# Patient Record
Sex: FEMALE | Race: WHITE | NOT HISPANIC OR LATINO | Employment: OTHER | URBAN - METROPOLITAN AREA
[De-identification: names, ages, dates, MRNs, and addresses within clinical notes are randomized per-mention and may not be internally consistent; named-entity substitution may affect disease eponyms.]

---

## 2018-02-23 ENCOUNTER — OFFICE VISIT - HEALTHEAST (OUTPATIENT)
Dept: FAMILY MEDICINE | Facility: CLINIC | Age: 68
End: 2018-02-23

## 2018-02-23 DIAGNOSIS — Z71.84 TRAVEL ADVICE ENCOUNTER: ICD-10-CM

## 2018-02-23 DIAGNOSIS — Z00.00 ROUTINE GENERAL MEDICAL EXAMINATION AT A HEALTH CARE FACILITY: ICD-10-CM

## 2018-02-23 DIAGNOSIS — M81.8 OTHER OSTEOPOROSIS WITHOUT CURRENT PATHOLOGICAL FRACTURE: ICD-10-CM

## 2018-02-23 DIAGNOSIS — F32.A DEPRESSION, UNSPECIFIED DEPRESSION TYPE: ICD-10-CM

## 2018-02-23 DIAGNOSIS — B18.2 HEP C W/O COMA, CHRONIC (H): ICD-10-CM

## 2018-02-23 DIAGNOSIS — R74.8 ELEVATED ALKALINE PHOSPHATASE LEVEL: ICD-10-CM

## 2018-02-23 DIAGNOSIS — R53.83 FATIGUE: ICD-10-CM

## 2018-02-23 DIAGNOSIS — M81.0 OSTEOPOROSIS: ICD-10-CM

## 2018-02-23 LAB
ALBUMIN SERPL-MCNC: 3.3 G/DL (ref 3.5–5)
ALP SERPL-CCNC: 144 U/L (ref 45–120)
ALT SERPL W P-5'-P-CCNC: 89 U/L (ref 0–45)
ANION GAP SERPL CALCULATED.3IONS-SCNC: 6 MMOL/L (ref 5–18)
AST SERPL W P-5'-P-CCNC: 69 U/L (ref 0–40)
BASOPHILS # BLD AUTO: 0 THOU/UL (ref 0–0.2)
BASOPHILS NFR BLD AUTO: 0 % (ref 0–2)
BILIRUB DIRECT SERPL-MCNC: 0.2 MG/DL
BILIRUB SERPL-MCNC: 0.4 MG/DL (ref 0–1)
BUN SERPL-MCNC: 15 MG/DL (ref 8–22)
CALCIUM SERPL-MCNC: 9 MG/DL (ref 8.5–10.5)
CHLORIDE BLD-SCNC: 102 MMOL/L (ref 98–107)
CHOLEST SERPL-MCNC: 115 MG/DL
CO2 SERPL-SCNC: 28 MMOL/L (ref 22–31)
CREAT SERPL-MCNC: 0.73 MG/DL (ref 0.6–1.1)
EOSINOPHIL # BLD AUTO: 0.1 THOU/UL (ref 0–0.4)
EOSINOPHIL NFR BLD AUTO: 1 % (ref 0–6)
ERYTHROCYTE [DISTWIDTH] IN BLOOD BY AUTOMATED COUNT: 11.7 % (ref 11–14.5)
FASTING STATUS PATIENT QL REPORTED: NO
GFR SERPL CREATININE-BSD FRML MDRD: >60 ML/MIN/1.73M2
GGT SERPL-CCNC: 49 U/L (ref 0–50)
GLUCOSE BLD-MCNC: 100 MG/DL (ref 70–125)
HCT VFR BLD AUTO: 40.3 % (ref 35–47)
HDLC SERPL-MCNC: 38 MG/DL
HGB BLD-MCNC: 13.5 G/DL (ref 12–16)
LDLC SERPL CALC-MCNC: 55 MG/DL
LYMPHOCYTES # BLD AUTO: 1.4 THOU/UL (ref 0.8–4.4)
LYMPHOCYTES NFR BLD AUTO: 19 % (ref 20–40)
MCH RBC QN AUTO: 31.4 PG (ref 27–34)
MCHC RBC AUTO-ENTMCNC: 33.5 G/DL (ref 32–36)
MCV RBC AUTO: 94 FL (ref 80–100)
MONOCYTES # BLD AUTO: 0.5 THOU/UL (ref 0–0.9)
MONOCYTES NFR BLD AUTO: 7 % (ref 2–10)
NEUTROPHILS # BLD AUTO: 5.4 THOU/UL (ref 2–7.7)
NEUTROPHILS NFR BLD AUTO: 73 % (ref 50–70)
PLATELET # BLD AUTO: 127 THOU/UL (ref 140–440)
PMV BLD AUTO: 7.4 FL (ref 7–10)
POTASSIUM BLD-SCNC: 4.4 MMOL/L (ref 3.5–5)
PROT SERPL-MCNC: 6.6 G/DL (ref 6–8)
RBC # BLD AUTO: 4.29 MILL/UL (ref 3.8–5.4)
SODIUM SERPL-SCNC: 136 MMOL/L (ref 136–145)
TRIGL SERPL-MCNC: 109 MG/DL
TSH SERPL DL<=0.005 MIU/L-ACNC: 0.26 UIU/ML (ref 0.3–5)
VIT B12 SERPL-MCNC: 924 PG/ML (ref 213–816)
WBC: 7.4 THOU/UL (ref 4–11)

## 2018-02-25 LAB — 25(OH)D3 SERPL-MCNC: 28.4 NG/ML (ref 30–80)

## 2018-02-28 LAB
ALP BONE SERPL-CCNC: 62 U/L (ref 0–55)
ALP LIVER SERPL-CCNC: 67 U/L (ref 0–94)
ALP OTHER SERPL-CCNC: 0 U/L
ALP SERPL-CCNC: 129 U/L (ref 40–120)

## 2018-03-02 ENCOUNTER — COMMUNICATION - HEALTHEAST (OUTPATIENT)
Dept: FAMILY MEDICINE | Facility: CLINIC | Age: 68
End: 2018-03-02

## 2018-03-02 DIAGNOSIS — R79.89 DECREASED THYROID STIMULATING HORMONE (TSH) LEVEL: ICD-10-CM

## 2018-03-02 DIAGNOSIS — R74.8 ELEVATED LIVER ENZYMES: ICD-10-CM

## 2018-03-02 DIAGNOSIS — D69.6 PLATELETS DECREASED (H): ICD-10-CM

## 2018-03-06 ENCOUNTER — COMMUNICATION - HEALTHEAST (OUTPATIENT)
Dept: FAMILY MEDICINE | Facility: CLINIC | Age: 68
End: 2018-03-06

## 2018-03-19 ENCOUNTER — RECORDS - HEALTHEAST (OUTPATIENT)
Dept: ADMINISTRATIVE | Facility: OTHER | Age: 68
End: 2018-03-19

## 2018-04-16 ENCOUNTER — RECORDS - HEALTHEAST (OUTPATIENT)
Dept: ADMINISTRATIVE | Facility: OTHER | Age: 68
End: 2018-04-16

## 2018-06-29 ENCOUNTER — OFFICE VISIT - HEALTHEAST (OUTPATIENT)
Dept: FAMILY MEDICINE | Facility: CLINIC | Age: 68
End: 2018-06-29

## 2018-06-29 DIAGNOSIS — R35.1 NOCTURIA: ICD-10-CM

## 2018-06-29 DIAGNOSIS — B18.2 HEP C W/O COMA, CHRONIC (H): ICD-10-CM

## 2018-06-29 DIAGNOSIS — F32.A DEPRESSION, UNSPECIFIED DEPRESSION TYPE: ICD-10-CM

## 2018-06-29 DIAGNOSIS — R19.5 LOOSE STOOLS: ICD-10-CM

## 2018-06-29 DIAGNOSIS — R23.3 EASY BRUISING: ICD-10-CM

## 2018-06-29 DIAGNOSIS — K30 INDIGESTION: ICD-10-CM

## 2018-06-29 DIAGNOSIS — R53.83 TIRED: ICD-10-CM

## 2018-06-29 DIAGNOSIS — Z71.84 TRAVEL ADVICE ENCOUNTER: ICD-10-CM

## 2018-06-29 LAB
ALBUMIN SERPL-MCNC: 3.8 G/DL (ref 3.5–5)
ALBUMIN UR-MCNC: NEGATIVE MG/DL
ALP SERPL-CCNC: 130 U/L (ref 45–120)
ALT SERPL W P-5'-P-CCNC: 72 U/L (ref 0–45)
APPEARANCE UR: CLEAR
AST SERPL W P-5'-P-CCNC: 47 U/L (ref 0–40)
BASOPHILS # BLD AUTO: 0 THOU/UL (ref 0–0.2)
BASOPHILS NFR BLD AUTO: 0 % (ref 0–2)
BILIRUB DIRECT SERPL-MCNC: 0.2 MG/DL
BILIRUB SERPL-MCNC: 0.6 MG/DL (ref 0–1)
BILIRUB UR QL STRIP: NEGATIVE
C REACTIVE PROTEIN LHE: <0.1 MG/DL (ref 0–0.8)
COLOR UR AUTO: YELLOW
EOSINOPHIL # BLD AUTO: 0.1 THOU/UL (ref 0–0.4)
EOSINOPHIL NFR BLD AUTO: 2 % (ref 0–6)
ERYTHROCYTE [DISTWIDTH] IN BLOOD BY AUTOMATED COUNT: 11.1 % (ref 11–14.5)
FERRITIN SERPL-MCNC: 249 NG/ML (ref 10–130)
GGT SERPL-CCNC: 63 U/L (ref 0–50)
GLUCOSE UR STRIP-MCNC: NEGATIVE MG/DL
HBA1C MFR BLD: 5 % (ref 3.5–6)
HCT VFR BLD AUTO: 45.5 % (ref 35–47)
HGB BLD-MCNC: 15 G/DL (ref 12–16)
HGB UR QL STRIP: NEGATIVE
KETONES UR STRIP-MCNC: NEGATIVE MG/DL
LEUKOCYTE ESTERASE UR QL STRIP: NEGATIVE
LYMPHOCYTES # BLD AUTO: 1.5 THOU/UL (ref 0.8–4.4)
LYMPHOCYTES NFR BLD AUTO: 26 % (ref 20–40)
MCH RBC QN AUTO: 31.7 PG (ref 27–34)
MCHC RBC AUTO-ENTMCNC: 33 G/DL (ref 32–36)
MCV RBC AUTO: 96 FL (ref 80–100)
MONOCYTES # BLD AUTO: 0.5 THOU/UL (ref 0–0.9)
MONOCYTES NFR BLD AUTO: 9 % (ref 2–10)
NEUTROPHILS # BLD AUTO: 3.6 THOU/UL (ref 2–7.7)
NEUTROPHILS NFR BLD AUTO: 63 % (ref 50–70)
NITRATE UR QL: NEGATIVE
PH UR STRIP: 7 [PH] (ref 5–8)
PLATELET # BLD AUTO: 153 THOU/UL (ref 140–440)
PMV BLD AUTO: 7 FL (ref 7–10)
PROT SERPL-MCNC: 7.4 G/DL (ref 6–8)
RBC # BLD AUTO: 4.73 MILL/UL (ref 3.8–5.4)
SP GR UR STRIP: 1.02 (ref 1–1.03)
T3FREE SERPL-MCNC: 3.1 PG/ML (ref 1.9–3.9)
T4 FREE SERPL-MCNC: 0.8 NG/DL (ref 0.7–1.8)
TSH SERPL DL<=0.005 MIU/L-ACNC: 0.38 UIU/ML (ref 0.3–5)
UROBILINOGEN UR STRIP-ACNC: NORMAL
WBC: 5.7 THOU/UL (ref 4–11)

## 2018-06-29 ASSESSMENT — MIFFLIN-ST. JEOR: SCORE: 1034.76

## 2018-07-01 LAB
ALP BONE SERPL-CCNC: 44 U/L (ref 0–55)
ALP LIVER SERPL-CCNC: 90 U/L (ref 0–94)
ALP OTHER SERPL-CCNC: 0 U/L
ALP SERPL-CCNC: 134 U/L (ref 40–120)

## 2018-07-19 ENCOUNTER — COMMUNICATION - HEALTHEAST (OUTPATIENT)
Dept: FAMILY MEDICINE | Facility: CLINIC | Age: 68
End: 2018-07-19

## 2018-07-24 ENCOUNTER — COMMUNICATION - HEALTHEAST (OUTPATIENT)
Dept: FAMILY MEDICINE | Facility: CLINIC | Age: 68
End: 2018-07-24

## 2018-07-24 ENCOUNTER — RECORDS - HEALTHEAST (OUTPATIENT)
Dept: ADMINISTRATIVE | Facility: OTHER | Age: 68
End: 2018-07-24

## 2018-07-24 DIAGNOSIS — G31.84 MILD COGNITIVE IMPAIRMENT WITH MEMORY LOSS: ICD-10-CM

## 2018-08-05 ENCOUNTER — RECORDS - HEALTHEAST (OUTPATIENT)
Dept: LAB | Facility: CLINIC | Age: 68
End: 2018-08-05

## 2018-08-07 LAB — BACTERIA SPEC CULT: NORMAL

## 2018-09-07 ENCOUNTER — COMMUNICATION - HEALTHEAST (OUTPATIENT)
Dept: FAMILY MEDICINE | Facility: CLINIC | Age: 68
End: 2018-09-07

## 2018-09-07 LAB — ERYTHROCYTE [SEDIMENTATION RATE] IN BLOOD BY WESTERGREN METHOD: 22 MM/HR (ref 0–20)

## 2018-09-12 ENCOUNTER — COMMUNICATION - HEALTHEAST (OUTPATIENT)
Dept: FAMILY MEDICINE | Facility: CLINIC | Age: 68
End: 2018-09-12

## 2019-01-13 ENCOUNTER — COMMUNICATION - HEALTHEAST (OUTPATIENT)
Dept: FAMILY MEDICINE | Facility: CLINIC | Age: 69
End: 2019-01-13

## 2019-03-07 ENCOUNTER — RECORDS - HEALTHEAST (OUTPATIENT)
Dept: ADMINISTRATIVE | Facility: OTHER | Age: 69
End: 2019-03-07

## 2019-03-11 ENCOUNTER — RECORDS - HEALTHEAST (OUTPATIENT)
Dept: ADMINISTRATIVE | Facility: OTHER | Age: 69
End: 2019-03-11

## 2019-05-15 ENCOUNTER — OFFICE VISIT - HEALTHEAST (OUTPATIENT)
Dept: FAMILY MEDICINE | Facility: CLINIC | Age: 69
End: 2019-05-15

## 2019-05-15 ENCOUNTER — COMMUNICATION - HEALTHEAST (OUTPATIENT)
Dept: TELEHEALTH | Facility: CLINIC | Age: 69
End: 2019-05-15

## 2019-05-15 DIAGNOSIS — R41.3 MEMORY DIFFICULTY: ICD-10-CM

## 2019-05-15 DIAGNOSIS — G44.209 TENSION HEADACHE: ICD-10-CM

## 2019-05-15 DIAGNOSIS — Z23 NEED FOR PNEUMOCOCCAL VACCINATION: ICD-10-CM

## 2019-05-15 DIAGNOSIS — R74.8 ELEVATED LIVER ENZYMES: ICD-10-CM

## 2019-05-15 LAB
BASOPHILS # BLD AUTO: 0 THOU/UL (ref 0–0.2)
BASOPHILS NFR BLD AUTO: 0 % (ref 0–2)
EOSINOPHIL # BLD AUTO: 0.1 THOU/UL (ref 0–0.4)
EOSINOPHIL NFR BLD AUTO: 2 % (ref 0–6)
ERYTHROCYTE [DISTWIDTH] IN BLOOD BY AUTOMATED COUNT: 10.8 % (ref 11–14.5)
HCT VFR BLD AUTO: 39.3 % (ref 35–47)
HGB BLD-MCNC: 13.4 G/DL (ref 12–16)
LYMPHOCYTES # BLD AUTO: 1.6 THOU/UL (ref 0.8–4.4)
LYMPHOCYTES NFR BLD AUTO: 32 % (ref 20–40)
MCH RBC QN AUTO: 31.3 PG (ref 27–34)
MCHC RBC AUTO-ENTMCNC: 34 G/DL (ref 32–36)
MCV RBC AUTO: 92 FL (ref 80–100)
MONOCYTES # BLD AUTO: 0.4 THOU/UL (ref 0–0.9)
MONOCYTES NFR BLD AUTO: 9 % (ref 2–10)
NEUTROPHILS # BLD AUTO: 2.9 THOU/UL (ref 2–7.7)
NEUTROPHILS NFR BLD AUTO: 57 % (ref 50–70)
PLATELET # BLD AUTO: 167 THOU/UL (ref 140–440)
PMV BLD AUTO: 7 FL (ref 7–10)
RBC # BLD AUTO: 4.26 MILL/UL (ref 3.8–5.4)
WBC: 5 THOU/UL (ref 4–11)

## 2019-05-15 ASSESSMENT — MIFFLIN-ST. JEOR: SCORE: 1063.49

## 2019-05-16 LAB
ALBUMIN SERPL-MCNC: 3.7 G/DL (ref 3.5–5)
ALP SERPL-CCNC: 118 U/L (ref 45–120)
ALT SERPL W P-5'-P-CCNC: 21 U/L (ref 0–45)
ANION GAP SERPL CALCULATED.3IONS-SCNC: 11 MMOL/L (ref 5–18)
AST SERPL W P-5'-P-CCNC: 28 U/L (ref 0–40)
BILIRUB SERPL-MCNC: 0.3 MG/DL (ref 0–1)
BUN SERPL-MCNC: 17 MG/DL (ref 8–22)
C REACTIVE PROTEIN LHE: <0.1 MG/DL (ref 0–0.8)
CALCIUM SERPL-MCNC: 9.7 MG/DL (ref 8.5–10.5)
CHLORIDE BLD-SCNC: 103 MMOL/L (ref 98–107)
CO2 SERPL-SCNC: 26 MMOL/L (ref 22–31)
CREAT SERPL-MCNC: 0.83 MG/DL (ref 0.6–1.1)
ERYTHROCYTE [SEDIMENTATION RATE] IN BLOOD BY WESTERGREN METHOD: 11 MM/HR (ref 0–20)
GFR SERPL CREATININE-BSD FRML MDRD: >60 ML/MIN/1.73M2
GGT SERPL-CCNC: 21 U/L (ref 0–50)
GLUCOSE BLD-MCNC: 124 MG/DL (ref 70–125)
POTASSIUM BLD-SCNC: 4.5 MMOL/L (ref 3.5–5)
PROT SERPL-MCNC: 7.1 G/DL (ref 6–8)
SODIUM SERPL-SCNC: 140 MMOL/L (ref 136–145)
TSH SERPL DL<=0.005 MIU/L-ACNC: 1.32 UIU/ML (ref 0.3–5)
VIT B12 SERPL-MCNC: >2000 PG/ML (ref 213–816)

## 2019-05-17 ENCOUNTER — COMMUNICATION - HEALTHEAST (OUTPATIENT)
Dept: FAMILY MEDICINE | Facility: CLINIC | Age: 69
End: 2019-05-17

## 2019-05-30 ENCOUNTER — RECORDS - HEALTHEAST (OUTPATIENT)
Dept: ADMINISTRATIVE | Facility: OTHER | Age: 69
End: 2019-05-30

## 2019-05-30 ENCOUNTER — COMMUNICATION - HEALTHEAST (OUTPATIENT)
Dept: FAMILY MEDICINE | Facility: CLINIC | Age: 69
End: 2019-05-30

## 2019-06-25 ENCOUNTER — COMMUNICATION - HEALTHEAST (OUTPATIENT)
Dept: FAMILY MEDICINE | Facility: CLINIC | Age: 69
End: 2019-06-25

## 2019-07-30 ENCOUNTER — HOSPITAL ENCOUNTER (OUTPATIENT)
Dept: NEUROLOGY | Facility: CLINIC | Age: 69
Setting detail: THERAPIES SERIES
Discharge: STILL ADMITTED OR ADMINISTRATIVE | End: 2019-07-30
Attending: FAMILY MEDICINE

## 2019-07-30 DIAGNOSIS — F32.A DEPRESSIVE DISORDER: ICD-10-CM

## 2019-07-30 DIAGNOSIS — G31.84 MILD COGNITIVE IMPAIRMENT WITH MEMORY LOSS: ICD-10-CM

## 2019-08-12 ENCOUNTER — COMMUNICATION - HEALTHEAST (OUTPATIENT)
Dept: SCHEDULING | Facility: CLINIC | Age: 69
End: 2019-08-12

## 2019-08-13 ENCOUNTER — COMMUNICATION - HEALTHEAST (OUTPATIENT)
Dept: FAMILY MEDICINE | Facility: CLINIC | Age: 69
End: 2019-08-13

## 2019-08-13 ENCOUNTER — HOSPITAL ENCOUNTER (OUTPATIENT)
Dept: MRI IMAGING | Facility: CLINIC | Age: 69
Discharge: HOME | End: 2019-08-13
Attending: PSYCHIATRY & NEUROLOGY

## 2019-08-13 DIAGNOSIS — G31.84 MILD COGNITIVE IMPAIRMENT WITH MEMORY LOSS: ICD-10-CM

## 2019-09-04 ENCOUNTER — HOSPITAL ENCOUNTER (OUTPATIENT)
Dept: NEUROLOGY | Facility: CLINIC | Age: 69
Setting detail: THERAPIES SERIES
Discharge: STILL ADMITTED OR ADMINISTRATIVE | End: 2019-09-04
Attending: PSYCHIATRY & NEUROLOGY

## 2019-09-04 DIAGNOSIS — G31.84 MILD COGNITIVE IMPAIRMENT, SO STATED: ICD-10-CM

## 2019-09-11 ENCOUNTER — HOSPITAL ENCOUNTER (OUTPATIENT)
Dept: NEUROLOGY | Facility: CLINIC | Age: 69
Setting detail: THERAPIES SERIES
Discharge: STILL ADMITTED OR ADMINISTRATIVE | End: 2019-09-11
Attending: PSYCHIATRY & NEUROLOGY

## 2019-09-11 DIAGNOSIS — F33.0 MAJOR DEPRESSIVE DISORDER, RECURRENT EPISODE, MILD (H): ICD-10-CM

## 2019-09-11 DIAGNOSIS — G31.84 MILD COGNITIVE IMPAIRMENT, SO STATED: ICD-10-CM

## 2019-09-27 ENCOUNTER — HOSPITAL ENCOUNTER (OUTPATIENT)
Dept: NEUROLOGY | Facility: CLINIC | Age: 69
Setting detail: THERAPIES SERIES
Discharge: STILL ADMITTED OR ADMINISTRATIVE | End: 2019-09-27
Attending: PSYCHIATRY & NEUROLOGY

## 2019-09-27 DIAGNOSIS — G31.84 MCI (MILD COGNITIVE IMPAIRMENT): ICD-10-CM

## 2019-10-15 ENCOUNTER — OFFICE VISIT - HEALTHEAST (OUTPATIENT)
Dept: FAMILY MEDICINE | Facility: CLINIC | Age: 69
End: 2019-10-15

## 2019-10-15 DIAGNOSIS — R63.0 POOR APPETITE: ICD-10-CM

## 2019-10-15 DIAGNOSIS — S06.9X1S TRAUMATIC BRAIN INJURY, WITH LOSS OF CONSCIOUSNESS OF 30 MINUTES OR LESS, SEQUELA (H): ICD-10-CM

## 2019-10-15 DIAGNOSIS — M81.8 OTHER OSTEOPOROSIS WITHOUT CURRENT PATHOLOGICAL FRACTURE: ICD-10-CM

## 2019-10-15 DIAGNOSIS — G31.84 MILD COGNITIVE IMPAIRMENT WITH MEMORY LOSS: ICD-10-CM

## 2019-10-15 DIAGNOSIS — F32.A DEPRESSIVE DISORDER: ICD-10-CM

## 2019-10-15 LAB
ALBUMIN UR-MCNC: NEGATIVE MG/DL
ANION GAP SERPL CALCULATED.3IONS-SCNC: 9 MMOL/L (ref 5–18)
APPEARANCE UR: CLEAR
BACTERIA #/AREA URNS HPF: ABNORMAL HPF
BASOPHILS # BLD AUTO: 0 THOU/UL (ref 0–0.2)
BASOPHILS NFR BLD AUTO: 0 % (ref 0–2)
BILIRUB UR QL STRIP: NEGATIVE
BUN SERPL-MCNC: 12 MG/DL (ref 8–22)
CALCIUM SERPL-MCNC: 10.2 MG/DL (ref 8.5–10.5)
CHLORIDE BLD-SCNC: 99 MMOL/L (ref 98–107)
CO2 SERPL-SCNC: 28 MMOL/L (ref 22–31)
COLOR UR AUTO: YELLOW
CREAT SERPL-MCNC: 0.79 MG/DL (ref 0.6–1.1)
EOSINOPHIL # BLD AUTO: 0.1 THOU/UL (ref 0–0.4)
EOSINOPHIL NFR BLD AUTO: 1 % (ref 0–6)
ERYTHROCYTE [DISTWIDTH] IN BLOOD BY AUTOMATED COUNT: 11.6 % (ref 11–14.5)
GFR SERPL CREATININE-BSD FRML MDRD: >60 ML/MIN/1.73M2
GLUCOSE BLD-MCNC: 95 MG/DL (ref 70–125)
GLUCOSE UR STRIP-MCNC: NEGATIVE MG/DL
HCT VFR BLD AUTO: 42.9 % (ref 35–47)
HGB BLD-MCNC: 14.6 G/DL (ref 12–16)
HGB UR QL STRIP: ABNORMAL
HYALINE CASTS #/AREA URNS LPF: ABNORMAL LPF
KETONES UR STRIP-MCNC: NEGATIVE MG/DL
LEUKOCYTE ESTERASE UR QL STRIP: NEGATIVE
LYMPHOCYTES # BLD AUTO: 1 THOU/UL (ref 0.8–4.4)
LYMPHOCYTES NFR BLD AUTO: 13 % (ref 20–40)
MCH RBC QN AUTO: 32.4 PG (ref 27–34)
MCHC RBC AUTO-ENTMCNC: 34 G/DL (ref 32–36)
MCV RBC AUTO: 95 FL (ref 80–100)
MONOCYTES # BLD AUTO: 0.4 THOU/UL (ref 0–0.9)
MONOCYTES NFR BLD AUTO: 5 % (ref 2–10)
NEUTROPHILS # BLD AUTO: 6 THOU/UL (ref 2–7.7)
NEUTROPHILS NFR BLD AUTO: 80 % (ref 50–70)
NITRATE UR QL: NEGATIVE
PH UR STRIP: 8.5 [PH] (ref 5–8)
PLATELET # BLD AUTO: 204 THOU/UL (ref 140–440)
PMV BLD AUTO: 7 FL (ref 7–10)
POTASSIUM BLD-SCNC: 4.2 MMOL/L (ref 3.5–5)
RBC # BLD AUTO: 4.5 MILL/UL (ref 3.8–5.4)
RBC #/AREA URNS AUTO: ABNORMAL HPF
SODIUM SERPL-SCNC: 136 MMOL/L (ref 136–145)
SP GR UR STRIP: 1.02 (ref 1–1.03)
SQUAMOUS #/AREA URNS AUTO: ABNORMAL LPF
TSH SERPL DL<=0.005 MIU/L-ACNC: 0.86 UIU/ML (ref 0.3–5)
UROBILINOGEN UR STRIP-ACNC: ABNORMAL
VIT B12 SERPL-MCNC: 1893 PG/ML (ref 213–816)
WBC #/AREA URNS AUTO: ABNORMAL HPF
WBC: 7.5 THOU/UL (ref 4–11)

## 2019-10-15 ASSESSMENT — MIFFLIN-ST. JEOR: SCORE: 1043.65

## 2019-10-16 ENCOUNTER — COMMUNICATION - HEALTHEAST (OUTPATIENT)
Dept: FAMILY MEDICINE | Facility: CLINIC | Age: 69
End: 2019-10-16

## 2019-10-16 DIAGNOSIS — R63.0 POOR APPETITE: ICD-10-CM

## 2019-10-16 LAB — 25(OH)D3 SERPL-MCNC: 32 NG/ML (ref 30–80)

## 2019-10-30 ENCOUNTER — AMBULATORY - HEALTHEAST (OUTPATIENT)
Dept: BEHAVIORAL HEALTH | Facility: CLINIC | Age: 69
End: 2019-10-30

## 2019-11-04 ENCOUNTER — COMMUNICATION - HEALTHEAST (OUTPATIENT)
Dept: SCHEDULING | Facility: CLINIC | Age: 69
End: 2019-11-04

## 2019-11-05 ENCOUNTER — OFFICE VISIT - HEALTHEAST (OUTPATIENT)
Dept: FAMILY MEDICINE | Facility: CLINIC | Age: 69
End: 2019-11-05

## 2019-11-05 ENCOUNTER — COMMUNICATION - HEALTHEAST (OUTPATIENT)
Dept: FAMILY MEDICINE | Facility: CLINIC | Age: 69
End: 2019-11-05

## 2019-11-05 DIAGNOSIS — R55 NEAR SYNCOPE: ICD-10-CM

## 2019-11-05 LAB
ALBUMIN SERPL-MCNC: 4.2 G/DL (ref 3.5–5)
ALBUMIN UR-MCNC: NEGATIVE MG/DL
ALP SERPL-CCNC: 101 U/L (ref 45–120)
ALT SERPL W P-5'-P-CCNC: 16 U/L (ref 0–45)
ANION GAP SERPL CALCULATED.3IONS-SCNC: 11 MMOL/L (ref 5–18)
APPEARANCE UR: CLEAR
AST SERPL W P-5'-P-CCNC: 24 U/L (ref 0–40)
ATRIAL RATE - MUSE: 62 BPM
BACTERIA #/AREA URNS HPF: ABNORMAL HPF
BASOPHILS # BLD AUTO: 0 THOU/UL (ref 0–0.2)
BASOPHILS NFR BLD AUTO: 0 % (ref 0–2)
BILIRUB SERPL-MCNC: 0.4 MG/DL (ref 0–1)
BILIRUB UR QL STRIP: NEGATIVE
BUN SERPL-MCNC: 11 MG/DL (ref 8–22)
CALCIUM SERPL-MCNC: 10.3 MG/DL (ref 8.5–10.5)
CHLORIDE BLD-SCNC: 100 MMOL/L (ref 98–107)
CO2 SERPL-SCNC: 28 MMOL/L (ref 22–31)
COLOR UR AUTO: YELLOW
CREAT SERPL-MCNC: 0.81 MG/DL (ref 0.6–1.1)
DIASTOLIC BLOOD PRESSURE - MUSE: NORMAL
EOSINOPHIL # BLD AUTO: 0.1 THOU/UL (ref 0–0.4)
EOSINOPHIL NFR BLD AUTO: 1 % (ref 0–6)
ERYTHROCYTE [DISTWIDTH] IN BLOOD BY AUTOMATED COUNT: 11.5 % (ref 11–14.5)
FASTING STATUS PATIENT QL REPORTED: NO
GFR SERPL CREATININE-BSD FRML MDRD: >60 ML/MIN/1.73M2
GLUCOSE BLD-MCNC: 86 MG/DL (ref 74–125)
GLUCOSE BLD-MCNC: 89 MG/DL (ref 70–125)
GLUCOSE UR STRIP-MCNC: NEGATIVE MG/DL
HCT VFR BLD AUTO: 42.7 % (ref 35–47)
HGB BLD-MCNC: 14.4 G/DL (ref 12–16)
HGB UR QL STRIP: ABNORMAL
INTERPRETATION ECG - MUSE: NORMAL
KETONES UR STRIP-MCNC: NEGATIVE MG/DL
LEUKOCYTE ESTERASE UR QL STRIP: NEGATIVE
LYMPHOCYTES # BLD AUTO: 1.1 THOU/UL (ref 0.8–4.4)
LYMPHOCYTES NFR BLD AUTO: 19 % (ref 20–40)
MCH RBC QN AUTO: 32.2 PG (ref 27–34)
MCHC RBC AUTO-ENTMCNC: 33.8 G/DL (ref 32–36)
MCV RBC AUTO: 95 FL (ref 80–100)
MONOCYTES # BLD AUTO: 0.5 THOU/UL (ref 0–0.9)
MONOCYTES NFR BLD AUTO: 8 % (ref 2–10)
NEUTROPHILS # BLD AUTO: 4.1 THOU/UL (ref 2–7.7)
NEUTROPHILS NFR BLD AUTO: 71 % (ref 50–70)
NITRATE UR QL: NEGATIVE
P AXIS - MUSE: 57 DEGREES
PH UR STRIP: 8.5 [PH] (ref 5–8)
PLATELET # BLD AUTO: 188 THOU/UL (ref 140–440)
PMV BLD AUTO: 7.2 FL (ref 7–10)
POTASSIUM BLD-SCNC: 4.1 MMOL/L (ref 3.5–5)
PR INTERVAL - MUSE: 152 MS
PROT SERPL-MCNC: 7.8 G/DL (ref 6–8)
QRS DURATION - MUSE: 88 MS
QT - MUSE: 416 MS
QTC - MUSE: 422 MS
R AXIS - MUSE: 32 DEGREES
RBC # BLD AUTO: 4.48 MILL/UL (ref 3.8–5.4)
RBC #/AREA URNS AUTO: ABNORMAL HPF
SODIUM SERPL-SCNC: 139 MMOL/L (ref 136–145)
SP GR UR STRIP: 1.01 (ref 1–1.03)
SQUAMOUS #/AREA URNS AUTO: ABNORMAL LPF
SYSTOLIC BLOOD PRESSURE - MUSE: NORMAL
T AXIS - MUSE: 56 DEGREES
UROBILINOGEN UR STRIP-ACNC: ABNORMAL
VENTRICULAR RATE- MUSE: 62 BPM
WBC #/AREA URNS AUTO: ABNORMAL HPF
WBC: 5.8 THOU/UL (ref 4–11)

## 2019-11-05 ASSESSMENT — PATIENT HEALTH QUESTIONNAIRE - PHQ9: SUM OF ALL RESPONSES TO PHQ QUESTIONS 1-9: 8

## 2019-11-05 ASSESSMENT — MIFFLIN-ST. JEOR: SCORE: 1053.86

## 2019-11-06 ENCOUNTER — COMMUNICATION - HEALTHEAST (OUTPATIENT)
Dept: FAMILY MEDICINE | Facility: CLINIC | Age: 69
End: 2019-11-06

## 2019-11-07 ENCOUNTER — HOSPITAL ENCOUNTER (OUTPATIENT)
Dept: NEUROLOGY | Facility: HOSPITAL | Age: 69
Discharge: HOME | End: 2019-11-07
Attending: FAMILY MEDICINE

## 2019-11-07 ENCOUNTER — HOSPITAL ENCOUNTER (OUTPATIENT)
Dept: CARDIOLOGY | Facility: HOSPITAL | Age: 69
Discharge: HOME | End: 2019-11-07
Attending: FAMILY MEDICINE

## 2019-11-07 DIAGNOSIS — R55 NEAR SYNCOPE: ICD-10-CM

## 2019-11-11 ENCOUNTER — COMMUNICATION - HEALTHEAST (OUTPATIENT)
Dept: FAMILY MEDICINE | Facility: CLINIC | Age: 69
End: 2019-11-11

## 2019-11-11 DIAGNOSIS — R55 NEAR SYNCOPE: ICD-10-CM

## 2019-11-11 DIAGNOSIS — R53.83 TIRED: ICD-10-CM

## 2019-11-11 DIAGNOSIS — G31.84 MILD COGNITIVE IMPAIRMENT WITH MEMORY LOSS: ICD-10-CM

## 2019-11-12 ENCOUNTER — OFFICE VISIT - HEALTHEAST (OUTPATIENT)
Dept: CARDIOLOGY | Facility: CLINIC | Age: 69
End: 2019-11-12

## 2019-11-12 ENCOUNTER — COMMUNICATION - HEALTHEAST (OUTPATIENT)
Dept: CARDIOLOGY | Facility: CLINIC | Age: 69
End: 2019-11-12

## 2019-11-12 DIAGNOSIS — I49.3 PVC'S (PREMATURE VENTRICULAR CONTRACTIONS): ICD-10-CM

## 2019-11-12 DIAGNOSIS — R55 PRE-SYNCOPE: ICD-10-CM

## 2019-11-18 ENCOUNTER — COMMUNICATION - HEALTHEAST (OUTPATIENT)
Dept: FAMILY MEDICINE | Facility: CLINIC | Age: 69
End: 2019-11-18

## 2019-11-19 ENCOUNTER — COMMUNICATION - HEALTHEAST (OUTPATIENT)
Dept: NEUROLOGY | Facility: CLINIC | Age: 69
End: 2019-11-19

## 2019-11-19 ENCOUNTER — COMMUNICATION - HEALTHEAST (OUTPATIENT)
Dept: FAMILY MEDICINE | Facility: CLINIC | Age: 69
End: 2019-11-19

## 2019-11-21 ENCOUNTER — HOSPITAL ENCOUNTER (OUTPATIENT)
Dept: CARDIOLOGY | Facility: HOSPITAL | Age: 69
Discharge: HOME | End: 2019-11-21
Attending: INTERNAL MEDICINE

## 2019-11-21 DIAGNOSIS — R55 PRE-SYNCOPE: ICD-10-CM

## 2019-11-21 LAB
AORTIC ROOT: 3.9 CM
BSA FOR ECHO PROCEDURE: 1.57 M2
CV BLOOD PRESSURE: ABNORMAL MMHG
CV ECHO HEIGHT: 63 IN
CV ECHO WEIGHT: 122 LBS
DOP CALC LVOT AREA: 2.54 CM2
DOP CALC LVOT DIAMETER: 1.8 CM
DOP CALC LVOT STROKE VOLUME: 72.7 CM3
DOP CALCLVOT PEAK VEL VTI: 28.6 CM
EJECTION FRACTION: 67 % (ref 55–75)
FRACTIONAL SHORTENING: 35.9 % (ref 28–44)
INTERVENTRICULAR SEPTUM IN END DIASTOLE: 0.9 CM (ref 0.6–0.9)
IVS/PW RATIO: 0.9
LA AREA 1: 18.4 CM2
LA AREA 2: 20.1 CM2
LEFT ATRIUM LENGTH: 5.48 CM
LEFT ATRIUM SIZE: 3.6 CM
LEFT ATRIUM TO AORTIC ROOT RATIO: 0.92 NO UNITS
LEFT ATRIUM VOLUME INDEX: 36.5 ML/M2
LEFT ATRIUM VOLUME: 57.4 ML
LEFT VENTRICLE CARDIAC INDEX: 3.1 L/MIN/M2
LEFT VENTRICLE CARDIAC OUTPUT: 4.8 L/MIN
LEFT VENTRICLE DIASTOLIC VOLUME INDEX: 50.3 CM3/M2 (ref 29–61)
LEFT VENTRICLE DIASTOLIC VOLUME: 79 CM3 (ref 46–106)
LEFT VENTRICLE HEART RATE: 66 BPM
LEFT VENTRICLE MASS INDEX: 72.3 G/M2
LEFT VENTRICLE SYSTOLIC VOLUME INDEX: 16.6 CM3/M2 (ref 8–24)
LEFT VENTRICLE SYSTOLIC VOLUME: 26 CM3 (ref 14–42)
LEFT VENTRICULAR INTERNAL DIMENSION IN DIASTOLE: 3.9 CM (ref 3.8–5.2)
LEFT VENTRICULAR INTERNAL DIMENSION IN SYSTOLE: 2.5 CM (ref 2.2–3.5)
LEFT VENTRICULAR MASS: 113.6 G
LEFT VENTRICULAR OUTFLOW TRACT MEAN GRADIENT: 3 MMHG
LEFT VENTRICULAR OUTFLOW TRACT MEAN VELOCITY: 73.3 CM/S
LEFT VENTRICULAR POSTERIOR WALL IN END DIASTOLE: 1 CM (ref 0.6–0.9)
LV STROKE VOLUME INDEX: 46.3 ML/M2
MITRAL VALVE E/A RATIO: 1.4
MV AVERAGE E/E' RATIO: 10 CM/S
MV DECELERATION TIME: 278 MS
MV E'TISSUE VEL-LAT: 11.5 CM/S
MV E'TISSUE VEL-MED: 6.63 CM/S
MV LATERAL E/E' RATIO: 7.9
MV MEDIAL E/E' RATIO: 13.7
MV PEAK A VELOCITY: 66.2 CM/S
MV PEAK E VELOCITY: 90.5 CM/S
NUC REST DIASTOLIC VOLUME INDEX: 1952 LBS
NUC REST SYSTOLIC VOLUME INDEX: 63 IN
TRICUSPID VALVE ANULAR PLANE SYSTOLIC EXCURSION: 2.9 CM

## 2019-11-21 ASSESSMENT — MIFFLIN-ST. JEOR: SCORE: 1042.52

## 2019-11-25 ENCOUNTER — COMMUNICATION - HEALTHEAST (OUTPATIENT)
Dept: FAMILY MEDICINE | Facility: CLINIC | Age: 69
End: 2019-11-25

## 2019-11-30 ENCOUNTER — COMMUNICATION - HEALTHEAST (OUTPATIENT)
Dept: FAMILY MEDICINE | Facility: CLINIC | Age: 69
End: 2019-11-30

## 2019-11-30 DIAGNOSIS — F32.A DEPRESSIVE DISORDER: ICD-10-CM

## 2019-12-05 ENCOUNTER — COMMUNICATION - HEALTHEAST (OUTPATIENT)
Dept: SCHEDULING | Facility: CLINIC | Age: 69
End: 2019-12-05

## 2019-12-06 ENCOUNTER — OFFICE VISIT - HEALTHEAST (OUTPATIENT)
Dept: FAMILY MEDICINE | Facility: CLINIC | Age: 69
End: 2019-12-06

## 2019-12-06 ENCOUNTER — COMMUNICATION - HEALTHEAST (OUTPATIENT)
Dept: NURSING | Facility: CLINIC | Age: 69
End: 2019-12-06

## 2019-12-06 DIAGNOSIS — F32.A DEPRESSIVE DISORDER: ICD-10-CM

## 2019-12-06 DIAGNOSIS — G31.84 MILD COGNITIVE IMPAIRMENT WITH MEMORY LOSS: ICD-10-CM

## 2019-12-06 DIAGNOSIS — R55 NEAR SYNCOPE: ICD-10-CM

## 2019-12-06 DIAGNOSIS — F43.9 STRESS: ICD-10-CM

## 2019-12-06 DIAGNOSIS — Z59.9 FINANCIAL DIFFICULTIES: ICD-10-CM

## 2019-12-06 SDOH — ECONOMIC STABILITY - INCOME SECURITY: PROBLEM RELATED TO HOUSING AND ECONOMIC CIRCUMSTANCES, UNSPECIFIED: Z59.9

## 2019-12-06 ASSESSMENT — MIFFLIN-ST. JEOR: SCORE: 1047.05

## 2019-12-07 ENCOUNTER — COMMUNICATION - HEALTHEAST (OUTPATIENT)
Dept: FAMILY MEDICINE | Facility: CLINIC | Age: 69
End: 2019-12-07

## 2019-12-09 ENCOUNTER — HOSPITAL ENCOUNTER (OUTPATIENT)
Dept: CARDIOLOGY | Facility: HOSPITAL | Age: 69
Discharge: HOME | End: 2019-12-09
Attending: FAMILY MEDICINE

## 2019-12-09 ENCOUNTER — COMMUNICATION - HEALTHEAST (OUTPATIENT)
Dept: FAMILY MEDICINE | Facility: CLINIC | Age: 69
End: 2019-12-09

## 2019-12-09 DIAGNOSIS — R55 NEAR SYNCOPE: ICD-10-CM

## 2019-12-10 ENCOUNTER — COMMUNICATION - HEALTHEAST (OUTPATIENT)
Dept: FAMILY MEDICINE | Facility: CLINIC | Age: 69
End: 2019-12-10

## 2019-12-10 ENCOUNTER — OFFICE VISIT - HEALTHEAST (OUTPATIENT)
Dept: FAMILY MEDICINE | Facility: CLINIC | Age: 69
End: 2019-12-10

## 2019-12-10 ENCOUNTER — AMBULATORY - HEALTHEAST (OUTPATIENT)
Dept: LAB | Facility: CLINIC | Age: 69
End: 2019-12-10

## 2019-12-10 ENCOUNTER — AMBULATORY - HEALTHEAST (OUTPATIENT)
Dept: NURSING | Facility: CLINIC | Age: 69
End: 2019-12-10

## 2019-12-10 ENCOUNTER — COMMUNICATION - HEALTHEAST (OUTPATIENT)
Dept: CARE COORDINATION | Facility: CLINIC | Age: 69
End: 2019-12-10

## 2019-12-10 DIAGNOSIS — R55 NEAR SYNCOPE: ICD-10-CM

## 2019-12-10 DIAGNOSIS — R07.9 CHEST PAIN: ICD-10-CM

## 2019-12-10 DIAGNOSIS — R42 DIZZINESS: ICD-10-CM

## 2019-12-10 DIAGNOSIS — F43.22 ADJUSTMENT DISORDER WITH ANXIOUS MOOD: ICD-10-CM

## 2019-12-10 DIAGNOSIS — G31.84 MILD COGNITIVE IMPAIRMENT WITH MEMORY LOSS: ICD-10-CM

## 2019-12-10 DIAGNOSIS — F32.A DEPRESSIVE DISORDER: ICD-10-CM

## 2019-12-10 LAB
DEPRECATED S PYO AG THROAT QL EIA: NORMAL
FLUAV AG SPEC QL IA: NORMAL
FLUBV AG SPEC QL IA: NORMAL

## 2019-12-10 ASSESSMENT — MIFFLIN-ST. JEOR: SCORE: 1047.05

## 2019-12-11 ENCOUNTER — COMMUNICATION - HEALTHEAST (OUTPATIENT)
Dept: NURSING | Facility: CLINIC | Age: 69
End: 2019-12-11

## 2019-12-11 LAB — GROUP A STREP BY PCR: NORMAL

## 2019-12-11 ASSESSMENT — ACTIVITIES OF DAILY LIVING (ADL): DEPENDENT_IADLS:: INDEPENDENT

## 2019-12-12 ENCOUNTER — COMMUNICATION - HEALTHEAST (OUTPATIENT)
Dept: FAMILY MEDICINE | Facility: CLINIC | Age: 69
End: 2019-12-12

## 2019-12-12 DIAGNOSIS — F32.A DEPRESSIVE DISORDER: ICD-10-CM

## 2019-12-13 ENCOUNTER — COMMUNICATION - HEALTHEAST (OUTPATIENT)
Dept: FAMILY MEDICINE | Facility: CLINIC | Age: 69
End: 2019-12-13

## 2019-12-13 ENCOUNTER — AMBULATORY - HEALTHEAST (OUTPATIENT)
Dept: LAB | Facility: CLINIC | Age: 69
End: 2019-12-13

## 2019-12-13 DIAGNOSIS — R55 NEAR SYNCOPE: ICD-10-CM

## 2019-12-13 LAB
ANNOTATION COMMENT IMP: NORMAL
METANEPHS SERPL-SCNC: 0.34 NMOL/L (ref 0–0.49)
NORMETANEPHRINE SERPL-SCNC: 0.31 NMOL/L (ref 0–0.89)

## 2019-12-16 ENCOUNTER — COMMUNICATION - HEALTHEAST (OUTPATIENT)
Dept: FAMILY MEDICINE | Facility: CLINIC | Age: 69
End: 2019-12-16

## 2019-12-16 ENCOUNTER — COMMUNICATION - HEALTHEAST (OUTPATIENT)
Dept: NURSING | Facility: CLINIC | Age: 69
End: 2019-12-16

## 2019-12-16 DIAGNOSIS — R55 NEAR SYNCOPE: ICD-10-CM

## 2019-12-16 DIAGNOSIS — R42 DIZZY SPELLS: ICD-10-CM

## 2019-12-16 DIAGNOSIS — G31.84 MILD COGNITIVE IMPAIRMENT WITH MEMORY LOSS: ICD-10-CM

## 2019-12-16 LAB
CATECHOLS UR-IMP: NORMAL
COLLECT DURATION TIME SPEC: 24 HR
CREAT 24H UR-MRATE: 1045 MG/D (ref 500–1400)
CREAT UR-MCNC: 110 MG/DL
DOPAMINE 24H UR-MRATE: 203 UG/D (ref 71–485)
DOPAMINE UR-MCNC: 214 UG/L
DOPAMINE/CREAT UR: 195 UG/G CRT (ref 0–250)
EPINEPH 24H UR-MRATE: 6 UG/D (ref 1–14)
EPINEPH UR-MCNC: 6 UG/L
EPINEPH/CREAT UR: 5 UG/G CRT (ref 0–20)
NOREPINEPH 24H UR-MRATE: 23 UG/D (ref 14–120)
NOREPINEPH UR-MCNC: 24 UG/L
NOREPINEPH/CREAT UR: 22 UG/G CRT (ref 0–45)
SPECIMEN VOL ?TM UR: 950 ML

## 2019-12-17 ENCOUNTER — COMMUNICATION - HEALTHEAST (OUTPATIENT)
Dept: FAMILY MEDICINE | Facility: CLINIC | Age: 69
End: 2019-12-17

## 2019-12-20 ENCOUNTER — COMMUNICATION - HEALTHEAST (OUTPATIENT)
Dept: FAMILY MEDICINE | Facility: CLINIC | Age: 69
End: 2019-12-20

## 2019-12-23 ENCOUNTER — COMMUNICATION - HEALTHEAST (OUTPATIENT)
Dept: FAMILY MEDICINE | Facility: CLINIC | Age: 69
End: 2019-12-23

## 2019-12-23 ENCOUNTER — COMMUNICATION - HEALTHEAST (OUTPATIENT)
Dept: ADMINISTRATIVE | Facility: CLINIC | Age: 69
End: 2019-12-23

## 2019-12-27 ENCOUNTER — COMMUNICATION - HEALTHEAST (OUTPATIENT)
Dept: FAMILY MEDICINE | Facility: CLINIC | Age: 69
End: 2019-12-27

## 2019-12-28 ENCOUNTER — COMMUNICATION - HEALTHEAST (OUTPATIENT)
Dept: FAMILY MEDICINE | Facility: CLINIC | Age: 69
End: 2019-12-28

## 2019-12-29 ENCOUNTER — COMMUNICATION - HEALTHEAST (OUTPATIENT)
Dept: FAMILY MEDICINE | Facility: CLINIC | Age: 69
End: 2019-12-29

## 2019-12-30 ENCOUNTER — AMBULATORY - HEALTHEAST (OUTPATIENT)
Dept: FAMILY MEDICINE | Facility: CLINIC | Age: 69
End: 2019-12-30

## 2019-12-30 ENCOUNTER — COMMUNICATION - HEALTHEAST (OUTPATIENT)
Dept: FAMILY MEDICINE | Facility: CLINIC | Age: 69
End: 2019-12-30

## 2019-12-31 ENCOUNTER — COMMUNICATION - HEALTHEAST (OUTPATIENT)
Dept: FAMILY MEDICINE | Facility: CLINIC | Age: 69
End: 2019-12-31

## 2020-01-02 ENCOUNTER — COMMUNICATION - HEALTHEAST (OUTPATIENT)
Dept: FAMILY MEDICINE | Facility: CLINIC | Age: 70
End: 2020-01-02

## 2020-01-03 ENCOUNTER — COMMUNICATION - HEALTHEAST (OUTPATIENT)
Dept: ADMINISTRATIVE | Facility: CLINIC | Age: 70
End: 2020-01-03

## 2020-01-04 ENCOUNTER — COMMUNICATION - HEALTHEAST (OUTPATIENT)
Dept: FAMILY MEDICINE | Facility: CLINIC | Age: 70
End: 2020-01-04

## 2020-01-07 ENCOUNTER — AMBULATORY - HEALTHEAST (OUTPATIENT)
Dept: FAMILY MEDICINE | Facility: CLINIC | Age: 70
End: 2020-01-07

## 2020-01-07 DIAGNOSIS — R42 DIZZY SPELLS: ICD-10-CM

## 2020-01-07 DIAGNOSIS — R55 NEAR SYNCOPE: ICD-10-CM

## 2020-01-08 ENCOUNTER — COMMUNICATION - HEALTHEAST (OUTPATIENT)
Dept: FAMILY MEDICINE | Facility: CLINIC | Age: 70
End: 2020-01-08

## 2020-01-08 DIAGNOSIS — R55 NEAR SYNCOPE: ICD-10-CM

## 2020-01-08 DIAGNOSIS — R42 DIZZY SPELLS: ICD-10-CM

## 2020-01-09 ENCOUNTER — COMMUNICATION - HEALTHEAST (OUTPATIENT)
Dept: FAMILY MEDICINE | Facility: CLINIC | Age: 70
End: 2020-01-09

## 2020-01-09 ENCOUNTER — HOSPITAL ENCOUNTER (OUTPATIENT)
Dept: ULTRASOUND IMAGING | Facility: HOSPITAL | Age: 70
Discharge: HOME | End: 2020-01-09
Attending: FAMILY MEDICINE

## 2020-01-09 DIAGNOSIS — R55 NEAR SYNCOPE: ICD-10-CM

## 2020-01-09 DIAGNOSIS — R42 DIZZY SPELLS: ICD-10-CM

## 2020-01-10 ENCOUNTER — AMBULATORY - HEALTHEAST (OUTPATIENT)
Dept: LAB | Facility: CLINIC | Age: 70
End: 2020-01-10

## 2020-01-10 ENCOUNTER — COMMUNICATION - HEALTHEAST (OUTPATIENT)
Dept: FAMILY MEDICINE | Facility: CLINIC | Age: 70
End: 2020-01-10

## 2020-01-10 DIAGNOSIS — R42 DIZZY SPELLS: ICD-10-CM

## 2020-01-10 DIAGNOSIS — R31.29 MICROSCOPIC HEMATURIA: ICD-10-CM

## 2020-01-10 LAB
ALBUMIN UR-MCNC: ABNORMAL MG/DL
APPEARANCE UR: CLEAR
BACTERIA #/AREA URNS HPF: ABNORMAL HPF
BILIRUB UR QL STRIP: NEGATIVE
COLOR UR AUTO: YELLOW
GLUCOSE UR STRIP-MCNC: NEGATIVE MG/DL
HGB UR QL STRIP: ABNORMAL
KETONES UR STRIP-MCNC: NEGATIVE MG/DL
LEUKOCYTE ESTERASE UR QL STRIP: NEGATIVE
NITRATE UR QL: NEGATIVE
PH UR STRIP: 6 [PH] (ref 5–8)
RBC #/AREA URNS AUTO: ABNORMAL HPF
SP GR UR STRIP: >=1.03 (ref 1–1.03)
SQUAMOUS #/AREA URNS AUTO: ABNORMAL LPF
UROBILINOGEN UR STRIP-ACNC: ABNORMAL
WBC #/AREA URNS AUTO: ABNORMAL HPF

## 2020-01-11 ENCOUNTER — COMMUNICATION - HEALTHEAST (OUTPATIENT)
Dept: FAMILY MEDICINE | Facility: CLINIC | Age: 70
End: 2020-01-11

## 2020-01-12 ENCOUNTER — COMMUNICATION - HEALTHEAST (OUTPATIENT)
Dept: FAMILY MEDICINE | Facility: CLINIC | Age: 70
End: 2020-01-12

## 2020-01-13 ENCOUNTER — COMMUNICATION - HEALTHEAST (OUTPATIENT)
Dept: FAMILY MEDICINE | Facility: CLINIC | Age: 70
End: 2020-01-13

## 2020-01-14 ENCOUNTER — RECORDS - HEALTHEAST (OUTPATIENT)
Dept: MAMMOGRAPHY | Facility: CLINIC | Age: 70
End: 2020-01-14

## 2020-01-14 DIAGNOSIS — Z12.31 ENCOUNTER FOR SCREENING MAMMOGRAM FOR MALIGNANT NEOPLASM OF BREAST: ICD-10-CM

## 2020-01-15 ENCOUNTER — COMMUNICATION - HEALTHEAST (OUTPATIENT)
Dept: FAMILY MEDICINE | Facility: CLINIC | Age: 70
End: 2020-01-15

## 2020-01-16 ENCOUNTER — COMMUNICATION - HEALTHEAST (OUTPATIENT)
Dept: NURSING | Facility: CLINIC | Age: 70
End: 2020-01-16

## 2020-01-16 ENCOUNTER — COMMUNICATION - HEALTHEAST (OUTPATIENT)
Dept: FAMILY MEDICINE | Facility: CLINIC | Age: 70
End: 2020-01-16

## 2020-01-17 ENCOUNTER — COMMUNICATION - HEALTHEAST (OUTPATIENT)
Dept: FAMILY MEDICINE | Facility: CLINIC | Age: 70
End: 2020-01-17

## 2020-01-21 ENCOUNTER — COMMUNICATION - HEALTHEAST (OUTPATIENT)
Dept: FAMILY MEDICINE | Facility: CLINIC | Age: 70
End: 2020-01-21

## 2020-01-22 ENCOUNTER — AMBULATORY - HEALTHEAST (OUTPATIENT)
Dept: FAMILY MEDICINE | Facility: CLINIC | Age: 70
End: 2020-01-22

## 2020-01-22 ENCOUNTER — OFFICE VISIT - HEALTHEAST (OUTPATIENT)
Dept: BEHAVIORAL HEALTH | Facility: CLINIC | Age: 70
End: 2020-01-22

## 2020-01-22 DIAGNOSIS — F32.A DEPRESSIVE DISORDER: ICD-10-CM

## 2020-01-22 DIAGNOSIS — F41.0 PANIC ATTACK: ICD-10-CM

## 2020-01-22 DIAGNOSIS — F41.9 ANXIETY DISORDER, UNSPECIFIED TYPE: ICD-10-CM

## 2020-01-22 DIAGNOSIS — R41.3 MEMORY DIFFICULTY: ICD-10-CM

## 2020-01-22 ASSESSMENT — ANXIETY QUESTIONNAIRES
5. BEING SO RESTLESS THAT IT IS HARD TO SIT STILL: MORE THAN HALF THE DAYS
2. NOT BEING ABLE TO STOP OR CONTROL WORRYING: MORE THAN HALF THE DAYS
IF YOU CHECKED OFF ANY PROBLEMS ON THIS QUESTIONNAIRE, HOW DIFFICULT HAVE THESE PROBLEMS MADE IT FOR YOU TO DO YOUR WORK, TAKE CARE OF THINGS AT HOME, OR GET ALONG WITH OTHER PEOPLE: VERY DIFFICULT
1. FEELING NERVOUS, ANXIOUS, OR ON EDGE: NEARLY EVERY DAY
6. BECOMING EASILY ANNOYED OR IRRITABLE: NEARLY EVERY DAY
GAD7 TOTAL SCORE: 17
4. TROUBLE RELAXING: MORE THAN HALF THE DAYS
7. FEELING AFRAID AS IF SOMETHING AWFUL MIGHT HAPPEN: NEARLY EVERY DAY
3. WORRYING TOO MUCH ABOUT DIFFERENT THINGS: MORE THAN HALF THE DAYS

## 2020-01-22 ASSESSMENT — PATIENT HEALTH QUESTIONNAIRE - PHQ9: SUM OF ALL RESPONSES TO PHQ QUESTIONS 1-9: 14

## 2020-01-23 ENCOUNTER — RECORDS - HEALTHEAST (OUTPATIENT)
Dept: RADIOLOGY | Facility: CLINIC | Age: 70
End: 2020-01-23

## 2020-01-23 ENCOUNTER — COMMUNICATION - HEALTHEAST (OUTPATIENT)
Dept: FAMILY MEDICINE | Facility: CLINIC | Age: 70
End: 2020-01-23

## 2020-01-23 ENCOUNTER — RECORDS - HEALTHEAST (OUTPATIENT)
Dept: ADMINISTRATIVE | Facility: OTHER | Age: 70
End: 2020-01-23

## 2020-01-23 ENCOUNTER — HOSPITAL ENCOUNTER (OUTPATIENT)
Dept: MAMMOGRAPHY | Facility: CLINIC | Age: 70
Discharge: HOME | End: 2020-01-23
Attending: FAMILY MEDICINE

## 2020-01-23 DIAGNOSIS — N64.89 BREAST ASYMMETRY: ICD-10-CM

## 2020-01-24 ENCOUNTER — COMMUNICATION - HEALTHEAST (OUTPATIENT)
Dept: ADMINISTRATIVE | Facility: CLINIC | Age: 70
End: 2020-01-24

## 2020-01-24 ENCOUNTER — COMMUNICATION - HEALTHEAST (OUTPATIENT)
Dept: FAMILY MEDICINE | Facility: CLINIC | Age: 70
End: 2020-01-24

## 2020-01-27 ENCOUNTER — COMMUNICATION - HEALTHEAST (OUTPATIENT)
Dept: FAMILY MEDICINE | Facility: CLINIC | Age: 70
End: 2020-01-27

## 2020-01-29 ENCOUNTER — COMMUNICATION - HEALTHEAST (OUTPATIENT)
Dept: FAMILY MEDICINE | Facility: CLINIC | Age: 70
End: 2020-01-29

## 2020-01-31 ENCOUNTER — COMMUNICATION - HEALTHEAST (OUTPATIENT)
Dept: FAMILY MEDICINE | Facility: CLINIC | Age: 70
End: 2020-01-31

## 2020-01-31 DIAGNOSIS — R42 VERTIGO: ICD-10-CM

## 2020-01-31 DIAGNOSIS — F41.0 PANIC ATTACK: ICD-10-CM

## 2020-01-31 DIAGNOSIS — F43.9 STRESS: ICD-10-CM

## 2020-02-01 ENCOUNTER — COMMUNICATION - HEALTHEAST (OUTPATIENT)
Dept: FAMILY MEDICINE | Facility: CLINIC | Age: 70
End: 2020-02-01

## 2020-02-04 ENCOUNTER — COMMUNICATION - HEALTHEAST (OUTPATIENT)
Dept: FAMILY MEDICINE | Facility: CLINIC | Age: 70
End: 2020-02-04

## 2020-02-05 ENCOUNTER — COMMUNICATION - HEALTHEAST (OUTPATIENT)
Dept: FAMILY MEDICINE | Facility: CLINIC | Age: 70
End: 2020-02-05

## 2020-02-05 ENCOUNTER — OFFICE VISIT - HEALTHEAST (OUTPATIENT)
Dept: BEHAVIORAL HEALTH | Facility: CLINIC | Age: 70
End: 2020-02-05

## 2020-02-05 DIAGNOSIS — F43.10 PTSD (POST-TRAUMATIC STRESS DISORDER): ICD-10-CM

## 2020-02-06 ENCOUNTER — COMMUNICATION - HEALTHEAST (OUTPATIENT)
Dept: FAMILY MEDICINE | Facility: CLINIC | Age: 70
End: 2020-02-06

## 2020-02-07 ENCOUNTER — OFFICE VISIT - HEALTHEAST (OUTPATIENT)
Dept: FAMILY MEDICINE | Facility: CLINIC | Age: 70
End: 2020-02-07

## 2020-02-07 DIAGNOSIS — B18.2 HEP C W/O COMA, CHRONIC (H): ICD-10-CM

## 2020-02-07 DIAGNOSIS — Z12.11 SCREEN FOR COLON CANCER: ICD-10-CM

## 2020-02-07 DIAGNOSIS — S06.9X1S TRAUMATIC BRAIN INJURY, WITH LOSS OF CONSCIOUSNESS OF 30 MINUTES OR LESS, SEQUELA (H): ICD-10-CM

## 2020-02-07 DIAGNOSIS — F10.21 ALCOHOL DEPENDENCE IN REMISSION (H): ICD-10-CM

## 2020-02-07 DIAGNOSIS — Z80.0 FAMILY HX OF COLON CANCER: ICD-10-CM

## 2020-02-07 DIAGNOSIS — F32.0 MILD MAJOR DEPRESSION (H): ICD-10-CM

## 2020-02-07 DIAGNOSIS — Z00.00 ROUTINE GENERAL MEDICAL EXAMINATION AT A HEALTH CARE FACILITY: ICD-10-CM

## 2020-02-07 DIAGNOSIS — M81.8 OTHER OSTEOPOROSIS WITHOUT CURRENT PATHOLOGICAL FRACTURE: ICD-10-CM

## 2020-02-07 ASSESSMENT — MIFFLIN-ST. JEOR: SCORE: 1034.15

## 2020-02-11 ENCOUNTER — COMMUNICATION - HEALTHEAST (OUTPATIENT)
Dept: FAMILY MEDICINE | Facility: CLINIC | Age: 70
End: 2020-02-11

## 2020-02-11 DIAGNOSIS — T75.3XXA MOTION SICKNESS, INITIAL ENCOUNTER: ICD-10-CM

## 2020-02-12 ENCOUNTER — OFFICE VISIT - HEALTHEAST (OUTPATIENT)
Dept: BEHAVIORAL HEALTH | Facility: CLINIC | Age: 70
End: 2020-02-12

## 2020-02-12 ENCOUNTER — COMMUNICATION - HEALTHEAST (OUTPATIENT)
Dept: FAMILY MEDICINE | Facility: CLINIC | Age: 70
End: 2020-02-12

## 2020-02-12 DIAGNOSIS — F41.9 ANXIETY DISORDER, UNSPECIFIED TYPE: ICD-10-CM

## 2020-02-12 DIAGNOSIS — F43.10 PTSD (POST-TRAUMATIC STRESS DISORDER): ICD-10-CM

## 2020-02-23 ENCOUNTER — COMMUNICATION - HEALTHEAST (OUTPATIENT)
Dept: FAMILY MEDICINE | Facility: CLINIC | Age: 70
End: 2020-02-23

## 2020-02-25 ENCOUNTER — RECORDS - HEALTHEAST (OUTPATIENT)
Dept: ADMINISTRATIVE | Facility: OTHER | Age: 70
End: 2020-02-25

## 2020-02-26 ENCOUNTER — COMMUNICATION - HEALTHEAST (OUTPATIENT)
Dept: CARE COORDINATION | Facility: CLINIC | Age: 70
End: 2020-02-26

## 2020-02-26 ENCOUNTER — AMBULATORY - HEALTHEAST (OUTPATIENT)
Dept: BEHAVIORAL HEALTH | Facility: CLINIC | Age: 70
End: 2020-02-26

## 2020-02-26 ENCOUNTER — COMMUNICATION - HEALTHEAST (OUTPATIENT)
Dept: NURSING | Facility: CLINIC | Age: 70
End: 2020-02-26

## 2020-02-26 ENCOUNTER — OFFICE VISIT - HEALTHEAST (OUTPATIENT)
Dept: BEHAVIORAL HEALTH | Facility: CLINIC | Age: 70
End: 2020-02-26

## 2020-02-26 DIAGNOSIS — F41.1 GENERALIZED ANXIETY DISORDER: ICD-10-CM

## 2020-02-26 ASSESSMENT — ANXIETY QUESTIONNAIRES
2. NOT BEING ABLE TO STOP OR CONTROL WORRYING: NOT AT ALL
GAD7 TOTAL SCORE: 1
3. WORRYING TOO MUCH ABOUT DIFFERENT THINGS: NOT AT ALL
IF YOU CHECKED OFF ANY PROBLEMS ON THIS QUESTIONNAIRE, HOW DIFFICULT HAVE THESE PROBLEMS MADE IT FOR YOU TO DO YOUR WORK, TAKE CARE OF THINGS AT HOME, OR GET ALONG WITH OTHER PEOPLE: NOT DIFFICULT AT ALL
1. FEELING NERVOUS, ANXIOUS, OR ON EDGE: NOT AT ALL
5. BEING SO RESTLESS THAT IT IS HARD TO SIT STILL: NOT AT ALL
7. FEELING AFRAID AS IF SOMETHING AWFUL MIGHT HAPPEN: NOT AT ALL
6. BECOMING EASILY ANNOYED OR IRRITABLE: SEVERAL DAYS
4. TROUBLE RELAXING: NOT AT ALL

## 2020-02-29 ENCOUNTER — COMMUNICATION - HEALTHEAST (OUTPATIENT)
Dept: FAMILY MEDICINE | Facility: CLINIC | Age: 70
End: 2020-02-29

## 2020-03-02 ENCOUNTER — COMMUNICATION - HEALTHEAST (OUTPATIENT)
Dept: FAMILY MEDICINE | Facility: CLINIC | Age: 70
End: 2020-03-02

## 2020-03-04 ENCOUNTER — COMMUNICATION - HEALTHEAST (OUTPATIENT)
Dept: FAMILY MEDICINE | Facility: CLINIC | Age: 70
End: 2020-03-04

## 2020-03-04 ENCOUNTER — AMBULATORY - HEALTHEAST (OUTPATIENT)
Dept: FAMILY MEDICINE | Facility: CLINIC | Age: 70
End: 2020-03-04

## 2020-03-04 DIAGNOSIS — S06.9X1S TRAUMATIC BRAIN INJURY, WITH LOSS OF CONSCIOUSNESS OF 30 MINUTES OR LESS, SEQUELA (H): ICD-10-CM

## 2020-03-04 DIAGNOSIS — F41.0 PANIC ATTACK: ICD-10-CM

## 2020-03-08 ENCOUNTER — COMMUNICATION - HEALTHEAST (OUTPATIENT)
Dept: FAMILY MEDICINE | Facility: CLINIC | Age: 70
End: 2020-03-08

## 2020-03-11 ENCOUNTER — COMMUNICATION - HEALTHEAST (OUTPATIENT)
Dept: FAMILY MEDICINE | Facility: CLINIC | Age: 70
End: 2020-03-11

## 2020-03-12 ENCOUNTER — COMMUNICATION - HEALTHEAST (OUTPATIENT)
Dept: FAMILY MEDICINE | Facility: CLINIC | Age: 70
End: 2020-03-12

## 2020-03-18 ENCOUNTER — AMBULATORY - HEALTHEAST (OUTPATIENT)
Dept: FAMILY MEDICINE | Facility: CLINIC | Age: 70
End: 2020-03-18

## 2020-03-18 DIAGNOSIS — F32.0 MILD MAJOR DEPRESSION (H): ICD-10-CM

## 2020-03-18 DIAGNOSIS — F10.21 ALCOHOL DEPENDENCE IN REMISSION (H): ICD-10-CM

## 2020-03-18 DIAGNOSIS — S06.9X1S TRAUMATIC BRAIN INJURY, WITH LOSS OF CONSCIOUSNESS OF 30 MINUTES OR LESS, SEQUELA (H): ICD-10-CM

## 2020-03-18 DIAGNOSIS — F41.0 PANIC ATTACK: ICD-10-CM

## 2020-03-20 ENCOUNTER — OFFICE VISIT - HEALTHEAST (OUTPATIENT)
Dept: BEHAVIORAL HEALTH | Facility: CLINIC | Age: 70
End: 2020-03-20

## 2020-03-20 DIAGNOSIS — F43.10 PTSD (POST-TRAUMATIC STRESS DISORDER): ICD-10-CM

## 2020-03-20 DIAGNOSIS — F41.1 GENERALIZED ANXIETY DISORDER: ICD-10-CM

## 2020-03-24 ENCOUNTER — COMMUNICATION - HEALTHEAST (OUTPATIENT)
Dept: FAMILY MEDICINE | Facility: CLINIC | Age: 70
End: 2020-03-24

## 2020-04-03 ENCOUNTER — OFFICE VISIT - HEALTHEAST (OUTPATIENT)
Dept: BEHAVIORAL HEALTH | Facility: CLINIC | Age: 70
End: 2020-04-03

## 2020-04-03 DIAGNOSIS — F41.1 GENERALIZED ANXIETY DISORDER: ICD-10-CM

## 2020-04-03 DIAGNOSIS — F43.10 PTSD (POST-TRAUMATIC STRESS DISORDER): ICD-10-CM

## 2020-04-06 ENCOUNTER — COMMUNICATION - HEALTHEAST (OUTPATIENT)
Dept: FAMILY MEDICINE | Facility: CLINIC | Age: 70
End: 2020-04-06

## 2020-04-10 ENCOUNTER — COMMUNICATION - HEALTHEAST (OUTPATIENT)
Dept: FAMILY MEDICINE | Facility: CLINIC | Age: 70
End: 2020-04-10

## 2020-04-13 ENCOUNTER — COMMUNICATION - HEALTHEAST (OUTPATIENT)
Dept: FAMILY MEDICINE | Facility: CLINIC | Age: 70
End: 2020-04-13

## 2020-04-22 ENCOUNTER — COMMUNICATION - HEALTHEAST (OUTPATIENT)
Dept: FAMILY MEDICINE | Facility: CLINIC | Age: 70
End: 2020-04-22

## 2020-04-22 DIAGNOSIS — R42 VERTIGO: ICD-10-CM

## 2020-04-22 DIAGNOSIS — F41.0 PANIC ATTACK: ICD-10-CM

## 2020-04-22 DIAGNOSIS — F43.9 STRESS: ICD-10-CM

## 2020-04-22 DIAGNOSIS — S06.9X1S TRAUMATIC BRAIN INJURY, WITH LOSS OF CONSCIOUSNESS OF 30 MINUTES OR LESS, SEQUELA (H): ICD-10-CM

## 2020-04-22 DIAGNOSIS — F10.21 ALCOHOL DEPENDENCE IN REMISSION (H): ICD-10-CM

## 2020-04-22 DIAGNOSIS — F32.0 MILD MAJOR DEPRESSION (H): ICD-10-CM

## 2020-04-24 ENCOUNTER — COMMUNICATION - HEALTHEAST (OUTPATIENT)
Dept: NURSING | Facility: CLINIC | Age: 70
End: 2020-04-24

## 2020-04-26 ENCOUNTER — COMMUNICATION - HEALTHEAST (OUTPATIENT)
Dept: FAMILY MEDICINE | Facility: CLINIC | Age: 70
End: 2020-04-26

## 2020-04-27 ENCOUNTER — COMMUNICATION - HEALTHEAST (OUTPATIENT)
Dept: FAMILY MEDICINE | Facility: CLINIC | Age: 70
End: 2020-04-27

## 2020-05-01 ENCOUNTER — OFFICE VISIT - HEALTHEAST (OUTPATIENT)
Dept: BEHAVIORAL HEALTH | Facility: CLINIC | Age: 70
End: 2020-05-01

## 2020-05-01 ENCOUNTER — COMMUNICATION - HEALTHEAST (OUTPATIENT)
Dept: FAMILY MEDICINE | Facility: CLINIC | Age: 70
End: 2020-05-01

## 2020-05-01 DIAGNOSIS — F41.1 GENERALIZED ANXIETY DISORDER: ICD-10-CM

## 2020-05-01 DIAGNOSIS — F43.10 PTSD (POST-TRAUMATIC STRESS DISORDER): ICD-10-CM

## 2020-05-05 ENCOUNTER — COMMUNICATION - HEALTHEAST (OUTPATIENT)
Dept: FAMILY MEDICINE | Facility: CLINIC | Age: 70
End: 2020-05-05

## 2020-05-06 ENCOUNTER — OFFICE VISIT - HEALTHEAST (OUTPATIENT)
Dept: FAMILY MEDICINE | Facility: CLINIC | Age: 70
End: 2020-05-06

## 2020-05-06 ENCOUNTER — COMMUNICATION - HEALTHEAST (OUTPATIENT)
Dept: NURSING | Facility: CLINIC | Age: 70
End: 2020-05-06

## 2020-05-06 ENCOUNTER — COMMUNICATION - HEALTHEAST (OUTPATIENT)
Dept: FAMILY MEDICINE | Facility: CLINIC | Age: 70
End: 2020-05-06

## 2020-05-06 DIAGNOSIS — S06.9X1S TRAUMATIC BRAIN INJURY, WITH LOSS OF CONSCIOUSNESS OF 30 MINUTES OR LESS, SEQUELA (H): ICD-10-CM

## 2020-05-06 DIAGNOSIS — F41.0 PANIC ATTACK: ICD-10-CM

## 2020-05-06 DIAGNOSIS — G31.84 MILD COGNITIVE IMPAIRMENT WITH MEMORY LOSS: ICD-10-CM

## 2020-05-06 DIAGNOSIS — M81.0 SENILE OSTEOPOROSIS: ICD-10-CM

## 2020-05-06 DIAGNOSIS — F32.0 MILD MAJOR DEPRESSION (H): ICD-10-CM

## 2020-05-06 DIAGNOSIS — R42 VERTIGO: ICD-10-CM

## 2020-05-06 DIAGNOSIS — F43.9 STRESS: ICD-10-CM

## 2020-05-06 DIAGNOSIS — M81.8 OTHER OSTEOPOROSIS WITHOUT CURRENT PATHOLOGICAL FRACTURE: ICD-10-CM

## 2020-05-06 DIAGNOSIS — F10.21 ALCOHOL DEPENDENCE IN REMISSION (H): ICD-10-CM

## 2020-05-07 ENCOUNTER — COMMUNICATION - HEALTHEAST (OUTPATIENT)
Dept: NURSING | Facility: CLINIC | Age: 70
End: 2020-05-07

## 2020-05-07 ENCOUNTER — COMMUNICATION - HEALTHEAST (OUTPATIENT)
Dept: CARE COORDINATION | Facility: CLINIC | Age: 70
End: 2020-05-07

## 2020-05-15 ENCOUNTER — OFFICE VISIT - HEALTHEAST (OUTPATIENT)
Dept: BEHAVIORAL HEALTH | Facility: CLINIC | Age: 70
End: 2020-05-15

## 2020-05-15 DIAGNOSIS — F41.1 GENERALIZED ANXIETY DISORDER: ICD-10-CM

## 2020-05-17 ENCOUNTER — COMMUNICATION - HEALTHEAST (OUTPATIENT)
Dept: FAMILY MEDICINE | Facility: CLINIC | Age: 70
End: 2020-05-17

## 2020-05-17 DIAGNOSIS — F41.0 PANIC DISORDER WITHOUT AGORAPHOBIA: ICD-10-CM

## 2020-05-18 ENCOUNTER — COMMUNICATION - HEALTHEAST (OUTPATIENT)
Dept: FAMILY MEDICINE | Facility: CLINIC | Age: 70
End: 2020-05-18

## 2020-06-01 ENCOUNTER — COMMUNICATION - HEALTHEAST (OUTPATIENT)
Dept: FAMILY MEDICINE | Facility: CLINIC | Age: 70
End: 2020-06-01

## 2020-06-10 ENCOUNTER — COMMUNICATION - HEALTHEAST (OUTPATIENT)
Dept: FAMILY MEDICINE | Facility: CLINIC | Age: 70
End: 2020-06-10

## 2020-06-11 ENCOUNTER — AMBULATORY - HEALTHEAST (OUTPATIENT)
Dept: BEHAVIORAL HEALTH | Facility: CLINIC | Age: 70
End: 2020-06-11

## 2020-06-11 ENCOUNTER — OFFICE VISIT - HEALTHEAST (OUTPATIENT)
Dept: FAMILY MEDICINE | Facility: CLINIC | Age: 70
End: 2020-06-11

## 2020-06-11 ENCOUNTER — OFFICE VISIT - HEALTHEAST (OUTPATIENT)
Dept: BEHAVIORAL HEALTH | Facility: CLINIC | Age: 70
End: 2020-06-11

## 2020-06-11 DIAGNOSIS — S06.9X1S TRAUMATIC BRAIN INJURY, WITH LOSS OF CONSCIOUSNESS OF 30 MINUTES OR LESS, SEQUELA (H): ICD-10-CM

## 2020-06-11 DIAGNOSIS — F41.1 GENERALIZED ANXIETY DISORDER: ICD-10-CM

## 2020-06-11 DIAGNOSIS — F32.0 MILD MAJOR DEPRESSION (H): ICD-10-CM

## 2020-06-11 DIAGNOSIS — M81.8 OTHER OSTEOPOROSIS WITHOUT CURRENT PATHOLOGICAL FRACTURE: ICD-10-CM

## 2020-06-11 DIAGNOSIS — F41.0 PANIC DISORDER WITHOUT AGORAPHOBIA: ICD-10-CM

## 2020-06-11 DIAGNOSIS — F43.10 PTSD (POST-TRAUMATIC STRESS DISORDER): ICD-10-CM

## 2020-06-11 DIAGNOSIS — F10.21 ALCOHOL DEPENDENCE IN REMISSION (H): ICD-10-CM

## 2020-06-11 DIAGNOSIS — G31.84 MILD COGNITIVE IMPAIRMENT WITH MEMORY LOSS: ICD-10-CM

## 2020-06-15 ENCOUNTER — COMMUNICATION - HEALTHEAST (OUTPATIENT)
Dept: FAMILY MEDICINE | Facility: CLINIC | Age: 70
End: 2020-06-15

## 2020-06-21 ENCOUNTER — COMMUNICATION - HEALTHEAST (OUTPATIENT)
Dept: FAMILY MEDICINE | Facility: CLINIC | Age: 70
End: 2020-06-21

## 2020-06-23 ENCOUNTER — COMMUNICATION - HEALTHEAST (OUTPATIENT)
Dept: FAMILY MEDICINE | Facility: CLINIC | Age: 70
End: 2020-06-23

## 2020-06-23 DIAGNOSIS — R55 PRE-SYNCOPE: ICD-10-CM

## 2020-06-23 DIAGNOSIS — R42 VERTIGO: ICD-10-CM

## 2020-06-23 DIAGNOSIS — F41.0 PANIC ATTACK: ICD-10-CM

## 2020-06-23 DIAGNOSIS — I49.3 PVC'S (PREMATURE VENTRICULAR CONTRACTIONS): ICD-10-CM

## 2020-06-23 DIAGNOSIS — F43.9 STRESS: ICD-10-CM

## 2020-07-02 ENCOUNTER — COMMUNICATION - HEALTHEAST (OUTPATIENT)
Dept: BEHAVIORAL HEALTH | Facility: CLINIC | Age: 70
End: 2020-07-02

## 2020-07-06 ENCOUNTER — COMMUNICATION - HEALTHEAST (OUTPATIENT)
Dept: FAMILY MEDICINE | Facility: CLINIC | Age: 70
End: 2020-07-06

## 2020-07-08 ENCOUNTER — COMMUNICATION - HEALTHEAST (OUTPATIENT)
Dept: FAMILY MEDICINE | Facility: CLINIC | Age: 70
End: 2020-07-08

## 2020-07-08 ENCOUNTER — OFFICE VISIT - HEALTHEAST (OUTPATIENT)
Dept: FAMILY MEDICINE | Facility: CLINIC | Age: 70
End: 2020-07-08

## 2020-07-08 DIAGNOSIS — M81.8 OTHER OSTEOPOROSIS WITHOUT CURRENT PATHOLOGICAL FRACTURE: ICD-10-CM

## 2020-07-08 DIAGNOSIS — F32.0 MILD MAJOR DEPRESSION (H): ICD-10-CM

## 2020-07-08 DIAGNOSIS — F10.21 ALCOHOL DEPENDENCE IN REMISSION (H): ICD-10-CM

## 2020-07-08 DIAGNOSIS — Z63.79 STRESSFUL LIFE EVENT AFFECTING FAMILY: ICD-10-CM

## 2020-07-08 DIAGNOSIS — S06.9X1S TRAUMATIC BRAIN INJURY, WITH LOSS OF CONSCIOUSNESS OF 30 MINUTES OR LESS, SEQUELA (H): ICD-10-CM

## 2020-07-08 DIAGNOSIS — R55 PRE-SYNCOPE: ICD-10-CM

## 2020-07-08 DIAGNOSIS — I49.3 PVC'S (PREMATURE VENTRICULAR CONTRACTIONS): ICD-10-CM

## 2020-07-10 ENCOUNTER — COMMUNICATION - HEALTHEAST (OUTPATIENT)
Dept: FAMILY MEDICINE | Facility: CLINIC | Age: 70
End: 2020-07-10

## 2020-07-10 DIAGNOSIS — F10.21 ALCOHOL DEPENDENCE IN REMISSION (H): ICD-10-CM

## 2020-07-10 DIAGNOSIS — F32.0 MILD MAJOR DEPRESSION (H): ICD-10-CM

## 2020-07-10 DIAGNOSIS — S06.9X1S TRAUMATIC BRAIN INJURY, WITH LOSS OF CONSCIOUSNESS OF 30 MINUTES OR LESS, SEQUELA (H): ICD-10-CM

## 2020-07-10 DIAGNOSIS — F41.0 PANIC DISORDER WITHOUT AGORAPHOBIA: ICD-10-CM

## 2020-07-10 DIAGNOSIS — F41.0 PANIC ATTACK: ICD-10-CM

## 2020-07-15 ENCOUNTER — COMMUNICATION - HEALTHEAST (OUTPATIENT)
Dept: FAMILY MEDICINE | Facility: CLINIC | Age: 70
End: 2020-07-15

## 2020-07-22 ENCOUNTER — OFFICE VISIT - HEALTHEAST (OUTPATIENT)
Dept: FAMILY MEDICINE | Facility: CLINIC | Age: 70
End: 2020-07-22

## 2020-07-22 DIAGNOSIS — M81.8 OTHER OSTEOPOROSIS WITHOUT CURRENT PATHOLOGICAL FRACTURE: ICD-10-CM

## 2020-07-22 DIAGNOSIS — R42 VERTIGO: ICD-10-CM

## 2020-07-22 DIAGNOSIS — F41.0 PANIC DISORDER WITHOUT AGORAPHOBIA: ICD-10-CM

## 2020-07-22 DIAGNOSIS — F10.21 ALCOHOL DEPENDENCE IN REMISSION (H): ICD-10-CM

## 2020-07-22 DIAGNOSIS — M81.0 SENILE OSTEOPOROSIS: ICD-10-CM

## 2020-07-22 DIAGNOSIS — F32.0 MILD MAJOR DEPRESSION (H): ICD-10-CM

## 2020-07-22 DIAGNOSIS — G31.84 MILD COGNITIVE IMPAIRMENT WITH MEMORY LOSS: ICD-10-CM

## 2020-07-22 DIAGNOSIS — F43.9 STRESS: ICD-10-CM

## 2020-07-22 DIAGNOSIS — Z63.79 STRESSFUL LIFE EVENT AFFECTING FAMILY: ICD-10-CM

## 2020-07-22 DIAGNOSIS — F41.0 PANIC ATTACK: ICD-10-CM

## 2020-07-22 DIAGNOSIS — R55 PRE-SYNCOPE: ICD-10-CM

## 2020-07-22 DIAGNOSIS — I49.3 PVC'S (PREMATURE VENTRICULAR CONTRACTIONS): ICD-10-CM

## 2020-07-22 DIAGNOSIS — S06.9X1S TRAUMATIC BRAIN INJURY, WITH LOSS OF CONSCIOUSNESS OF 30 MINUTES OR LESS, SEQUELA (H): ICD-10-CM

## 2020-07-24 ENCOUNTER — COMMUNICATION - HEALTHEAST (OUTPATIENT)
Dept: FAMILY MEDICINE | Facility: CLINIC | Age: 70
End: 2020-07-24

## 2020-07-29 ENCOUNTER — OFFICE VISIT - HEALTHEAST (OUTPATIENT)
Dept: BEHAVIORAL HEALTH | Facility: CLINIC | Age: 70
End: 2020-07-29

## 2020-07-29 DIAGNOSIS — F41.1 GENERALIZED ANXIETY DISORDER: ICD-10-CM

## 2020-08-06 ENCOUNTER — COMMUNICATION - HEALTHEAST (OUTPATIENT)
Dept: FAMILY MEDICINE | Facility: CLINIC | Age: 70
End: 2020-08-06

## 2020-08-12 ENCOUNTER — OFFICE VISIT - HEALTHEAST (OUTPATIENT)
Dept: FAMILY MEDICINE | Facility: CLINIC | Age: 70
End: 2020-08-12

## 2020-08-12 DIAGNOSIS — G31.84 MILD COGNITIVE IMPAIRMENT WITH MEMORY LOSS: ICD-10-CM

## 2020-08-12 DIAGNOSIS — F10.21 ALCOHOL DEPENDENCE IN REMISSION (H): ICD-10-CM

## 2020-08-12 DIAGNOSIS — S06.9X1S TRAUMATIC BRAIN INJURY, WITH LOSS OF CONSCIOUSNESS OF 30 MINUTES OR LESS, SEQUELA (H): ICD-10-CM

## 2020-08-12 DIAGNOSIS — F41.0 PANIC DISORDER WITHOUT AGORAPHOBIA: ICD-10-CM

## 2020-08-12 DIAGNOSIS — F22 PARANOIA (H): ICD-10-CM

## 2020-08-12 DIAGNOSIS — F32.0 MILD MAJOR DEPRESSION (H): ICD-10-CM

## 2020-08-12 DIAGNOSIS — G47.00 INSOMNIA, UNSPECIFIED TYPE: ICD-10-CM

## 2020-08-19 ENCOUNTER — COMMUNICATION - HEALTHEAST (OUTPATIENT)
Dept: SCHEDULING | Facility: CLINIC | Age: 70
End: 2020-08-19

## 2020-08-19 ENCOUNTER — COMMUNICATION - HEALTHEAST (OUTPATIENT)
Dept: FAMILY MEDICINE | Facility: CLINIC | Age: 70
End: 2020-08-19

## 2020-08-21 ENCOUNTER — COMMUNICATION - HEALTHEAST (OUTPATIENT)
Dept: FAMILY MEDICINE | Facility: CLINIC | Age: 70
End: 2020-08-21

## 2020-08-21 DIAGNOSIS — F41.0 PANIC DISORDER WITHOUT AGORAPHOBIA: ICD-10-CM

## 2020-08-24 ENCOUNTER — COMMUNICATION - HEALTHEAST (OUTPATIENT)
Dept: FAMILY MEDICINE | Facility: CLINIC | Age: 70
End: 2020-08-24

## 2020-08-24 DIAGNOSIS — F41.0 PANIC DISORDER WITHOUT AGORAPHOBIA: ICD-10-CM

## 2020-08-25 ENCOUNTER — AMBULATORY - HEALTHEAST (OUTPATIENT)
Dept: NEUROLOGY | Facility: CLINIC | Age: 70
End: 2020-08-25

## 2020-08-25 ENCOUNTER — COMMUNICATION - HEALTHEAST (OUTPATIENT)
Dept: FAMILY MEDICINE | Facility: CLINIC | Age: 70
End: 2020-08-25

## 2020-08-26 ENCOUNTER — COMMUNICATION - HEALTHEAST (OUTPATIENT)
Dept: BEHAVIORAL HEALTH | Facility: CLINIC | Age: 70
End: 2020-08-26

## 2020-08-27 ENCOUNTER — COMMUNICATION - HEALTHEAST (OUTPATIENT)
Dept: FAMILY MEDICINE | Facility: CLINIC | Age: 70
End: 2020-08-27

## 2020-08-27 DIAGNOSIS — F32.0 MILD MAJOR DEPRESSION (H): ICD-10-CM

## 2020-08-27 DIAGNOSIS — Z63.79 STRESSFUL LIFE EVENT AFFECTING FAMILY: ICD-10-CM

## 2020-08-27 DIAGNOSIS — H81.13 BENIGN PAROXYSMAL POSITIONAL VERTIGO DUE TO BILATERAL VESTIBULAR DISORDER: ICD-10-CM

## 2020-08-27 DIAGNOSIS — M81.8 OTHER OSTEOPOROSIS WITHOUT CURRENT PATHOLOGICAL FRACTURE: ICD-10-CM

## 2020-08-27 DIAGNOSIS — I10 ESSENTIAL HYPERTENSION: ICD-10-CM

## 2020-08-27 DIAGNOSIS — F10.21 ALCOHOL DEPENDENCE IN REMISSION (H): ICD-10-CM

## 2020-08-27 DIAGNOSIS — S06.9X1S TRAUMATIC BRAIN INJURY, WITH LOSS OF CONSCIOUSNESS OF 30 MINUTES OR LESS, SEQUELA (H): ICD-10-CM

## 2020-08-27 DIAGNOSIS — G31.84 MILD COGNITIVE IMPAIRMENT WITH MEMORY LOSS: ICD-10-CM

## 2020-08-27 DIAGNOSIS — R68.89 SPELLS OF DECREASED ATTENTIVENESS: ICD-10-CM

## 2020-08-28 ENCOUNTER — COMMUNICATION - HEALTHEAST (OUTPATIENT)
Dept: FAMILY MEDICINE | Facility: CLINIC | Age: 70
End: 2020-08-28

## 2020-08-30 ENCOUNTER — COMMUNICATION - HEALTHEAST (OUTPATIENT)
Dept: FAMILY MEDICINE | Facility: CLINIC | Age: 70
End: 2020-08-30

## 2020-08-30 DIAGNOSIS — R68.89 SPELLS OF DECREASED ATTENTIVENESS: ICD-10-CM

## 2020-08-30 DIAGNOSIS — I10 ESSENTIAL HYPERTENSION: ICD-10-CM

## 2020-09-02 ENCOUNTER — OFFICE VISIT - HEALTHEAST (OUTPATIENT)
Dept: BEHAVIORAL HEALTH | Facility: CLINIC | Age: 70
End: 2020-09-02

## 2020-09-02 DIAGNOSIS — F41.1 GENERALIZED ANXIETY DISORDER: ICD-10-CM

## 2020-09-03 ENCOUNTER — COMMUNICATION - HEALTHEAST (OUTPATIENT)
Dept: FAMILY MEDICINE | Facility: CLINIC | Age: 70
End: 2020-09-03

## 2020-09-07 ENCOUNTER — COMMUNICATION - HEALTHEAST (OUTPATIENT)
Dept: FAMILY MEDICINE | Facility: CLINIC | Age: 70
End: 2020-09-07

## 2020-09-08 ENCOUNTER — OFFICE VISIT - HEALTHEAST (OUTPATIENT)
Dept: FAMILY MEDICINE | Facility: CLINIC | Age: 70
End: 2020-09-08

## 2020-09-08 ENCOUNTER — COMMUNICATION - HEALTHEAST (OUTPATIENT)
Dept: FAMILY MEDICINE | Facility: CLINIC | Age: 70
End: 2020-09-08

## 2020-09-08 DIAGNOSIS — F32.0 MILD MAJOR DEPRESSION (H): ICD-10-CM

## 2020-09-08 DIAGNOSIS — E51.9 THIAMINE DEFICIENCY: ICD-10-CM

## 2020-09-08 DIAGNOSIS — M81.0 SENILE OSTEOPOROSIS: ICD-10-CM

## 2020-09-08 DIAGNOSIS — Z63.79 STRESSFUL LIFE EVENT AFFECTING FAMILY: ICD-10-CM

## 2020-09-08 DIAGNOSIS — G31.84 MILD COGNITIVE IMPAIRMENT WITH MEMORY LOSS: ICD-10-CM

## 2020-09-08 DIAGNOSIS — Z63.79 STRESSFUL LIFE EVENTS AFFECTING FAMILY AND HOUSEHOLD: ICD-10-CM

## 2020-09-08 DIAGNOSIS — F10.21 ALCOHOL DEPENDENCE IN REMISSION (H): ICD-10-CM

## 2020-09-08 DIAGNOSIS — H81.13 BENIGN PAROXYSMAL POSITIONAL VERTIGO DUE TO BILATERAL VESTIBULAR DISORDER: ICD-10-CM

## 2020-09-08 RX ORDER — ALENDRONATE SODIUM 70 MG/1
70 TABLET ORAL
Qty: 12 TABLET | Refills: 4 | Status: SHIPPED | OUTPATIENT
Start: 2020-09-08 | End: 2022-04-30

## 2020-09-08 ASSESSMENT — PATIENT HEALTH QUESTIONNAIRE - PHQ9: SUM OF ALL RESPONSES TO PHQ QUESTIONS 1-9: 3

## 2020-09-08 ASSESSMENT — MIFFLIN-ST. JEOR: SCORE: 1083.79

## 2020-09-09 ENCOUNTER — COMMUNICATION - HEALTHEAST (OUTPATIENT)
Dept: NURSING | Facility: CLINIC | Age: 70
End: 2020-09-09

## 2020-09-11 ENCOUNTER — COMMUNICATION - HEALTHEAST (OUTPATIENT)
Dept: NURSING | Facility: CLINIC | Age: 70
End: 2020-09-11

## 2020-09-11 ASSESSMENT — ACTIVITIES OF DAILY LIVING (ADL): DEPENDENT_IADLS:: INDEPENDENT

## 2020-09-16 ENCOUNTER — COMMUNICATION - HEALTHEAST (OUTPATIENT)
Dept: FAMILY MEDICINE | Facility: CLINIC | Age: 70
End: 2020-09-16

## 2020-09-16 DIAGNOSIS — R42 VERTIGO: ICD-10-CM

## 2020-09-16 DIAGNOSIS — F43.9 STRESS: ICD-10-CM

## 2020-09-16 DIAGNOSIS — F41.0 PANIC ATTACK: ICD-10-CM

## 2020-09-21 ENCOUNTER — AMBULATORY - HEALTHEAST (OUTPATIENT)
Dept: FAMILY MEDICINE | Facility: CLINIC | Age: 70
End: 2020-09-21

## 2020-09-21 DIAGNOSIS — R68.89 SPELLS OF DECREASED ATTENTIVENESS: ICD-10-CM

## 2020-09-21 DIAGNOSIS — G31.84 MILD COGNITIVE IMPAIRMENT WITH MEMORY LOSS: ICD-10-CM

## 2020-09-21 DIAGNOSIS — F22 PARANOIA (H): ICD-10-CM

## 2020-09-21 DIAGNOSIS — S06.9X1S TRAUMATIC BRAIN INJURY, WITH LOSS OF CONSCIOUSNESS OF 30 MINUTES OR LESS, SEQUELA (H): ICD-10-CM

## 2020-09-22 ENCOUNTER — COMMUNICATION - HEALTHEAST (OUTPATIENT)
Dept: FAMILY MEDICINE | Facility: CLINIC | Age: 70
End: 2020-09-22

## 2020-09-22 DIAGNOSIS — B18.2 CHRONIC HEPATITIS C WITHOUT HEPATIC COMA (H): ICD-10-CM

## 2020-09-22 DIAGNOSIS — I10 ESSENTIAL HYPERTENSION: ICD-10-CM

## 2020-09-22 RX ORDER — LOSARTAN POTASSIUM 100 MG/1
100 TABLET ORAL DAILY
Qty: 90 TABLET | Refills: 4 | Status: SHIPPED | OUTPATIENT
Start: 2020-09-22 | End: 2021-10-25

## 2020-09-22 RX ORDER — FOLIC ACID 1 MG/1
1 TABLET ORAL DAILY
Qty: 90 TABLET | Refills: 4 | Status: SHIPPED | OUTPATIENT
Start: 2020-09-22 | End: 2021-10-25

## 2020-09-22 RX ORDER — LANOLIN ALCOHOL/MO/W.PET/CERES
100 CREAM (GRAM) TOPICAL DAILY
Qty: 90 TABLET | Refills: 4 | Status: SHIPPED | OUTPATIENT
Start: 2020-09-22 | End: 2021-09-22

## 2020-09-28 ENCOUNTER — COMMUNICATION - HEALTHEAST (OUTPATIENT)
Dept: FAMILY MEDICINE | Facility: CLINIC | Age: 70
End: 2020-09-28

## 2020-09-28 DIAGNOSIS — R68.89 SPELLS OF DECREASED ATTENTIVENESS: ICD-10-CM

## 2020-09-28 DIAGNOSIS — G31.84 MILD COGNITIVE IMPAIRMENT WITH MEMORY LOSS: ICD-10-CM

## 2020-09-28 DIAGNOSIS — S06.9X1S TRAUMATIC BRAIN INJURY, WITH LOSS OF CONSCIOUSNESS OF 30 MINUTES OR LESS, SEQUELA (H): ICD-10-CM

## 2020-09-28 DIAGNOSIS — F22 PARANOIA (H): ICD-10-CM

## 2020-09-30 ENCOUNTER — AMBULATORY - HEALTHEAST (OUTPATIENT)
Dept: FAMILY MEDICINE | Facility: CLINIC | Age: 70
End: 2020-09-30

## 2020-09-30 ENCOUNTER — AMBULATORY - HEALTHEAST (OUTPATIENT)
Dept: LAB | Facility: CLINIC | Age: 70
End: 2020-09-30

## 2020-09-30 ENCOUNTER — COMMUNICATION - HEALTHEAST (OUTPATIENT)
Dept: FAMILY MEDICINE | Facility: CLINIC | Age: 70
End: 2020-09-30

## 2020-09-30 ENCOUNTER — COMMUNICATION - HEALTHEAST (OUTPATIENT)
Dept: ADMINISTRATIVE | Facility: CLINIC | Age: 70
End: 2020-09-30

## 2020-09-30 DIAGNOSIS — R55 PRE-SYNCOPE: ICD-10-CM

## 2020-09-30 DIAGNOSIS — R73.09 ELEVATED GLUCOSE: ICD-10-CM

## 2020-09-30 DIAGNOSIS — B18.2 CHRONIC HEPATITIS C WITHOUT HEPATIC COMA (H): ICD-10-CM

## 2020-09-30 DIAGNOSIS — I49.3 PVC'S (PREMATURE VENTRICULAR CONTRACTIONS): ICD-10-CM

## 2020-09-30 DIAGNOSIS — I10 ESSENTIAL HYPERTENSION: ICD-10-CM

## 2020-09-30 LAB
ALBUMIN SERPL-MCNC: 4.1 G/DL (ref 3.5–5)
ALBUMIN UR-MCNC: NEGATIVE MG/DL
ALP SERPL-CCNC: 75 U/L (ref 45–120)
ALT SERPL W P-5'-P-CCNC: 17 U/L (ref 0–45)
ANION GAP SERPL CALCULATED.3IONS-SCNC: 10 MMOL/L (ref 5–18)
APPEARANCE UR: CLEAR
AST SERPL W P-5'-P-CCNC: 17 U/L (ref 0–40)
BACTERIA #/AREA URNS HPF: ABNORMAL HPF
BILIRUB SERPL-MCNC: 0.4 MG/DL (ref 0–1)
BILIRUB UR QL STRIP: NEGATIVE
BUN SERPL-MCNC: 15 MG/DL (ref 8–22)
CALCIUM SERPL-MCNC: 10.3 MG/DL (ref 8.5–10.5)
CHLORIDE BLD-SCNC: 103 MMOL/L (ref 98–107)
CO2 SERPL-SCNC: 27 MMOL/L (ref 22–31)
COLOR UR AUTO: YELLOW
CREAT SERPL-MCNC: 0.93 MG/DL (ref 0.6–1.1)
ERYTHROCYTE [DISTWIDTH] IN BLOOD BY AUTOMATED COUNT: 11.3 % (ref 11–14.5)
GFR SERPL CREATININE-BSD FRML MDRD: 60 ML/MIN/1.73M2
GLUCOSE BLD-MCNC: 173 MG/DL (ref 70–125)
GLUCOSE UR STRIP-MCNC: NEGATIVE MG/DL
HCT VFR BLD AUTO: 44.5 % (ref 35–47)
HGB BLD-MCNC: 15.1 G/DL (ref 12–16)
HGB UR QL STRIP: ABNORMAL
KETONES UR STRIP-MCNC: NEGATIVE MG/DL
LEUKOCYTE ESTERASE UR QL STRIP: NEGATIVE
MCH RBC QN AUTO: 31.9 PG (ref 27–34)
MCHC RBC AUTO-ENTMCNC: 33.8 G/DL (ref 32–36)
MCV RBC AUTO: 94 FL (ref 80–100)
NITRATE UR QL: NEGATIVE
PH UR STRIP: 5.5 [PH] (ref 5–8)
PLATELET # BLD AUTO: 218 THOU/UL (ref 140–440)
PMV BLD AUTO: 6.4 FL (ref 7–10)
POTASSIUM BLD-SCNC: 5 MMOL/L (ref 3.5–5)
PROT SERPL-MCNC: 7.8 G/DL (ref 6–8)
RBC # BLD AUTO: 4.73 MILL/UL (ref 3.8–5.4)
RBC #/AREA URNS AUTO: ABNORMAL HPF
SODIUM SERPL-SCNC: 140 MMOL/L (ref 136–145)
SP GR UR STRIP: 1.02 (ref 1–1.03)
SQUAMOUS #/AREA URNS AUTO: ABNORMAL LPF
UROBILINOGEN UR STRIP-ACNC: ABNORMAL
WBC #/AREA URNS AUTO: ABNORMAL HPF
WBC: 7.7 THOU/UL (ref 4–11)

## 2020-09-30 RX ORDER — METOPROLOL SUCCINATE 50 MG/1
50 TABLET, EXTENDED RELEASE ORAL AT BEDTIME
Qty: 90 TABLET | Refills: 4 | Status: SHIPPED | OUTPATIENT
Start: 2020-09-30 | End: 2021-10-09

## 2020-10-01 ENCOUNTER — COMMUNICATION - HEALTHEAST (OUTPATIENT)
Dept: FAMILY MEDICINE | Facility: CLINIC | Age: 70
End: 2020-10-01

## 2020-10-02 ENCOUNTER — COMMUNICATION - HEALTHEAST (OUTPATIENT)
Dept: FAMILY MEDICINE | Facility: CLINIC | Age: 70
End: 2020-10-02

## 2020-10-02 ENCOUNTER — AMBULATORY - HEALTHEAST (OUTPATIENT)
Dept: FAMILY MEDICINE | Facility: CLINIC | Age: 70
End: 2020-10-02

## 2020-10-02 DIAGNOSIS — F32.0 MILD MAJOR DEPRESSION (H): ICD-10-CM

## 2020-10-02 DIAGNOSIS — E51.9 THIAMINE DEFICIENCY: ICD-10-CM

## 2020-10-02 DIAGNOSIS — F22 PARANOIA (H): ICD-10-CM

## 2020-10-02 DIAGNOSIS — S06.9X1S TRAUMATIC BRAIN INJURY, WITH LOSS OF CONSCIOUSNESS OF 30 MINUTES OR LESS, SEQUELA (H): ICD-10-CM

## 2020-10-02 DIAGNOSIS — F10.21 ALCOHOL DEPENDENCE IN REMISSION (H): ICD-10-CM

## 2020-10-02 DIAGNOSIS — Z63.79 STRESSFUL LIFE EVENT AFFECTING FAMILY: ICD-10-CM

## 2020-10-02 DIAGNOSIS — I10 ESSENTIAL HYPERTENSION: ICD-10-CM

## 2020-10-02 DIAGNOSIS — G31.84 MILD COGNITIVE IMPAIRMENT WITH MEMORY LOSS: ICD-10-CM

## 2020-10-02 DIAGNOSIS — H81.13 BENIGN PAROXYSMAL POSITIONAL VERTIGO DUE TO BILATERAL VESTIBULAR DISORDER: ICD-10-CM

## 2020-10-02 DIAGNOSIS — B18.2 CHRONIC HEPATITIS C WITHOUT HEPATIC COMA (H): ICD-10-CM

## 2020-10-02 DIAGNOSIS — J30.1 NON-SEASONAL ALLERGIC RHINITIS DUE TO POLLEN: ICD-10-CM

## 2020-10-02 LAB — HBA1C MFR BLD: 5.5 %

## 2020-10-04 ENCOUNTER — COMMUNICATION - HEALTHEAST (OUTPATIENT)
Dept: FAMILY MEDICINE | Facility: CLINIC | Age: 70
End: 2020-10-04

## 2020-10-04 DIAGNOSIS — Z59.9 FINANCIAL DIFFICULTIES: ICD-10-CM

## 2020-10-04 SDOH — ECONOMIC STABILITY - INCOME SECURITY: PROBLEM RELATED TO HOUSING AND ECONOMIC CIRCUMSTANCES, UNSPECIFIED: Z59.9

## 2020-10-06 ENCOUNTER — AMBULATORY - HEALTHEAST (OUTPATIENT)
Dept: PHARMACY | Facility: CLINIC | Age: 70
End: 2020-10-06

## 2020-10-06 ENCOUNTER — COMMUNICATION - HEALTHEAST (OUTPATIENT)
Dept: PHARMACY | Facility: CLINIC | Age: 70
End: 2020-10-06

## 2020-10-06 DIAGNOSIS — R73.9 ELEVATED BLOOD SUGAR: ICD-10-CM

## 2020-10-06 DIAGNOSIS — I10 ESSENTIAL HYPERTENSION: ICD-10-CM

## 2020-10-06 DIAGNOSIS — M81.8 OTHER OSTEOPOROSIS WITHOUT CURRENT PATHOLOGICAL FRACTURE: ICD-10-CM

## 2020-10-06 DIAGNOSIS — F41.9 ANXIETY: ICD-10-CM

## 2020-10-06 RX ORDER — GLUCOSAMINE HCL/CHONDROITIN SU 500-400 MG
1 CAPSULE ORAL DAILY
Qty: 100 STRIP | Refills: 3 | Status: SHIPPED | OUTPATIENT
Start: 2020-10-06 | End: 2022-03-19

## 2020-10-07 ENCOUNTER — AMBULATORY - HEALTHEAST (OUTPATIENT)
Dept: BEHAVIORAL HEALTH | Facility: CLINIC | Age: 70
End: 2020-10-07

## 2020-10-07 ENCOUNTER — OFFICE VISIT - HEALTHEAST (OUTPATIENT)
Dept: BEHAVIORAL HEALTH | Facility: CLINIC | Age: 70
End: 2020-10-07

## 2020-10-07 DIAGNOSIS — F41.1 GENERALIZED ANXIETY DISORDER: ICD-10-CM

## 2020-10-07 ASSESSMENT — ANXIETY QUESTIONNAIRES
1. FEELING NERVOUS, ANXIOUS, OR ON EDGE: MORE THAN HALF THE DAYS
3. WORRYING TOO MUCH ABOUT DIFFERENT THINGS: MORE THAN HALF THE DAYS
2. NOT BEING ABLE TO STOP OR CONTROL WORRYING: MORE THAN HALF THE DAYS
GAD7 TOTAL SCORE: 13
7. FEELING AFRAID AS IF SOMETHING AWFUL MIGHT HAPPEN: SEVERAL DAYS
IF YOU CHECKED OFF ANY PROBLEMS ON THIS QUESTIONNAIRE, HOW DIFFICULT HAVE THESE PROBLEMS MADE IT FOR YOU TO DO YOUR WORK, TAKE CARE OF THINGS AT HOME, OR GET ALONG WITH OTHER PEOPLE: VERY DIFFICULT
5. BEING SO RESTLESS THAT IT IS HARD TO SIT STILL: MORE THAN HALF THE DAYS
6. BECOMING EASILY ANNOYED OR IRRITABLE: SEVERAL DAYS
4. TROUBLE RELAXING: NEARLY EVERY DAY

## 2020-10-08 ENCOUNTER — OFFICE VISIT - HEALTHEAST (OUTPATIENT)
Dept: PHARMACY | Facility: CLINIC | Age: 70
End: 2020-10-08

## 2020-10-08 DIAGNOSIS — R73.9 ELEVATED BLOOD SUGAR: ICD-10-CM

## 2020-10-09 ENCOUNTER — OFFICE VISIT - HEALTHEAST (OUTPATIENT)
Dept: FAMILY MEDICINE | Facility: CLINIC | Age: 70
End: 2020-10-09

## 2020-10-09 ENCOUNTER — COMMUNICATION - HEALTHEAST (OUTPATIENT)
Dept: NURSING | Facility: CLINIC | Age: 70
End: 2020-10-09

## 2020-10-09 DIAGNOSIS — I10 ESSENTIAL HYPERTENSION: ICD-10-CM

## 2020-10-09 DIAGNOSIS — R68.89 SPELLS OF DECREASED ATTENTIVENESS: ICD-10-CM

## 2020-10-09 DIAGNOSIS — S09.90XS TRAUMATIC INJURY OF HEAD, SEQUELA: ICD-10-CM

## 2020-10-09 DIAGNOSIS — F41.9 ANXIETY: ICD-10-CM

## 2020-10-09 DIAGNOSIS — E51.9 THIAMINE DEFICIENCY: ICD-10-CM

## 2020-10-09 DIAGNOSIS — Z63.79 STRESSFUL LIFE EVENT AFFECTING FAMILY: ICD-10-CM

## 2020-10-09 DIAGNOSIS — F32.0 MILD MAJOR DEPRESSION (H): ICD-10-CM

## 2020-10-20 ENCOUNTER — COMMUNICATION - HEALTHEAST (OUTPATIENT)
Dept: FAMILY MEDICINE | Facility: CLINIC | Age: 70
End: 2020-10-20

## 2020-10-20 DIAGNOSIS — F22 PARANOIA (H): ICD-10-CM

## 2020-10-20 DIAGNOSIS — R68.89 SPELLS OF DECREASED ATTENTIVENESS: ICD-10-CM

## 2020-10-20 DIAGNOSIS — G31.84 MILD COGNITIVE IMPAIRMENT WITH MEMORY LOSS: ICD-10-CM

## 2020-10-20 DIAGNOSIS — F32.0 MILD MAJOR DEPRESSION (H): ICD-10-CM

## 2020-10-20 DIAGNOSIS — F10.21 ALCOHOL DEPENDENCE IN REMISSION (H): ICD-10-CM

## 2020-10-20 DIAGNOSIS — E51.9 THIAMINE DEFICIENCY: ICD-10-CM

## 2020-10-20 DIAGNOSIS — S06.9X1S TRAUMATIC BRAIN INJURY, WITH LOSS OF CONSCIOUSNESS OF 30 MINUTES OR LESS, SEQUELA (H): ICD-10-CM

## 2020-10-22 ENCOUNTER — AMBULATORY - HEALTHEAST (OUTPATIENT)
Dept: NEUROLOGY | Facility: CLINIC | Age: 70
End: 2020-10-22

## 2020-10-29 ENCOUNTER — COMMUNICATION - HEALTHEAST (OUTPATIENT)
Dept: FAMILY MEDICINE | Facility: CLINIC | Age: 70
End: 2020-10-29

## 2020-10-31 ENCOUNTER — COMMUNICATION - HEALTHEAST (OUTPATIENT)
Dept: FAMILY MEDICINE | Facility: CLINIC | Age: 70
End: 2020-10-31

## 2020-10-31 DIAGNOSIS — S06.9X1S TRAUMATIC BRAIN INJURY, WITH LOSS OF CONSCIOUSNESS OF 30 MINUTES OR LESS, SEQUELA (H): ICD-10-CM

## 2020-10-31 DIAGNOSIS — G31.84 MILD COGNITIVE IMPAIRMENT WITH MEMORY LOSS: ICD-10-CM

## 2020-10-31 DIAGNOSIS — R68.89 SPELLS OF DECREASED ATTENTIVENESS: ICD-10-CM

## 2020-10-31 DIAGNOSIS — F22 PARANOIA (H): ICD-10-CM

## 2020-11-11 ENCOUNTER — OFFICE VISIT - HEALTHEAST (OUTPATIENT)
Dept: BEHAVIORAL HEALTH | Facility: CLINIC | Age: 70
End: 2020-11-11

## 2020-11-11 DIAGNOSIS — F33.2 SEVERE EPISODE OF RECURRENT MAJOR DEPRESSIVE DISORDER, WITHOUT PSYCHOTIC FEATURES (H): ICD-10-CM

## 2020-11-11 DIAGNOSIS — F41.1 GENERALIZED ANXIETY DISORDER: ICD-10-CM

## 2020-11-13 ENCOUNTER — OFFICE VISIT - HEALTHEAST (OUTPATIENT)
Dept: FAMILY MEDICINE | Facility: CLINIC | Age: 70
End: 2020-11-13

## 2020-11-13 ENCOUNTER — COMMUNICATION - HEALTHEAST (OUTPATIENT)
Dept: FAMILY MEDICINE | Facility: CLINIC | Age: 70
End: 2020-11-13

## 2020-11-13 DIAGNOSIS — R73.09 ELEVATED GLUCOSE: ICD-10-CM

## 2020-11-13 DIAGNOSIS — R68.89 SPELLS OF DECREASED ATTENTIVENESS: ICD-10-CM

## 2020-11-13 DIAGNOSIS — Z63.79 STRESSFUL LIFE EVENT AFFECTING FAMILY: ICD-10-CM

## 2020-11-13 DIAGNOSIS — F22 PARANOIA (H): ICD-10-CM

## 2020-11-13 DIAGNOSIS — F41.9 ANXIETY: ICD-10-CM

## 2020-11-13 DIAGNOSIS — G31.84 MILD COGNITIVE IMPAIRMENT WITH MEMORY LOSS: ICD-10-CM

## 2020-11-13 DIAGNOSIS — I10 ESSENTIAL HYPERTENSION: ICD-10-CM

## 2020-11-13 DIAGNOSIS — S09.90XS TRAUMATIC INJURY OF HEAD, SEQUELA: ICD-10-CM

## 2020-11-13 DIAGNOSIS — S06.9X1S TRAUMATIC BRAIN INJURY, WITH LOSS OF CONSCIOUSNESS OF 30 MINUTES OR LESS, SEQUELA (H): ICD-10-CM

## 2020-11-13 DIAGNOSIS — F33.2 SEVERE EPISODE OF RECURRENT MAJOR DEPRESSIVE DISORDER, WITHOUT PSYCHOTIC FEATURES (H): ICD-10-CM

## 2020-11-30 ENCOUNTER — OFFICE VISIT - HEALTHEAST (OUTPATIENT)
Dept: BEHAVIORAL HEALTH | Facility: CLINIC | Age: 70
End: 2020-11-30

## 2020-11-30 DIAGNOSIS — F33.2 SEVERE EPISODE OF RECURRENT MAJOR DEPRESSIVE DISORDER, WITHOUT PSYCHOTIC FEATURES (H): ICD-10-CM

## 2020-11-30 DIAGNOSIS — F41.1 GENERALIZED ANXIETY DISORDER: ICD-10-CM

## 2020-12-02 ENCOUNTER — AMBULATORY - HEALTHEAST (OUTPATIENT)
Dept: FAMILY MEDICINE | Facility: CLINIC | Age: 70
End: 2020-12-02

## 2020-12-02 DIAGNOSIS — F22 PARANOIA (H): ICD-10-CM

## 2020-12-02 DIAGNOSIS — S06.9X1S TRAUMATIC BRAIN INJURY, WITH LOSS OF CONSCIOUSNESS OF 30 MINUTES OR LESS, SEQUELA (H): ICD-10-CM

## 2020-12-02 DIAGNOSIS — F41.1 GAD (GENERALIZED ANXIETY DISORDER): ICD-10-CM

## 2020-12-15 ENCOUNTER — OFFICE VISIT - HEALTHEAST (OUTPATIENT)
Dept: FAMILY MEDICINE | Facility: CLINIC | Age: 70
End: 2020-12-15

## 2020-12-15 DIAGNOSIS — F33.2 SEVERE EPISODE OF RECURRENT MAJOR DEPRESSIVE DISORDER, WITHOUT PSYCHOTIC FEATURES (H): ICD-10-CM

## 2020-12-15 DIAGNOSIS — F10.931 ALCOHOL WITHDRAWAL SEIZURE WITH DELIRIUM (H): ICD-10-CM

## 2020-12-15 DIAGNOSIS — G40.909 SEIZURE DISORDER (H): ICD-10-CM

## 2020-12-15 DIAGNOSIS — R56.9 ALCOHOL WITHDRAWAL SEIZURE WITH DELIRIUM (H): ICD-10-CM

## 2020-12-15 DIAGNOSIS — F41.9 ANXIETY: ICD-10-CM

## 2020-12-15 DIAGNOSIS — F22 PARANOIA (H): ICD-10-CM

## 2020-12-15 DIAGNOSIS — G31.84 MILD COGNITIVE IMPAIRMENT WITH MEMORY LOSS: ICD-10-CM

## 2020-12-15 DIAGNOSIS — Z63.79 STRESSFUL LIFE EVENT AFFECTING FAMILY: ICD-10-CM

## 2020-12-15 DIAGNOSIS — S09.90XS TRAUMATIC INJURY OF HEAD, SEQUELA: ICD-10-CM

## 2021-01-11 ENCOUNTER — COMMUNICATION - HEALTHEAST (OUTPATIENT)
Dept: BEHAVIORAL HEALTH | Facility: CLINIC | Age: 71
End: 2021-01-11

## 2021-01-11 ENCOUNTER — COMMUNICATION - HEALTHEAST (OUTPATIENT)
Dept: FAMILY MEDICINE | Facility: CLINIC | Age: 71
End: 2021-01-11

## 2021-01-13 ENCOUNTER — OFFICE VISIT - HEALTHEAST (OUTPATIENT)
Dept: FAMILY MEDICINE | Facility: CLINIC | Age: 71
End: 2021-01-13

## 2021-01-13 ENCOUNTER — COMMUNICATION - HEALTHEAST (OUTPATIENT)
Dept: FAMILY MEDICINE | Facility: CLINIC | Age: 71
End: 2021-01-13

## 2021-01-13 DIAGNOSIS — F22 PARANOIA (H): ICD-10-CM

## 2021-01-13 DIAGNOSIS — F33.2 SEVERE EPISODE OF RECURRENT MAJOR DEPRESSIVE DISORDER, WITHOUT PSYCHOTIC FEATURES (H): ICD-10-CM

## 2021-01-13 DIAGNOSIS — G31.84 MILD COGNITIVE IMPAIRMENT WITH MEMORY LOSS: ICD-10-CM

## 2021-01-13 DIAGNOSIS — F41.9 ANXIETY: ICD-10-CM

## 2021-01-13 DIAGNOSIS — E51.9 THIAMINE DEFICIENCY: ICD-10-CM

## 2021-01-13 DIAGNOSIS — S06.9X1S TRAUMATIC BRAIN INJURY, WITH LOSS OF CONSCIOUSNESS OF 30 MINUTES OR LESS, SEQUELA (H): ICD-10-CM

## 2021-01-13 DIAGNOSIS — F10.21 ALCOHOL DEPENDENCE IN REMISSION (H): ICD-10-CM

## 2021-01-13 DIAGNOSIS — S09.90XS TRAUMATIC INJURY OF HEAD, SEQUELA: ICD-10-CM

## 2021-01-13 DIAGNOSIS — R68.89 SPELLS OF DECREASED ATTENTIVENESS: ICD-10-CM

## 2021-02-17 ENCOUNTER — AMBULATORY - HEALTHEAST (OUTPATIENT)
Dept: SCHEDULING | Facility: CLINIC | Age: 71
End: 2021-02-17

## 2021-02-17 ENCOUNTER — COMMUNICATION - HEALTHEAST (OUTPATIENT)
Dept: FAMILY MEDICINE | Facility: CLINIC | Age: 71
End: 2021-02-17

## 2021-02-17 ENCOUNTER — OFFICE VISIT - HEALTHEAST (OUTPATIENT)
Dept: FAMILY MEDICINE | Facility: CLINIC | Age: 71
End: 2021-02-17

## 2021-02-17 DIAGNOSIS — Z13.820 SCREENING FOR OSTEOPOROSIS: ICD-10-CM

## 2021-02-17 DIAGNOSIS — R68.89 SPELLS OF DECREASED ATTENTIVENESS: ICD-10-CM

## 2021-02-17 DIAGNOSIS — F22 PARANOIA (H): ICD-10-CM

## 2021-02-17 DIAGNOSIS — M81.0 AGE-RELATED OSTEOPOROSIS WITHOUT CURRENT PATHOLOGICAL FRACTURE: ICD-10-CM

## 2021-02-17 DIAGNOSIS — F17.200 CURRENT EVERY DAY SMOKER: ICD-10-CM

## 2021-02-17 DIAGNOSIS — F33.2 SEVERE EPISODE OF RECURRENT MAJOR DEPRESSIVE DISORDER, WITHOUT PSYCHOTIC FEATURES (H): ICD-10-CM

## 2021-02-17 DIAGNOSIS — Z12.31 ENCOUNTER FOR SCREENING MAMMOGRAM FOR BREAST CANCER: ICD-10-CM

## 2021-02-17 DIAGNOSIS — S06.9X1S TRAUMATIC BRAIN INJURY, WITH LOSS OF CONSCIOUSNESS OF 30 MINUTES OR LESS, SEQUELA (H): ICD-10-CM

## 2021-02-17 DIAGNOSIS — G31.84 MILD COGNITIVE IMPAIRMENT WITH MEMORY LOSS: ICD-10-CM

## 2021-02-17 RX ORDER — RISPERIDONE 1 MG/1
TABLET ORAL
Qty: 60 TABLET | Refills: 11 | Status: SHIPPED | OUTPATIENT
Start: 2021-02-17 | End: 2022-02-28

## 2021-02-19 ENCOUNTER — COMMUNICATION - HEALTHEAST (OUTPATIENT)
Dept: FAMILY MEDICINE | Facility: CLINIC | Age: 71
End: 2021-02-19

## 2021-03-01 ENCOUNTER — COMMUNICATION - HEALTHEAST (OUTPATIENT)
Dept: FAMILY MEDICINE | Facility: CLINIC | Age: 71
End: 2021-03-01

## 2021-03-01 DIAGNOSIS — S06.9X1S TRAUMATIC BRAIN INJURY, WITH LOSS OF CONSCIOUSNESS OF 30 MINUTES OR LESS, SEQUELA (H): ICD-10-CM

## 2021-03-01 DIAGNOSIS — F41.1 GAD (GENERALIZED ANXIETY DISORDER): ICD-10-CM

## 2021-03-01 DIAGNOSIS — F22 PARANOIA (H): ICD-10-CM

## 2021-03-01 RX ORDER — LORAZEPAM 1 MG/1
1 TABLET ORAL 2 TIMES DAILY PRN
Qty: 14 TABLET | Refills: 0 | Status: SHIPPED | OUTPATIENT
Start: 2021-03-01 | End: 2022-04-30

## 2021-03-17 ENCOUNTER — OFFICE VISIT - HEALTHEAST (OUTPATIENT)
Dept: FAMILY MEDICINE | Facility: CLINIC | Age: 71
End: 2021-03-17

## 2021-03-17 DIAGNOSIS — F10.21 ALCOHOL DEPENDENCE IN REMISSION (H): ICD-10-CM

## 2021-03-17 DIAGNOSIS — F41.0 PANIC ATTACK: ICD-10-CM

## 2021-03-17 DIAGNOSIS — F32.0 MILD MAJOR DEPRESSION (H): ICD-10-CM

## 2021-03-17 DIAGNOSIS — S06.9X1S TRAUMATIC BRAIN INJURY, WITH LOSS OF CONSCIOUSNESS OF 30 MINUTES OR LESS, SEQUELA (H): ICD-10-CM

## 2021-03-17 DIAGNOSIS — F33.2 SEVERE EPISODE OF RECURRENT MAJOR DEPRESSIVE DISORDER, WITHOUT PSYCHOTIC FEATURES (H): ICD-10-CM

## 2021-03-17 RX ORDER — ESCITALOPRAM OXALATE 20 MG/1
20 TABLET ORAL DAILY
Qty: 90 TABLET | Refills: 4 | Status: SHIPPED | OUTPATIENT
Start: 2021-03-17 | End: 2022-04-05

## 2021-03-17 ASSESSMENT — MIFFLIN-ST. JEOR: SCORE: 1109.42

## 2021-03-17 ASSESSMENT — PATIENT HEALTH QUESTIONNAIRE - PHQ9: SUM OF ALL RESPONSES TO PHQ QUESTIONS 1-9: 18

## 2021-03-19 ENCOUNTER — COMMUNICATION - HEALTHEAST (OUTPATIENT)
Dept: FAMILY MEDICINE | Facility: CLINIC | Age: 71
End: 2021-03-19

## 2021-03-19 DIAGNOSIS — F32.0 MILD MAJOR DEPRESSION (H): ICD-10-CM

## 2021-03-24 ENCOUNTER — COMMUNICATION - HEALTHEAST (OUTPATIENT)
Dept: FAMILY MEDICINE | Facility: CLINIC | Age: 71
End: 2021-03-24

## 2021-04-29 ENCOUNTER — COMMUNICATION - HEALTHEAST (OUTPATIENT)
Dept: FAMILY MEDICINE | Facility: CLINIC | Age: 71
End: 2021-04-29

## 2021-05-14 ENCOUNTER — COMMUNICATION - HEALTHEAST (OUTPATIENT)
Dept: FAMILY MEDICINE | Facility: CLINIC | Age: 71
End: 2021-05-14

## 2021-05-14 ENCOUNTER — OFFICE VISIT - HEALTHEAST (OUTPATIENT)
Dept: FAMILY MEDICINE | Facility: CLINIC | Age: 71
End: 2021-05-14

## 2021-05-14 DIAGNOSIS — F10.931 ALCOHOL WITHDRAWAL SEIZURE WITH DELIRIUM (H): ICD-10-CM

## 2021-05-14 DIAGNOSIS — I10 ESSENTIAL HYPERTENSION: ICD-10-CM

## 2021-05-14 DIAGNOSIS — F33.2 SEVERE EPISODE OF RECURRENT MAJOR DEPRESSIVE DISORDER, WITHOUT PSYCHOTIC FEATURES (H): ICD-10-CM

## 2021-05-14 DIAGNOSIS — F32.0 MILD MAJOR DEPRESSION (H): ICD-10-CM

## 2021-05-14 DIAGNOSIS — K70.30 ALCOHOLIC CIRRHOSIS OF LIVER WITHOUT ASCITES (H): ICD-10-CM

## 2021-05-14 DIAGNOSIS — G40.909 SEIZURE DISORDER (H): ICD-10-CM

## 2021-05-14 DIAGNOSIS — B18.2 CHRONIC HEPATITIS C WITHOUT HEPATIC COMA (H): ICD-10-CM

## 2021-05-14 DIAGNOSIS — R56.9 ALCOHOL WITHDRAWAL SEIZURE WITH DELIRIUM (H): ICD-10-CM

## 2021-05-14 DIAGNOSIS — F41.9 ANXIETY: ICD-10-CM

## 2021-05-14 DIAGNOSIS — Z11.59 NEED FOR HEPATITIS B SCREENING TEST: ICD-10-CM

## 2021-05-14 LAB
ALBUMIN SERPL-MCNC: 3.9 G/DL (ref 3.5–5)
ALBUMIN UR-MCNC: NEGATIVE G/DL
ALP SERPL-CCNC: 57 U/L (ref 45–120)
ALT SERPL W P-5'-P-CCNC: 11 U/L (ref 0–45)
ANION GAP SERPL CALCULATED.3IONS-SCNC: 15 MMOL/L (ref 5–18)
APPEARANCE UR: CLEAR
AST SERPL W P-5'-P-CCNC: 13 U/L (ref 0–40)
BACTERIA #/AREA URNS HPF: ABNORMAL /[HPF]
BASOPHILS # BLD AUTO: 0 THOU/UL (ref 0–0.2)
BASOPHILS NFR BLD AUTO: 0 % (ref 0–2)
BILIRUB SERPL-MCNC: 0.5 MG/DL (ref 0–1)
BILIRUB UR QL STRIP: NEGATIVE
BUN SERPL-MCNC: 17 MG/DL (ref 8–28)
CALCIUM SERPL-MCNC: 9.7 MG/DL (ref 8.5–10.5)
CHLORIDE BLD-SCNC: 100 MMOL/L (ref 98–107)
CHOLEST SERPL-MCNC: 201 MG/DL
CO2 SERPL-SCNC: 25 MMOL/L (ref 22–31)
COLOR UR AUTO: YELLOW
CREAT SERPL-MCNC: 0.88 MG/DL (ref 0.6–1.1)
EOSINOPHIL # BLD AUTO: 0.1 THOU/UL (ref 0–0.4)
EOSINOPHIL NFR BLD AUTO: 1 % (ref 0–6)
ERYTHROCYTE [DISTWIDTH] IN BLOOD BY AUTOMATED COUNT: 11.9 % (ref 11–14.5)
FASTING STATUS PATIENT QL REPORTED: ABNORMAL
GFR SERPL CREATININE-BSD FRML MDRD: >60 ML/MIN/1.73M2
GGT SERPL-CCNC: 24 U/L (ref 0–50)
GLUCOSE BLD-MCNC: 99 MG/DL (ref 70–125)
GLUCOSE UR STRIP-MCNC: NEGATIVE MG/DL
HCT VFR BLD AUTO: 46.4 % (ref 35–47)
HDLC SERPL-MCNC: 46 MG/DL
HGB BLD-MCNC: 15.2 G/DL (ref 12–16)
HGB UR QL STRIP: ABNORMAL
IMM GRANULOCYTES # BLD: 0 THOU/UL
IMM GRANULOCYTES NFR BLD: 0 %
KETONES UR STRIP-MCNC: NEGATIVE MG/DL
LDLC SERPL CALC-MCNC: 119 MG/DL
LEUKOCYTE ESTERASE UR QL STRIP: ABNORMAL
LYMPHOCYTES # BLD AUTO: 1.2 THOU/UL (ref 0.8–4.4)
LYMPHOCYTES NFR BLD AUTO: 12 % (ref 20–40)
MCH RBC QN AUTO: 30.5 PG (ref 27–34)
MCHC RBC AUTO-ENTMCNC: 32.8 G/DL (ref 32–36)
MCV RBC AUTO: 93 FL (ref 80–100)
MONOCYTES # BLD AUTO: 0.9 THOU/UL (ref 0–0.9)
MONOCYTES NFR BLD AUTO: 8 % (ref 2–10)
NEUTROPHILS # BLD AUTO: 8 THOU/UL (ref 2–7.7)
NEUTROPHILS NFR BLD AUTO: 78 % (ref 50–70)
NITRATE UR QL: NEGATIVE
PH UR STRIP: 5.5 [PH] (ref 5–8)
PLATELET # BLD AUTO: 205 THOU/UL (ref 140–440)
PMV BLD AUTO: 8.4 FL (ref 7–10)
POTASSIUM BLD-SCNC: 4.1 MMOL/L (ref 3.5–5)
PROT SERPL-MCNC: 7.6 G/DL (ref 6–8)
RBC # BLD AUTO: 4.99 MILL/UL (ref 3.8–5.4)
RBC #/AREA URNS AUTO: ABNORMAL HPF
SODIUM SERPL-SCNC: 140 MMOL/L (ref 136–145)
SP GR UR STRIP: 1.02 (ref 1–1.03)
SQUAMOUS #/AREA URNS AUTO: ABNORMAL LPF
TRIGL SERPL-MCNC: 182 MG/DL
TSH SERPL DL<=0.005 MIU/L-ACNC: 1.3 UIU/ML (ref 0.3–5)
UROBILINOGEN UR STRIP-ACNC: ABNORMAL
WBC #/AREA URNS AUTO: ABNORMAL HPF
WBC: 10.2 THOU/UL (ref 4–11)

## 2021-05-14 RX ORDER — VENLAFAXINE HYDROCHLORIDE 75 MG/1
75 CAPSULE, EXTENDED RELEASE ORAL DAILY
Qty: 90 CAPSULE | Refills: 4 | Status: SHIPPED | OUTPATIENT
Start: 2021-05-14 | End: 2021-10-26

## 2021-05-14 ASSESSMENT — MIFFLIN-ST. JEOR: SCORE: 1127.57

## 2021-05-15 LAB — BACTERIA SPEC CULT: NORMAL

## 2021-05-17 LAB
HBV SURFACE AB SERPL IA-ACNC: POSITIVE M[IU]/ML
HCV AB SERPL QL IA: POSITIVE

## 2021-05-21 LAB — HCV GENTYP SERPL NAA+PROBE: NORMAL

## 2021-05-24 ENCOUNTER — COMMUNICATION - HEALTHEAST (OUTPATIENT)
Dept: FAMILY MEDICINE | Facility: CLINIC | Age: 71
End: 2021-05-24

## 2021-05-24 DIAGNOSIS — R31.29 MICROSCOPIC HEMATURIA: ICD-10-CM

## 2021-05-24 DIAGNOSIS — F17.200 CURRENT SMOKER: ICD-10-CM

## 2021-05-26 ASSESSMENT — PATIENT HEALTH QUESTIONNAIRE - PHQ9: SUM OF ALL RESPONSES TO PHQ QUESTIONS 1-9: 8

## 2021-05-27 VITALS
WEIGHT: 140.75 LBS | TEMPERATURE: 99.3 F | BODY MASS INDEX: 24.94 KG/M2 | DIASTOLIC BLOOD PRESSURE: 66 MMHG | SYSTOLIC BLOOD PRESSURE: 124 MMHG | HEIGHT: 63 IN | RESPIRATION RATE: 17 BRPM | HEART RATE: 82 BPM | OXYGEN SATURATION: 98 %

## 2021-05-27 ASSESSMENT — PATIENT HEALTH QUESTIONNAIRE - PHQ9
SUM OF ALL RESPONSES TO PHQ QUESTIONS 1-9: 18
SUM OF ALL RESPONSES TO PHQ QUESTIONS 1-9: 3
SUM OF ALL RESPONSES TO PHQ QUESTIONS 1-9: 14

## 2021-05-28 ASSESSMENT — ANXIETY QUESTIONNAIRES
GAD7 TOTAL SCORE: 13
GAD7 TOTAL SCORE: 17
GAD7 TOTAL SCORE: 1

## 2021-05-28 NOTE — PROGRESS NOTES
OFFICE VISIT NOTE      Assessment & Plan   Eleanor Awan is a 68 y.o. female dealing with significant memory issues. She readily agrees to see a specialist in this area, hopefully before she moves to [place].  We will also recheck labs and try to be sure there is no obvious reason for her memory problems.  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 Getting this appointment in is TOP PRIORITY  Checking BP, possible CXR and possible EKG are due with a bone density test once she's retired.    Headache - sounds a little like temporal arteritis so will check ESR    Depression - seems fine - she admits the fluoxetine 40mg is helping    Adv directive - will not do a form now but says to ask her  if she cannot talk    Agrees to Pneumonia shot today; Hep C u/s due    Diagnoses and all orders for this visit:    Memory difficulty  -     Ambulatory referral to [facility]  -     HM1(CBC and Differential)  -     Vitamin B12  -     Thyroid Stimulating Hormone (TSH)  -     HM1 (CBC with Diff)    Tension headache  -     Erythrocyte Sedimentation Rate  -     HM1(CBC and Differential)  -     Comprehensive Metabolic Panel  -     C-Reactive Protein  -     Thyroid Stimulating Hormone (TSH)  -     HM1 (CBC with Diff)    Elevated liver enzymes  -     Comprehensive Metabolic Panel  -     GGT (Gamma GT)    Need for pneumococcal vaccination  -     Pneumococcal conjugate vaccine 13-valent 6wks-17yrs; >50yrs        Camille Love MD    The following are part of a depression follow up plan for the patient:  coping support assessment and coping support management          Subjective:   Chief Complaint:  Follow-up    68 y.o. female.     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 - and has notices posted up around home    Stutters sometimes  Now she's quiet in groups because she does not want to stutter - her  has noticed that she  "talks a lot less    Drops things on occasion, she's not sure if it is from weakness or something else    2) balance is off, nearly fell in the bathtub    3)  has been sick, appy surgery, not able to work, so he's frustrated    4) gets 3 meals per day; feels better having gained some weight    5) cough - occasionally productive of green sputum      Current Outpatient Medications   Medication Sig Note     FLUoxetine (PROZAC) 20 MG capsule Take 2 capsules (40 mg total) by mouth daily.      alendronate (FOSAMAX) 70 MG tablet Take 1 tablet (70 mg total) by mouth every 7 days. Take in the morning on an empty stomach with a full glass of water 30 minutes before food      HARVONI  mg Tab per tablet Take  mg by mouth daily. 6/29/2018: Received from: External Pharmacy       PSFHx: appropriate sections of PMH completed/filled in   Tobacco Status:  She  reports that she has never smoked. She has never used smokeless tobacco.    Review of Systems:  No fever.  Occasional sharp  Headache on left temporal side. All other systems negative except as noted above.    Objective:    /62   Pulse 63   Temp 99.4  F (37.4  C) (Oral)   Resp 16   Ht 5' 3.75\" (1.619 m)   Wt 124 lb (56.2 kg)   SpO2 (!) 9%   BMI 21.45 kg/m    GENERAL: No acute distress, has gained weight and looks better  HEENT: PERRL  NECK: supple, no LA  Ht: Reg s1s2  Lungs: clear with good aeration but breath sounds are decreased  Mood: good  Insight: good  Judgment: good  Affect: appropriate    Labs pending    Spent 40 min face to face with patient with more the 50% spent in counseling, reviewing chart, and coordination of care and discussing memory issues, eating, sleeping, work, immunizations.      "

## 2021-05-29 NOTE — TELEPHONE ENCOUNTER
Provider Communication  Who is calling:  Myesha / Sil[facility]  Facility in which provider is associated:  [facility]  Reason for call: Myesha / [facility] completed on the patient peripheral artrial disease screen. The patient had moderate findings on the left leg. Please call with questions. The report will be sent in 10 days  Urgency for return call:  as available  Okay to leave detailed message?:  Yes

## 2021-05-29 NOTE — TELEPHONE ENCOUNTER
Writer contacted the caller and asked Myesha to send to [facility]  ASAP as the provider cannot review report and follow-up with questions until she has the report to review.     Myesha agreed. Myesha agrees to send ASAP to clinic at [phone] (). When received, please route to physician in blue folder.

## 2021-05-30 NOTE — TELEPHONE ENCOUNTER
Who is calling:  Patient  Reason for Call:  Patient would like Dr Love know she is not moving to [facility] and plans to go to her Memory appointment on July 30 th.   Date of last appointment with primary care: NA  Okay to leave a detailed message: Yes, No need to call back just a FYI.

## 2021-05-30 NOTE — PROGRESS NOTES
"[facility] Outpatient Consultation    Assessment / Impression:   1.  Cognitive disorder not otherwise specified, MCI by history  2.  Depression NOS  3.  Hepatitis C, status post treatment  4.  Degenerative arthritis    Plan:   1.  Neuropsychometric testing  2.  MRI brain  3.  Follow-up on completion    A long conversation held with the patient who presents unaccompanied today.  This patient with above-noted past medical history has experienced progressive changes in cognition over the past 2 years initially manifesting his impairment and episodic memory but also lately including possible episodic spatial disorientation and difficulty with language as outlined below.  The patient has undergone screening blood tests in the additional tests noted above are felt to be warranted.  All questions answered follow-up on completion    Total time for evaluation one hour with majority of time spent in counseling.    Subjective:   HPI: Eleanor Awan is a 68 y.o. female with above-noted diagnoses who presents for cognitive evaluation.  The patient is unaccompanied today but she reports that her  of 21 years would agree with her report today.  Ekaterina notes that she first became aware of difficulty with cognition more than 2 years ago when she noted having difficulty remembering items on her to do list and elements of conversation.  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  In addition to difficulties with short-term recall she has noted problems with word finding during the course of conversation, occasional stuttering when speaking and difficulty phonetically constructing words.  \"Sometimes what comes out of my mouth is total gibberish.\"  She reports no significant difficulties with functionality but again she has recently discontinued her long-standing " "in-home business.    On further questioning patient denies loss of consciousness, frequent falls, tremors or active symptoms of depression.  She does report difficulty with ambulation on occasion with what she describes as a stumbling type gait.  She also reports losing her balance frequently.  No difficulty with sleep identified.  No evidence of visual or auditory hallucinations.  No significant orthostasis.  Remainder of her general medical review of systems is negative.    Past Medical History:   As above    Social History:   Born in [place], [place], the patient obtained her GED.  She was  on 3 occasions.  Her first marriage ended in divorce as did her second marriage.  Her third marriage is in his 22nd tier.  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      Past Psychiatric History:   Remarkable for depression treated with pharmacotherapy alone.  The patient has never been hospitalized for depression.  She has no history of substance use disorder but does report \"doing drugs\" as a young adult.    Family History:   Remarkable for late life dementia in the patient's father    Review of Systems:  Pertinent items are noted in HPI.    Objective:   Patient is a thin but generally healthy-appearing middle-aged female who otherwise demonstrates no evidence of acute physical distress    There were no vitals filed for this visit.  Physical Exam:    Damage.  Skin turgor is adequate.  No significant lower extremity edema noted.  HEENT exam is grossly unremarkable.  Head is normocephalic.  Eyes pupils and irises normal.  Conjunctiva clear.  Ears nose normal inspection.  Oropharynx reveals a full upper plate.  Mucous membranes moist.  Neck is supple without adenopathy.  Lungs with diminished breath sounds otherwise clear to auscultation.  Cardiac exam with a regular S1-S2.  Abdomen soft nontender.  Extremities again without " significant edema.  Peripheral joint changes consistent with degenerative joint disease no joint redness or    Neurological Exam:     Cranial nerve exam reveals eyes to demonstrate a forward conjugate gaze and normal pursuit and saccadic movements with normal saccadic velocity and amplitude.  Volitional EOMs are fully intact.  No evidence of ocular apraxia or ataxia no evidence of square wave jerks.  Face is symmetrical.  Hearing is intact.  Speech is well articulated and projects normally at this time.  Oropharynx reveals symmetrical palatal lift and midline tongue without fasciculation or atrophy.  Shoulder girdle strength appears to be intact.  Strength and tone are normal peripherally including axial tone.  Hand grasp strength is equal.  No pronator drift noted.  A slight increase in reflex noted on the left involving the brachioradialis.  No tremors.  Absent ankle reflexes noted.  Frontal release signs are absent.  The patient station demonstrates a normal base with steady maintenance of posture.  Romberg is borderline positive.  Gait demonstrates a good symmetric stride and good ground clearance with normal associated arm movements.  Turns are fluid and steady with no evidence of focal weakness or ataxia.  No evidence of freezing of gait or gait apraxia noted.  Coordination testing reveals normal finger-to-nose testing normal rapid alternating movements and heel-knee-to-shin.  No evidence of dysdiadochokinesis.    Cognitive Evaluation:     Patient was neatly groomed casually dressed and seated for evaluation.  She was alert pleasant and directable.  Insight and reasoning abilities appear to be reasonably well preserved.  General fund of knowledge would appear to be reasonably well preserved.  No gross evidence of difficulty with attentional ability at this time.  Speech was noted to be fluent with no evidence of paraphasias during this evaluation.  The patient demonstrated mildly impaired fluency with the  ability to generate 15 exemplars during phonetic fluency testing and 13 exemplars during categorical fluency testing.  The patient did not appear to have difficulty remembering information presented to her during the course of this evaluation but specific testing was not performed.  Affect was noted to be pleasant and mood congruent with no evidence of abnormal thought    Medications:  Scheduled Meds:  Continuous Infusions:  PRN Meds:.    Anupam Santos MD  7/30/2019

## 2021-05-31 NOTE — TELEPHONE ENCOUNTER
Patient calling. She is reporting that she has recently been evaluated for a 'seizure type disturbance' that has occurred 3 times over this past weekend.  From the notes in chart, it looks as if this problem has been coming on for about 2 years, with lapses in memory, and some gait problems , and short term memory issues. Trouble with language, and ability to speak what she is thinking in the moment.    Patient reports while talking with nurse   Adviser, that she was feeling a sensation that starts in her feet, a numbing, and tingling traveling up both legs . And she is feeling somewhat paralyzed , frozen.    She reports that this is very startling, and sometimes , she feels like it is a seizure type of event.    She is calling to ask about what she is to do, if she has another one of these .    Patient was advsied to see her provider, and PCP is out all week. Patient was them advised to go to ED, but would rather go to [facility].  Patient has not been started on any seizure type medication she states.    Mechelle Parson RN  [facility] Triage/refill nurse

## 2021-05-31 NOTE — TELEPHONE ENCOUNTER
PCP is unfortunately out of clinic until Tuesday.  CMT scheduled pt for Tuesday with PCP.  Pt had an MRI done today.  Should pt wait until Tuesday or go to ER or go to Neurology.  Pt apt with neurology 7/30 cx by provider.  How should we proceed?  Thanks.

## 2021-05-31 NOTE — TELEPHONE ENCOUNTER
Patient most recently saw neurology 7/30 who ordered MRI, Dr. Santos- patient should contact their office regarding next steps.     Loren Headley

## 2021-05-31 NOTE — TELEPHONE ENCOUNTER
New Appointment Needed  What is the reason for the visit:   Same day/ next day  Same Date/Next Day Appt Request  What is the reason for your visit?: On going seizure, 3 Friday, 4 sat. 1 yesterday and 1 today. Much more frequent. Please call and advise.     Provider Preference: PCP only  How soon do you need to be seen?: next week, asap   Waitlist offered?: No  Okay to leave a detailed message:  Yes

## 2021-06-01 ENCOUNTER — COMMUNICATION - HEALTHEAST (OUTPATIENT)
Dept: FAMILY MEDICINE | Facility: CLINIC | Age: 71
End: 2021-06-01

## 2021-06-01 VITALS — HEIGHT: 64 IN | BODY MASS INDEX: 20.19 KG/M2 | WEIGHT: 118.25 LBS

## 2021-06-01 DIAGNOSIS — K70.30 ALCOHOLIC CIRRHOSIS OF LIVER WITHOUT ASCITES (H): ICD-10-CM

## 2021-06-01 DIAGNOSIS — F17.200 CURRENT EVERY DAY SMOKER: ICD-10-CM

## 2021-06-01 DIAGNOSIS — F10.21 ALCOHOL DEPENDENCE IN REMISSION (H): ICD-10-CM

## 2021-06-01 DIAGNOSIS — G31.84 MILD COGNITIVE IMPAIRMENT WITH MEMORY LOSS: ICD-10-CM

## 2021-06-01 DIAGNOSIS — R68.89 SPELLS OF DECREASED ATTENTIVENESS: ICD-10-CM

## 2021-06-01 DIAGNOSIS — E51.9 THIAMINE DEFICIENCY: ICD-10-CM

## 2021-06-01 DIAGNOSIS — Z59.9 FINANCIAL DIFFICULTIES: ICD-10-CM

## 2021-06-01 DIAGNOSIS — I10 ESSENTIAL HYPERTENSION: ICD-10-CM

## 2021-06-01 DIAGNOSIS — B18.2 CHRONIC HEPATITIS C WITHOUT HEPATIC COMA (H): ICD-10-CM

## 2021-06-01 DIAGNOSIS — S06.9X1S TRAUMATIC BRAIN INJURY, WITH LOSS OF CONSCIOUSNESS OF 30 MINUTES OR LESS, SEQUELA (H): ICD-10-CM

## 2021-06-01 SDOH — ECONOMIC STABILITY - INCOME SECURITY: PROBLEM RELATED TO HOUSING AND ECONOMIC CIRCUMSTANCES, UNSPECIFIED: Z59.9
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
## 2021-06-01 NOTE — PROGRESS NOTES
Persons accompanying you (the patient) today: self    How have you been doing since we saw you last? Please note any concerns.  Patient is here to discuss test, also patient is having a hard time making decisions.    Please list any recent hospitalizations/surgeries/procedures you've had since we saw you last:  None     Have you had any falls since your last visit? No    Do you have any pain today? No

## 2021-06-01 NOTE — PROGRESS NOTES
NEUROPSYCHOLOGICAL PROGRESS NOTE    NAME: Eleanor Awan  YOB: 1950     DATE OF EVALUATION: 9/4/2019      Eleanor Awan is a 68 y.o., right-handed, female who was referred for a cognitive evaluation by Anupam Santos MD.  The patient arrived on time and unaccompanied. She was administered a portion of the neuropsychological battery, but partway through experienced an episode of numbness and tingling of her hands bilaterally, and the sensation ultimately traveled up through her upper body and to her head. This is a fairly typical episode that she has intermittently experienced over the past two years, although it usually begins in her toes. She felt nauseous, dizzy, and fatigued and required a break. She continued to feel mildly symptomatic afterward (fatigue, pressure in head, and ongoing numbness in her hands). A performance validity test was subsequently administered, and she performed below the cutoff score for adequate effort/engagement. As such, the remainder of testing was rescheduled for next week (9/11/2019). Patient was amenable to this plan. Full report to follow upon the completion of testing.    Please contact me with any questions regarding the content of this note.       Edna Swain PsyD, LP  Licensed [place] [address]  Phone: [phone]

## 2021-06-01 NOTE — PROGRESS NOTES
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 impaired/low average on a word list memory task). All other test scores are within normal limits, including processing speed, visuospatial/constructional ability, language processing, verbal and nonverbal learning, and nonverbal memory. In contrast to the patient's subjective experiences in daily life, there is no evidence of an expressive language issue and no consistent evidence of memory impairment in the current profile. There is also no cognitive correlate with the left anterior temporal chronic encephalomalacia noted on recent MRI.    Emotionally, Ms. Awan endorses severe anxiety and mild depressive symptoms on self-report measures. She acknowledges significant concern/preoccupation about having dementia. She often refers to her cognitive issues as \"my dementia\" despite never formally receiving that diagnosis, and spends time reading and watching videos about the condition. She is also going through several other stressors, including her recent assisted and associated loss of income, missing her  children, her mother-in-law's poor health, and several of her friends recently passing from or currently battling " cancer. She endorses passive suicidal ideation but denies current intent or plan.    Again, due to the patient's variable effort/engagement during testing, the etiology of her mild cognitive impairments is difficult to discern. However, her significant emotional distress may very well cause or exacerbate cognitive difficulties and result in variable test performances. An underlying neurodegenerative dementia cannot be fully ruled out, but her cognitive profile does not suggest dementia-level impairment at this time. The patient did experience one of her episodes of numbness/tingling during testing, which caused the patient to feel quite symptomatic afterward (with nausea, chills, dizziness, pressure in her head, and fatigue). These episodes could have a transient effect on cognitive functioning in daily life, and further workup is recommended.     DIAGNOSTIC IMPRESSIONS:  Unspecified Mild Neurocognitive Disorder  Major Depressive Disorder, Recurrent, Mild, with Anxious Distress  R/O Generalized Anxiety Disorder    RECOMMENDATIONS:  1) Given her significant symptoms of anxiety and depression, it is recommended that Ms. Awan consider engaging in psychotherapy services to address her mood symptoms. A cognitive-behavioral approach may be useful as such a technique has been shown to have fairly quick results in terms of symptom improvement (8-12 weeks). A psychiatric consultation may also be appropriate to determine if her antidepressant medication needs an adjustment or if alternative medications may help.    2) Further evaluation/treatment of her episodes of numbness/tingling is recommended. This will be deferred to her medical providers.    3) Neuropsychological re-evaluation can be performed as clinically indicated. The current test data can now serve as a baseline should a repeat assessment be warranted in the future.      Ms. Awan has requested to receive the results of this evaluation from her referring  provider at the time of an upcoming follow-up appointment; however, she was encouraged to schedule a formal feedback appointment with me after that appointment to discuss these results in further detail. Thank you for allowing me to participate in Ms. Awan's care. Please contact me with any questions regarding the content of this report.        Edna Swain PsyD, LP  Licensed Clinical Neuropsychologist  [facility]  Neuropsychology Section   Phone:  [phone]
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
## 2021-06-02 NOTE — TELEPHONE ENCOUNTER
RN cannot approve Refill Request    RN can NOT refill this medication 30 tablet w/ 2 refills    10/15/2019   . Last office visit: 10/15/2019 Camille Love MD Last Physical: 2/23/2018 Last MTM visit: Visit date not found Last visit same specialty: 10/15/2019 Camille Love MD.  Next visit within 3 mo: Visit date not found  Next physical within 3 mo: Visit date not found      Kwame Pinto, [facility] Triage/Med Refill 10/18/2019    Requested Prescriptions   Pending Prescriptions Disp Refills     mirtazapine (REMERON) 15 MG tablet [Pharmacy Med Name: MIRTAZAPINE 15MG TABLETS] 90 tablet 2     Sig: TAKE 1 TABLET(15 MG) BY MOUTH AT BEDTIME       Tricyclics/Misc Antidepressant/Antianxiety Meds Refill Protocol Passed - 10/16/2019  3:18 PM        Passed - PCP or prescribing provider visit in last year     Last office visit with prescriber/PCP: 10/15/2019 Camille Love MD OR same dept: 10/15/2019 Camille oLve MD OR same specialty: 10/15/2019 Camille Love MD  Last physical: 2/23/2018 Last MTM visit: Visit date not found   Next visit within 3 mo: Visit date not found  Next physical within 3 mo: Visit date not found  Prescriber OR PCP: Camille Love MD  Last diagnosis associated with med order: 1. Poor appetite  - mirtazapine (REMERON) 15 MG tablet [Pharmacy Med Name: MIRTAZAPINE 15MG TABLETS]; TAKE 1 TABLET(15 MG) BY MOUTH AT BEDTIME  Dispense: 90 tablet; Refill: 2    If protocol passes may refill for 12 months if within 3 months of last provider visit (or a total of 15 months).

## 2021-06-02 NOTE — TELEPHONE ENCOUNTER
"Please call Eleanor. Let her know her lab results came back normal! This is a good thing - but we found nothing that is causing her to feel \"off\".  I hope the meds given yesterday help her. We will also see what her neurologist says next month.  She should take care of herself with good sleep, efforts to eat well, and get regular exercise.  "

## 2021-06-03 VITALS — WEIGHT: 124 LBS | HEIGHT: 64 IN | BODY MASS INDEX: 21.17 KG/M2

## 2021-06-03 VITALS
BODY MASS INDEX: 22.06 KG/M2 | SYSTOLIC BLOOD PRESSURE: 140 MMHG | RESPIRATION RATE: 12 BRPM | WEIGHT: 124.5 LBS | DIASTOLIC BLOOD PRESSURE: 76 MMHG | HEART RATE: 76 BPM | HEIGHT: 63 IN | TEMPERATURE: 99.3 F

## 2021-06-03 VITALS
WEIGHT: 122 LBS | OXYGEN SATURATION: 95 % | HEART RATE: 88 BPM | BODY MASS INDEX: 21.61 KG/M2 | DIASTOLIC BLOOD PRESSURE: 62 MMHG | SYSTOLIC BLOOD PRESSURE: 124 MMHG | RESPIRATION RATE: 14 BRPM

## 2021-06-03 VITALS
DIASTOLIC BLOOD PRESSURE: 72 MMHG | RESPIRATION RATE: 16 BRPM | BODY MASS INDEX: 21.66 KG/M2 | HEIGHT: 63 IN | WEIGHT: 122.25 LBS | SYSTOLIC BLOOD PRESSURE: 126 MMHG | HEART RATE: 64 BPM | TEMPERATURE: 98.3 F

## 2021-06-03 NOTE — TELEPHONE ENCOUNTER
Spoke to pt, call soft xferred to [facility]. Appt sched for:    Nov 12, 2019 12:50 PM CST  (Arrive by 12:35 PM)  Urgent with Daniel Mijares MD  [facility]  ([facility] [address] [address]  [phone]

## 2021-06-03 NOTE — TELEPHONE ENCOUNTER
Called pt and relayed PCP message.  Please assist pt with referral for rapid access Cardiology.  Pt no longer works and is retired.  Thanks.

## 2021-06-03 NOTE — TELEPHONE ENCOUNTER
"Please call Eleanor. Let her know that her Holter monitor showed that she MIGHT have a problem with her heart which is causing her near-fainting spells.  Dr. Love put in a referral to cardiology's \"rapid access\" clinic, so it is hoped Eleanor will be seen this week. If they say her spells are not from her heart, we'll look more at her neurological system/brain.  "

## 2021-06-03 NOTE — PATIENT INSTRUCTIONS - HE
Thank you for allowing the [facility] clinic to be a part of your care. Please pay close attention to the following medications as they have been changed during this visit.  1.) Please start taking metoprolol succinate 12.5 mg (half of a 25 mg tablet) one time daily at bedtime.

## 2021-06-03 NOTE — PROGRESS NOTES
Bedside EEG was performed that included photic stimulation; hyperventilation was deferred. The patient was awake and asleep throughout the recording.    EEG System [id number] was used for this recording.

## 2021-06-03 NOTE — TELEPHONE ENCOUNTER
Seizures coming more often 3 x day.  Sometimes takes 20 minutes to return to normal.    Gets her flush and sweaty.    Currently symptoms free .  Patient would like to be seen.    Transferred to scheduling for an appointment.    Arabella Stein, RN, [facility] Connection Nurse Triage/Med Refills RN     Reason for Disposition    Epileptic seizures occur frequently (several per week)    Protocols used: ONXFJPZ-U-MW

## 2021-06-03 NOTE — TELEPHONE ENCOUNTER
Please call Eleanor. Let her know her EEG was normal, so it showed NO seizures. This is good news!  Dr. Love saw that the cardiologist put her on  Metoprolol. With being on that, are her episodes/spells any different?

## 2021-06-03 NOTE — PROGRESS NOTES
"OFFICE VISIT NOTE      Assessment & Plan   Eleanor Awan is a 68 y.o. female with continued \"near syncope\" - I don't yet think the episodes are seizures. Will evaluate more with a Holter monitor to see if she is having unusual arrhythmias and see if I can do an EEG (since it takes quite a while to see a neurologist and then have them do it). Meanwhile, she's to eat well, get sleep, remain hydrated and not drive.      Diagnoses and all orders for this visit:    Near syncope  -     Electrocardiogram Perform and Read  -     XR Chest 2 Views  -     Urinalysis-UC if Indicated  -     Comprehensive Metabolic Panel  -     HM1(CBC and Differential)  -     HM1 (CBC with Diff)  -     Glucose  -     Holter Monitor; Future; Expected date: 11/05/2019  -     EEG - Routine - Extended to 1 Hour (41-60mins); Future; Expected date: 11/05/2019        Camille Love MD              Subjective:   Chief Complaint:  Seizures (One this AM. )    68 y.o. female.     1) starts in her toes, goes up to her head, head turns red, eyes \"flicker\" per ; then she's tired for a while after. No fevers or other signs of a \"cold\" or such.    2) drinking lots of water  Eating regularly  Very little alcohol    Current Outpatient Medications   Medication Sig Note     alendronate (FOSAMAX) 70 MG tablet Take 1 tablet (70 mg total) by mouth every 7 days. Take in the morning on an empty stomach with a full glass of water 30 minutes before food      FLUoxetine (PROZAC) 20 MG capsule Take 2 capsules (40 mg total) by mouth daily.      HARVONI  mg Tab per tablet Take  mg by mouth daily. 6/29/2018: Received from: External Pharmacy     mirtazapine (REMERON) 15 MG tablet Take 1 tablet (15 mg total) by mouth at bedtime.        PSFHx: appropriate sections of PMH completed/filled in   Tobacco Status:  She  reports that she has never smoked. She has never used smokeless tobacco.    Review of Systems:  No fever.  No rash. All other systems negative " "except as noted above.    Objective:    /76   Pulse 76   Temp 99.3  F (37.4  C) (Oral)   Resp 12   Ht 5' 3\" (1.6 m)   Wt 124 lb 8 oz (56.5 kg)   BMI 22.05 kg/m    GENERAL: No acute distress.  HEENT: PERRL, EOMI, clear anterior chambers of eyes, TMs retracted and clear; throat without erythema, tongue protrudes in the middle and straightly  NECK: supple, no LA  Ht: reg s1s2 with extra beats  Lungs: clear  Gait: stable even around turns    Labs: pending  EK PVCs, otherwise normal  CXR: clear (read by me with patient, then confirmed with overread)    Spent 40 min face to face with patient with more the 50% spent in counseling, education and coordination of care and discussing episodes, possible causes, trying to decrease episodes, sleep, hydration, eating.    "

## 2021-06-03 NOTE — TELEPHONE ENCOUNTER
Called pt who was glad to know about the EEG.  Pt states the Metoprolol causes spells/seizures in the middle of the night.  Had one a t30 am and 4-430 pm and she is feeling drained and tired. Thanks.

## 2021-06-03 NOTE — TELEPHONE ENCOUNTER
Refill Approved    Rx renewed per Medication Renewal Policy. Medication was last renewed on 9/12/18.    Constance Hernandez, [facility] Triage/Med Refill 11/30/2019     Requested Prescriptions   Pending Prescriptions Disp Refills     FLUoxetine (PROZAC) 20 MG capsule [Pharmacy Med Name: FLUOXETINE 20MG CAPSULES] 90 capsule 0     Sig: TAKE ONE CAPSULE BY MOUTH EVERY DAY       SSRI Refill Protocol  Passed - 11/30/2019  3:17 AM        Passed - PCP or prescribing provider visit in last year     Last office visit with prescriber/PCP: 11/5/2019 Camille Love MD OR same dept: 11/5/2019 Camille Love MD OR same specialty: 11/5/2019 Camille Love MD  Last physical: 2/23/2018 Last MTM visit: Visit date not found   Next visit within 3 mo: Visit date not found  Next physical within 3 mo: Visit date not found  Prescriber OR PCP: Camille Love MD  Last diagnosis associated with med order: There are no diagnoses linked to this encounter.  If protocol passes may refill for 12 months if within 3 months of last provider visit (or a total of 15 months).

## 2021-06-04 VITALS
HEART RATE: 72 BPM | TEMPERATURE: 97.9 F | RESPIRATION RATE: 18 BRPM | BODY MASS INDEX: 21.79 KG/M2 | HEIGHT: 63 IN | WEIGHT: 123 LBS | DIASTOLIC BLOOD PRESSURE: 92 MMHG | OXYGEN SATURATION: 97 % | SYSTOLIC BLOOD PRESSURE: 162 MMHG

## 2021-06-04 VITALS
RESPIRATION RATE: 20 BRPM | BODY MASS INDEX: 22.5 KG/M2 | SYSTOLIC BLOOD PRESSURE: 98 MMHG | WEIGHT: 122.25 LBS | HEART RATE: 92 BPM | OXYGEN SATURATION: 95 % | TEMPERATURE: 99.1 F | HEIGHT: 62 IN | DIASTOLIC BLOOD PRESSURE: 62 MMHG

## 2021-06-04 VITALS
HEART RATE: 76 BPM | SYSTOLIC BLOOD PRESSURE: 130 MMHG | WEIGHT: 123 LBS | TEMPERATURE: 99.6 F | HEIGHT: 63 IN | BODY MASS INDEX: 21.79 KG/M2 | RESPIRATION RATE: 16 BRPM | DIASTOLIC BLOOD PRESSURE: 76 MMHG

## 2021-06-04 VITALS — HEIGHT: 63 IN | BODY MASS INDEX: 21.62 KG/M2 | WEIGHT: 122 LBS

## 2021-06-04 NOTE — PROGRESS NOTES
CHW spoke with Eleanor as she just enrolled in [facility].    Eleanor cancelled her SW appointment and states she does not need any specific resources as her main concern is out of pocket medical bills. CHW provided the FRW's direct phone number and encouraged Eleanor to call the FRW back to schedule an appointment to complete a Financial Aid Application for the [facility] [employer] system.    CHW did provide the [facility] phone number, [phone] to Eleanor as well as she reports she does get confused about Medicare and would like to have a good phone number to call when she has questions.      Next Outreach: 1/16/2020    Plan:     - Was FAA completed with the patient to help with out of pocket medical costs?   - How did MH appointment go on 1/3/2020, any follow up appointments scheduled?

## 2021-06-04 NOTE — TELEPHONE ENCOUNTER
Please call Eleanor  Let her know 1/2 of the 24 hr urine test is back. The part that is in is normal.  While waiting for the 2nd half of the 24 hr urine test to come back, Dr. Love is trying to figure out what else could be causing Eleanor's spells. At this point, referrals to specialists are needed. Dr. Love spoke with an endocrinologist who did not think Eleanor's spells were due to an endocrine problem. Therefore, referral to neurology is being put in. Also, Dr. Love is hoping to hear from the cardiologist who previously saw Eleanor to see if anything else should happen for her heart. I hope she can go to the earliest appointment offered (by neuro or cards).    At any point with this spells she has, she is welcome to go to the ER.

## 2021-06-04 NOTE — TELEPHONE ENCOUNTER
----- Message from Jhon John sent at 1/3/2020  8:43 AM CST -----  Regarding: Psychology Referral  Good Morning,     The patient sent a [employer] message yesterday 1.2.2020 stating that she is uncomfortable driving into the city and would like to see a female provider; we had previous scheduled her for a new patient intake for therapy with a male LICSW. We are on an intake hold with our female providers at [facility] and [facility]. Can you reach out to her to see if there is any recourses locally that she can use for support? Thank you much.     Jhon John

## 2021-06-04 NOTE — PROGRESS NOTES
Programs Applying For:  SNAP/Health Insurance  County:  Case #:  [facility] Worker:   [place] #:   PMI #:   Tracking:   Date Applied:     Outreach:   12/27/2019: FRW received staff message from CHW about [employer]. FRW called patient and will mail [employer] application for her to complete and send to billing. No further FRW needs, taking patient off panel.   12/19/2019: Patient called FRW back, patient's  makes $3,100 per month. Patient is over income for medical assistance/SNAP. Individuals on Medicare don't qualify for [employer]. Patient understands.FRW sending staff message to CHW. No further FRW needs, taking patient off panel.  12/19/2019: FRW called patient on referral. Patient is going to gather gross monthly income from her  and call FRW back. Patient is aware FRW will be out of the office from 12/20-12/30.  12/11/2019: Referral Call: Attempt 1 Financial Resource Worker called and left a message for the patient. If the patient is returning my call, please transfer the patient to Gloria Kearns  at ext. 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  She receives $468/mo SSI.   still works. [place]  Paying $800/month rent.  Having difficulty with paying co-pays to specialty clinics and appointments.  Will refer to FRW for SNAP and insurance.

## 2021-06-04 NOTE — TELEPHONE ENCOUNTER
Copy of message below sent to patient in [employer] per her request so that she can review the message with her spouse.     Patient will be at [facility] 12/10/19 for assessment with care management. The assessment with care management is 60 minutes so CMT schedule lab for urine and blood at 11:15 AM. The patient verbalizes understanding of provider/CSS instructions for follow-up and continued care per provider message.

## 2021-06-04 NOTE — PROGRESS NOTES
"HPI - 69 yo female who came for a lab appointment and reported being dizzy.     She was got flushed and dizzy and weak and chilled. Lab staff checked her BP which was 162/98 and a pulse of 32. She was placed on an exam table and tested for strep, influenza and EKG b/c she was reporting chest pressure.   She reported no shortness of breath or chest pain or N&V.      She reports she has been having these episodes off and on for over a year. Her PCP has been doing several tests to identify the etiology.     Per chart review:  EEG normal 11/13/19  Echo normal 11/12/19  CBC, CMP, nl 11/5/19  TSH wnl 10/15/19  SERENA hook up yesterday at cardiology to evaluated near syncope  24-hour urine ordered to r/o pheo        Current Outpatient Medications   Medication Sig Note     alendronate (FOSAMAX) 70 MG tablet Take 1 tablet (70 mg total) by mouth every 7 days. Take in the morning on an empty stomach with a full glass of water 30 minutes before food      FLUoxetine (PROZAC) 20 MG capsule Take 2 capsules (40 mg total) by mouth daily.      HARVONI  mg Tab per tablet Take  mg by mouth daily. 6/29/2018: Received from: External Pharmacy     metoprolol succinate (TOPROL-XL) 25 MG Take 0.5 tablets (12.5 mg total) by mouth at bedtime.      mirtazapine (REMERON) 15 MG tablet Take 1 tablet (15 mg total) by mouth at bedtime.      FLUoxetine (PROZAC) 20 MG capsule TAKE ONE CAPSULE BY MOUTH EVERY DAY      Vitals:    12/10/19 1132 12/10/19 1147 12/10/19 1148   BP: (!) 160/98 152/74 (!) 162/92   Pulse: (!) 38 72    Resp: 18     Temp: 97.9  F (36.6  C)     TempSrc: Oral     SpO2: 97%     Weight: 123 lb (55.8 kg)     Height: 5' 3\" (1.6 m)       OBJECTIVE:  Vitals listed above within normal limits  General appearance: well groomed, pleasant, well hydrated, nontoxic appearing  ENT: PERRL  Lungs: lungs clear to auscultation bilaterally, no wheezes or rhonchi  Heart: HR varied from regular to irregularly irregular, no murmurs, rubs or " gallops  Abdomen: soft, nontender  Neuro: no focal deficits, CN II-XII grossly intact, alert and oriented  Psych:  mood stable, appears to have good insight and judgment    Results for BUD KEATING (MRN [id number]) as of 12/10/2019 13:02   Ref. Range 12/10/2019 11:40 12/10/2019 11:54   Rapid Strep A Antigen Latest Ref Range: No Group A Strep detected, presumptive negative  No Group A Strep detected, presumptive negative    Influenza  A, Rapid Antigen Latest Ref Range: No Influenza A antigen detected  No Influenza A antigen detected    Influenza B, Rapid Antigen Latest Ref Range: No Influenza B antigen detected  No Influenza B antigen detected    SYSTOLIC BLOOD PRESSURE Unknown  Pend   DIASTOLIC BLOOD PRESSURE Unknown  Pend   VENTRICULAR RATE Latest Units: BPM  69   ATRIAL RATE Latest Units: BPM  69   P-R INTERVAL Latest Units: ms  152   QRS DURATION Latest Units: ms  88   Q-T INTERVAL Latest Units: ms  392   QTC CALCULATION (BEZET) Latest Units: ms  420   P Axis Latest Units: degrees  62   R AXIS Latest Units: degrees  52   T AXIS Latest Units: degrees  61   MUSE DIAGNOSIS Unknown  Normal sinus rhyt...       A/P  Dizziness spell with intermittently irregular heart rate  After a few minutes the symptoms passes and she was feeling better.   EKG showed NRS  Discussed her heart monitor and explained about capturing her symptoms with the phone she was given by the cardiologist yesterday. She had not been pushing the button when she had symptoms, including this episode. We reviewed the process.   She was stable, asx and pulse and BP improved and so was given ok to go home.   She was advised to complete the heart and urine test as ordered by her PCP and to f/u as scheduled.   Call or return to clinic if not improving or symptoms worsening, fever or unable to adequate oral intake.

## 2021-06-04 NOTE — TELEPHONE ENCOUNTER
[facility] RN spoke with Eleanor today for an Assessment.  Patient would like to participate in Mental Health counseling to talk about all the changes to her life, health, etc lately.    Writer can place the referral if PCP is okay with this.  Please advise

## 2021-06-04 NOTE — TELEPHONE ENCOUNTER
I left a VM for pt to call back or come back to clinic  to   for her sensor adapter that she had left in room 12.

## 2021-06-04 NOTE — TELEPHONE ENCOUNTER
"I called Eleanor's mobile and left a message for her to go to the ER.  I messaged her back on [employer].  Then I called her , Asaf, per demographics. I asked that she go to [facility], or if they really do not want to go downtown, to [facility]. She needs specialist's care and there are more specialists \"in house\" at [facility] and [facility]. Asaf said he agreed and would try to reach Eleanor.    I then called [facility] ER and gave them report to expect her.    *CMT - please call Eleanor or her  about 11:30 and check to be sure they are following through on this. If Asaf (Eleanor's ) cannot take her to [facility], then call 911.  "

## 2021-06-04 NOTE — TELEPHONE ENCOUNTER
Please call Eleanor. Ask her to come in and collect a kit for a 24 hour urine collection. That is needed for a test Dr. Love wants to do because of her spells. Also, there is a blood test we want, and she has to come in and lie down for 30min before they draw the labs. I hope she will do these very soon (remember the clinic is open until 8pm on Mondays and Wednesdays).  The name of what she might have is usam-ifgyxr-etxroc (a certain kind of tumor of the adrenal gland). These tumors are sometimes (not always) cancerous.    Also, I hope Eleanor will keep a record of her spells - when she has them, how long they are, and anything else (if they are more intense, or whatever).

## 2021-06-04 NOTE — PROGRESS NOTES
The Clinic Community Health Worker spoke with the patient today to discuss possible Clinic Care Coordination enrollment.  The service was described to the patient and immediate needs were discussed.  The patient agreed to enrollment and an assessment was scheduled.  The patient was provided with contact information for the clinic CHW.             Assessment date: 12/10/2019      Notes:   Scared to sleep due to medical concerns  Getting a Holter Monitor on Monday  Sees Cardiology, Neurology   Struggling financially -  works, she doesn't. She receives $468 a month. Retired in June. Didn't pay in taxes so this is affecting her.    Has a $10 copy with [employer].  Has been using Credit cards. Pays $107 for [employer]. They are living with a friend

## 2021-06-04 NOTE — TELEPHONE ENCOUNTER
FARNAZ called patient and left message on home phone to let her know that we are trying to follow up to make sure that we know that she went to ER.     FARNAZ checked chart and there is an active [facility] ED note. Patient went to ER.

## 2021-06-04 NOTE — PROGRESS NOTES
"OFFICE VISIT NOTE      Assessment & Plan   Eleanor Awan is a 68 y.o. female who keeps having \"spells\" where she sweats, has chest tightness, and headache. She's been put on metoprolol and since taking that, the spells are a bit less frequent.  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 We'll do a urine collection (24hr) on her and test blood, too.  She showed me a video of one of her spells. It was interesting to me how she made herself breathe more heavily during the spell. She did not specify anything during the video but told me on the side about how terrible the headache was. She denied palpitations but said her chest was tight and perhaps her heart was beating more quickly. She has stopped going out of her house much for fear of having a spell. She can fell the spells coming on, so she drives and stops quickly if a spell is starting --this despite my clear instructions to her not to drive.    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 The copay on these visits is very hard, and it's hard to come up with money for groceries. She agreed to have our financial  help her.    Diagnoses and all orders for this visit:    Near syncope  -     SERENA Monitor Hook-Up; Future; Expected date: 12/06/2019  -     Ambulatory referral to Care Management (Primary Care)  -     Catecholamine Fractionation, Free, 24 Hour Urine; Future  -     Metanephrines, Fractionated, Free, Plasma; Future; Expected date: 12/08/2019    Mild cognitive impairment with memory loss  -     Ambulatory referral to Care Management (Primary Care)    Depressive disorder  -     Ambulatory referral to Care Management (Primary Care)    Stress  -     Ambulatory referral to Care Management (Primary Care)    Financial difficulties  -     Ambulatory referral to Care Management (Primary Care)        Camille Love MD    The following are part of a depression follow up plan for the patient:  coping support " assessment and coping support management  I have counseled the patient for tobacco cessation and the follow up will occur  at the next visit.          Subjective:   Chief Complaint:  Seizures (-per triage pt has video to show PCP)    68 y.o. female.     1) still having episodes - EXCEPT this AM - no episodes    4am gets up to pee and then gets one, then poops  6am gets up and same thing  Typically none during the day    Since being on metoprolol (takes between 5-7pm), the spells start in her upper belly instead of in her feet.  She feels more paranoid.    With the spells - sweats, tight in the chest  Breathes deeply during the spell because she feels like she won't breathe at all.  Feels weak  Head feels heavy  Nausea  Body feels heavy    Going to a craft show all day today - hopes not to have a spell.    2) money is really tight - she can barely afford the copays.    Current Outpatient Medications   Medication Sig Note     metoprolol succinate (TOPROL-XL) 25 MG Take 0.5 tablets (12.5 mg total) by mouth at bedtime.      alendronate (FOSAMAX) 70 MG tablet Take 1 tablet (70 mg total) by mouth every 7 days. Take in the morning on an empty stomach with a full glass of water 30 minutes before food      FLUoxetine (PROZAC) 20 MG capsule Take 2 capsules (40 mg total) by mouth daily.      FLUoxetine (PROZAC) 20 MG capsule TAKE ONE CAPSULE BY MOUTH EVERY DAY      HARVONI  mg Tab per tablet Take  mg by mouth daily. 6/29/2018: Received from: External Pharmacy     mirtazapine (REMERON) 15 MG tablet Take 1 tablet (15 mg total) by mouth at bedtime.        PSFHx: appropriate sections of PMH completed/filled in   Tobacco Status:  She  reports that she quit smoking about 24 years ago. She started smoking about 49 years ago. She smoked 0.00 packs per day. She has never used smokeless tobacco.    Review of Systems:  No fever.  No rash. All other systems negative except as noted above.    Objective:    /76 (Patient  "Site: Left Arm, Patient Position: Sitting, Cuff Size: Adult Regular)   Pulse 76   Temp 99.6  F (37.6  C) (Oral)   Resp 16   Ht 5' 3\" (1.6 m)   Wt 123 lb (55.8 kg)   BMI 21.79 kg/m    GENERAL: No acute distress.    Video - she feels the spell start and keeps talking, breathing deeply with her eyes closed. Through the middle of the spell, she swore several times, presumably due to pain. She never cried out. I could not tell if her face was red. As the spell let up, she resumed breathing normally.    Echo is OK    Cardiology note - not obvious what these spells are    Spent 25 min face to face with patient with more the 50% spent in counseling, education and coordination of care and discussing spells, heart condition, depression, anxiety, finances.    "

## 2021-06-04 NOTE — TELEPHONE ENCOUNTER
Spoke to pt. Appt scheduled for:    Jan 22, 2020 11:00 AM CST  (Arrive by 10:45 AM)  Psychotherapy Evaluation with CLIFF Wright  [facility] ([facility]) [address] [place] [address]  [phone]     Intake + reminder mailed to pt.

## 2021-06-04 NOTE — TELEPHONE ENCOUNTER
"Patient calling.  She is reporting that she has been seeing Cardiology, Neurology and PCP, because of ongoing little seizure episodes.    Been on new medication , Metoprolol.  For 2 weeks.  And still getting these episodes;seizure like\"   Patient reports she video taped herself  Having \"episode\"., and she would like to bring it into her PCP for her to see .  Patient states she originally got these   Episodes \"seizure like\" , that started in her feet, and moved up her body until reaching her brain.  Since starting the Metoprolol, they seem to start in the gut, and move up from there.  She states she is frightened , and feels vulnerable because of them, and has stopped going out of her home,   She reports, she is afraid to go out, because of these seizure like episodes.    Patient wanting to make an appointment   With her PCP, and one was made for tomorrow at 0900.  Mechelle Parson RN  [facility] Triage/refill nurse        "

## 2021-06-05 VITALS
HEART RATE: 56 BPM | SYSTOLIC BLOOD PRESSURE: 122 MMHG | HEIGHT: 63 IN | OXYGEN SATURATION: 97 % | BODY MASS INDEX: 22.99 KG/M2 | WEIGHT: 129.75 LBS | DIASTOLIC BLOOD PRESSURE: 54 MMHG | TEMPERATURE: 99.3 F

## 2021-06-05 VITALS
WEIGHT: 136.75 LBS | TEMPERATURE: 99 F | HEART RATE: 81 BPM | RESPIRATION RATE: 17 BRPM | SYSTOLIC BLOOD PRESSURE: 124 MMHG | HEIGHT: 63 IN | BODY MASS INDEX: 24.23 KG/M2 | OXYGEN SATURATION: 98 % | DIASTOLIC BLOOD PRESSURE: 72 MMHG
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
## 2021-06-05 NOTE — TELEPHONE ENCOUNTER
I do not see pt is med mentioned.  Only the metoprolol is a  Half tab.  Please clarify for pt. Thanks.
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
## 2021-06-05 NOTE — PROGRESS NOTES
Eleanor completed the [employer] application and states she mailed it back last week. She understands it can take 6 weeks to hear back.    She reports remembering her appointments coming up tomorrow and on 1/22/20 with therapist Lyndsay at [facility].      Next Outreach: 2/27/20    Plan:     - Have you heard from the billing department about your [employer] financial aid application? Approved or denied?

## 2021-06-05 NOTE — PROGRESS NOTES
OFFICE VISIT NOTE      Assessment & Plan   Eleanor Awan is a 69 y.o. 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 She's on top of those now through medication and counseling. Soon, she and her family will go on a cruise together - which will be another great way for her to relax and improve her self-care.  We need to get her caught up on health maintenance - with colonoscopy, bone density scan; discussed shingles vaccine and she does not want it.    Diagnoses and all orders for this visit:    Routine general medical examination at a health care facility    Screen for colon cancer  -     Cologuard    Mild major depression (H)    Alcohol dependence in remission (H)    Hep C w/o coma, chronic (H)    Traumatic brain injury, with loss of consciousness of 30 minutes or less, sequela (H)    Family hx of colon cancer    Other osteoporosis without current pathological fracture    Other orders  -     Cancel: Ambulatory referral for Colonoscopy        Camille Love MD    I have had an Advance Directives discussion with the patient.  The following are part of a depression follow up plan for the patient:  under care of mental health counselor, coping support assessment and coping support management          Subjective:   Chief Complaint:  Annual Exam    69 y.o. female.     1) 2/14 leave for the cruise  Cat is having hip problems  Snappy the past month    2) paranoia - better; bad with noises - cat jumps or door slam  Appetite - eating better, mainly meat;   Spells - had a tiny one, went to bed and slept till noon (2 wakes at 5am and 9am)  Exercise- walking, 3 days in a row  Sorting, cleaning  Socializing some - has not resumed bingo  Prayer - never before; raised Hindu    3) adv directive      No life support  Daughter; Kia Staley      Current Outpatient Medications   Medication Sig     hydrOXYzine  "HCl (ATARAX) 25 MG tablet Take 0.5-1 tablets (12.5-25 mg total) by mouth every 8 (eight) hours as needed for itching.     alendronate (FOSAMAX) 70 MG tablet Take 1 tablet (70 mg total) by mouth every 7 days. Take in the morning on an empty stomach with a full glass of water 30 minutes before food     escitalopram oxalate (LEXAPRO) 10 MG tablet Take 10 mg by mouth daily.     hydrOXYzine pamoate (VISTARIL) 25 MG capsule Take 25 mg by mouth daily.     metoprolol succinate (TOPROL-XL) 25 MG Take 0.5 tablets (12.5 mg total) by mouth at bedtime.     mirtazapine (REMERON) 15 MG tablet Take 1 tablet (15 mg total) by mouth at bedtime.       PSFHx: appropriate sections of PMH completed/filled in   Tobacco Status:  She  reports that she quit smoking about 24 years ago. She started smoking about 49 years ago. She smoked 0.00 packs per day. She has never used smokeless tobacco.    Review of Systems:  No fever.  No rash. All other systems negative except as noted above.    Objective:    BP 98/62   Pulse 92   Temp 99.1  F (37.3  C) (Oral)   Resp 20   Ht 5' 2.4\" (1.585 m)   Wt 122 lb 4 oz (55.5 kg)   SpO2 95%   BMI 22.07 kg/m    General Appearance: Alert, cooperative, no distress, appears older than stated age  Head: Normocephalic, without obvious abnormality, atraumatic  Eyes: PERRL, conjunctiva/corneas clear, EOM's intact  Ears: TM's clear and retracted,external ear canals with mild amount of cerumen, both ears  Nose: Nares clear, septum midline,mucosa pink and moist, no drainage  Throat: Lips, mucosa, and tongue moist without lesions; teeth is mild disrepair  Neck: Supple, symmetrical, trachea midline, no adenopathy;  thyroid: not enlarged, symmetric, no tenderness/mass/nodules  Back: Symmetric, no curvature, ROM normal, no CVA tenderness  Lungs: Clear to auscultation bilaterally, respirations unlabored  Breasts: No breast masses, tenderness, asymmetry, or nipple discharge  Heart: Regular rate and rhythm, S1 and S2 " normal, no murmur  Abdomen: Soft, non-tender, bowel sounds active,  no masses, no organomegaly  Extremities: Extremities have symmetric bulk and tone, atraumatic, no cyanosis or edema  Skin: no rashes or lesions, warm  Neurologic: smooth coordination during exam, +2/4 DTRs in patellar tendons    Labs and other tests reveiwed

## 2021-06-06 NOTE — TELEPHONE ENCOUNTER
Please see My Chart message from today.     Please begin PA on pt Lexapro ASAP.  PCP dc'd Prozac on 12/19/19 but insurance refuses to do a override despite being in the same medication family. As pharmacy called.  Thanks.

## 2021-06-06 NOTE — PROGRESS NOTES
Outpatient Mental Health Treatment Plan    Name:  Eleanor Awan  :  1950  MRN:  [id number]    Treatment Plan:  Initial Treatment Plan  Intake/initial treatment plan date:    Intake: 2020   Benefit and risks and alternatives have been discussed: Yes  Is this treatment appropriate with minimal intrusion/restrictions: Yes  Estimated duration of treatment:  Patient attend 2 total follow-up visits post DA completion and met goals  Anticipated frequency of services: Will notify PCP if needing to return due to goals of managing anxiety being met  Necessity for frequency: This frequency is needed to establish therapeutic goals and for continuity of care in order to monitor progress.  Necessity for treatment: To address cognitive, behavioral, and/or emotional barriers in order to work toward goals and to improve quality of life.    Session Type: Patient is presenting for an Individual session.    Plan:           ?   ? Anxiety    Goal:  Maintain average anxiety level around 1   Strategies: ? [] Practice relaxation techniques and other coping strategies (e.g., thought stopping, reframing, meditation)     ? [] Increase involvement in meaningful activities and pursue part-time employment     ? [] Maintain adequate sleep hygiene     ? [] Explore thoughts and expectations about self and others     ? [] Identify and monitor triggers for panic/anxiety symptoms     ? [] Implement physical activity routine     ? [] Maintain compliance with prescribed medications  ?Degree to which this is a problem: 1  Degree to which goal is met: 4  Date of Review: Long-term goals reviewed today; goals reached; patient will return for therapy as needed         Functional Impairment:  1=Not at all/Rarely  2=Some days  3=Most Days  4=Every Day    Personal : 0  Family : 1  Social : 0   Work/school : 1    Diagnosis:  (EXAMPLE of DSM V: Major depressive disorder, recurrent, moderate; Generalized Anxiety disorder; borderline personality per  patient PHI; fibromyalgia, History of breast cancer in remission; Problem with primary relationship.)   Generalized Anxiety Disorder      Clinical assessments and measures completed at intake:  On the Patient Health Questionnaire-9, a self report measure of depressive symptomatology, she obtained a score of 14, placing her in the range of moderate depression.      On the Generalized Anxiety Disorder-7, a self-report measure of anxiety, she obtained a score of 17,  placing her in the range of severe anxiety.       On the PTSD Checklist for DSM-5 (PCL-5), a 20-item self-report measure of PTSD symptoms, she obtained a total symptom severity score of 63. Number of symptoms endorsed in each criterion group are as follows:  Cluster B: 4 (1) Cluster C: 2 (1)   Cluster D: 6 (2) Cluster E: 3 (2)     WHODAS 2.0 12-item version: 17    Scores presented in qualifiers to represent level of disability.  MILD Problem (slight, low, ...) 5-24%  H1= 20  H2= 20  H3= 10    Clinical assessments and measures completed today:  LESIA-7 = 1     Strengths:  The patient is humorous and open-minded. She is cooperative and agreeable to treatment recommendations.   Limitations:  She has a history of substance abuse and interpersonal conflicts in addition to untreated mental illness.  Cultural Considerations: The patient is a 69 year old  female. She was born and raised in [place], [place]. She identifies as having a good upbringing but running away from home at age 17. The patient recalls getting into trouble as a teenager. She does not identify any specific aspects of culture as being relevant to current symptoms.     Persons responsible for this plan: Patient and Provider            Psychotherapist Signature           Patient Signature:              Guardian Signature             Provider: Performed and documented by CLIFF Wright   Date:  2/26/2020

## 2021-06-06 NOTE — TELEPHONE ENCOUNTER
PCP dc'd Prozac on 12/12/19 and began Lexapro.  Pharmacy called insurance for override since meds are in the same family.  Also relayed to insurance that Prozac was dc'd but insurance refused override and states needs a PA.  How does PCP wish to proceed?  Thanks.

## 2021-06-06 NOTE — PROGRESS NOTES
Clinic Care Coordination Contact     Situation: Pt chart reviewed by SW care coordinator.     Background:   - Most recent RN assessment completed on 12/10/19; goals identified were for SW and FRW to follow up on.  - Pt was scheduled to follow up with SW but cancelled her appointment stating her main concern was out of pocket medical bills.   - FRW has assisted with [employer] application; pt confirmed that it was completed and mailed at W outreach on 1/16/20.  - Pt following with CLIFF Wright (RLN therapist) for psychotherapy. Intake completed on 1/22/20.  - Last outreach by W on 2/26/20. During this conversation, pt did not identify any new concerns. She is waiting to hear back regarding her [employer] application which can take up to 6 weeks to process.     Assessment: All previous goals completed.     Plan/Recommendations:   1.) Okay for maintenance.

## 2021-06-06 NOTE — PROGRESS NOTES
Mental Health Visit Note    2/12/2020    Start time: 10:00am    Stop Time: 10:50am   Session # 1    Session Type: Patient is presenting for an Individual session.    Persons Present: Patient and Therapist    Eleanor Awan is a 69 y.o. female is being seen today for    Chief Complaint   Patient presents with      Follow Up     Psychotherapy follow-up visit for anxiety   .     New symptoms or complaints: Excitement about upcoming cruise; no recent panic attacks    Functional Impairment:   Personal: 2  Family: 1  Work: 4  Social:1    Clinical assessment of mental status:   Grooming: Well groomed  Attire: Appropriate  Age: Appears Stated  Behavior Towards Examiner: Cooperative  Motor Activity: Excessive   Eye Contact: Appropriate  Mood: Anxious  Affect: Jovial  Speech/Language: Dramatic, Rapid and Loud  Attention: Hypervigilant  Concentration: Brief  Thought Process: Flight of ideas  Thought Content: Hallucinations: none reported  Delusions: none reported  Orientation: X 3  Memory: No Evidence of Impairment  Judgement: No Evidence of Impairment  Estimated Intelligence: Average  Demonstrated Insight: Adequate  Fund of Knowledge: adequate    Suicidal/Homicidal Ideation present: None Reported This Session    Patient's impression of their current status:   The patient reports that her overall status is greatly improved since her initial intake visit on 1/22/20. She reports feeling less anxious and denies any recent episodes of panic. She is very excited about her upcoming cruise. She leaves tomorrow. She feels that she is getting back to her old self. She reported a desire to be 100% back to normal. At this time, she reports feeling 90% back to normal. She reported that her goal is to work part-time and to get back into her chores. She believes she will know if she is back to 100% when she is able to work, do her chores and feel more relaxed.   She utilized much of today's session reflecting upon her life story and  how she got to where she is today.     Therapist impression of patients current state:   Therapist's impression is that patient's anxiety is now better managed with the assistance of medication and therapy. The patient presents with rapid speech patterns, excessive motor activity and flight of ideas thought process. This appears to be congruent to her baseline and does not necessarily cause impairments to daily functioning. Episodes of panic have almost disappeared, as patient reports some signs and symptoms that last only moments. The patient indicates feeling more in control of her symptoms. Therapist suggests that patient continue to participate in therapy in order to monitor progress towards goals to manage anxiety independently.     Type of psychotherapeutic technique provided: Insight oriented and CBT    Progress toward short term goals:Progress as expected, as patient returned on time for a follow-up therapy visit. She has met short-term goals to eliminate problematic panic attack symptoms and better manage underlying symptoms of anxiety.    Review of long term goals: Not done at today's visit and long-term treatment goals will be created during her next follow-up visit    Diagnosis:   1. PTSD (post-traumatic stress disorder)    2. Anxiety disorder, unspecified type        Plan and Follow up: The patient is scheduled to return for a follow-up visit on 2/26/20.  She has an anxiety action plan to help her manage symptoms on her cruise.      Discharge Criteria/Planning: Patient will continue with follow-up until therapy can be discontinued without return of signs and symptoms.     Performed and documented by CLIFF Wright 2/12/2020

## 2021-06-06 NOTE — PROGRESS NOTES
Clinic Care Coordination Contact    Follow Up Progress Note      Assessment: Eleanor reports she has been gone for a week and reports she is doing well with Lyndsay for therapy services. Her primary concern of the cost of health care was resolved as she has connected with the billing department to start the process of applying for [employer]. She reports no concerns or barriers to this.      Goals addressed this encounter:     Goals Addressed                 This Visit's Progress       Patient Stated      COMPLETED: Financial Wellbeing (pt-stated)        Goal:  I would like to speak with the Financial Resource Worker to obtain information regarding my eligibility for Medicaid insurance, applying for SNAP and completing an [facility] Financial Aid Application in the next 30-60 days.    Personal Plan  1. I do not qualify for Medicaid or SNAP but I will accept the [employer] Financial Aid application the FRW mailed to me.  2. I have been working with the billing department regarding my [employer] application.  2. I will complete this financial aid application every 6 months as needed.    Date goal set:  12/10/2019    Discussed/Updated: 12/16/19; 1/8/20; 2/26/2020        COMPLETED: Mental Health Management (pt-stated)        Goal:  I would like to obtain a mental health counselor in the next 30-90 days.    Personal Plan  1.  I will continue to attend my therapy appointments with Lyndsay at [facility].    Date goal set:  12/10/2019    Updated: 12/16/19; 1/8/20; 2/27/2020                   Intervention/Education provided during outreach: None          Plan:      - CHW routing to [facility] SW to review for Maintenance.   - CHW will wait for approval/denial of maintenance and will addend this note to reflect next outreach date.

## 2021-06-06 NOTE — PROGRESS NOTES
"Mental Health Visit Note    2/26/2020    Start time: 10:00am    Stop Time: 10:35am   Session # 2    Session Type: Patient is presenting for an Individual session.    Persons Present: Patient and Therapist    Elaenor Awan is a 69 y.o. female is being seen today for    Chief Complaint   Patient presents with      Follow Up     Psychotherapy follow-up visit for anxiety   .     New symptoms or complaints: None    Functional Impairment:   Personal: 1  Family: 0  Work: 1  Social:0    Clinical assessment of mental status:   Grooming: Well groomed  Attire: Appropriate  Age: Appears Stated  Behavior Towards Examiner: Cooperative  Motor Activity: Excessive   Eye Contact: Appropriate  Mood: Euthymic  Affect: Jovial  Speech/Language: Dramatic, Rapid and Loud  Attention: Within normal and Hypervigilant  Concentration: Brief  Thought Process: Flight of ideas  Thought Content: Hallucinations: none reported  Delusions: none reported  Orientation: X 3  Memory: No Evidence of Impairment  Judgement: No Evidence of Impairment  Estimated Intelligence: Average  Demonstrated Insight: Adequate  Fund of Knowledge: adequate    Suicidal/Homicidal Ideation present: None Reported This Session    Patient's impression of their current status:   The patient reports that her overall status has dramatically improved since intake. She recently went on a cruise and denied any episodes of panic or problematic anxiety. She was very proud of this and happy to be feeling better. She stated, \"I feel like I'm back to my old self again.\" She noted having more energy to complete tasks, having more fun when social and feeling less irritable. She would like to pursue part-time work and start participating in craft shows again. She plans to continue exercising and spending time with friends and family. She plans to continue taking medications as prescribed and following up with her PCP. She was very appreciative of her time with this provider and " experience in therapy.     Therapist impression of patients current state:   Therapist's impression is that patient's anxiety is now better managed with the assistance of medication, engagement in meaningful activities, social interactions and healthy daily choices. Her LESIA-7 score today is 1; it was 17 at intake. The patient has reportedly returned to her baseline in regards to functioning with no reported impairments to functioning at this time. Episodes of panic have almost disappeared, as patient reported only minimal signs that are virtually non-existent. The patient indicates feeling more in control of her symptoms and does not identify any new treatment goals. Therefore, patient and therapist agreed that further participation in individual therapy is not clinically necessary at this time. She was encouraged to return for therapy in the future as needed.      Type of psychotherapeutic technique provided: Insight oriented and CBT    Progress toward short term goals:Progress as expected, as patient returned on time for a follow-up therapy visit. She has met short-term goals to eliminate problematic panic attack symptoms and better manage underlying symptoms of anxiety. Her LESIA-7 score has reduced significantly since intake.    Review of long term goals: Treatment Plan updated   Long term goals will be reviewed again if patient returns to therapy.   PCP will notify therapist if necessary.    Diagnosis:   1. Generalized anxiety disorder        Plan and Follow up: The patient will plan to coordinate care with her PCP and return to therapy as needed.  She plans to maintain compliance with prescribed medications. She plans to exercise and find a part-time job. She was encouraged to continue practicing mindfulness skills for relaxation and stress relief.       Discharge Criteria/Planning: Patient will continue with follow-up until therapy can be discontinued without return of signs and symptoms.     Performed and  documented by Lyndsay Mir, [facility]        Lyndsay Mir 2/26/2020

## 2021-06-06 NOTE — TELEPHONE ENCOUNTER
Central PA team  [phone]  Pool: HE PA MED ([id number])          PA has been initiated.       PA form completed and faxed insurance via Cover My Meds     Key:  [id number]     Medication:  Escitalopram    Insurance:  [employer]        Response will be received via fax and may take up to 5-10 business days depending on plan

## 2021-06-07 NOTE — PROGRESS NOTES
"Mental Health tele Visit Note    Patient: Eleanor Awan    : 1950 MRN: [id number]    Date: 2020   Start time: 10:30am   Stop Time: 10:55am   Session # 5    The patient has been notified of the following:   \"We have found that certain health care needs can be provided without the need for a face to face visit.  This service lets us provide the care you need with a phone conversation.  I will have full access to your [place] medical record during this entire phone call.   I will be taking notes for your medical record. Since this is like an office visit, we will bill your insurance company for this service.  There are potential benefits and risks of telephone visits (e.g. limits to patient confidentiality) that differ from in-person visits.?  Confidentiality still applies for telephone services, and nobody will record the visit.  It is important to be in a quiet, private space that is free of distractions (including cell phone or other devices) during the visit.?? If during the course of the call I believe a telephone visit is not appropriate, you will not be charged for this service.\"  Consent has been obtained for this service by care team member: Yes, per verbal agreement     Session Type: Patient is participating in a telephone visit.  Patient unable to participate in video visits due to limited knowledge of technology and lack of appropriate equipment.       Chief Complaint   Patient presents with      Follow Up     Virtual visit for mental health symptoms associated with anxiety       New symptoms or complaints: None    Functional Impairment:   Personal: 2  Family: 1  Work: 2  Social:2          ASSESSMENT: Current Emotional / Mental Status (status of significant symptoms):              Risk status (Self / Other harm or suicidal ideation)              Patient denies risks to personal safety              Patient denies current or recent suicidal ideation or behaviors.              Patient denies " "current or recent homicidal ideation or behaviors.              Patient denies current or recent self injurious behavior or ideation.              Patient denies other safety concerns.              Patient denies changes to risk factors              Patient denies changes to protective factors              Recommended that patient call 911 or go to the local ED should there be a change in any of these risk factors.                Attitude:                                   Cooperative               Orientation:                             x3              Speech                          Rate / Production:       Normal                           Volume:                       Normal               Mood:                                      Anxious  Normal              Thought Content:                    Clear               Thought Form:                        Coherent  Logical               Insight:                                     Good     Patient's impression of their current status:   Patient reports increased anxiety over past few weeks due to stress at home with roommate. Patient reports going for a walk to reduce stress. Patient reports that she is off balance when walking. \"When then things are not the way I expected them to be, it sets me off.\" This irritation leads to chest tightness. Patient reports waking up sweaty but sleeping well.   Patient reports interest in obtaining part-time work.   Patient reports \"I can't get myself back on the phone talking to friends\" like I used to.   Patient has not had any recent panic attacks in the last 2 weeks.     Therapist impression of patients current state:   Therapist prompted patient to express thoughts and feelings. Therapist prompted patient to identify triggers, signs and symptoms of anxiety. Patient is easily distracted and very energetic.   Therapist reviewed patient's defense mechanisms such as humor and avoidance that help her typically cope with this stress. " These defense mechanisms likely result in poor overall emotion regulation.   Therapist encouraged patient to practice belly breathing.     Type of psychotherapeutic technique provided: Client centered and CBT    Progress toward short term goals: Progress as expected, with patient discussing concerns and coping strategies during tele-sessions. Patient working on homework assignments and skills learned in therapy between sessions.    Review of long term goals: Not done at today's visit  Treatment plan updated on 2/26/20    Diagnosis:  1. Generalized anxiety disorder    2. PTSD (post-traumatic stress disorder)        Plan and Follow up:   To continue to work on personal goals:   *continue belly breathing  *continue acknowledging and expressing emotions  *practice assertive communication techniques      Discharge Criteria/Planning: Patient will continue with follow-up until therapy can be discontinued without return of signs and symptoms.         I have reviewed the note as documented above.  This accurately captures the substance of my conversation with the patient.  Performed and documented by CLIFF Wright LICSW

## 2021-06-07 NOTE — PROGRESS NOTES
Clinic Care Coordination Contact  [facility]/Voicemail       Clinical Data: Care Coordinator Outreach    Outreach attempted x 1.  Left message on patient's voicemail with call back information and requested return call.    Plan: Care Coordinator will make another attempt to reach the patient via phone.    Care Coordinator outreach on 5/7/2020

## 2021-06-07 NOTE — PROGRESS NOTES
"Mental Health tele Visit Note    Patient: Eleanor Awan    : 1950 MRN: [id number]    Date: 4/3/2020   Start time: 10:30am   Stop Time: 10:55am   Session # 4    The patient has been notified of the following:   \"We have found that certain health care needs can be provided without the need for a face to face visit.  This service lets us provide the care you need with a phone conversation.  I will have full access to your [place] medical record during this entire phone call.   I will be taking notes for your medical record. Since this is like an office visit, we will bill your insurance company for this service.  There are potential benefits and risks of telephone visits (e.g. limits to patient confidentiality) that differ from in-person visits.?  Confidentiality still applies for telephone services, and nobody will record the visit.  It is important to be in a quiet, private space that is free of distractions (including cell phone or other devices) during the visit.?? If during the course of the call I believe a telephone visit is not appropriate, you will not be charged for this service\"  Consent has been obtained for this service by care team member: Yes, per verbal agreement     Session Type: Patient is participating in a telephone visit     Chief Complaint   Patient presents with     Follow-up     Telephone visit for mental health       New symptoms or complaints: None    Functional Impairment:   Personal: 2  Family: 1  Work: 2  Social:2          ASSESSMENT: Current Emotional / Mental Status (status of significant symptoms):              Risk status (Self / Other harm or suicidal ideation)              Patient denies risks to personal safety              Patient denies current or recent suicidal ideation or behaviors.              Patient denies current or recent homicidal ideation or behaviors.              Patient denies current or recent self injurious behavior or ideation.              Patient " "denies other safety concerns.              Patient denies changes to risk factors              Patient denies changes to protective factors              Recommended that patient call 911 or go to the local ED should there be a change in any of these risk factors.                Attitude:                                   Cooperative               Orientation:                             x3              Speech                          Rate / Production:       Normal                           Volume:                       Normal               Mood:                                      Anxious  Normal              Thought Content:                    Clear               Thought Form:                        Coherent  Logical               Insight:                                     Good       Patient's impression of their current status:   Patient reported some reduced stress at home with roommate. Patient reported that her roommate will jump out and scare her which triggers panic symptoms. She has requested her roommate to stop. Patient identified other triggers for anxiety. Patient reported feeling slightly paranoid while out walking or when at home and hearing noises. Patient reported more frequent but less severe episodes. Patient denied \"panicking\" about the COVID19.  Patient reported that she continues to walk everyday - she reports that the walks provide relief. Patient reports being outside brings her christy. Patient expressed some concerns about being able to work and wondering if the timing is even appropriate.     Therapist impression of patients current state:   Therapist prompted patient to express thoughts and feelings. Therapist prompted patient to identify triggers, signs and symptoms of anxiety. Therapist affirmed patient capacity to work towards acceptance. Therapist normalized patient responses and encouraged patient to continue implementing positive self-talk. Therapist encouraged patient to continue " practicing mindfulness techniques.     Type of psychotherapeutic technique provided: Client centered and CBT    Progress toward short term goals: Progress as expected, with patient discussing concerns and coping strategies during tele-sessions. Patient working on homework assignments and skills learned in therapy between sessions.    Review of long term goals: Not done at today's visit  Treatment plan updated on 2/26/20    Diagnosis:  1. Generalized anxiety disorder    2. PTSD (post-traumatic stress disorder)        Plan and Follow up: To continue to work on personal goals. To continue participating in tele-therapy until face-to-face meetings are allowed. To maintain mental health and physical health through this period of heightened health concerns related to corona virus. To remain medication compliant. To notify this psychotherapist if additional psychotherapy sessions are needed due to environmental stressors.     Discharge Criteria/Planning: Patient will continue with follow-up until therapy can be discontinued without return of signs and symptoms.         I have reviewed the note as documented above.  This accurately captures the substance of my conversation with the patient.  CLIFF Wright

## 2021-06-07 NOTE — PROGRESS NOTES
"Eleanor Awan is a 69 y.o. female who is being evaluated via a billable telephone visit.      The patient has been notified of following:     \"This telephone visit will be conducted via a call between you and your physician/provider. We have found that certain health care needs can be provided without the need for a physical exam.  This service lets us provide the care you need with a short phone conversation.  If a prescription is necessary we can send it directly to your pharmacy.  If lab work is needed we can place an order for that and you can then stop by our lab to have the test done at a later time.    Telephone visits are billed at different rates depending on your insurance coverage. During this emergency period, for some insurers they may be billed the same as an in-person visit.  Please reach out to your insurance provider with any questions.    If during the course of the call the physician/provider feels a telephone visit is not appropriate, you will not be charged for this service.\"    Patient has given verbal consent to a Telephone visit? Yes    What phone number would you like to be contacted at? home    Patient would like to receive their AVS by AVS Preference: Kay.    Additional provider notes: depression     Got stimulus check and caught up with some debt.  Stuttering and walking - wants to be seen with her . 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  Sweats at night.  Gets a bit anxious and walks. This happens every morning about 9:30am. It sometimes comes back.  Is not smoking cigarettes x 27 years.  CBD oil helped for a bit but she no longer can afford it. She got some essential oil samples and that helped. Adaptive was the name.  Smokes pot 5 puffs per day.    The roommate situation continues. She is currently staying with her boyfriend somewhere else. Eleanor sticks up for herself, she says, so it's not too bad. This roommate is the owner and Eleanor and her  rent from her. There is " no option to move.          Assessment/Plan:  1. Mild major depression (H)  Stable, on esitalopram, walking    2. Traumatic brain injury, with loss of consciousness of 30 minutes or less, sequela (H)  Stable - but will eval her walking    3. Alcohol dependence in remission (H)  remission    4. Mild cognitive impairment with memory loss  stable    5. Vertigo  Not a current problem  - hydrOXYzine HCL (ATARAX) 25 MG tablet; Take 0.5-1 tablets (12.5-25 mg total) by mouth every 8 (eight) hours as needed for itching.  Dispense: 90 tablet; Refill: 4    6. Panic attack  Treats these with walks or medication, usually 2 hydroxyzine per day  - hydrOXYzine HCL (ATARAX) 25 MG tablet; Take 0.5-1 tablets (12.5-25 mg total) by mouth every 8 (eight) hours as needed for itching.  Dispense: 90 tablet; Refill: 4    7. Stress  Better now as roommate does not live with her right now  - hydrOXYzine HCL (ATARAX) 25 MG tablet; Take 0.5-1 tablets (12.5-25 mg total) by mouth every 8 (eight) hours as needed for itching.  Dispense: 90 tablet; Refill: 4    8. Senile osteoporosis  Re-start fosamax  - alendronate (FOSAMAX) 70 MG tablet; Take 1 tablet (70 mg total) by mouth every 7 days. Take in the morning on an empty stomach with a full glass of water 30 minutes before food  Dispense: 12 tablet; Refill: 4    9. Other osteoporosis without current pathological fracture  As above        Phone call duration:  29 minutes    8:47 - 9:16  Camille Love MD

## 2021-06-07 NOTE — PROGRESS NOTES
"Telephone Visit Note    Patient Name: Eleanor Awan                   Date:   3/20/2020                                          Service Type:            Telephone Visit                The patient has been notified of following:     \"This telephone visit will be conducted via a call between you and your provider. We have found that certain health care needs can be provided without the need for an office visit.  This service lets us provide the care you need with a short phone conversation.    If during the course of the call the provider feels a telephone visit is not appropriate, you will not be charged for this service.\"                 Session Start Time:  10:35am                    Session End Time:   11:05am                Session Length:        30 minutes    Session #:      1 via telephone encounter    Attendees:     Therapist and Patient                DATA                            Progress Since Last Session (Related to Symptoms / Goals / Homework):              Symptoms: increased anxiety and signs of panic.   Increased anxiety likely due to roommate conflicts and living situation.   Still searching for work.   Walking every morning.   Trying to distract self with TV.                            Episode of Care Goals:    Identifying signs, triggers and symptoms of anxiety.   Review coping strategies for managing stress.                Current / Ongoing Stressors and Concerns:              Living situation stress with roommate                Intervention:              Active listening   Unconditional positive regard   CBT                              ASSESSMENT: Current Emotional / Mental Status (status of significant symptoms):              Risk status (Self / Other harm or suicidal ideation)              Patient denies any risks to personal safety              Patient denies current or recent suicidal ideation or behaviors.              Patient denies current or recent homicidal ideation or " behaviors.              Patient denies current or recent self injurious behavior or ideation.              Patient denies other safety concerns.              Patient denies any risk factors              Patient denies any changes to protective factors              Recommended that patient call 911 or go to the local ED should there be a change in any of these risk factors.                Attitude:                                   Cooperative               Orientation:                             x3              Speech                          Rate / Production:       Normal                           Volume:                       Normal               Mood:                                      Anxious               Thought Content:                    Clear  Perservative               Thought Form:                        Coherent  Logical               Insight:                                     Good                 Medication Compliance:              Working with PCP on increasing medications   Some issues with remembering to take medications.                 Changes in Health Issues:              No major changes to health issues                Chemical Use Review:              Substance Use: Marijuana              Tobacco Use: yes    Diagnosis:  1. Generalized anxiety disorder    2. PTSD (post-traumatic stress disorder)          PLAN: (Patient Tasks / Therapist Tasks / Other)  Therapist and patient will plan to reconnect via telephone on 4/3/20.  The patient will plan to coordinate care with her PCP.  She plans to maintain compliance with prescribed medications.   She plans to exercise and attempt to find a part-time job.   She was encouraged to continue practicing mindfulness skills for relaxation and stress relief.  Encouraged to continue using positive self-talk.      I have reviewed the note as documented above.  This accurately captures the substance of my conversation with the patient.  Lyndsay Mir,  LICSW

## 2021-06-08 NOTE — PROGRESS NOTES
"Mental Health tele Visit Note    Patient: Eleanor Awan    : 1950 MRN: [id number]    Date: 5/15/2020   Start time: 10:31am   Stop Time: 10:55am   Session # 6    The patient has been notified of the following:   \"We have found that certain health care needs can be provided without the need for a face to face visit.  This service lets us provide the care you need with a phone conversation.  I will have full access to your [facility] medical record during this entire phone call.   I will be taking notes for your medical record. Since this is like an office visit, we will bill your insurance company for this service.  There are potential benefits and risks of telephone visits (e.g. limits to patient confidentiality) that differ from in-person visits.?  Confidentiality still applies for telephone services, and nobody will record the visit.  It is important to be in a quiet, private space that is free of distractions (including cell phone or other devices) during the visit.?? If during the course of the call I believe a telephone visit is not appropriate, you will not be charged for this service.\"  Consent has been obtained for this service by care team member: Yes, per verbal agreement     Session Type: Patient is participating in a telephone visit.  Patient unable to participate in video visits due to limited knowledge of technology and lack of appropriate equipment.       Chief Complaint   Patient presents with      Follow Up     Virtual visit to address symptoms of anxiety       New symptoms or complaints: None    Functional Impairment:   Personal: 2  Family: 1  Work: 2  Social: 2        ASSESSMENT: Current Emotional / Mental Status (status of significant symptoms):              Risk status (Self / Other harm or suicidal ideation)              Patient denies risks to personal safety              Patient denies current or recent suicidal ideation or behaviors.              Patient denies current or recent " "homicidal ideation or behaviors.              Patient denies current or recent self injurious behavior or ideation.              Patient denies other safety concerns.              Patient denies changes to risk factors              Patient denies changes to protective factors              Recommended that patient call 911 or go to the local ED should there be a change in any of these risk factors.                Attitude:                                   Cooperative               Orientation:                             x3              Speech                          Rate / Production:       Normal                           Volume:                       Normal               Mood:                                      Anxious  Normal              Thought Content:                    Clear               Thought Form:                        Coherent  Logical               Insight:                                     Good     Patient's impression of their current status:   Patient reports feeling \"paranoid\" and \"afraid like someone is watching me.\" Patient describes experiencing tightness in her chest. She tries to do deep breathing and reports that this does reduce the stress. Patient reports \"I hate this feeling.\" Patient believes that the majority of the anxiety stems from her relationship with her roommate. Patient does notice a reduction in panic symptoms. Patient reports walking a lot in order to reduce her anxiety. Patient has been writing in her journal. Patient reports \"thinking about things all day long.\"   Patient describes \"just having to go\" when starting to feel anxious.      Therapist impression of patients current state:   Therapist prompted patient to express thoughts and feelings. Therapist attempting to help patient understand her emotions.  Therapist prompted patient to identify triggers, signs and symptoms of anxiety. Therapist helped patient examine racing thought patterns. Therapist normalized " signs and symptoms of anxiety.   Therapist encouraged patient to practice belly breathing and practice grounding techniques.     Type of psychotherapeutic technique provided: Client centered and CBT    Progress toward short term goals: Progress as expected, with patient discussing concerns and coping strategies during tele-sessions. Patient working on homework assignments and skills learned in therapy between sessions.    Review of long term goals: Not done at today's visit  Treatment plan updated on 2/26/20    Diagnosis:  1. Generalized anxiety disorder    R/O Bipolar Disorder    Plan and Follow up:   To continue to work on personal goals:   *continue belly breathing  *continue acknowledging and expressing emotions  *practice assertive communication techniques      Discharge Criteria/Planning: Patient will continue with follow-up until therapy can be discontinued without return of signs and symptoms.         I have reviewed the note as documented above.  This accurately captures the substance of my conversation with the patient.  Performed and documented by CLIFF Wright LICSW

## 2021-06-08 NOTE — PROGRESS NOTES
Clinic Care Coordination Contact     Situation: Pt chart reviewed by CHARLENE care coordinator.     Background:   - Most recent RN assessment completed on 12/10/19.  - Moved to maintenance on 2/26/20. See CHARLENE note from this date for further detail regarding course of [facility] enrollment.  - Last outreach by CHW on 5/6/20. Pt stated that she was able to pay off her medical bills with her stimulus check. No new concerns or goals identified.     Assessment: All previous goals completed. Episode resolved. Enrollment status adjusted. CCC team members removed from Care Team.     Plan/Recommendations:   1.) Pt will be graduated from [facility] at this time.

## 2021-06-08 NOTE — TELEPHONE ENCOUNTER
This pt writes to update PCP on almost a daily basis.  It is FYI for PCP.  Will route to both PCP and covering provider.  Thanks.

## 2021-06-08 NOTE — PROGRESS NOTES
"Eleanor Awan is a 69 y.o. female who is being evaluated via a billable telephone visit.      The patient has been notified of following:     \"This telephone visit will be conducted via a call between you and your physician/provider. We have found that certain health care needs can be provided without the need for a physical exam.  This service lets us provide the care you need with a short phone conversation.  If a prescription is necessary we can send it directly to your pharmacy.  If lab work is needed we can place an order for that and you can then stop by our lab to have the test done at a later time.    Telephone visits are billed at different rates depending on your insurance coverage. During this emergency period, for some insurers they may be billed the same as an in-person visit.  Please reach out to your insurance provider with any questions.    If during the course of the call the physician/provider feels a telephone visit is not appropriate, you will not be charged for this service.\"    Patient has given verbal consent to a Telephone visit? Yes    What phone number would you like to be contacted at? Home#    Patient would like to receive their AVS by AVS Preference: Mail a copy.    Additional provider notes: anxiety med f/u     Doing meditative music + lavendar + crystals  Feeling really good.  She learned how to let it go. She can tolerate hearing about her landlord's boyfriend  Today they're doing a garage sale and she is loving seeing the kids.  Sleep - good, just gets up to use the toilet. 11pm - 7 - 10am.  Eating more.  Plans to keep up what she's doing since she's feeling so good.    Assessment/Plan:  1. Panic disorder without agoraphobia  Continue same  - QUEtiapine (SEROQUEL) 50 MG tablet; Take 1 tablet (50 mg total) by mouth at bedtime.  Dispense: 30 tablet; Refill: 4    2. Mild major depression (H)  Therapy, meds, skill exercises    3. Alcohol dependence in remission (H)  Stable/same    4. " Traumatic brain injury, with loss of consciousness of 30 minutes or less, sequela (H)  Stable/same    5. Mild cognitive impairment with memory loss  Improved with better mood    6. Other osteoporosis without current pathological fracture  She struggles to remember her fosamax but will keep trying        Phone call duration:  14 minutes  3:44 - 3:58    Camille Love MD

## 2021-06-08 NOTE — PROGRESS NOTES
Clinic Care Coordination Contact     Patient has completed all goals with Clinic Care Coordination.     Please review the chart and confirm if Graduation is approved.    CHW spoke with Eleanor via [employer] message and she chose to pay off her medical bills with her stimulus check. Eleanor does know she can contact the billing department in the future regarding any ongoing bills she has concerns about.

## 2021-06-08 NOTE — PROGRESS NOTES
Outpatient Mental Health Treatment Plan    Name:  Eleanor Awan  :  1950  MRN:  [id number]    Treatment Plan:  Updated Treatment Plan  Intake/initial treatment plan date:    Intake: 2020   Benefit and risks and alternatives have been discussed: Yes  Is this treatment appropriate with minimal intrusion/restrictions: Yes  Estimated duration of treatment:  Initial trial of 3-4 sessions, to be reviewed.  Anticipated frequency of services: Will notify PCP if needing to return due to goals of managing anxiety being met  Necessity for frequency: This frequency is needed to establish therapeutic goals and for continuity of care in order to monitor progress.  Necessity for treatment: To address cognitive, behavioral, and/or emotional barriers in order to work toward goals and to improve quality of life.    Session Type: Patient is presenting for an Individual session.via telephone encounter due to virtual visit    Plan:           ?   ? Anxiety    Goal:  Maintain average anxiety level around 1   Strategies: ? [x] Practice relaxation techniques and other coping strategies (e.g., thought stopping, reframing, meditation)     ? [x] Increase involvement in meaningful activities and pursue part-time employment     ? [x] Maintain adequate sleep hygiene     ? [x] Explore thoughts and expectations about self and others     ? [x] Identify and monitor triggers for panic/anxiety symptoms     ? [x] Implement physical activity routine     ? [x] Maintain compliance with prescribed medications  ?Degree to which this is a problem: 1  Degree to which goal is met: 3  Date of Review: 2020         Functional Impairment:  1=Not at all/Rarely  2=Some days  3=Most Days  4=Every Day    Personal : 0  Family : 1  Social : 0   Work/school : 1    Diagnosis:  (EXAMPLE of DSM V: Major depressive disorder, recurrent, moderate; Generalized Anxiety disorder; borderline personality per patient PHI; fibromyalgia, History of breast  cancer in remission; Problem with primary relationship.)   Generalized Anxiety Disorder      Clinical assessments and measures completed at intake:  On the Patient Health Questionnaire-9, a self report measure of depressive symptomatology, she obtained a score of 14, placing her in the range of moderate depression.      On the Generalized Anxiety Disorder-7, a self-report measure of anxiety, she obtained a score of 17,  placing her in the range of severe anxiety.       On the PTSD Checklist for DSM-5 (PCL-5), a 20-item self-report measure of PTSD symptoms, she obtained a total symptom severity score of 63. Number of symptoms endorsed in each criterion group are as follows:  Cluster B: 4 (1) Cluster C: 2 (1)   Cluster D: 6 (2) Cluster E: 3 (2)     WHODAS 2.0 12-item version: 17    Scores presented in qualifiers to represent level of disability.  MILD Problem (slight, low, ...) 5-24%  H1= 20  H2= 20  H3= 10       Strengths:  The patient is humorous and open-minded. She is cooperative and agreeable to treatment recommendations.   Limitations:  She has a history of substance abuse and interpersonal conflicts in addition to untreated mental illness.  Cultural Considerations: The patient is a 69 year old  female. She was born and raised in [place], [place]. She identifies as having a good upbringing but running away from home at age 17. The patient recalls getting into trouble as a teenager. She does not identify any specific aspects of culture as being relevant to current symptoms.     Persons responsible for this plan: Patient and Provider   Pt unable to sign due to virtual visit            Psychotherapist Signature           Patient Signature:              Guardian Signature             Provider: Performed and documented by CLIFF Wright   Date:  6/11/2020

## 2021-06-08 NOTE — PROGRESS NOTES
"Mental Health tele Visit Note    Patient: Eleanor Awan    : 1950 MRN: [id number]    Date: 2020   Start time: 11:00am   Stop Time: 11:20am   Session # 7    The patient has been notified of the following:   \"We have found that certain health care needs can be provided without the need for a face to face visit.  This service lets us provide the care you need with a phone conversation.  I will have full access to your [facility] medical record during this entire phone call.   I will be taking notes for your medical record. Since this is like an office visit, we will bill your insurance company for this service.  There are potential benefits and risks of telephone visits (e.g. limits to patient confidentiality) that differ from in-person visits.?  Confidentiality still applies for telephone services, and nobody will record the visit.  It is important to be in a quiet, private space that is free of distractions (including cell phone or other devices) during the visit.?? If during the course of the call I believe a telephone visit is not appropriate, you will not be charged for this service.\"  Consent has been obtained for this service by care team member: Yes, per verbal agreement     Session Type: Patient is participating in a telephone visit.  Patient unable to participate in video visits due to limited knowledge of technology and lack of appropriate equipment.     Chief Complaint   Patient presents with     MH Follow Up     Psychotherapy follow-up visit for anxiety       New symptoms or complaints: None    Functional Impairment:   Personal: 2  Family: 1  Work: 2  Social: 2        ASSESSMENT: Current Emotional / Mental Status (status of significant symptoms):              Risk status (Self / Other harm or suicidal ideation)              Patient denies risks to personal safety              Patient denies current or recent suicidal ideation or behaviors.              Patient denies current or recent " "homicidal ideation or behaviors.              Patient denies current or recent self injurious behavior or ideation.              Patient denies other safety concerns.              Patient denies changes to risk factors              Patient denies changes to protective factors              Recommended that patient call 911 or go to the local ED should there be a change in any of these risk factors.                Attitude:                                   Cooperative               Orientation:                             x3              Speech                          Rate / Production:       Normal                           Volume:                       Normal               Mood:                                      Anxious  Normal              Thought Content:                    Clear               Thought Form:                        Coherent  Logical               Insight:                                     Good     Patient's impression of their current status:   Patient reports that she is holding a garage sale currently but agrees to talk right now. Patient reports that a neighbor friend has provided her with some tools to help manage her anxiety (meditation, crystals, lavender). Patient reports that \"things have been really good\" minus a few minor symptoms.     Therapist impression of patients current state:   Therapist's impression is that patient was distracted today during the call despite her assurance that this was a good time.  Therapist prompted patient to express thoughts and feelings. Therapist attempting to help patient understand her emotions.  Therapist prompted patient to identify triggers, signs and symptoms of anxiety. Therapist helped patient examine racing thought patterns. Therapist normalized signs and symptoms of anxiety.   Therapist encouraged patient to practice belly breathing and practice grounding techniques.     Type of psychotherapeutic technique provided: Client centered and " CBT    Progress toward short term goals: Progress as expected, with patient discussing concerns and coping strategies during tele-sessions. Patient working on homework assignments and skills learned in therapy between sessions.    Review of long term goals:   Treatment plan updated today     Diagnosis:  1. Generalized anxiety disorder    2. PTSD (post-traumatic stress disorder)    R/O Bipolar Disorder    Plan and Follow up:   To continue to work on personal goals:   *continue belly breathing  *continue acknowledging and expressing emotions  *practice assertive communication techniques    *continue meditation practice     Discharge Criteria/Planning: Patient will continue with follow-up until therapy can be discontinued without return of signs and symptoms.         I have reviewed the note as documented above.  This accurately captures the substance of my conversation with the patient.  Performed and documented by CLIFF Wright LICSW

## 2021-06-09 NOTE — TELEPHONE ENCOUNTER
Patient did not answer phone at scheduled visit time today. Therapist left voice message for patient to call back in order to r/s.

## 2021-06-09 NOTE — PROGRESS NOTES
"Eleanor Awan is a 69 y.o. female who is being evaluated via a billable telephone visit.      The patient has been notified of following:     \"This telephone visit will be conducted via a call between you and your physician/provider. We have found that certain health care needs can be provided without the need for a physical exam.  This service lets us provide the care you need with a short phone conversation.  If a prescription is necessary we can send it directly to your pharmacy.  If lab work is needed we can place an order for that and you can then stop by our lab to have the test done at a later time.    Telephone visits are billed at different rates depending on your insurance coverage. During this emergency period, for some insurers they may be billed the same as an in-person visit.  Please reach out to your insurance provider with any questions.    If during the course of the call the physician/provider feels a telephone visit is not appropriate, you will not be charged for this service.\"    Patient has given verbal consent to a Telephone visit? Yes    What phone number would you like to be contacted at? [phone]    Patient would like to receive their AVS by AVS Preference: Kay.    Additional provider notes: housing problems with anxiety     Current housing is too stressful. Also, thinking about that friend puts her into panic. All this has become so much worse. She's been hesitant to go for walks because she can't stand up at times when she thinks about her. This is difficult, because this person used to be a good friend.    Eleanor wakes up tense. She has moments when she feels OK, but her whole life is manipulated by this.     Could I apply for disability?    Sleep is good if she takes her meds. She forgot about taking the hydroxyzine.    Assessment/Plan:  1. Mild major depression (H)  Worse due to stress of her friend/landlord; she's actively looking for different housing. Hopes to move within " the month.    2. Traumatic brain injury, with loss of consciousness of 30 minutes or less, sequela (H)  stable    3. Alcohol dependence in remission (H)  stable    4. Other osteoporosis without current pathological fracture  Getting treatment    5. Stressful life event affecting family  Housing is very stressful for her and her . Eleanor needs daily time away from their living space and agrees to seek that. Hopefully they can move soon.        Phone call duration:  26 minutes  9:44 - 10:10  MD Camille Jones MD

## 2021-06-09 NOTE — PROGRESS NOTES
"Eleanor Awan is a 69 y.o. female who is being evaluated via a billable telephone visit.      The patient has been notified of following:     \"This telephone visit will be conducted via a call between you and your physician/provider. We have found that certain health care needs can be provided without the need for a physical exam.  This service lets us provide the care you need with a short phone conversation.  If a prescription is necessary we can send it directly to your pharmacy.  If lab work is needed we can place an order for that and you can then stop by our lab to have the test done at a later time.    Telephone visits are billed at different rates depending on your insurance coverage. During this emergency period, for some insurers they may be billed the same as an in-person visit.  Please reach out to your insurance provider with any questions.    If during the course of the call the physician/provider feels a telephone visit is not appropriate, you will not be charged for this service.\"    Patient has given verbal consent to a Telephone visit? Yes    What phone number would you like to be contacted at? [phone]    Patient would like to receive their AVS by AVS Preference: Kay.    Additional provider notes: today is good     Conflict with her landlord continues.  Eleanor is writing problems down, she is seeing other friends.    Housing - they continue to looking. Have not found anything - they are looking for something to move into soon.  Still getting walks  Sleep is good - she notes she's sleeping more these days.8-10 hours per night!  Appetite - varies a lot, almost always 3 meals per day.    She loves watching the neighborhood kids play.    Planning to do a vacation when the Covid stuff is over --something to look forward to.    Assessment/Plan:  1. Panic disorder without agoraphobia  This really helps her - continue same dose  - QUEtiapine (SEROQUEL) 50 MG tablet; Take 1 tablet (50 mg total) " by mouth at bedtime. To help SLEEP  Dispense: 30 tablet; Refill: 4    2. Pre-syncope  Stable, continue same  - metoprolol succinate (TOPROL-XL) 25 MG; Take 0.5 tablets (12.5 mg total) by mouth at bedtime. To help heart racing  Dispense: 45 tablet; Refill: 3    3. PVC's (premature ventricular contractions)  She notes this gets bad when she panics.  - metoprolol succinate (TOPROL-XL) 25 MG; Take 0.5 tablets (12.5 mg total) by mouth at bedtime. To help heart racing  Dispense: 45 tablet; Refill: 3    4. Vertigo  Not bad now. Continue med for panic.  - hydrOXYzine HCL (ATARAX) 25 MG tablet; Take 0.5-1 tablets (12.5-25 mg total) by mouth every 8 (eight) hours as needed (for anxiety).  Dispense: 90 tablet; Refill: 4    5. Panic attack  Fewer attacks recently. Walking helps and she resumed this.  - hydrOXYzine HCL (ATARAX) 25 MG tablet; Take 0.5-1 tablets (12.5-25 mg total) by mouth every 8 (eight) hours as needed (for anxiety).  Dispense: 90 tablet; Refill: 4  - escitalopram oxalate (LEXAPRO) 20 MG tablet; Take 1 tablet (20 mg total) by mouth daily. To help general mood (depression/anxiety/anger)  Dispense: 90 tablet; Refill: 4    6. Stress  Very high- they are looking for a new low cost housing place  - hydrOXYzine HCL (ATARAX) 25 MG tablet; Take 0.5-1 tablets (12.5-25 mg total) by mouth every 8 (eight) hours as needed (for anxiety).  Dispense: 90 tablet; Refill: 4    7. Mild major depression (H)  Better on medication  - escitalopram oxalate (LEXAPRO) 20 MG tablet; Take 1 tablet (20 mg total) by mouth daily. To help general mood (depression/anxiety/anger)  Dispense: 90 tablet; Refill: 4    8. Alcohol dependence in remission (H)  Continues in remission  - escitalopram oxalate (LEXAPRO) 20 MG tablet; Take 1 tablet (20 mg total) by mouth daily. To help general mood (depression/anxiety/anger)  Dispense: 90 tablet; Refill: 4    9. Traumatic brain injury, with loss of consciousness of 30 minutes or less, sequela (H)  Historical  but affects her  - escitalopram oxalate (LEXAPRO) 20 MG tablet; Take 1 tablet (20 mg total) by mouth daily. To help general mood (depression/anxiety/anger)  Dispense: 90 tablet; Refill: 4    10. Senile osteoporosis  On medication  - alendronate (FOSAMAX) 70 MG tablet; Take 1 tablet (70 mg total) by mouth every 7 days. Take in the morning on an empty stomach with a full glass of water 30 minutes before food; FOR YOUR BONES  Dispense: 12 tablet; Refill: 4    11. Other osteoporosis without current pathological fracture  As above    12. Mild cognitive impairment with memory loss  Seemed ok to me - needs close monitoring    13. Stressful life event affecting family  Living situation is stressful - they are hoping to change it soon + COVID19 is stressful for everyone.    Phone call duration:  23 minutes  10:07 - 10:30  MD iTta Jones MA

## 2021-06-10 NOTE — TELEPHONE ENCOUNTER
Therapist called patient at 11am on 8/26/20 for a follow-up psychotherapy visit. Patient did not answer so therapist left detailed message including option to talk next Wednesday, 9/2, at 10am. Patient was instructed to call the behavioral access scheduling line if next week does not work for her.

## 2021-06-10 NOTE — PROGRESS NOTES
"Eleanor Awan is a 69 y.o. female who is being evaluated via a billable telephone visit.      The patient has been notified of following:     \"This telephone visit will be conducted via a call between you and your physician/provider. We have found that certain health care needs can be provided without the need for a physical exam.  This service lets us provide the care you need with a short phone conversation.  If a prescription is necessary we can send it directly to your pharmacy.  If lab work is needed we can place an order for that and you can then stop by our lab to have the test done at a later time.    Telephone visits are billed at different rates depending on your insurance coverage. During this emergency period, for some insurers they may be billed the same as an in-person visit.  Please reach out to your insurance provider with any questions.    If during the course of the call the physician/provider feels a telephone visit is not appropriate, you will not be charged for this service.\"    Patient has given verbal consent to a Telephone visit? Yes    What phone number would you like to be contacted at? [phone]    Patient would like to receive their AVS by AVS Preference: Kay.    Additional provider notes: follow up     Feels OK now - chest tightness.    After putting her cat down, had 5 anxiety attacks.  Her friend/landlord was gone    Paranoia - gets worse - watches who is behind her, etc. She can't walk as far anymore. She can't shower when she's alone in the house anymore either.    She feels like she wants to do something. She has to wait until \"a door opens up in her chest\" and then she can go do it. Sometimes with prayer, she can help the door open, but she otherwise has no control over it.    She knows she's had a huge year    She takes quetiapine 50mg 8pm. Goes to sleep 10-10:30pm. Wakes 7-9am.    Assessment/Plan:  1. Mild cognitive impairment with memory loss  I wonder how much of " what is going on is from her traumatic brain injury    2. Mild major depression (H)  On lexapro, helps    3. Alcohol dependence in remission (H)  No problem    4. Traumatic brain injury, with loss of consciousness of 30 minutes or less, sequela (H)  Might be causing her paranoia --will try increasing her seroquel from 50 to 100mg x 14 days.    5. Panic disorder without agoraphobia  On meds, still has attacks. Might have to give a stronger sleep med to work with the Lexapro to help more.  - QUEtiapine (SEROQUEL) 50 MG tablet; Take 1-2 tablets ( mg total) by mouth at bedtime. To help SLEEP  Dispense: 60 tablet; Refill: 4    6. Paranoia (H)  New - building - not sure if this is part of anxiety or her TBI or something else.    7. Insomnia, unspecified type  seroquel works, increasing dose        Phone call duration:  20 minutes  8:58 - 9:18  MD Camille Jones MD

## 2021-06-10 NOTE — TELEPHONE ENCOUNTER
FYI - Status Update  Who is Calling: Patient  Update: Wanted her Dr to know that she was in the hospital at [facility], they are sayng that she has a thiamine deficiency.  Okay to leave a detailed message?:  No return call needed

## 2021-06-10 NOTE — TELEPHONE ENCOUNTER
"This is helpful to know. Thanks for this message.    Please note the patient was hospitalized and found to have a thiamine deficiency. She has not \"sprung back\" from this -- a week later is still very tired.   "

## 2021-06-10 NOTE — PROGRESS NOTES
NEUROPSYCHOLOGY NOTE     Ms. Awan was referred for an updated neuropsychological evaluation by her former provider, Anupam Santos MD and was scheduled for an appointment with me on 9/9/2020. However, this appointment was scheduled prior to COVID-19. Given the ongoing pandemic, we are currently only able to see patients via telehealth. Given that she was previously evaluated in person, I recommend that Ms. Awan wait to be seen for testing when we can safely accommodate face-to-face visits again (hopefully within the next 1-2 months) in order to make more accurate comparisons to her initial test performances. We have been unable to get a hold of her via telephone, but left her another voicemail stating that we will cancel her 9/9/2020 appointment and will put her name on our wait-list and will contact her as soon as we are able to schedule an in-person assessment.      Ms. Awan is welcome to contact our service at any time ([phone]) if she has any questions about her pending appointment.        Edna Swain PsyD, LP  Licensed Clinical Neuropsychologist  [facility]  [address], [address]  [place] [address]  Phone: [phone]

## 2021-06-10 NOTE — PROGRESS NOTES
"Mental Health tele Visit Note    Patient: Eleanor Awan    : 1950 MRN: [id number]    Date: 2020   Start time: 2:05pm   Stop Time: 2:45pm   Session # 8    The patient has been notified of the following:   \"We have found that certain health care needs can be provided without the need for a face to face visit.  This service lets us provide the care you need with a phone conversation.  I will have full access to your [facility] medical record during this entire phone call.   I will be taking notes for your medical record. Since this is like an office visit, we will bill your insurance company for this service.  There are potential benefits and risks of telephone visits (e.g. limits to patient confidentiality) that differ from in-person visits.?  Confidentiality still applies for telephone services, and nobody will record the visit.  It is important to be in a quiet, private space that is free of distractions (including cell phone or other devices) during the visit.?? If during the course of the call I believe a telephone visit is not appropriate, you will not be charged for this service.\"  Consent has been obtained for this service by care team member: Yes, per verbal agreement     Session Type: Patient is participating in a telephone visit.  Patient unable to participate in video visits due to limited knowledge of technology and lack of appropriate equipment.     Chief Complaint   Patient presents with     MH Follow Up     Psychotherapy follow-up visit for anxiety       New symptoms or complaints: None    Functional Impairment:   Personal: 2  Family: 1  Work: 2  Social: 2        ASSESSMENT: Current Emotional / Mental Status (status of significant symptoms):              Risk status (Self / Other harm or suicidal ideation)              Patient denies risks to personal safety              Patient denies current or recent suicidal ideation or behaviors.              Patient denies current or recent " "homicidal ideation or behaviors.              Patient denies current or recent self injurious behavior or ideation.              Patient denies other safety concerns.              Patient denies changes to risk factors              Patient denies changes to protective factors              Recommended that patient call 911 or go to the local ED should there be a change in any of these risk factors.                Attitude:                                   Cooperative               Orientation:                             x3              Speech                          Rate / Production:       Normal                           Volume:                       Normal               Mood:                                      Anxious  Normal              Thought Content:                    Clear               Thought Form:                        Coherent  Logical               Insight:                                     Good     Patient's impression of their current status:   Patient reflects upon some recent events and stressors. She reports \"doing much better now\" compared to before. Patient is looking to move but is having trouble finding a suitable place. Patient reports that she is still going for walks. Patient reports having a panic attack several weeks ago during a walk which scared her. Patient describes some problems with appetite and low energy but doesn't believe she feels depressed.     Therapist impression of patients current state:   Therapist prompted patient to express thoughts and feelings.   Therapist prompted patient to identify triggers, signs and symptoms of anxiety. Therapist normalized signs and symptoms of anxiety.   Therapist encouraged patient to practice belly breathing and practice grounding techniques.     Type of psychotherapeutic technique provided: Client centered and CBT    Progress toward short term goals: Progress as expected, with patient discussing concerns and coping strategies during " tele-sessions. Patient working on homework assignments and skills learned in therapy between sessions.    Review of long term goals:   Not done at today's visit  Treatment plan updated 6/11/2020    Diagnosis:  1. Generalized anxiety disorder    R/O PTSD  R/O Bipolar Disorder    Plan and Follow up:   To continue to work on personal goals:   *continue belly breathing  *continue acknowledging and expressing emotions  *practice assertive communication techniques    *continue meditation practice     Discharge Criteria/Planning: Patient will continue with follow-up until therapy can be discontinued without return of signs and symptoms.         I have reviewed the note as documented above.  This accurately captures the substance of my conversation with the patient.  Performed and documented by CLIFF Wright LICSW

## 2021-06-11 NOTE — PROGRESS NOTES
Clinic Care Coordination Contact  Community Health Worker Initial Outreach    CHW Initial Information Gathering:  Referral Source: PCP  Preferred Hospital: [facility]  [phone]  Preferred Urgent Care: [facility], [phone]  Current living arrangement:: I live in a private home with family  Type of residence:: Apartment  Community Resources: None  Supplies used at home:: None  Equipment Currently Used at Home: none  Informal Support system:: Spouse  No PCP office visit in Past Year: No  Transportation means:: Accessible car  CHW Additional Questions  MyChart active?: Yes  Patient sent Social Determinants of Health questionnaire?: No    Patient accepts CC: Yes. Patient scheduled for assessment with CCC SW on Friday 9/11 at 2pm. Patient noted desire to discuss information on rental websites where she can go to research available rental openings.      - Financial concerns, but she is of age to apply for RSDI which she will have to do independently via calling [facility] or online.

## 2021-06-11 NOTE — PROGRESS NOTES
"Mental Health tele Visit Note    Patient: Eleanor Awan    : 1950 MRN: [id number]    Date: 2020   Start time: 10:00am   Stop Time: 10:40am   Session # 9    The patient has been notified of the following:   \"We have found that certain health care needs can be provided without the need for a face to face visit.  This service lets us provide the care you need with a phone conversation.  I will have full access to your [facility] medical record during this entire phone call.   I will be taking notes for your medical record. Since this is like an office visit, we will bill your insurance company for this service.  There are potential benefits and risks of telephone visits (e.g. limits to patient confidentiality) that differ from in-person visits.?  Confidentiality still applies for telephone services, and nobody will record the visit.  It is important to be in a quiet, private space that is free of distractions (including cell phone or other devices) during the visit.?? If during the course of the call I believe a telephone visit is not appropriate, you will not be charged for this service.\"  Consent has been obtained for this service by care team member: Yes, per verbal agreement     Session Type: Patient is participating in a telephone visit.  Patient unable to participate in video visits due to limited knowledge of technology and lack of appropriate equipment.     Chief Complaint   Patient presents with     MH Follow Up     Psychotherapy follow-up visit for anxiety       New symptoms or complaints: recent health problems    Functional Impairment:   Personal: 2  Family: 1  Work: 2  Social: 2        ASSESSMENT: Current Emotional / Mental Status (status of significant symptoms):              Risk status (Self / Other harm or suicidal ideation)              Patient denies risks to personal safety              Patient denies current or recent suicidal ideation or behaviors.              Patient denies current " "or recent homicidal ideation or behaviors.              Patient denies current or recent self injurious behavior or ideation.              Patient denies other safety concerns.              Patient denies changes to risk factors              Patient denies changes to protective factors              Recommended that patient call 911 or go to the local ED should there be a change in any of these risk factors.                Attitude:                                   Cooperative               Orientation:                             x3              Speech                          Rate / Production:       Normal                           Volume:                       Normal               Mood:                                      Anxious  Normal              Thought Content:                    Clear               Thought Form:                        Coherent  Logical               Insight:                                     Good     Patient's impression of their current status:   Patient reflects upon recent events since her last visit including a seizure and hospitalization. She shares \"I can't remember any of it.\" She discusses her substance use history. She is adamant that she does not have a substance use problem after quitting heroin 45 years ago. She shares that she finally \"started to feel human\" the past few days. She is finally able to eat normally but has lost \"so much weight.\" She is visiting her PCP next week. She reports that she has decided to stop drinking alcohol due to being so scared from the recent medical incident.   She shares that she has not experienced an anxiety attack recently. She reports that the living situation has improved slightly. She started walking again a few days ago but is not up to an hour yet.      Therapist impression of patients current state:   Therapist prompted patient to express thoughts and feelings following a CBT framework.   Therapist provided unconditional positive " regard and support.   Therapist prompted patient to identify triggers, signs and symptoms of anxiety.   Therapist encouraged patient to practice belly breathing and practice grounding techniques.     Type of psychotherapeutic technique provided: Client centered and CBT    Progress toward short term goals: Progress as expected, with patient discussing concerns and coping strategies during tele-sessions. Patient working on homework assignments and skills learned in therapy between sessions. She continues to follow-up with her PCP regarding health issues.     Review of long term goals:   Not done at today's visit  Treatment plan updated 6/11/2020    Diagnosis:  1. Generalized anxiety disorder    History of chemical dependency   R/O PTSD  R/O Bipolar Disorder    Plan and Follow up:   To continue to work on personal goals:   *continue belly breathing  *continue acknowledging and expressing emotions  *practice assertive communication techniques    *continue meditation practice   *follow-up with therapist as needed    Discharge Criteria/Planning: Patient will continue with follow-up until therapy can be discontinued without return of signs and symptoms.         I have reviewed the note as documented above.  This accurately captures the substance of my conversation with the patient.  Performed and documented by CLIFF Wright LICSW

## 2021-06-11 NOTE — TELEPHONE ENCOUNTER
FYI - Status Update  Who is Calling: Home Care  Update: [facility] received a referral to do medication management for this patient.  Patient has declined Home Care, stating her  will help her.  Home care referral being cancelled.  Okay to leave a detailed message?:  No return call needed

## 2021-06-11 NOTE — PROGRESS NOTES
"OFFICE VISIT NOTE      Assessment & Plan   Eleanor Awan is a 69 y.o. female who was hospitalized about a month ago after having some \"altered consciousness\" and found to have thiamine deficiency. She had been drinking alcohol the day before. She took a week after hospitalization to feel better, but now is feeling better - more appetite, sleeping well, etc. She's taking her meds - feels like she figured out how to keep them all straight.   Mood is good - she's seeing a counselor (luis) and taking a med.  Paranoia markedly better today.    Diagnoses and all orders for this visit:    Benign paroxysmal positional vertigo due to bilateral vestibular disorder  -     Ambulatory referral to Care Management (Primary Care)    Senile osteoporosis  -     alendronate (FOSAMAX) 70 MG tablet; Take 1 tablet (70 mg total) by mouth every 7 days. Take in the morning on an empty stomach with a full glass of water 30 minutes before food; FOR YOUR BONES  Dispense: 12 tablet; Refill: 4  -     Ambulatory referral to Care Management (Primary Care)    Mild cognitive impairment with memory loss  -     Ambulatory referral to Care Management (Primary Care)    Mild major depression (H)  -     Ambulatory referral to Care Management (Primary Care)    Stressful life event affecting family  -     Ambulatory referral to Care Management (Primary Care)    Stressful life events affecting family and household  -     Ambulatory referral to Care Management (Primary Care)    Thiamine deficiency    Alcohol dependence in remission (H)    Other orders  -     Cancel: PHQ9 Depression Screen        Camille Love MD    The following are part of a depression follow up plan for the patient:  under care of mental health counselor          Subjective:   Chief Complaint:  Medication review    69 y.o. female.     1) appetite is returning, she is starting to feel better    2) walks again and loves being outside    3) has very brief episodes, still, much " lighter and very short compared to what sent her to the ER a few weeks ago  4) has set up her meds;  does not help; she puts morning meds in one place and evening meds in another.    5) is looking for alternate housing but has very limited financial resources. Current roommate she lives with has been especially quiet recently, which is nice. But they never know if/when she'll explode and yell and be mean again. Eleanor wonders about getting disability or something?    Current Outpatient Medications   Medication Sig     alendronate (FOSAMAX) 70 MG tablet Take 1 tablet (70 mg total) by mouth every 7 days. Take in the morning on an empty stomach with a full glass of water 30 minutes before food; FOR YOUR BONES     escitalopram oxalate (LEXAPRO) 20 MG tablet Take 1 tablet (20 mg total) by mouth daily. To help general mood (depression/anxiety/anger)     folic acid (FOLVITE) 1 MG tablet Take 1 tablet (1 mg total) by mouth daily.     hydrOXYzine HCL (ATARAX) 25 MG tablet Take 0.5-1 tablets (12.5-25 mg total) by mouth every 8 (eight) hours as needed (for anxiety).     losartan (COZAAR) 100 MG tablet Take 1 tablet (100 mg total) by mouth daily.     metoprolol succinate (TOPROL-XL) 50 MG 24 hr tablet Take 1 tablet (50 mg total) by mouth at bedtime. To help heart racing     QUEtiapine (SEROQUEL) 50 MG tablet Take 1-2 tablets ( mg total) by mouth at bedtime. To help SLEEP     thiamine 100 MG tablet Take 1 tablet (100 mg total) by mouth 3 (three) times a day for 12 days, THEN 1 tablet (100 mg total) daily.     multivitamin with minerals (THERA-M) 9 mg iron-400 mcg Tab tablet Take 1 tablet by mouth daily.       PSFHx: appropriate sections of PMH completed/filled in   Tobacco Status:  She  reports that she quit smoking about 24 years ago. She started smoking about 49 years ago. She smoked 0.00 packs per day. She has never used smokeless tobacco.    Review of Systems:  No fever.  No rash. All other systems negative  "except as noted above.    Objective:    /54 (Patient Site: Left Arm, Patient Position: Sitting, Cuff Size: Adult Regular)   Pulse (!) 56   Temp 99.3  F (37.4  C) (Oral)   Ht 5' 3.39\" (1.61 m)   Wt 129 lb 12 oz (58.9 kg)   SpO2 97%   BMI 22.71 kg/m    GENERAL: No acute distress.  Mood: good  Affect - appropriate  Gait: steady and even    Ht: reg s1s2  Lungs: clear with good aeration  Legs: no edema  Skin: dry, no rashes    Went over meds and what each was for  Spent 40 min face to face with patient with more the 50% spent in counseling, education and coordination of care and discussing sleep, appetite/diet/nutrition, exercise, balance, medications.    "

## 2021-06-11 NOTE — TELEPHONE ENCOUNTER
Pt came in today and dropped off empty medication bottles. She wasn't sure if Dr Love wanted her to continue to take since they were prescribed by another provider.

## 2021-06-12 NOTE — PROGRESS NOTES
MTM Initial Encounter  Assessment & Plan                                                     Polypharmacy: Reviewed all medications with patient today.  Also, updated medication fridge list and routed to patient via evolso today.    1. Anxiety/depression  Discrepancy with current medication, quetiapine.  Patient appropriately taking escitalopram, risperidone, and hydroxyzine.  Patient also notes taking quetiapine, this medication was to be discontinued about a month ago.  Recommended patient discontinue quetiapine per PCP and continue current medications as prescribed, patient agreeable.  If patient continues to experience anxiety, could consider trial with buspirone.   In regards to blood sugars and CGM, recommended holding CGM testing for now due to lack of diabetes diagnosis or any medications known to cause hypoglycemia. Although, will send meter for BG to be monitored prn. Recommended maintaining regular eating schedule as PCP previously discussed with patient.   Plan  - Patient to stop taking quetiapine per previous PCP recommendation  - Sent BG testing supplies with PCP approval; will provide teaching on Thursday    Future consideration: Could consider trial of buspirone for anxiety    2. Essential hypertension  Patient appropriately taking losartan 100 mg daily and metoprolol succinate as prescribed.  Of note, patient's most recent pulse was low, recommend dose decrease of metoprolol if heart rate remains low at follow-up visit.    3. Other osteoporosis without current pathological fracture  Patient appropriately taking alendronate as prescribed, recommend continuation.    Follow Up  Return in about 3 days (around 10/9/2020) for Primary Care.     Subjective & Objective                                                     Eleanor Awan is a 69 y.o. female called for an initial visit for Medication Therapy Management. She was referred to me from Camille Love MD    Patient consented to a telehealth  visit: Yes  Chief Complaint: Initial MTM - adminsitration  Medication Adherence/Access: Self managment with use of a pillbox two times a day. Never missed doses.   Wants to know what they are for and what time of day to take them.     Anxiety/Depression: escitalopram 20 mg daily AM, risperidone 0.5 mg two times a day, hydroxyzine 25 mg Q8H prn and quetiapine 50 mg 1-2 by mouth HS. Thinks that these medications have been working for her mood but states that she has anxiety attacks that come out of nowhere.   Patient talks with Lyndsay who helps her out.   PHQ-9 Total Score: 3 (2020  4:41 PM)  Blood Sugars: PCP recommended placement of continuous glucose monitor to check for hypoglycemia. States that sometimes food doesn't taste good. Has been eating cereal for breakfast, sandwich for lunch, and a regular dinner. Has not been skipping any meals. Soemetimes noting shaking at times, feels like a panic attack, about once daily, never at the same time of day.   Lab Results   Component Value Date    HGBA1C 5.5 10/02/2020    HGBA1C 5.0 2018     HTN: losasrtan 100 mg AM and metoprolol succinate 50 mg daily. Denies any dizziness, only rarely when standing up too fast.  BP Readings from Last 3 Encounters:   20 122/54   20 171/77   20 98/62     Pulse Readings from Last 3 Encounters:   20 (!) 56   20 85   20 92     Bone health: alendronate 70 mg weekly on . Patient has been taking this every week as prescribed with water and without food or other medications.    PMH: reviewed in EPIC   Allergies/ADRs: reviewed in EPIC   Alcohol: None per patient, had panic attack last month   Tobacco:   Social History     Tobacco Use   Smoking Status Former Smoker     Packs/day: 0.00     Start date: 1970     Quit date: 1995     Years since quittin.9   Smokeless Tobacco Never Used   Tobacco Comment    currently smokes marijuana daily   ----------------    The patient declined an  after visit summary    I spent 37 minutes with this patient today.   All changes were made via collaborative practice agreement with Camille Love MD. A copy of the visit note was provided to the patient's provider.     Danuta Duong), PharmD  Medication Therapy Management Pharmacist  [facility]    Telemedicine Visit Details    Type of service:  Telephone     Start Time: 2:00 PM  End Time: 2:37 PM    Originating Location (pt. Location): Home    Distant Location (provider location):  [facility] [facility]    Mode of Communication: Telephone    Current Outpatient Medications   Medication Sig Dispense Refill     alendronate (FOSAMAX) 70 MG tablet Take 1 tablet (70 mg total) by mouth every 7 days. Take in the morning on an empty stomach with a full glass of water 30 minutes before food; FOR YOUR BONES 12 tablet 4     escitalopram oxalate (LEXAPRO) 20 MG tablet Take 1 tablet (20 mg total) by mouth daily. To help general mood (depression/anxiety/anger) 90 tablet 4     folic acid (FOLVITE) 1 MG tablet Take 1 tablet (1 mg total) by mouth daily. 90 tablet 4     hydrOXYzine HCL (ATARAX) 25 MG tablet Take 0.5-1 tablets (12.5-25 mg total) by mouth every 8 (eight) hours as needed (for anxiety). 90 tablet 4     losartan (COZAAR) 100 MG tablet Take 1 tablet (100 mg total) by mouth daily. 90 tablet 4     metoprolol succinate (TOPROL-XL) 50 MG 24 hr tablet Take 1 tablet (50 mg total) by mouth at bedtime. To help heart racing 90 tablet 4     multivitamin with minerals (THERA-M) 9 mg iron-400 mcg Tab tablet Take 1 tablet by mouth daily.  0     risperiDONE (RISPERDAL) 1 MG tablet Take 0.5 tablets (0.5 mg total) by mouth 2 (two) times a day. 60 tablet 11     thiamine 100 MG tablet Take 1 tablet (100 mg total) by mouth daily. 90 tablet 4     No current facility-administered medications for this visit.

## 2021-06-12 NOTE — PROGRESS NOTES
"Eleanor Awan is a 69 y.o. female who is being evaluated via a billable telephone visit.      The patient has been notified of following:     \"This telephone visit will be conducted via a call between you and your physician/provider. We have found that certain health care needs can be provided without the need for a physical exam.  This service lets us provide the care you need with a short phone conversation.  If a prescription is necessary we can send it directly to your pharmacy.  If lab work is needed we can place an order for that and you can then stop by our lab to have the test done at a later time.    Telephone visits are billed at different rates depending on your insurance coverage. During this emergency period, for some insurers they may be billed the same as an in-person visit.  Please reach out to your insurance provider with any questions.    If during the course of the call the physician/provider feels a telephone visit is not appropriate, you will not be charged for this service.\"    Patient has given verbal consent to a Telephone visit? Yes    What phone number would you like to be contacted at? [phone]    Patient would like to receive their AVS by AVS Preference: Kay.    Additional provider notes: check on spells     Just saw the clinical pharmacist and felt it was helpful.    Felt funny while driving yesterday, so she pulled over.    Eating is almost always fine. She had a few days that were \"off\" in terms of how things tasted (jannette hamburger and pork chops). Sleep - to bed at 10:30pm. Awake at 4:30am with her  and then back to sleep.    States she actually has a stomach now. She knows she's gaining weight.    Assessment/Plan:  1. Anxiety  Mostly improved    2. Thiamine deficiency  On supplementation    3. Spells of decreased attentiveness  Continue; evaluating for etiology; she has a home glucometer and will check her blood sugar during and after the spells and let us know what " she finds.    4. Traumatic injury of head, sequela  historical    5. Stressful life event affecting family  They need to move soon    6. Mild major depression (H)  Continues; on meds    7. Essential hypertension  controlled        Phone call duration:  10:40 - 10:52 minutes    Camille Love MD

## 2021-06-12 NOTE — TELEPHONE ENCOUNTER
Please call Eleanor and help her schedule to see Lorenzo Landrum next Thurs, 10/8 PM, for a continuous glucose monitor.  thanks

## 2021-06-12 NOTE — PROGRESS NOTES
MTM Consult Encounter    Eleanor Awan is a 69 y.o. female referred for a clinical pharmacist consult from Dr. Love for glucometer teaching.    Discussion: Patient presents today without her own glucometer. Checked with patients pharmacy today to ensure meter was covered by insurance, meter and testing supplies were covered and filled with Accu-chek Guide meter. [facility] pharmacist provides education with demo meter and testing supplies today. Patient is able to describe appropriate monitoring and will  new meter today. Instructed to use meter to check BG when feeling 'off', including dizzy, shaking, sweating, or heart racing.     Plan:  1. [facility] provided education on glucometer use today    Follow up:   With PCP as directed    Danuta Kay (Myles), PharmD  Medication Therapy Management Pharmacist  [facility]    Current Outpatient Medications   Medication Sig Dispense Refill     calcium-vitamin D 500 mg(1,250mg) -200 unit per tablet Take 1 tablet by mouth 2 (two) times a day with meals.       alendronate (FOSAMAX) 70 MG tablet Take 1 tablet (70 mg total) by mouth every 7 days. Take in the morning on an empty stomach with a full glass of water 30 minutes before food; FOR YOUR BONES 12 tablet 4     blood glucose meter (GLUCOMETER) Use 1 each As Directed as needed. Dispense glucometer brand per patient's insurance at pharmacy discretion. 1 each 0     blood glucose test strips Use 1 each As Directed daily. Dispense brand per patient's insurance at pharmacy discretion. 100 strip 3     escitalopram oxalate (LEXAPRO) 20 MG tablet Take 1 tablet (20 mg total) by mouth daily. To help general mood (depression/anxiety/anger) 90 tablet 4     folic acid (FOLVITE) 1 MG tablet Take 1 tablet (1 mg total) by mouth daily. 90 tablet 4     generic lancets Use 1 each As Directed daily. Dispense brand per patient's insurance at pharmacy discretion. 100 each 3     hydrOXYzine HCL (ATARAX) 25 MG tablet  Take 0.5-1 tablets (12.5-25 mg total) by mouth every 8 (eight) hours as needed (for anxiety). 90 tablet 4     losartan (COZAAR) 100 MG tablet Take 1 tablet (100 mg total) by mouth daily. 90 tablet 4     metoprolol succinate (TOPROL-XL) 50 MG 24 hr tablet Take 1 tablet (50 mg total) by mouth at bedtime. To help heart racing 90 tablet 4     risperiDONE (RISPERDAL) 1 MG tablet Take 0.5 tablets (0.5 mg total) by mouth 2 (two) times a day. 60 tablet 11     thiamine 100 MG tablet Take 1 tablet (100 mg total) by mouth daily. 90 tablet 4     No current facility-administered medications for this visit.

## 2021-06-12 NOTE — PROGRESS NOTES
NEUROPSYCHOLOGY NOTE    Ms. Awan was referred again for an updated neuropsychological evaluation by her PCP, Camille Love MD, on 10/21/2020. Given that she was previously underwent a neuropsychological evaluation with me in person on 9/11/2019, I recommend that Ms. Awan wait to be seen for testing when we can safely accommodate face-to-face visits again (hopefully within the next few months) in order to make more accurate comparisons to her initial test performances. We still have her name on our wait-list and will contact her as soon as we are able to schedule an in-person assessment. We will reach out to the patient again to communicate this plan with her.        Edna Swain PsyD, LP  Licensed Clinical Neuropsychologist  [facility] [address], [address]  [place] [address]  Phone: [phone]

## 2021-06-12 NOTE — TELEPHONE ENCOUNTER
RN cannot approve Refill Request    RN can NOT refill this medication med is not covered by policy/route to provider. Last office visit: 9/8/2020 Camille Love MD Last Physical: 2/7/2020 Last MTM visit: Visit date not found Last visit same specialty: 9/8/2020 Camille Love MD.  Next visit within 3 mo: Visit date not found  Next physical within 3 mo: Visit date not found      Page Hoffman, [facility] Triage/Med Refill 11/1/2020    Requested Prescriptions   Pending Prescriptions Disp Refills     risperiDONE (RISPERDAL) 1 MG tablet [Pharmacy Med Name: RISPERIDONE 1MG TABLETS] 30 tablet 0     Sig: TAKE 1/2 TABLET BY MOUTH TWICE DAILY       There is no refill protocol information for this order

## 2021-06-12 NOTE — PROGRESS NOTES
Outpatient Mental Health Treatment Plan    Name:  Eleanor Awan  :  1950  MRN:  [id number]    Treatment Plan:  Updated Treatment Plan  Intake/initial treatment plan date:    Intake: 2020   Benefit and risks and alternatives have been discussed: Yes  Is this treatment appropriate with minimal intrusion/restrictions: Yes  Estimated duration of treatment:  Approximately 5 sessions.  Anticipated frequency of services: Will continue to monitor and check-in regarding patient status  Necessity for frequency: This frequency is needed to establish therapeutic goals and for continuity of care in order to monitor progress.  Necessity for treatment: To address cognitive, behavioral, and/or emotional barriers in order to work toward goals and to improve quality of life.    Session Type: Patient is presenting for an Individual session.via telephone encounter due to virtual visit    Plan:           ?   ? Anxiety    Goal:  Decrease average anxiety level from 3 to 2   Strategies: ? [x] Practice relaxation techniques and other coping strategies (e.g., thought stopping, reframing, meditation)     ? [x] Increase involvement in meaningful activities and pursue part-time employment     ? [x] Maintain adequate sleep hygiene     ? [x] Explore thoughts and expectations about self and others     ? [x] Identify and monitor triggers for panic/anxiety symptoms     ? [x] Implement physical activity routine     ? [x] Maintain compliance with prescribed medications  ?Degree to which this is a problem: 3  Degree to which goal is met: 2  Date of Review: 2021         Functional Impairment:  1=Not at all/Rarely  2=Some days  3=Most Days  4=Every Day    Personal : 0  Family : 1  Social : 0   Work/school : 1    Diagnosis:  (EXAMPLE of DSM V: Major depressive disorder, recurrent, moderate; Generalized Anxiety disorder; borderline personality per patient PHI; fibromyalgia, History of breast cancer in remission; Problem with  primary relationship.)   Generalized Anxiety Disorder      Clinical assessments and measures completed at intake:  On the Patient Health Questionnaire-9, a self report measure of depressive symptomatology, she obtained a score of 14, placing her in the range of moderate depression.      On the Generalized Anxiety Disorder-7, a self-report measure of anxiety, she obtained a score of 17,  placing her in the range of severe anxiety.       On the PTSD Checklist for DSM-5 (PCL-5), a 20-item self-report measure of PTSD symptoms, she obtained a total symptom severity score of 63. Number of symptoms endorsed in each criterion group are as follows:  Cluster B: 4 (1) Cluster C: 2 (1)   Cluster D: 6 (2) Cluster E: 3 (2)     WHODAS 2.0 12-item version: 17    Scores presented in qualifiers to represent level of disability.  MILD Problem (slight, low, ...) 5-24%  H1= 20  H2= 20  H3= 10       Strengths:  The patient is humorous and open-minded. She is cooperative and agreeable to treatment recommendations.   Limitations:  She has a history of substance abuse and interpersonal conflicts in addition to untreated mental illness.  Cultural Considerations: The patient is a 69 year old  female. She was born and raised in [place], [place]. She identifies as having a good upbringing but running away from home at age 17. The patient recalls getting into trouble as a teenager. She does not identify any specific aspects of culture as being relevant to current symptoms.     Persons responsible for this plan: Patient and Provider   Pt unable to sign due to virtual visit            Psychotherapist Signature           Patient Signature:              Guardian Signature             Provider: Performed and documented by CLIFF Wright   Date:  10/7/2020

## 2021-06-12 NOTE — PROGRESS NOTES
"Mental Health tele Visit Note    Patient: Eleanor Awan    : 1950 MRN: [id number]    Date: 10/7/2020   Start time: 10:00am   Stop Time: 10:40am   Session # 10    The patient has been notified of the following:   \"We have found that certain health care needs can be provided without the need for a face to face visit.  This service lets us provide the care you need with a phone conversation.  I will have full access to your [facility] medical record during this entire phone call.   I will be taking notes for your medical record. Since this is like an office visit, we will bill your insurance company for this service.  There are potential benefits and risks of telephone visits (e.g. limits to patient confidentiality) that differ from in-person visits.?  Confidentiality still applies for telephone services, and nobody will record the visit.  It is important to be in a quiet, private space that is free of distractions (including cell phone or other devices) during the visit.?? If during the course of the call I believe a telephone visit is not appropriate, you will not be charged for this service.\"  Consent has been obtained for this service by care team member: Yes, per verbal agreement     Session Type: Patient is participating in a telephone visit.  Patient unable to participate in video visits due to limited knowledge of technology and lack of appropriate equipment.     Chief Complaint   Patient presents with      Follow Up     Psychotherapy follow-up visit for anxiety       New symptoms or complaints: anxiety attacks    Functional Impairment:   Personal: 2  Family: 1  Work: 2  Social: 2        ASSESSMENT: Current Emotional / Mental Status (status of significant symptoms):              Risk status (Self / Other harm or suicidal ideation)              Patient denies risks to personal safety              Patient denies current or recent suicidal ideation or behaviors.              Patient denies current or " "recent homicidal ideation or behaviors.              Patient denies current or recent self injurious behavior or ideation.              Patient denies other safety concerns.              Patient denies changes to risk factors              Patient denies changes to protective factors              Recommended that patient call 911 or go to the local ED should there be a change in any of these risk factors.                Attitude:                                   Cooperative               Orientation:                             x3              Speech                          Rate / Production:       Normal                           Volume:                       Normal               Mood:                                      Anxious  Normal              Thought Content:                    Clear               Thought Form:                        Coherent  Logical               Insight:                                     Good     Patient's impression of their current status:   Patient reports that she continues to have random panic attacks. She reports that her mind is \"constantly going.\" She is always thinking and worrying about things. She reports not wanting to go out very much or be social due to fears of feeling anxious. She describes some physical signs of stress.       Therapist impression of patients current state:   Therapist administered the LESIA-7 to evaluate underlying anxiety symptoms.   Therapist prompted patient to express thoughts and feelings following a CBT framework.   Therapist provided unconditional positive regard and support and encouraged patient to utilize positive self-talk.    Therapist encouraged patient to practice belly breathing and grounding techniques.     Type of psychotherapeutic technique provided: Client centered and CBT    Progress toward short term goals: Progress as expected, with patient discussing concerns and coping strategies during tele-sessions. Patient working on homework " assignments and skills learned in therapy between sessions. She continues to follow-up with her PCP regarding health issues. She continues to follow through with recommendations.     Review of long term goals:   Treatment plan updated today    Diagnosis:  1. Generalized anxiety disorder    History of chemical dependency   R/O PTSD  R/O Bipolar Disorder    Plan and Follow up:   To continue to work on personal goals:   *continue belly breathing  *continue acknowledging and expressing emotions  *practice assertive communication techniques    *continue meditation practice   *follow-up with therapist as needed    Discharge Criteria/Planning: Patient will continue with follow-up until therapy can be discontinued without return of signs and symptoms.         I have reviewed the note as documented above.  This accurately captures the substance of my conversation with the patient.  Performed and documented by CLIFF Wright LICSW

## 2021-06-13 NOTE — PROGRESS NOTES
"Mental Health tele Visit Note    Patient: Eleanor Awan    : 1950 MRN: [id number]    Date: 2020   Start time: 10:02am   Stop Time: 10:50am   Session # 11    The patient has been notified of the following:   \"We have found that certain health care needs can be provided without the need for a face to face visit.  This service lets us provide the care you need with a phone conversation.  I will have full access to your [place] medical record during this entire phone call.   I will be taking notes for your medical record. Since this is like an office visit, we will bill your insurance company for this service.  There are potential benefits and risks of telephone visits (e.g. limits to patient confidentiality) that differ from in-person visits.?  Confidentiality still applies for telephone services, and nobody will record the visit.  It is important to be in a quiet, private space that is free of distractions (including cell phone or other devices) during the visit.?? If during the course of the call I believe a telephone visit is not appropriate, you will not be charged for this service.\"  Consent has been obtained for this service by care team member: Yes, per verbal agreement     Session Type: Patient is participating in a telephone visit.  Patient unable to participate in video visits due to limited knowledge of technology and lack of appropriate equipment.     Chief Complaint   Patient presents with     MH Follow Up     Psychotherapy follow-up visit for anxiety        New symptoms or complaints: no new symptoms    Functional Impairment:   Personal: 2  Family: 1  Work: 2  Social: 2        ASSESSMENT: Current Emotional / Mental Status (status of significant symptoms):              Risk status (Self / Other harm or suicidal ideation)              Patient denies risks to personal safety              Patient denies current or recent suicidal ideation or behaviors.              Patient denies current or " "recent homicidal ideation or behaviors.              Patient denies current or recent self injurious behavior or ideation.              Patient denies other safety concerns.              Patient denies changes to risk factors              Patient denies changes to protective factors              Recommended that patient call 911 or go to the local ED should there be a change in any of these risk factors.                Attitude:                                   Cooperative               Orientation:                             x3              Speech                          Rate / Production:       Normal                           Volume:                       Normal               Mood:                                      Anxious  Normal              Thought Content:                    Clear               Thought Form:                        Coherent  Logical               Insight:                                     Good     Patient's impression of their current status:   Patient believes that the anxiety \"is ruining everything because I can't do anything.\" She reports that the anxiety attacks are becoming more frequent. She reports that her appetite fluctuates - she is not feeling hungry.  She is feeling afraid to do things on her own due to fear of getting another anxiety attack. She is feeling more depressed and discouraged.  She doesn't believe she has anything to do or anywhere to go.      Therapist impression of patients current state:   Patient adequately engaged in dialogue about presenting symptoms.   Therapist believes that patient may be suppressing her emotions without adequate emotional engagement or expression which may result in more physical sensations and/or somatic complaints. Therapist also believes that patient is not adequately taking care of herself. She is not following a daily routine or structure and is more isolated. She is engaging in a lot of avoidant behavior patterns and may be " taking her medications inconsistently. Therapist recommends that patient consider partial hospitalization or day treatment due to the severity of symptoms.     Type of psychotherapeutic technique provided: Client centered and CBT    Progress toward short term goals: Progress as expected, with patient discussing concerns and coping strategies during tele-sessions. However, poor progress in regards to symptom management with anxiety symptoms becoming more intolerable.      Review of long term goals:   Not done at today's visit  Treatment plan updated     Diagnosis:  1. Generalized anxiety disorder    2. Severe episode of recurrent major depressive disorder, without psychotic features (H)    worsening  History of chemical dependency   R/O PTSD  R/O Bipolar Disorder    Plan and Follow up:   Therapist strongly recommends that patient's level of care is elevated due to severity of symptoms.     Discharge Criteria/Planning: Patient will continue with follow-up until therapy can be discontinued without return of signs and symptoms.         I have reviewed the note as documented above.  This accurately captures the substance of my conversation with the patient.  Performed and documented by CLIFF Wright LICSW

## 2021-06-13 NOTE — PROGRESS NOTES
"Mental Health tele Visit Note    Patient: Eleanor Awan    : 1950 MRN: [id number]    Date: 2020   Start time: 10:00am   Stop Time: 10:40am   Session # 12    The patient has been notified of the following:   \"We have found that certain health care needs can be provided without the need for a face to face visit.  This service lets us provide the care you need with a phone conversation.  I will have full access to your [facility] medical record during this entire phone call.   I will be taking notes for your medical record. Since this is like an office visit, we will bill your insurance company for this service.  There are potential benefits and risks of telephone visits (e.g. limits to patient confidentiality) that differ from in-person visits.?  Confidentiality still applies for telephone services, and nobody will record the visit.  It is important to be in a quiet, private space that is free of distractions (including cell phone or other devices) during the visit.?? If during the course of the call I believe a telephone visit is not appropriate, you will not be charged for this service.\"  Consent has been obtained for this service by care team member: Yes, per verbal agreement     Session Type: Patient is participating in a telephone visit.  Patient unable to participate in video visits due to limited knowledge of technology and lack of appropriate equipment.     Chief Complaint   Patient presents with     MH Follow Up     Psychotherapy follow-up visit for anxiety and depression       New symptoms or complaints: no new symptoms    Functional Impairment:   Personal: 4  Family: 2  Work: 4  Social: 4        ASSESSMENT: Current Emotional / Mental Status (status of significant symptoms):              Risk status (Self / Other harm or suicidal ideation)              Patient denies risks to personal safety              Patient denies current or recent suicidal ideation or behaviors.              Patient " denies current or recent homicidal ideation or behaviors.              Patient denies current or recent self injurious behavior or ideation.              Patient denies other safety concerns.              Patient denies changes to risk factors              Patient denies changes to protective factors              Recommended that patient call 911 or go to the local ED should there be a change in any of these risk factors.                Attitude:                                   Cooperative               Orientation:                             x3              Speech                          Rate / Production:       Normal                           Volume:                       Normal               Mood:                                      Anxious  Normal              Thought Content:                    Clear               Thought Form:                        Coherent  Logical               Insight:                                     Good     Patient's impression of their current status:   Patient's impression is that her status has not improved. She reports not being able to do much of anything anymore. She reports feeling down and depressed.  She has stopped doing things that she used to enjoy. She has no structure to her day. She does not recall what she discussed with her doctor in regards to treatment options for anxiety and depression.     Therapist impression of patients current state:   Patient engaged in dialogue about presenting symptoms with minimal prompting. Her symptoms appear debilitating especially in regards to impairments to functioning. Therapist still recommends that patient consider partial hospitalization or day treatment due to the severity of symptoms.     Type of psychotherapeutic technique provided: Client centered and CBT    Progress toward short term goals: Poor progress in regards to symptom management, with anxiety and depression symptoms causing significant impairments to  functioning.  She has not yet followed through with short-term goals to improve self-care practices related to nutrition, social support, stress reduction, sleep and exercise.      Review of long term goals:   Not done at today's visit  Treatment plan updated on 10/7/20    Diagnosis:  1. Generalized anxiety disorder    2. Severe episode of recurrent major depressive disorder, without psychotic features (H)    worsening  History of chemical dependency   R/O PTSD  R/O Bipolar Disorder    Plan and Follow up:   Therapist strongly recommends that patient's level of care is elevated due to severity of symptoms.     Discharge Criteria/Planning: Patient will continue with follow-up until therapy can be discontinued without return of signs and symptoms.         I have reviewed the note as documented above.  This accurately captures the substance of my conversation with the patient.  Performed and documented by CLIFF Wright LICSW

## 2021-06-13 NOTE — PROGRESS NOTES
"Eleanor Awan is a 69 y.o. female who is being evaluated via a billable telephone visit.      The patient has been notified of following:     \"This telephone visit will be conducted via a call between you and your physician/provider. We have found that certain health care needs can be provided without the need for a physical exam.  This service lets us provide the care you need with a short phone conversation.  If a prescription is necessary we can send it directly to your pharmacy.  If lab work is needed we can place an order for that and you can then stop by our lab to have the test done at a later time.    Telephone visits are billed at different rates depending on your insurance coverage. During this emergency period, for some insurers they may be billed the same as an in-person visit.  Please reach out to your insurance provider with any questions.    If during the course of the call the physician/provider feels a telephone visit is not appropriate, you will not be charged for this service.\"    Patient has given verbal consent to a Telephone visit? Yes    What phone number would you like to be contacted at? [phone]    Patient would like to receive their AVS by AVS Preference: Kay.    Additional provider notes: really bad yesterday     Had a bad interaction with her roommate/landlord. It was really bad and Eleanor was shaking and \"flipping out\" she cried a lot and let it out. Her chest feels closed - she's hoping with a restful day today it'll open up. She is back to not taking showers unless Jr. Is home.    Despite this, she says she has not used marijuana.    Sleep last night - she was exhausted.  She is no longer walking - it is dark by the time her boyfriend is home.    Not eating well again. Snacks on sunflower seeds.    Not praying as much.  Says she setting up her meds well.      Assessment/Plan:  1. Elevated glucose  Check next time.  - Glycosylated Hemoglobin A1c    2. Mild cognitive " impairment with memory loss  same  - risperiDONE (RISPERDAL) 1 MG tablet; Take 0.5mg PO TID  Dispense: 45 tablet; Refill: 11    3. Traumatic brain injury, with loss of consciousness of 30 minutes or less, sequela (H)    - risperiDONE (RISPERDAL) 1 MG tablet; Take 0.5mg PO TID  Dispense: 45 tablet; Refill: 11    4. Paranoia (H)  Worse- will increase risperidone dosing to help  - risperiDONE (RISPERDAL) 1 MG tablet; Take 0.5mg PO TID  Dispense: 45 tablet; Refill: 11    5. Spells of decreased attentiveness  Following, a bit better now  - risperiDONE (RISPERDAL) 1 MG tablet; Take 0.5mg PO TID  Dispense: 45 tablet; Refill: 11    6. Severe episode of recurrent major depressive disorder, without psychotic features (H)  better    7. Anxiety  better    8. Traumatic injury of head, sequela  This is making it hard for her to stay well    9. Stressful life event affecting family  continues    10. Essential hypertension  Managed well, continue same        Phone call duration:  30 minutes  10:17 - 10:50    Camille Love MD

## 2021-06-13 NOTE — PROGRESS NOTES
"Eleanor Awan is a 69 y.o. female who is being evaluated via a billable telephone visit.      The patient has been notified of following:     \"This telephone visit will be conducted via a call between you and your physician/provider. We have found that certain health care needs can be provided without the need for a physical exam.  This service lets us provide the care you need with a short phone conversation.  If a prescription is necessary we can send it directly to your pharmacy.  If lab work is needed we can place an order for that and you can then stop by our lab to have the test done at a later time.    Telephone visits are billed at different rates depending on your insurance coverage. During this emergency period, for some insurers they may be billed the same as an in-person visit.  Please reach out to your insurance provider with any questions.    If during the course of the call the physician/provider feels a telephone visit is not appropriate, you will not be charged for this service.\"    Patient has given verbal consent to a Telephone visit? Yes    What phone number would you like to be contacted at? [phone]    Patient would like to receive their AVS by AVS Preference: Kay.    Additional provider notes: follow up on anxiety attacks     Yesterday was \"off\" but then in the evening she realized she had not taken her medications in the AM. She had a small spell yesterday AM while laying in bed. Otherwise, no spells lately    Has not yet tried the lorazepam - wants to know how to use/take it. The pharmacist was upset that Eleanor asked how to take it.    Sleep - OK. Rian wakes up early and Eleanor cannot always get back to sleep after he goes to work.    She is tired of being home. She's no longer taking walks.    Eating is OK. She's missing melons.    Eleanor wants to tell her counselor, Lyndsay, that Eleanor made a phone call and took a walk after their last virtual visit.    No more sweats - she's " "glad. She's talking to herself, encouraging herself.    Assessment/Plan:  1. Severe episode of recurrent major depressive disorder, without psychotic features (H)  Worse, being treated with lexapro and counseling    2. Anxiety  Worse - will try an occasional lorazepam prn panic    3. Traumatic injury of head, sequela  This is likely the root cause of some of her current problem    4. Seizure disorder (H)  Not currently on treatment    5. Alcohol withdrawal seizure with delirium (H)  Past abuse. I do not believe she is currently drinking    6. Paranoia (H)  Better recently; continue to monitor; seroquel likely helps this    7. Stressful life event affecting family  Problems with her \"landlord\" which cause a lot of stress; however, Eleanor is unable to find other housing at a cost she can afford    8. Mild cognitive impairment with memory loss  Makes remembering to do therapeutic routines difficult. I remind her and Lyndsay, her counselor, does, too.        Phone call duration:  25 minutes  MD Tita Jones MA  "

## 2021-06-14 NOTE — PROGRESS NOTES
Eleanor Awan is a 70 y.o. female who is being evaluated via a billable telephone visit.      What phone number would you like to be contacted at? [phone]  How would you like to obtain your AVS? AVS Preference: Kay.  Assessment & Plan     Mild cognitive impairment with memory loss  Making it harder for her now    Alcohol dependence in remission (H)  same    Traumatic brain injury, with loss of consciousness of 30 minutes or less, sequela (H)  worse    Paranoia (H)  stable    Traumatic injury of head, sequela  Continues to affect her daily life    Spells of decreased attentiveness  same    Thiamine deficiency  On supplementation    Anxiety  Same - makes it hard for her    Severe episode of recurrent major depressive disorder, without psychotic features (H)  Worse - needs to reconnect with her counselor            Diagnosis or treatment significantly limited by social determinants of health - poverty    30 minutes spent on the date of the encounter doing chart review, interpretation of tests, patient visit and documentation        Return in about 4 weeks (around 2/10/2021).    Camille Love MD  [facility]     Eleanor Awan is 70 y.o. and presents to clinic today for the following health issues   HPI   Feels bad  Depressed - sitting in bed all day and all night  Has not been out of a walk.  Chest has been tight for a few days.   Thinks she's overwhelmed by being inside.  Last week she drove home from the grocery store but otherwise she's not been driving. She literally cannot get herself to get out and in the car.    She missed her appointment with Lyndsay and is so sad about that.  She's taking her meds.    Not eating well. Today she's got a pot roast going. Her  was understanding    lorazepam    Depression and Anxiety Follow-Up    How are you doing with your depression since your last visit?: Worse    How are you doing with your anxiety since your last  visit?: Worse    Are you having other symptoms that might be associated with depression or anxiety?: Yes:  inability to do things to help    Have you had a significant life event?: No     Do you have any concerns with your use of alcohol or other drugs?: no    Social History     Tobacco Use     Smoking status: Former Smoker     Packs/day: 0.00     Start date: 1970     Quit date: 1995     Years since quittin.1     Smokeless tobacco: Never Used     Tobacco comment: currently smokes marijuana daily   Substance Use Topics     Alcohol use: Yes     Alcohol/week: 5.0 standard drinks     Types: 5 Standard drinks or equivalent per week     Frequency: 2-3 times a week     Drinks per session: 1 or 2     Binge frequency: Never     Drug use: Yes     Frequency: 7.0 times per week     Types: Marijuana     Comment: currently smokes marijuana daily         In the past two weeks have you had thoughts of suicide or self-harm?  No.    Do you have concerns about your personal safety or the safety of others?   No    Suicide Assessment Five-step Evaluation and Treatment (SAFE-T)    How many servings of fruits and vegetables do you eat daily?  0-1    On average, how many sweetened beverages do you drink each day (Examples: soda, juice, sweet tea, etc.  Do NOT count diet or artificially sweetened beverages)?   2    How many days per week do you exercise enough to make your heart beat faster? 3 or less    How many minutes a day do you exercise enough to make your heart beat faster? 9 or less    How many days per week do you miss taking your medication? 0      Review of Systems  Not sleeping as well      Objective       Vitals:  No vitals were obtained today due to virtual visit.    Physical Exam  None- vitual visit  Mood: low/anxious but workable    ----- Ambulatory Services Attestations for Billing on Time -----        Phone call duration: 25 minutes

## 2021-06-14 NOTE — TELEPHONE ENCOUNTER
Therapist attempted outreach - no answer. Left VM for patient to call behavioral access to reschedule.

## 2021-06-15 NOTE — TELEPHONE ENCOUNTER
----- Message from Camille Kaur MD sent at 2/17/2021 11:48 AM CST -----  Regarding: mammo and bone density  Hi -  This patient is not able to make her own appointments. She's recently had calls to help her schedule a mammo and a bone density test, but with her depression she just said no. I convinced her to do them today at her appointment, but she needs help to set them up --and because of transportation needs she needs them set up on the same day.  Can you help her? If not, do you know who can? Madison Love

## 2021-06-15 NOTE — PROGRESS NOTES
Eleanor Awan is a 70 y.o. female who is being evaluated via a billable telephone visit.      What phone number would you like to be contacted at? [phone]  How would you like to obtain your AVS? AVS Preference: Kay.    Assessment & Plan   Depression - she has medication plus she's had therapy. She's now working to make use of what she learned.    Mild cognitive impairment with memory loss  This definitely impedes her improvement. She has some skills that help her. Spring coming with more sunlight will likely help, too  - risperiDONE (RISPERDAL) 1 MG tablet  Dispense: 60 tablet; Refill: 11    Traumatic brain injury, with loss of consciousness of 30 minutes or less, sequela (H)  Likely part of what causes her depression and paranoia. Increase risperidone  - risperiDONE (RISPERDAL) 1 MG tablet  Dispense: 60 tablet; Refill: 11    Paranoia (H)  As above- this is a current problem. Increase risperidone to 2mg before bed + 1/2 prn. She currently declines getting a new therapist but she'll be thinking about it.  - risperiDONE (RISPERDAL) 1 MG tablet  Dispense: 60 tablet; Refill: 11    Spells of decreased attentiveness  I conidered an ADHD med for this but I fear it might exacerbate her paranoia. For now, will continue same meds and push her to get back to her crafts which should improve her attention.  - risperiDONE (RISPERDAL) 1 MG tablet  Dispense: 60 tablet; Refill: 11    She requests help to make her mammogram and dexa bone density tests.    Review of prior external note(s) from - Outside records from [facility]  60+ minutes spent on the date of the encounter doing chart review, history and exam, documentation and further activities as noted above     Return in about 4 weeks (around 3/17/2021) for Recheck.    Camille Love MD  [facility]   Eleanor Awan is 70 y.o. and presents today for the following health issues   HPI   Depression worse she thinks. She  "feels stuck inside.  Not driving, not getting out    Still looking for an apartment. Found one in [place]. They hope to move in April. She knows that will feel good and will be nice to set up. It is a good location for Rian's job.    Gets emotions/feelings that make her chest tight. She just had this before I called. She takes her med and takes deep breaths to try to help. Sometimes her thoughts are very low, and she worries someone is going to \"get her\" and be mean. She is unable to stop this when it occurs.  Walks the stairs - down 4, up 4. Once per day. Wipes her out.    Appetite - not good. She does not think she's gained weight.    Rian worries about her and his mom in a nursing home up [place].    Can't get into doing her crafts.    Sleeps - it is interrupted but she thinks she gets enough. She takes 1 risperidone and 1 hydroxyzine at bedtime.    TBI  depression with paranoia    Review of Systems  Poor appetite      Objective       Vitals:  No vitals were obtained today due to virtual visit.    Physical Exam  No exam, phone visit    Mood: fair  Voice - had energy, responses were quick and appropriate    ----- Ambulatory Services Attestations for Billing on Time -----        Phone call duration: 45 minutes    "

## 2021-06-15 NOTE — TELEPHONE ENCOUNTER
"Last office visit: 02/17/2021  Last ordered: 12/02/2020  Last lab check:   Next appointment: 03/17/2021    Chart reviewed. Please review findings below.     Assessment/Plan:  1. Severe episode of recurrent major depressive disorder, without psychotic features (H)  Worse, being treated with lexapro and counseling     2. Anxiety  Worse - will try an occasional lorazepam prn panic     3. Traumatic injury of head, sequela  This is likely the root cause of some of her current problem     4. Seizure disorder (H)  Not currently on treatment     5. Alcohol withdrawal seizure with delirium (H)  Past abuse. I do not believe she is currently drinking     6. Paranoia (H)  Better recently; continue to monitor; seroquel likely helps this     7. Stressful life event affecting family  Problems with her \"landlord\" which cause a lot of stress; however, Eleanor is unable to find other housing at a cost she can afford     8. Mild cognitive impairment with memory loss  Makes remembering to do therapeutic routines difficult. I remind her and Lyndsay, her counselor, does, too.     "

## 2021-06-15 NOTE — TELEPHONE ENCOUNTER
Dr. Love,    I called patient and we connected with [facility] but it was a very long hold time.  I left them a message requesting they contact patient to schedule.  Patient is aware and will await their call.      Transportation: [facility], this patient has [employer], do you happen to know if you can set up ride through [employer]?    Dr. Love,    As far as referrals to send our way, basically if its urgent or a patient with the inability to schedule his or herself due to incontinence or a MH reason we can certainly assist..  I think she falls into that category.

## 2021-06-15 NOTE — TELEPHONE ENCOUNTER
Refill Request  Did you contact pharmacy: Yes  Medication name:   Requested Prescriptions     Pending Prescriptions Disp Refills     LORazepam (ATIVAN) 1 MG tablet 14 tablet 0     Sig: Take 1 tablet (1 mg total) by mouth 2 (two) times a day as needed for anxiety.     Who prescribed the medication: Dr. Love   [facility]ed [facility]  Is patient out of medication: UNKNOWN  Patient notified refills processed in 3 business days:  no  Okay to leave a detailed message: no

## 2021-06-16 PROBLEM — G31.84 MILD COGNITIVE IMPAIRMENT WITH MEMORY LOSS: Status: ACTIVE | Noted: 2017-12-31

## 2021-06-16 PROBLEM — M81.0 OSTEOPOROSIS: Status: ACTIVE | Noted: 2017-07-21

## 2021-06-16 PROBLEM — F10.21 ALCOHOL DEPENDENCE IN REMISSION (H): Status: ACTIVE | Noted: 2020-02-08

## 2021-06-16 PROBLEM — J30.1 ALLERGIC RHINITIS DUE TO POLLEN: Status: ACTIVE | Noted: 2019-06-25

## 2021-06-16 PROBLEM — Z63.79 STRESSFUL LIFE EVENT AFFECTING FAMILY: Status: ACTIVE | Noted: 2020-07-08

## 2021-06-16 PROBLEM — F10.931 ALCOHOL WITHDRAWAL SEIZURE WITH DELIRIUM (H): Status: ACTIVE | Noted: 2020-08-17

## 2021-06-16 PROBLEM — E51.9 THIAMINE DEFICIENCY: Status: ACTIVE | Noted: 2020-09-09

## 2021-06-16 PROBLEM — F22 PARANOIA (H): Status: ACTIVE | Noted: 2020-08-12

## 2021-06-16 PROBLEM — F33.2 SEVERE EPISODE OF RECURRENT MAJOR DEPRESSIVE DISORDER, WITHOUT PSYCHOTIC FEATURES (H): Status: ACTIVE | Noted: 2020-11-11

## 2021-06-16 PROBLEM — I10 ESSENTIAL HYPERTENSION: Status: ACTIVE | Noted: 2020-08-23

## 2021-06-16 PROBLEM — K70.30 ALCOHOLIC CIRRHOSIS OF LIVER WITHOUT ASCITES (H): Status: ACTIVE | Noted: 2021-05-15

## 2021-06-16 PROBLEM — R56.9 ALCOHOL WITHDRAWAL SEIZURE WITH DELIRIUM (H): Status: ACTIVE | Noted: 2020-08-17

## 2021-06-16 PROBLEM — F41.9 ANXIETY: Status: ACTIVE | Noted: 2020-10-06

## 2021-06-16 NOTE — TELEPHONE ENCOUNTER
Telephone Encounter by Toni Suazo CMA at 12/30/2019 12:22 PM     Author: Toni Suazo CMA Service: -- Author Type: Certified Medical Assistant    Filed: 12/30/2019 12:22 PM Encounter Date: 12/27/2019 Status: Signed    : Toni Suazo CMA (Certified Medical Assistant)       Eleanor Awan Jeanette Di, MD 7 minutes ago (12:14 PM)         had one spell at 1030 am my chest feels lots better after not taking that one pill.

## 2021-06-16 NOTE — TELEPHONE ENCOUNTER
Telephone Encounter by Emily Thomas at 3/2/2020  3:13 PM     Author: Emily Thomas Service: -- Author Type: --    Filed: 3/2/2020  3:13 PM Encounter Date: 3/2/2020 Status: Signed    : Emily Thomas APPROVED:    Approval start date: 3/2/2020  Approval end date:  12/3/20    Pharmacy has been notified of approval and will contact patient when medication is ready for pickup.

## 2021-06-16 NOTE — PROGRESS NOTES
"OFFICE VISIT NOTE      Assessment & Plan   Eleanor Awan is a 70 y.o. female with recurrent major depression with paranoia and anxiety. She's on risperdone at bedtime and that has really helped the paranoia. However, her mood at this point is very low. The limitations imposed by Covid19 have been very difficult. She's had times when she does not get along with her roommate/landlord, but that seems to be OK at this point. Additionally, Eleanor and her  move out next month.  In order to try to support her mood more, I will add venlafaxine 75mg to her lexapro 20mg. Hopefully between the extra medication and some warmer weather alloweing her to get outside more, she will soon feel improvement in her mood.    Got a \"woosh\" feeling a tiredness - was a little shakey. I gave her a bottle of Ensure to drink and she perked up. I wonder if some impaired glucose tolerance is affecting her.    Diagnoses and all orders for this visit:    Severe episode of recurrent major depressive disorder, without psychotic features (H)  -     venlafaxine (EFFEXOR XR) 75 MG 24 hr capsule; Take 1 capsule (75 mg total) by mouth daily.  Dispense: 30 capsule; Refill: 2    Mild major depression (H)  -     escitalopram oxalate (LEXAPRO) 20 MG tablet; Take 1 tablet (20 mg total) by mouth daily. To help general mood (depression/anxiety/anger)  Dispense: 90 tablet; Refill: 4    Alcohol dependence in remission (H)  -     escitalopram oxalate (LEXAPRO) 20 MG tablet; Take 1 tablet (20 mg total) by mouth daily. To help general mood (depression/anxiety/anger)  Dispense: 90 tablet; Refill: 4    Traumatic brain injury, with loss of consciousness of 30 minutes or less, sequela (H)  -     escitalopram oxalate (LEXAPRO) 20 MG tablet; Take 1 tablet (20 mg total) by mouth daily. To help general mood (depression/anxiety/anger)  Dispense: 90 tablet; Refill: 4    Panic attack  -     escitalopram oxalate (LEXAPRO) 20 MG tablet; Take 1 tablet (20 mg total) by " mouth daily. To help general mood (depression/anxiety/anger)  Dispense: 90 tablet; Refill: 4        Camille Love MD    45 minutes spent on the date of the encounter doing chart review, history and exam, documentation and further activities as noted above        Subjective:   Chief Complaint:  Follow-up (Wants to discuss depression and possibility of new meds. Needs more energy. )    70 y.o. female.     1) depressed - worse for months  Her  says so, too  Quiet - her  says she's not herself  Taking all meds  Girlfriends invite her to do things    2) appetite is low  Sleeping OK 10pm - wakes 8:30 - 10:30am (wakes at 5am with Jr)    3) not drinking - does not care too    4) gets out some - to shop  Used to get on the computer    5) worries about someone breaking in - always thinking of the negative    6) moving 4/15! To an apt nearby here  May - they have concert tix!    7) 3/22 second shot      Current Outpatient Medications   Medication Sig     alendronate (FOSAMAX) 70 MG tablet Take 1 tablet (70 mg total) by mouth every 7 days. Take in the morning on an empty stomach with a full glass of water 30 minutes before food; FOR YOUR BONES     blood glucose meter (GLUCOMETER) Use 1 each As Directed as needed. Dispense glucometer brand per patient's insurance at pharmacy discretion.     blood glucose test strips Use 1 each As Directed daily. Dispense brand per patient's insurance at pharmacy discretion.     calcium-vitamin D 500 mg(1,250mg) -200 unit per tablet Take 1 tablet by mouth 2 (two) times a day with meals.     escitalopram oxalate (LEXAPRO) 20 MG tablet Take 1 tablet (20 mg total) by mouth daily. To help general mood (depression/anxiety/anger)     folic acid (FOLVITE) 1 MG tablet Take 1 tablet (1 mg total) by mouth daily.     generic lancets Use 1 each As Directed daily. Dispense brand per patient's insurance at pharmacy discretion.     LORazepam (ATIVAN) 1 MG tablet Take 1 tablet (1 mg total) by  "mouth 2 (two) times a day as needed for anxiety.     losartan (COZAAR) 100 MG tablet Take 1 tablet (100 mg total) by mouth daily.     metoprolol succinate (TOPROL-XL) 50 MG 24 hr tablet Take 1 tablet (50 mg total) by mouth at bedtime. To help heart racing     risperiDONE (RISPERDAL) 1 MG tablet Take 2mg PO qhs     thiamine 100 MG tablet Take 1 tablet (100 mg total) by mouth daily.     venlafaxine (EFFEXOR XR) 75 MG 24 hr capsule Take 1 capsule (75 mg total) by mouth daily.       PSFHx: appropriate sections of PMH completed/filled in   Tobacco Status:  She  reports that she quit smoking about 25 years ago. She started smoking about 50 years ago. She smoked 0.00 packs per day. She has never used smokeless tobacco.    Review of Systems:  No fever.  No diarrhea. All other systems negative except as noted above.    Objective:    /72   Pulse 81   Temp 99  F (37.2  C) (Oral)   Resp 17   Ht 5' 3\" (1.6 m)   Wt 136 lb 12 oz (62 kg)   SpO2 98%   BMI 24.22 kg/m    GENERAL: No acute distress.  Mood: low  Affect: flat  Judgment: fair    Gait: steady    No labs done      "

## 2021-06-16 NOTE — TELEPHONE ENCOUNTER
On current med list - it lists Fluxoetine but when pt saw PCP on 3/17/21 it is not listed.  Pt is on Lexapro.  Did the Lexapro replace the Fluxoetine?  Thanks.

## 2021-06-16 NOTE — TELEPHONE ENCOUNTER
Telephone Encounter by Toni Suazo CMA at 12/30/2019 12:21 PM     Author: Toni Suazo CMA Service: -- Author Type: Certified Medical Assistant    Filed: 12/30/2019 12:22 PM Encounter Date: 12/27/2019 Status: Signed    : Toni Suazo CMA (Certified Medical Assistant)       Eleanor Awan Jeanette Di, MD 15 hours ago (8:27 PM)         dec 29th  had 2 at 4 am & 630 am then slept. both times i got up to pee then one comes on then i poop. its almost the same every time I GET UP

## 2021-06-16 NOTE — TELEPHONE ENCOUNTER
Prior Authorization Request    Who s requesting:  Pharmacy     Pharmacy Name and Location: [address]    Medication Name: Escitalopram 20 mg tabs take one by mouth daily to help general mood    Member ID Number:  [id number]    Pt insurance does not cover med.  Either need PA or alternate med.  Thanks.     Call taken 3/19/21 at 1115 am by Noy Tipton CMA, CMT

## 2021-06-16 NOTE — PROGRESS NOTES
Assessment:      Healthy female exam.    Was with [facility] previously, now wants to establish with [facility]  Travel to [facility] later this year is planned  Osteoporosis - is on Flosamax + needs to resume vit D  Depression/anxiety - wants to continue on Fluoxetine  Hep C - she was not aware of the opportunity for cure - will refer to [facility]       Plan:       Discussed healthy lifestyle modifications.  Educational material distributed.  discussed Hep C treatment options - she'll see a liver specialist about this                Subjective:      Eleanor Awan is a 67 y.o. female who presents for an annual exam. The patient is not sexually active. The patient participates in regular exercise: no. The patient reports that there is not domestic violence in her life. She plans a trip to [facility] in [facility].    Healthy Habits:   Regular Exercise: Yes  Sunscreen Use: No  Healthy Diet: No  Dental Visits Regularly: sometimes  Seat Belt: Yes  Sexually active: not currently  Self Breast Exam Monthly:No mammo   Hemoccults: Yes and doesn't remember date  Flex Sig: No  Colonoscopy: Yes 17 OK  Lipid Profile: Yes  Glucose Screen: Yes  Prevention of Osteoporosis: Yes and No  Last Dexa: Yes  Guns at Home:  No      Immunization History   Administered Date(s) Administered     Hep A, Adult IM (19yr & older) 2018     IPV 2018     Immunization status: stated as current, but no records available.    No exam data present    Gynecologic History  No LMP recorded. Patient is postmenopausal.  Contraception: none  Last Pap: . Results were: normal  Last mammogram: refuses them      OB History    Para Term  AB Living   2 2    2   SAB TAB Ectopic Multiple Live Births             # Outcome Date GA Lbr Rony/2nd Weight Sex Delivery Anes PTL Lv   2 Para            1 Para                   Current Outpatient Prescriptions   Medication Sig Dispense Refill     FLUoxetine (PROZAC) 20 MG capsule Take 1 capsule  (20 mg total) by mouth daily. 90 capsule 4     alendronate (FOSAMAX) 70 MG tablet Take 1 tablet (70 mg total) by mouth every 7 days. Take in the morning on an empty stomach with a full glass of water 30 minutes before food 4 tablet 11     No current facility-administered medications for this visit.      Past Medical History:   Diagnosis Date     Alcoholism      Alcoholism in member of household      Hep C w/o coma, chronic      Mild cognitive impairment 2017    episodes of memory loss     Smoker     15years of small amounts     Past Surgical History:   Procedure Laterality Date     DENTAL SURGERY  2010     TUBAL LIGATION  1970     Review of patient's allergies indicates no known allergies.  Family History   Problem Relation Age of Onset     Heart attack Mother 57     Thyroid disease Mother      hyperthyroid     Peripheral vascular disease Father      No Medical Problems Brother      No Medical Problems Paternal Grandmother      Stroke Paternal Grandfather      No Medical Problems Daughter      No Medical Problems Son      Social History     Social History     Marital status:      Spouse name: Asaf     Number of children: 2     Years of education: 12     Occupational History     Not on file.     Social History Main Topics     Smoking status: Never Smoker     Smokeless tobacco: Never Used     Alcohol use Yes      Comment: sometimes     Drug use: Yes     Special: Marijuana      Comment: for back pain     Sexual activity: Not Currently     Partners: Male     Other Topics Concern     Not on file     Social History Narrative    2/2018        Employed -  - 26 years                Happy with life - daughter and grandson - in [place]    Does not communicate with son - he lives in [place]       Review of Systems  General:  Denies problem  Eyes: Denies problem  Ears/Nose/Throat: Denies problem  Cardiovascular: Denies problem  Respiratory:  Denies problem  Gastrointestinal:  Denies problem, Genitourinary:  occasional leaking  Musculoskeletal:  Denies problem  Skin: lots of age spots  Neurologic: Denies problem  Psychiatric: Denies problem  Endocrine: Denies problem  Heme/Lymphatic: Denies problem   Allergic/Immunologic: Denies problem        Objective:         Vitals:    02/23/18 0940   BP: 110/62   Pulse: 68   Resp: 18   Temp: 98.4  F (36.9  C)   TempSrc: Oral   SpO2: 98%     There is no height or weight on file to calculate BMI.    Physical Exam:  General Appearance: Alert, cooperative, no distress, appears stated age    Deferred full exam due to time limits  Skin: Skin color, texture, turgor normal, no rashes or lesions, no jaundice  Neurologic: Normal       Spent 40 min on history and an additional 15min on travel and Hep C

## 2021-06-16 NOTE — TELEPHONE ENCOUNTER
Telephone Encounter by Toni Suazo CMA at 12/30/2019 12:21 PM     Author: Toni Suazo CMA Service: -- Author Type: Certified Medical Assistant    Filed: 12/30/2019 12:22 PM Encounter Date: 12/27/2019 Status: Signed    : Toni Suazo CMA (Certified Medical Assistant)       Eleanor Awan Jeanette Di, MD 15 hours ago (8:38 PM)         Am I suppose to take Mirtazapine and fluoxetine at the same time  there both for the same thing. Im not taking either until I know thats not to much.

## 2021-06-17 NOTE — TELEPHONE ENCOUNTER
TC does not do outreach for referrals/orders. This was entered to [facility] portal yesterday by V[facility], they should be calling pts for appointments. However, pt should be calling specialty for the appt. I did send her a mychart msg with clinic info. Please call to relay provider msg if care team has not already done so. Thanks.

## 2021-06-17 NOTE — TELEPHONE ENCOUNTER
Telephone Encounter by Emily Thomas at 5/19/2021 11:01 AM     Author: Emily Thomas Service: -- Author Type: --    Filed: 5/19/2021 11:03 AM Encounter Date: 5/14/2021 Status: Addendum    : Emily Thomas    Related Notes: Original Note by Emily Thomas filed at 5/19/2021 11:03 AM       No PA needed, medication is covered under plan.  Called pharmacy and confirmed they have paid claim and patient picked up on 5/16. Closing encounter

## 2021-06-17 NOTE — PROGRESS NOTES
"OFFICE VISIT NOTE      Assessment & Plan   Eleanor Awan is a 70 y.o. female here for a depression and anxiety follow up. She and her  have moved and she feels the relief of not having a \"roommate\" who bugs her all the time. I can really tell her stress level has changed for the better. However, her mood is only neutral, not good - it seems something is bothering her. Continue venlafaxine, I encouraged her to re-establish care with a counselor, but she's not up for it now. Will check some labs since she's gained some weight to see if labs point to some of what is going on.    Persistent moderate blood in urine - this really needs to get evaluated.    Diagnoses and all orders for this visit:    Mild major depression (H)    Severe episode of recurrent major depressive disorder, without psychotic features (H)  -     venlafaxine (EFFEXOR XR) 75 MG 24 hr capsule; Take 1 capsule (75 mg total) by mouth daily.  Dispense: 90 capsule; Refill: 4    Anxiety  -     Thyroid Stimulating Hormone (TSH)    Essential hypertension  -     Comprehensive Metabolic Panel  -     HM1(CBC and Differential)  -     Lipid Cascade RANDOM  -     Urinalysis-UC if Indicated  -     Culture, Urine    Chronic hepatitis C without hepatic coma (H)  -     Comprehensive Metabolic Panel  -     Hepatitis C Antibody (Anti-HCV)  -     GGT (Gamma GT)  -     Hepatitis C Virus High-Resolution Genotype by Sequencing  -     Hepatitis B Surface Antibody (Anti-HBs) Vaccine Check    Need for hepatitis B screening test  -     Hepatitis B Surface Antibody (Anti-HBs) Vaccine Check    Alcoholic cirrhosis of liver without ascites (H)    Alcohol withdrawal seizure with delirium (H)    Seizure disorder (H)        Camille Love MD    40 minutes spent on the date of the encounter doing chart review, history and exam, documentation and further activities per the note        Subjective:   Chief Complaint:  Follow-up and Finger Injury (swollen knuckle )    70 y.o. " "female.     1) unpacked, moved into their new apartment    2) yesterday, walked only 15min and then \"I thought I was going to die.\"  Learning the area.  Looking for certain popcorn and snickerdoodles she likes from [place]  Rian is willing to walk with her now    3) sleeps well     4) follows soap operas    5) meds to [facility] pharmacy since there is no [facility] nearby and they use [facility] for food    6) might go to [place] for [place] - something to look forward to  Concert the end of the month    7) anxiety - thinking of Jeremy (former roommate Eleanor did not get along with) or thinking of anything - feels it in her chest    Current Outpatient Medications   Medication Sig     escitalopram oxalate (LEXAPRO) 20 MG tablet Take 1 tablet (20 mg total) by mouth daily. To help general mood (depression/anxiety/anger)     folic acid (FOLVITE) 1 MG tablet Take 1 tablet (1 mg total) by mouth daily.     losartan (COZAAR) 100 MG tablet Take 1 tablet (100 mg total) by mouth daily.     metoprolol succinate (TOPROL-XL) 50 MG 24 hr tablet Take 1 tablet (50 mg total) by mouth at bedtime. To help heart racing     risperiDONE (RISPERDAL) 1 MG tablet Take 2mg PO qhs     venlafaxine (EFFEXOR XR) 75 MG 24 hr capsule Take 1 capsule (75 mg total) by mouth daily.     VITAMIN B-1, MONONITRATE, 100 mg tablet Take 100 mg by mouth daily.     alendronate (FOSAMAX) 70 MG tablet Take 1 tablet (70 mg total) by mouth every 7 days. Take in the morning on an empty stomach with a full glass of water 30 minutes before food; FOR YOUR BONES     blood glucose meter (GLUCOMETER) Use 1 each As Directed as needed. Dispense glucometer brand per patient's insurance at pharmacy discretion.     blood glucose test strips Use 1 each As Directed daily. Dispense brand per patient's insurance at pharmacy discretion.     calcium-vitamin D 500 mg(1,250mg) -200 unit per tablet Take 1 tablet by mouth 2 (two) times a day with meals.     generic lancets Use 1 each As Directed daily. " "Dispense brand per patient's insurance at pharmacy discretion.     LORazepam (ATIVAN) 1 MG tablet Take 1 tablet (1 mg total) by mouth 2 (two) times a day as needed for anxiety.     thiamine 100 MG tablet Take 1 tablet (100 mg total) by mouth daily.       PSFHx: appropriate sections of PMH completed/filled in   Tobacco Status:  She  reports that she quit smoking about 25 years ago. She started smoking about 50 years ago. She smoked 0.00 packs per day. She has never used smokeless tobacco.    Review of Systems:  No fever.  No rash but dry skin. All other systems negative except as noted above.    Objective:    /66   Pulse 82   Temp 99.3  F (37.4  C) (Oral)   Resp 17   Ht 5' 3\" (1.6 m)   Wt 140 lb 12 oz (63.8 kg)   SpO2 98%   BMI 24.93 kg/m    GENERAL: No acute distress.  Mood: fair  Affect: mostly neutral, a few smiles.    Weight, BP reviewed  Labs pending  Immunizations - check hep B immunity  "

## 2021-06-17 NOTE — TELEPHONE ENCOUNTER
Please call Eleanor. She does not have an appointment scheduled with me. I hope she will schedule something soonish and something in a month or so.

## 2021-06-17 NOTE — TELEPHONE ENCOUNTER
Called pt and scheduled for 1240 pm on May 19th with PCP.  New address noted and added to pt chart. Thanks.

## 2021-06-17 NOTE — TELEPHONE ENCOUNTER
Prior Authorization Request  Who s requesting:  Pharmacy  Pharmacy Name and Location: [id number] [[place]], [address]  Medication Name: venlafaxine (EFFEXOR XR) 75 MG 24 hr capsule  Insurance Plan:   HUMANA HUMANA MEDICARE ADVANTAGE PLAN [id number] BUD KEATING [id number]   MEDICARE MEDICARE A AND B  BUD KEATING [id number]   CoverMyMeds Key: N/A  Informed patient that prior authorizations can take up to 10 business days for response:   Yes  Okay to leave a detailed message: Yes

## 2021-06-17 NOTE — TELEPHONE ENCOUNTER
"Please call Eleanor. Let her know I'm putting in a referral to urology because her urine has shown \"moderate blood\" for some time. This puts her at risk for cancer, so it should be checked out. She should call [facility] for an appointment.  "

## 2021-06-19 NOTE — LETTER
Letter by Camille Love MD at      Author: Camille Love MD Service: -- Author Type: --    Filed:  Encounter Date: 11/6/2019 Status: Signed         Eleanorchucho Awan  [address]  [facility] [address]         November 6, 2019         Dear Ms. Gilmiguesarah,    Below are the results from your recent visit:    Resulted Orders   Basic Metabolic Panel   Result Value Ref Range    Sodium 136 136 - 145 mmol/L    Potassium 4.2 3.5 - 5.0 mmol/L    Chloride 99 98 - 107 mmol/L    CO2 28 22 - 31 mmol/L    Anion Gap, Calculation 9 5 - 18 mmol/L    Glucose 95 70 - 125 mg/dL    Calcium 10.2 8.5 - 10.5 mg/dL    BUN 12 8 - 22 mg/dL    Creatinine 0.79 0.60 - 1.10 mg/dL    GFR MDRD Af Amer >60 >60 mL/min/1.73m2    GFR MDRD Non Af Amer >60 >60 mL/min/1.73m2    Narrative    Fasting Glucose reference range is 70-99 mg/dL per  American Diabetes Association (ADA) guidelines.   Thyroid Stimulating Hormone (TSH)   Result Value Ref Range    TSH 0.86 0.30 - 5.00 uIU/mL   Vitamin B12   Result Value Ref Range    Vitamin B-12 1,893 (H) 213 - 816 pg/mL   Vitamin D, Total (25-Hydroxy)   Result Value Ref Range    Vitamin D, Total (25-Hydroxy) 32.0 30.0 - 80.0 ng/mL    Narrative    Deficiency <10.0 ng/mL  Insufficiency 10.0-29.9 ng/mL  Sufficiency 30.0-80.0 ng/mL  Toxicity (possible) >100.0 ng/mL   Urinalysis-UC if Indicated   Result Value Ref Range    Color, UA Yellow Colorless, Yellow, Straw, Light Yellow    Clarity, UA Clear Clear    Glucose, UA Negative Negative    Bilirubin, UA Negative Negative    Ketones, UA Negative Negative    Specific Gravity, UA 1.020 1.005 - 1.030    Blood, UA Trace (!) Negative    pH, UA 8.5 (H) 5.0 - 8.0    Protein, UA Negative Negative mg/dL    Urobilinogen, UA 0.2 E.U./dL 0.2 E.U./dL, 1.0 E.U./dL    Nitrite, UA Negative Negative    Leukocytes, UA Negative Negative    Bacteria, UA None Seen None Seen hpf    RBC, UA 3-5 (!) None Seen, 0-2 hpf    WBC, UA 0-5 None Seen, 0-5 hpf    Squam Epithel, UA 0-5  None Seen, 0-5 lpf    Hyaline Casts, UA 0-5 0-5, None Seen lpf    Narrative    UC not indicated   HM1 (CBC with Diff)   Result Value Ref Range    WBC 7.5 4.0 - 11.0 thou/uL    RBC 4.50 3.80 - 5.40 mill/uL    Hemoglobin 14.6 12.0 - 16.0 g/dL    Hematocrit 42.9 35.0 - 47.0 %    MCV 95 80 - 100 fL    MCH 32.4 27.0 - 34.0 pg    MCHC 34.0 32.0 - 36.0 g/dL    RDW 11.6 11.0 - 14.5 %    Platelets 204 140 - 440 thou/uL    MPV 7.0 7.0 - 10.0 fL    Neutrophils % 80 (H) 50 - 70 %    Lymphocytes % 13 (L) 20 - 40 %    Monocytes % 5 2 - 10 %    Eosinophils % 1 0 - 6 %    Basophils % 0 0 - 2 %    Neutrophils Absolute 6.0 2.0 - 7.7 thou/uL    Lymphocytes Absolute 1.0 0.8 - 4.4 thou/uL    Monocytes Absolute 0.4 0.0 - 0.9 thou/uL    Eosinophils Absolute 0.1 0.0 - 0.4 thou/uL    Basophils Absolute 0.0 0.0 - 0.2 thou/uL       This is so you have copies of your lab results. While some are a little off, none help us know why you are having the spells. We'll keep trying to figure this out! Please do not drive until we have stopped the spells 100%.    Please call with questions or contact us using [employer].    Sincerely,      Electronically signed by Camille Love MD

## 2021-06-19 NOTE — LETTER
Letter by Camille Love MD at      Author: Camille Love MD Service: -- Author Type: --    Filed:  Encounter Date: 11/5/2019 Status: Signed         Eleanor Awan  [address]  [facility] [address]             November 5, 2019         Dear Ms. Lv,    Below are the results from your recent visit:    Resulted Orders   Urinalysis-UC if Indicated   Result Value Ref Range    Color, UA Yellow Colorless, Yellow, Straw, Light Yellow    Clarity, UA Clear Clear    Glucose, UA Negative Negative    Bilirubin, UA Negative Negative    Ketones, UA Negative Negative    Specific Gravity, UA 1.015 1.005 - 1.030    Blood, UA Trace (!) Negative    pH, UA 8.5 (H) 5.0 - 8.0    Protein, UA Negative Negative mg/dL    Urobilinogen, UA 0.2 E.U./dL 0.2 E.U./dL, 1.0 E.U./dL    Nitrite, UA Negative Negative    Leukocytes, UA Negative Negative    Bacteria, UA None Seen None Seen hpf    RBC, UA 0-2 None Seen, 0-2 hpf    WBC, UA None Seen None Seen, 0-5 hpf    Squam Epithel, UA 0-5 None Seen, 0-5 lpf    Narrative    UC not indicated   HM1 (CBC with Diff)   Result Value Ref Range    WBC 5.8 4.0 - 11.0 thou/uL    RBC 4.48 3.80 - 5.40 mill/uL    Hemoglobin 14.4 12.0 - 16.0 g/dL    Hematocrit 42.7 35.0 - 47.0 %    MCV 95 80 - 100 fL    MCH 32.2 27.0 - 34.0 pg    MCHC 33.8 32.0 - 36.0 g/dL    RDW 11.5 11.0 - 14.5 %    Platelets 188 140 - 440 thou/uL    MPV 7.2 7.0 - 10.0 fL    Neutrophils % 71 (H) 50 - 70 %    Lymphocytes % 19 (L) 20 - 40 %    Monocytes % 8 2 - 10 %    Eosinophils % 1 0 - 6 %    Basophils % 0 0 - 2 %    Neutrophils Absolute 4.1 2.0 - 7.7 thou/uL    Lymphocytes Absolute 1.1 0.8 - 4.4 thou/uL    Monocytes Absolute 0.5 0.0 - 0.9 thou/uL    Eosinophils Absolute 0.1 0.0 - 0.4 thou/uL    Basophils Absolute 0.0 0.0 - 0.2 thou/uL   Glucose   Result Value Ref Range    Glucose 86 74 - 125 mg/dL    Patient Fasting > 8hrs? No     Narrative    Fasting Glucose reference range is 70-99 mg/dL per  American Diabetes  Association (ADA) guidelines.       Your chest xray was read as clear. Your EKG showed PVCs (premature ventricular complexes) --which is why we are doing the Holter monitor (to check your heart rhythm over the course of a day). Be sure you are eating healthy food and drinking water regularly!    Please call with questions or contact us using [employer].    Sincerely,        Electronically signed by Camille Love MD

## 2021-06-19 NOTE — PROGRESS NOTES
OFFICE VISIT NOTE      Assessment & Plan   Eleanor Awan is a 67 y.o. female planning to go to [facility] in August.       Diagnoses and all orders for this visit:    Tired  This is a new symptom. Not sure what it is coming from, but there are many possible etiologies. Start with checking labs.  -     HM1(CBC and Differential)  -     Thyroid Stimulating Hormone (TSH)  -     Alkaline Phosphatase, Total & Isoenzymes, Serum or Plasma  -     Ferritin  -     Glycosylated Hemoglobin A1c  -     Urinalysis-UC if Indicated  -     HM1 (CBC with Diff)  -     GGT (Gamma GT)  -     Hepatic Profile  -     T4, Free  -     T3 (Triiodothyronine), Free    Depression, unspecified depression type  -     HM1(CBC and Differential)  -     Thyroid Stimulating Hormone (TSH)  -     Alkaline Phosphatase, Total & Isoenzymes, Serum or Plasma  -     Ferritin  -     Glycosylated Hemoglobin A1c  -     Urinalysis-UC if Indicated  -     HM1 (CBC with Diff)    Hep C w/o coma, chronic (H)  -     HM1(CBC and Differential)  -     Thyroid Stimulating Hormone (TSH)  -     Alkaline Phosphatase, Total & Isoenzymes, Serum or Plasma  -     Ferritin  -     Glycosylated Hemoglobin A1c  -     Urinalysis-UC if Indicated  -     HM1 (CBC with Diff)    Nocturia  -     HM1(CBC and Differential)  -     Thyroid Stimulating Hormone (TSH)  -     Alkaline Phosphatase, Total & Isoenzymes, Serum or Plasma  -     Ferritin  -     Glycosylated Hemoglobin A1c  -     Urinalysis-UC if Indicated  -     HM1 (CBC with Diff)    Travel advice encounter  -     Typhoid, Inactive, Inj  She agrees to the typhoid shot and will take anti-malarials. She'll carry medication with her for traveler's diarrhea.  She will also take an aspirin daily on her trip.    Easy bruising  Did labs, platelets and Hgb normal    Indigestion  -     GGT (Gamma GT)  -     Hepatic Profile    Other orders  -     atovaquone-proguanil (MALARONE) 250-100 mg Tab; Take 1 tablet by mouth daily with breakfast.   "Dispense: 14 tablet; Refill: 0      Camille Love MD    The following are part of a depression follow up plan for the patient:  coping support assessment and coping support management  I have counseled the patient for tobacco cessation and the follow up will occur  at the next visit.          Subjective:   Chief Complaint:  Immunizations    67 y.o. female.     1) sleepy most of the time; this is different for her -she's usually the energetic and active one. She's not sure why. She is eating and sleeping like usual.    2) change in thinking and remembering, too.    3) pooping lots, several times per day    4) likes veggies but does not eat them now; she used to enjoy cooking but she just eats out now. She has mostly breads and meat.    Current Outpatient Prescriptions   Medication Sig Note     alendronate (FOSAMAX) 70 MG tablet Take 1 tablet (70 mg total) by mouth every 7 days. Take in the morning on an empty stomach with a full glass of water 30 minutes before food      FLUoxetine (PROZAC) 20 MG capsule Take 1 capsule (20 mg total) by mouth daily.      HARVONI  mg Tab per tablet Take  mg by mouth daily. 6/29/2018: Received from: External Pharmacy     atovaquone-proguanil (MALARONE) 250-100 mg Tab Take 1 tablet by mouth daily with breakfast.        PSFHx: appropriate sections of PMH completed/filled in   Tobacco Status:  She  reports that she has never smoked. She has never used smokeless tobacco.    Review of Systems:  No fever.  Bruising easily.  Pooping a lot.    Objective:    /78 (Patient Site: Left Arm, Patient Position: Sitting, Cuff Size: Adult Regular)  Pulse 80  Temp 98.4  F (36.9  C) (Oral)   Resp 18  Ht 5' 3.58\" (1.615 m)  Wt 118 lb 4 oz (53.6 kg)  SpO2 98% Comment: RA  Breastfeeding? No  BMI 20.56 kg/m2  GENERAL: No acute distress, appears thin, alert  Ht: reg s1s2  Lungs: aeration fair, no wheezes or rales but not exactly clear  Skin: no bruising or rash    Reviewed [place] " health recommendations  Labs pending    Spent 25 min face to face with patient with more the 50% spent in counseling, reviewing chart, and coordination of care and discussing travel risks and recommendations for meds and vaccines. After this visit, she is ready to travel. The etiology of her sleepiness is uncertain. Might follow up on that once labs are all in.

## 2021-06-19 NOTE — LETTER
Letter by Camille Love MD at      Author: Camille Love MD Service: -- Author Type: --    Filed:  Encounter Date: 5/17/2019 Status: (Other)         Eleanor Awan  [address] [place] [address]             May 17, 2019         Dear Ms. Awan,    Below are the results from your recent visit:    Resulted Orders   Erythrocyte Sedimentation Rate   Result Value Ref Range    Sed Rate 11 0 - 20 mm/hr   Comprehensive Metabolic Panel   Result Value Ref Range    Sodium 140 136 - 145 mmol/L    Potassium 4.5 3.5 - 5.0 mmol/L    Chloride 103 98 - 107 mmol/L    CO2 26 22 - 31 mmol/L    Anion Gap, Calculation 11 5 - 18 mmol/L    Glucose 124 70 - 125 mg/dL    BUN 17 8 - 22 mg/dL    Creatinine 0.83 0.60 - 1.10 mg/dL    GFR MDRD Af Amer >60 >60 mL/min/1.73m2    GFR MDRD Non Af Amer >60 >60 mL/min/1.73m2    Bilirubin, Total 0.3 0.0 - 1.0 mg/dL    Calcium 9.7 8.5 - 10.5 mg/dL    Protein, Total 7.1 6.0 - 8.0 g/dL    Albumin 3.7 3.5 - 5.0 g/dL    Alkaline Phosphatase 118 45 - 120 U/L    AST 28 0 - 40 U/L    ALT 21 0 - 45 U/L    Narrative    Fasting Glucose reference range is 70-99 mg/dL per  American Diabetes Association (ADA) guidelines.   Vitamin B12   Result Value Ref Range    Vitamin B-12 >2,000 (H) 213 - 816 pg/mL   C-Reactive Protein   Result Value Ref Range    CRP <0.1 0.0 - 0.8 mg/dL   GGT (Gamma GT)   Result Value Ref Range    GGT (Gamma GT) 21 0 - 50 U/L   Thyroid Stimulating Hormone (TSH)   Result Value Ref Range    TSH 1.32 0.30 - 5.00 uIU/mL   HM1 (CBC with Diff)   Result Value Ref Range    WBC 5.0 4.0 - 11.0 thou/uL    RBC 4.26 3.80 - 5.40 mill/uL    Hemoglobin 13.4 12.0 - 16.0 g/dL    Hematocrit 39.3 35.0 - 47.0 %    MCV 92 80 - 100 fL    MCH 31.3 27.0 - 34.0 pg    MCHC 34.0 32.0 - 36.0 g/dL    RDW 10.8 (L) 11.0 - 14.5 %    Platelets 167 140 - 440 thou/uL    MPV 7.0 7.0 - 10.0 fL    Neutrophils % 57 50 - 70 %    Lymphocytes % 32 20 - 40 %    Monocytes % 9 2 - 10 %    Eosinophils % 2 0 - 6 %     Basophils % 0 0 - 2 %    Neutrophils Absolute 2.9 2.0 - 7.7 thou/uL    Lymphocytes Absolute 1.6 0.8 - 4.4 thou/uL    Monocytes Absolute 0.4 0.0 - 0.9 thou/uL    Eosinophils Absolute 0.1 0.0 - 0.4 thou/uL    Basophils Absolute 0.0 0.0 - 0.2 thou/uL       All of these labs look good. I am not worried that the vit B12 level is high, although you could take less of the supplement since it is high. I hope you see the neurologist soon!    Please call with questions or contact us using [employer]    Sincerely,        Electronically signed by Camille Love MD

## 2021-06-19 NOTE — LETTER
Letter by Camille Love MD at      Author: Camille Love MD Service: -- Author Type: --    Filed:  Encounter Date: 10/15/2019 Status: Signed             10/15/2019        This letter is to state that Eleanor is not well enough medically to care for a 3 month old baby. We are working to help improve her status, but it is not clear how soon things will improve.        Camille Love MD

## 2021-06-20 NOTE — LETTER
Letter by Heather Carson BSW at      Author: Heather Carson BSW Service: -- Author Type: --    Filed:  Encounter Date: 5/7/2020 Status: (Other)       CARE COORDINATION  [facility]- [address], [address]  [place] [address]      May 7, 2020    Eleanor Awan  [address] [address]      Dear Eleanor,  Your Care Team congratulates you on your journey to maintain wellness. This document will help guide you on your journey to maintain a healthy lifestyle.  You can use this to help you overcome any barriers you may encounter.  If you should have any questions or concerns, you can contact the members of your Care Team or contact your Primary Care Clinic for assistance.    Health Maintenance  Health Maintenance Reviewed:      My Access Plan  Medical Emergency 911   Primary Clinic Line Camille Love MD - [phone]   24 Hour Appointment Line [phone] or  [phone] ([phone]) (toll-free)   24 Hour Nurse Line [phone]   Preferred Urgent Care     Preferred Hospital     Preferred Pharmacy [id number] - [address] AT [facility] OF [address] [place] [address]     Behavioral Health Crisis Line The National Suicide Prevention Lifeline at 1-770.614.5577 or 911     My Care Team Members  Patient Care Team       Relationship Specialty Notifications Start End    Camille Love MD PCP - General Family Medicine  2/23/18     Phone: [phone] Fax: [phone]         [address] [place] [address]    Camille Love MD Assigned PCP   7/28/19     Phone: [phone] Fax: [phone]         [address] [place] [address]              Goals        Patient Stated    ? COMPLETED: Financial Wellbeing (pt-stated)      Goal:  I would like to speak with the Financial Resource Worker to obtain information regarding my eligibility for Medicaid insurance, applying for SNAP and completing an [facility] Financial Aid  Application in the next 30-60 days.    Personal Plan  1. I do not qualify for Medicaid or SNAP but I will accept the [facility] Financial Aid application the FRW mailed to me.  2. I have been working with the billing department regarding my [facility] Financial Aid application.  2. I will complete this financial aid application every 6 months as needed.    Date goal set:  12/10/2019    Discussed/Updated: 12/16/19; 1/8/20; 2/26/2020      ? COMPLETED: Mental Health Management (pt-stated)      Goal:  I would like to obtain a mental health counselor in the next 30-90 days.    Personal Plan  1.  I will continue to attend my therapy appointments with Lyndsay at [facility].    Date goal set:  12/10/2019    Updated: 12/16/19; 1/8/20; 2/27/2020          ? COMPLETED: Psychosocial (pt-stated)      Goal:  I would like to meet with the Clinic Care Coordination  to obtain information about community resources in the next 30 days.    Action steps to achieve this goal  1. I no longer feel I need to work with the [facility] as my primary concern is the cost of medical care and I will call the FRW to address this.    Date goal set:  12/10/2019             Advance Care Plans/Directives Type:      We notice that you do not have an Advance Directive on file. Upon completion of your Health Care Directive, please bring a copy with you to your next office visit.    It has been your Clinic Care Team's pleasure to work with you on your goals.    Regards,  Your Clinic Care Team

## 2021-06-20 NOTE — LETTER
Letter by Camille Love MD at      Author: Camille Love MD Service: -- Author Type: --    Filed:  Encounter Date: 2/7/2020 Status: (Other)                    My Depression Action Plan  Name: Eleanor Awan   Date of Birth 1950  Date: 2/7/2020    My Doctor: Camille Love MD   My Clinic: [facility]   [address] [place] [id number]  [phone]          GREEN    ZONE   Good Control    What it looks like:     Things are going generally well. You have normal ups and downs. You may even feel depressed from time to time, but bad moods usually last less than a day.   What you need to do:  1. Continue to care for yourself (see self care plan)  2. Check your depression survival kit and update it as needed  3. Follow your physicians recommendations including any medication.  4. Do not stop taking medication unless you consult with your physician first.           YELLOW         ZONE Getting Worse    What it looks like:     Depression is starting to interfere with your life.     It may be hard to get out of bed; you may be starting to isolate yourself from others.    Symptoms of depression are starting to last most all day and this has happened for several days.     You may have suicidal thoughts but they are not constant.   What you need to do:     1. Call your care team. Your response to treatment will improve if you keep your care team informed of your progress. Yellow periods are signs an adjustment may need to be made.     2. Continue your self-care.  Just get dressed and ready for the day.  Don't give yourself time to talk yourself out of it.    3. Talk to someone in your support network.    4. Open up your depression Depression Self-Care Plan / Wellness kit.           RED    ZONE Medical Alert - Get Help    What it looks like:     Depression is seriously interfering with your life.     You may experience these or other symptoms: You cant get out of bed most  days, cant work or engage in other necessary activities, you have trouble taking care of basic hygiene, or basic responsibilities, thoughts of suicide or death that will not go away, self-injurious behavior.     What you need to do:  1. Call your care team and request a same-day appointment. If they are not available (weekends or after hours) call your local crisis line, emergency room or 911.            Self-Care Plan / Wellness Kit    Self-Care for Depression  Heres the deal. Your body and mind are really not as separate as most people think.  What you do and think affects how you feel and how you feel influences what you do and think. This means if you do things that people who feel good do, it will help you feel better.  Sometimes this is all it takes.  There is also a place for medication and therapy depending on how severe your depression is, so be sure to consult with your medical provider and/ or Behavioral Health Consultant if your symptoms are worsening or not improving.     In order to better manage my stress, I will:    Exercise  Get some form of exercise, every day. This will help reduce pain and release endorphins, the feel good chemicals in your brain. This is almost as good as taking antidepressants!  This is not the same as joining a gym and then never going! (they count on that by the way?) It can be as simple as just going for a walk or doing some gardening, anything that will get you moving.      Hygiene   Maintain good hygiene (get out of bed in the morning, make your bed, brush your teeth, take a shower, and get dressed like you were going to work, even if you are unemployed).  If your clothes don't fit try to get ones that do.    Diet  Strive to eat foods that are good for me, drink plenty of water, and avoid excessive sugar, caffeine, alcohol, and other mood-altering substances.  Some foods that are helpful in depression are: complex carbohydrates, B vitamins, flaxseed, fish or fish oil,  fresh fruits and vegetables.    Psychotherapy  Agree to participate in Individual Therapy (if recommended).    Medication  If prescribed medications, I agree to take them.  Missing doses can result in serious side effects.  I understand that drinking alcohol, or other illicit drug use, may cause potential side effects.  I will not stop my medication abruptly without first discussing it with my provider.    Staying Connected With Others  Stay in touch with my friends, family members, and my primary care provider/team.    Use your imagination  Be creative.  We all have a creative side; it doesnt matter if its oil painting, sand castles, or mud pies! This will also kick up the endorphins.    Witness Beauty  (AKA stop and smell the roses) Take a look outside, even in mid-winter. Notice colors, textures. Watch the squirrels and birds.     Service to others  Be of service to others.  There is always someone else in need.  By helping others we can get out of ourselves and remember the really important things.  This also provides opportunities for practicing all the other parts of the program.    Humor  Laugh and be silly!  Adjust your TV habits for less news and crime-drama and more comedy.    Control your stress  Try breathing deep, massage therapy, biofeedback, and meditation. Find time to relax each day.     Crisis Text Line  http://www.crisistextline.org    The Crisis Text Line serves anyone, in any type of crisis, providing access to free, 24/7 support and information via the medium people already use and trust:    Here's how it works:  1.  Text 595-618 from anywhere in the [place], anytime, about any type of crisis.  2.  A live, trained Crisis Counselor receives the text and responds quickly.  3.  The volunteer Crisis Counselor will help you move from a 'hot moment to a cool moment'.  My support system    Clinic Contact:  Phone number:    Contact 1:  Phone number:    Contact 2:  Phone number:    Roman Catholic/spiritual  advisor:  Phone number:    Therapist:  Phone number:    [facility] center:    Phone number:    Other community support:  Phone number:

## 2021-06-20 NOTE — LETTER
Letter by Camille Love MD at      Author: Camille Love MD Service: -- Author Type: --    Filed:  Encounter Date: 9/8/2020 Status: (Other)                 9/8/2020      To Whom It May Concern:      I am the primary care giver for Eleanor Awan, and I can state that she medically needs her cat. Please allow this in her apartment.      Sincerely,    Camille Love MD

## 2021-06-20 NOTE — LETTER
Letter by Camille Love MD at      Author: Camille Love MD Service: -- Author Type: --    Filed:  Encounter Date: 2020 Status: (Other)         2020        To Whom It May Concern:      This letter is to state that Eleanor Awan ( 50) has a medical need for her cat.

## 2021-06-20 NOTE — LETTER
Letter by Camille Love MD at      Author: Camille Love MD Service: -- Author Type: --    Filed:  Encounter Date: 1/24/2020 Status: (Other)        [facility] PATIENT ACCESS  [address] [place] [address] [address] [place] [id number] [phone]         Eleanor Awan  [address] [address]        01/24/20    Dear Eleanor Awan,     At [facility] we care about your health and well-being. Your primary care provider is committed to ensuring you receive high quality care and has chosen a network of specialists to assist in providing that care. Recently Dr. Love referred you to Urology for specialty care.      Please call [facility] at [phone] at your earliest convenience for assistance in scheduling an appointment.  If you have already scheduled this appointment, please disregard this notice.  Thank you for choosing [facility] for your healthcare needs.       Sincerely,       [facility] Specialty Scheduling

## 2021-06-23 NOTE — TELEPHONE ENCOUNTER
"See comments in \"Reason for Call\" for information on the call purpose. Patient states that she cancelled her last two appointments as she did not have a ride. The patient will line up a ride and call back.   "

## 2021-06-25 NOTE — TELEPHONE ENCOUNTER
Please call Eleanor and ask her to schedule an appointment. If she needs help with transportation, we can help her with that.  I really need 40min with her, which I know will book out a ways.

## 2021-06-28 NOTE — PROGRESS NOTES
Progress Notes by Becca Tapia MD at 11/12/2019 12:50 PM     Author: Becca Tapia MD Service: -- Author Type: Physician    Filed: 11/12/2019  2:01 PM Encounter Date: 11/12/2019 Status: Signed    : Becca Tapia MD (Physician)             [facility] Heart Care Note    Thank you, Dr. Love, for asking the [facility] Heart Care team to see Eleanor Awan given frequent PVCs on holter report.    Assessment:    Eleanor Awan is a 68 y.o. old female with a PMHx significant for chronic hepatitis C treating with AIYANA Sy who presents to cardiology clinic today for follow-up s/p abnormal Holter report which was significant for very frequent outflow tract ventricular premature beats. 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    Of note, Mrs. Awan has been experiencing episodes of near syncope for the last 15 years which she reports have progressed in frequency and duration over the last few weeks. She reports that the events begin with flushing and some nausea with associated lightheadedness and numbness and tingling of the extremities. She reports no LOC of consciousness during the events, but states that she will fall if she does not sit when the episodes start. She reports that the frequency has increased to at least 3 times per day. She states that the episodes are now ~ 20 minutes in duration and usually leave extremely exhausted requiring a nap right after. The states that the events are always followed by a bowel movement. She denies any alleviating or exacerbating factors. She denies orthopnea, PND or fluid retention/edema    ECG: Personally reviewed. normal sinus rhythm, occasional PVC noted, unifocal.    ECHO: ordered    Plan:  - Echocardiogram to evaluate for PVC-induced cardiomyopathy in light of Holter results  - Will start low dose metoprolol succinate    ______________________________________________________________________    Subjective:  CC: Mrs. Awan denies any current complaints or symptoms. Reports that the events are unpredictable and that causes anxiety. Concerned that she was previously diagnosed with seizures when the symptoms started, but now it's unclear.   ______________________________________________________________________      Review of Systems:   A complete 10 systems ROS was reviewed  And is negative except what is listed in the HPI.     Problem List:  Patient Active Problem List   Diagnosis   ? Osteoporosis   ? Depressive disorder   ? Chronic hepatitis C virus infection (H)   ? Mild cognitive impairment with memory loss   ? BPPV (benign paroxysmal positional vertigo)   ? Family hx of colon cancer   ? Vertigo   ? Allergic rhinitis due to pollen     Medical History:  Past Medical History:   Diagnosis Date   ? Alcoholism (H)    ? Alcoholism in member of household    ? Cirrhosis (H) 04/2018    found by u/s in 4/2018; should be followed q6 mon   ? Carlotta gilmore 2019    right   ? Hep C w/o coma, chronic (H) 03/2019    CURED - gone   ? Mild cognitive impairment 2017    episodes of memory loss   ? Primary osteoarthritis of hands, bilateral    ? PVC (premature ventricular contraction) 11/2019    likely symptomatic - evaluating more with a holter/event monitor   ? Smoker     15years of small amounts     Surgical History:  Past Surgical History:   Procedure Laterality Date   ? DENTAL SURGERY  2010   ? TUBAL LIGATION  1970     Social History:  Social History     Socioeconomic History   ? Marital status:      Spouse name: Asaf   ? Number of children: 2   ? Years of education: 12   ? Highest education level: Not on file   Occupational History   ? Not on file   Social Needs   ? Financial resource strain: Not on file   ? Food insecurity:     Worry: Not on file     Inability: Not on file   ? Transportation needs:     Medical: Not on file      Non-medical: Not on file   Tobacco Use   ? Smoking status: Former Smoker     Packs/day: 0.00     Start date: 1970     Last attempt to quit: 1995     Years since quittin.0   ? Smokeless tobacco: Never Used   Substance and Sexual Activity   ? Alcohol use: Yes     Comment: sometimes   ? Drug use: Yes     Types: Marijuana     Comment: for back pain   ? Sexual activity: Not Currently     Partners: Male   Lifestyle   ? Physical activity:     Days per week: Not on file     Minutes per session: Not on file   ? Stress: Not on file   Relationships   ? Social connections:     Talks on phone: Not on file     Gets together: Not on file     Attends Jain service: Not on file     Active member of club or organization: Not on file     Attends meetings of clubs or organizations: Not on file     Relationship status: Not on file   ? Intimate partner violence:     Fear of current or ex partner: Not on file     Emotionally abused: Not on file     Physically abused: Not on file     Forced sexual activity: Not on file   Other Topics Concern   ? Not on file   Social History Narrative    2018        Employed -  - 26 years;     retiring 19 and then moving to [place], [place]                  Happy with life - daughter and grandson - in [place]    Does not communicate with son - he lives in [place]           Family History:  Family History   Problem Relation Age of Onset   ? Heart attack Mother 57   ? Thyroid disease Mother         hyperthyroid   ? Peripheral vascular disease Father    ? No Medical Problems Brother    ? No Medical Problems Paternal Grandmother    ? Stroke Paternal Grandfather    ? No Medical Problems Daughter    ? No Medical Problems Son          Allergies:  No Known Allergies    Medications:  Current Outpatient Medications   Medication Sig Dispense Refill   ? FLUoxetine (PROZAC) 20 MG capsule Take 2 capsules (40 mg total) by mouth daily. 180 capsule 4   ? alendronate (FOSAMAX)  70 MG tablet Take 1 tablet (70 mg total) by mouth every 7 days. Take in the morning on an empty stomach with a full glass of water 30 minutes before food 4 tablet 11   ? HARVONI  mg Tab per tablet Take  mg by mouth daily.     ? mirtazapine (REMERON) 15 MG tablet Take 1 tablet (15 mg total) by mouth at bedtime. 90 tablet 0     No current facility-administered medications for this visit.        Objective:   Vital signs:  /62 (Patient Site: Right Arm, Patient Position: Sitting, Cuff Size: Adult Regular)   Pulse 88   Resp 14   Wt 122 lb (55.3 kg)   SpO2 95%   BMI 21.61 kg/m        Physical Exam:    GENERAL APPEARANCE: Alert, cooperative and in no acute distress.   HEENT: No scleral icterus. Oral mucuos membranes pink and moist.   NECK:  No JVP. No Hepatojugular reflux. Thyroid not visualized. No lymphadenopathy   CHEST: clear to auscultation   CARDIOVASCULAR: S1, S2 without murmur ,clicks or rubs. Brachial, radial and posterior tibial pulses are intact and symetric. No carotid bruits noted. No edema  ABDOMEN: Nontender. BS+. No bruits.   SKIN: No Xanthelasma   Musculoskeletal: No cyanosis, clubbing or swelling.      Lab Results:  LIPIDS:  Lab Results   Component Value Date    CHOL 115 02/23/2018     Lab Results   Component Value Date    HDL 38 (L) 02/23/2018     Lab Results   Component Value Date    LDLCALC 55 02/23/2018     Lab Results   Component Value Date    TRIG 109 02/23/2018     No components found for: CHOLHDL    BMP:  Lab Results   Component Value Date    CREATININE 0.81 11/05/2019    BUN 11 11/05/2019     11/05/2019    K 4.1 11/05/2019     11/05/2019    CO2 28 11/05/2019         Judith Tapia MD., [facility]

## 2021-06-28 NOTE — PROGRESS NOTES
Progress Notes by Hedy Parker RN at 12/10/2019 10:00 AM     Author: Hedy Parker RN Service: -- Author Type: Registered Nurse    Filed: 12/11/2019  9:28 AM Encounter Date: 12/10/2019 Status: Signed    : Hedy Parker RN (Registered Nurse)       Clinic Care Coordination Contact    Clinic Care Coordination Contact  OUTREACH    Referral Information:  Referral Source: PCP    Primary Diagnosis: (syncope)    Chief Complaint   Patient presents with   ? Clinic Care Coordination - Initial        Clinic Utilization  Difficulty keeping appointments:: No  Compliance Concerns: No  No-Show Concerns: No  No PCP office visit in Past Year: No  Utilization    Last refreshed: 12/11/2019  8:59 AM:  Hospital Admissions 0           Last refreshed: 12/11/2019  8:59 AM:  ED Visits 0           Last refreshed: 12/11/2019  8:59 AM:  No Show Count (past year) 4              Current as of: 12/11/2019  8:59 AM              Clinical Concerns:  Current Medical Concerns:  Syncope, memory difficulty, osteoporosis, hep c, positional vertigo, currently being tested for pheochromocytoma.  Current Behavioral Concerns: depressive disorder  Education Provided to patient: yes  Pain  Pain (GOAL):: No  Health Maintenance Reviewed: Up to date  Clinical Pathway: None     Medication Management:  Currently sets up medications in a MSU box weekly     Functional Status:  Dependent ADLs:: Independent  Dependent IADLs:: Independent  Bed or wheelchair confined:: No  Mobility Status: Independent  Any fall with injury in the past year?: No    Living Situation:  Current living arrangement:: I live in a private home  Type of residence:: Town home    Diet/Exercise/Sleep:  Diet:: Regular  Inadequate nutrition (GOAL):: No  Food Insecurity: No  Tube Feeding: No  Exercise:: Unable to exercise  Inadequate activity/exercise (GOAL):: No  Significant changes in sleep pattern (GOAL): No    Transportation:  Transportation concerns (GOAL):: No  Transportation  "means:: Regular car     Psychosocial:  Druze or spiritual beliefs that impact treatment:: No  Mental health DX:: No  Mental health management concern (GOAL):: Yes  Informal Support system:: Spouse     Financial/Insurance:   Financial/Insurance concerns (GOAL):: Yes  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  She receives $468/mo SSI.   still works. Patient and her  are staying in a townhome with her friend.  Paying $800/month rent.  Having difficulty with paying co-pays to specialty clinics and appointments.  Will refer to W for SNAP and insurance.       Resources and Interventions:  Current Resources:    ;   Community Resources: None  Supplies used at home:: None  Equipment Currently Used at Home: none    Advance Care Plan/Directive  Advanced Care Plans/Directives on file:: No  Advanced Care Plan/Directive Status: Considering Options    Referrals Placed: None     68 yr F PMH: Syncope, memory difficulty, osteoporosis, hep c, positional vertigo, currently being tested for pheochromocytoma, depressive disorder.  Currently lives in a townhouse with her friend and spouse.  They are no longer able to afford their home.  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  She drives and is able to manage her groceries, rx pick-up and all Dr's appointments.  She manages her medications via MSU box.  She c/o headaches to the frontal area intermittently but does not take anything for this.  She denies falls but has 'episodes' and \"sits down\".  Per PCP note Eleanor \" keeps having \"spells\" where she sweats, has chest tightness, and headache. She's been put on metoprolol and since taking that, the spells are a bit less frequent.\"  Patient agreeable to counseling to discuss all the changes to her health, lifestyle, etc.  Note sent to PCP for order.  Patient enrolled for Goals as written below.       Eleanor arrives today wearing a " [employer] heart monitor.  She had 4 patches and 3 wires.  Writer viewed the application of the device.  Eleanor admitted to 'finding an extra wire' that was not attached to her body.  She 'didn't know where it went so she added an extra patch'.  Writer applied wire to existing patch that was already on patients body.  (where the wire had come detached from).  Writer offered to call cardiology or [employer] to confirm that they were indeed receiving cardiac event readings.  Eleanor declined and stated she would call when she returned home.       Goals:   Goals        General    Financial Wellbeing (pt-stated)     Notes - Note created  12/11/2019  8:58 AM by Hedy Parker, RN    Goal:  I would like to speak with the Financial Resource Worker to obtain information regarding my medical insurance and applying for SNAP in the next 30-60 days.    Action steps to achieve this goal  1. I will speak with the [facility] FRW at outreach telephone calls.  2.  I will speak with the [facility] CHW at outreach telephone calls.  3.  I will return any necessary paperwork and/or documentation in a timely manner.    Date goal set:  12/10/2019        Mental Health Management (pt-stated)     Notes - Note created  12/11/2019  9:01 AM by Hedy Parker, RN    Goal:  I would like to obtain a mental health counselor in the next 30-90 days.    Action steps to achieve this goal  1.  I will speak with the specialty  to make an appointment for counseling.  2.  If I have not heard from the specialty  in the next 1-2 weeks; I will let my Clinic Care Coordination Community Health Care Worker know at my next outreach telephone call.  (She will assist me in connecting with the specialty )    Date goal set:  12/10/2019      Psychosocial (pt-stated)     Notes - Note edited  12/11/2019  9:06 AM by Hedy Parker, RN    Goal:  I would like to meet with the Clinic Care Coordination  to obtain information about community resources in  the next 30 days.    Action steps to achieve this goal  1. I will meet with the [facility]  on 12/16/19 @ 10:00.    Date goal set:  12/10/2019              Patient/Caregiver understanding: Patient stated an understanding       Future Appointments              In 5 days [facility] Clinical Support, [facility]    In 1 month Camille Love MD [facility] , [facility]    In 9 months Edna Swain, [facility] Outpatient Services,           Plan: DELEGATION: NONE -RN ALREADY SENT FRW REMIND ME.

## 2021-06-29 NOTE — PROGRESS NOTES
"Progress Notes by Heather Carson BSW at 9/11/2020  2:00 PM     Author: Heather Carson BSW Service: -- Author Type:     Filed: 9/11/2020  3:59 PM Encounter Date: 9/11/2020 Status: Signed    : Heather Carson BSW ()       Clinic Care Coordination Contact    Clinic Care Coordination Contact  OUTREACH    Visit Note:    Present for today's phone assessment is pt and CCC CHARLENE.    Eleanor Awan is a 68yo female who is known to me from previous enrollment. She was graduated from [employer] in in May 2020 and has since been re-referred by her PCP due to questions about unemployment and housing concerns.    [facility]  and a friend of theirs. They contribute $800/month in rent. Today, she tells me that the friend/roommate is \"rarely\" home and stays elsewhere (with a boyfriend) much of the time. She and her  would like to get their own place but she is unsure what they can afford. Furthermore, she states that they are not willing to move into an apartment. They are looking at places but intend on \"staying put\" through the winter. SW did suggest [employer] and [employer] to browse (online) for potential housing options. These links were emailed to her at the conclusion of today's visit. She understands that she and her  must contact properties directly to inquire about eligibility, availability, etc.    She initially states that she would like to apply for social security disability (SSI/SSDI), however, goes on to tell CHARLENE that she is already receiving $500/month in SSI benefits. Her monthly payment decreased approximately one year ago as it was determined that she was overpaid by the SSA for several years. She now owes $6,000 in \"back pay\" which is why her monthly payments have been reduced. SW explained that she is already receiving the benefits available to her through social security. CHARLENE also explained " that she would not be eligible for any kind of unemployment benefits as she has not been working in any capacity for well over 1 year.    She is still seeing CLIFF Wright for psychotherapy 1x monthly. She feels that this is helpful in managing her mental health/wellbeing. She also notes that she really enjoys working with Lyndsay. She has monthly follow up visits scheduled with Dr. Love throughout the next 3 months.    No CCC goals identified from SW standpoint at this time.    Plan:  1.) Remove pt from work queue.    Referral Information:  Referral Source: PCP  Primary Diagnosis: Psychosocial    Chief Complaint   Patient presents with   ? Clinic Care Coordination - Initial     Clinic Utilization  Difficulty keeping appointments:: No  Compliance Concerns: No  No-Show Concerns: No  No PCP office visit in Past Year: No     Utilization    Last refreshed: 9/11/2020  2:13 PM:  Hospital Admissions 1           Last refreshed: 9/11/2020  2:13 PM:  ED Visits 3           Last refreshed: 9/11/2020  2:13 PM:  No Show Count (past year) 3              Current as of: 9/11/2020  2:13 PM            Clinical Concerns:  Current Medical Concerns: See Problem List    Current Behavioral Concerns: See Problem List    Pain  Pain (GOAL):: No  Health Maintenance Reviewed: Not assessed  Clinical Pathway: None     Medication Management: Not assessed     Functional Status:  Dependent ADLs:: Independent  Dependent IADLs:: Independent  Bed or wheelchair confined:: No  Mobility Status: Independent  Fallen 2 or more times in the past year?: No  Any fall with injury in the past year?: No    Living Situation:  Current living arrangement:: I live in a private home with spouse  Type of residence:: Town home    Lifestyle & Psychosocial Needs:  Lifestyle   ? Physical activity     Days per week: 3 days     Minutes per session: 30 min   ? Stress: Only a little     Social Needs   ? Financial resource strain: Somewhat hard   ? Food insecurity      Worry: Never true     Inability: Never true   ? Transportation needs     Medical: No     Non-medical: No     Diet:: Regular  Inadequate nutrition (GOAL):: No  Tube Feeding: No  Inadequate activity/exercise (GOAL):: No  Significant changes in sleep pattern (GOAL): No  Transportation means:: Family, Regular car(Self)     Druze or spiritual beliefs that impact treatment:: No  Mental health DX:: No  Mental health management concern (GOAL):: Yes  Informal Support system:: Spouse, Friends, Family, Children     Socioeconomic History   ? Marital status:      Spouse name: Asaf   ? Number of children: 2   ? Years of education: 12   ? Highest education level: Not on file   Relationships   ? Social connections     Talks on phone: More than three times a week     Gets together: More than three times a week     Attends Mandaeism service: Never     Active member of club or organization: No     Attends meetings of clubs or organizations: Never     Relationship status:    ? Intimate partner violence     Fear of current or ex partner: No     Emotionally abused: No     Physically abused: No     Forced sexual activity: No     Tobacco Use   ? Smoking status: Former Smoker     Packs/day: 0.00     Start date: 1970     Last attempt to quit: 1995     Years since quittin.8   ? Smokeless tobacco: Never Used   ? Tobacco comment: currently smokes marijuana daily   Substance and Sexual Activity   ? Alcohol use: Yes     Alcohol/week: 5.0 standard drinks     Types: 5 Standard drinks or equivalent per week     Frequency: 2-3 times a week     Drinks per session: 1 or 2     Binge frequency: Never   ? Drug use: Yes     Frequency: 7.0 times per week     Types: Marijuana     Comment: currently smokes marijuana daily   ? Sexual activity: Not Currently     Partners: Male     Birth control/protection: Post-menopausal     Community Resources: OP Mental Health  Supplies used at home:: None  Equipment Currently Used at Home:  none    Advance Care Plan/Directive  Advanced Care Plans/Directives on file:: No  Advanced Care Plan/Directive Status: Considering Options    Referrals: None     Goals: None     Future Appointments              In 4 weeks Camille Love MD [facility]    In 2 months Camille Love MD [facility]    In 3 months Camille Love MD [facility]

## 2021-07-03 NOTE — ADDENDUM NOTE
Addendum Note by Nata Garcia CMA at 7/30/2019 11:59 PM     Author: Nata Garcia CMA Service: -- Author Type: Certified Medical Assistant    Filed: 9/11/2019  8:25 AM Date of Service: 7/30/2019 11:59 PM Status: Signed    : Nata Garcia CMA (Certified Medical Assistant)    Encounter addended by: Nata Garcia CMA on: 9/11/2019  8:25 AM      Actions taken: Charge Capture section accepted

## 2021-07-03 NOTE — ADDENDUM NOTE
Addendum Note by Arturo Love MD at 9/8/2020  4:20 PM     Author: Arturo Love MD Service: -- Author Type: Physician    Filed: 9/9/2020  8:24 AM Encounter Date: 9/8/2020 Status: Signed    : Arturo Love MD (Physician)    Addended by: ARTURO LOVE on: 9/9/2020 08:24 AM        Modules accepted: Orders

## 2021-07-03 NOTE — ADDENDUM NOTE
Addendum Note by Arturo Love MD at 7/8/2020  9:40 AM     Author: Arturo Love MD Service: -- Author Type: Physician    Filed: 7/8/2020 10:55 AM Encounter Date: 7/8/2020 Status: Signed    : Arturo Love MD (Physician)    Addended by: ARTURO LOVE on: 7/8/2020 10:55 AM        Modules accepted: Orders

## 2021-07-04 ENCOUNTER — HEALTH MAINTENANCE LETTER (OUTPATIENT)
Age: 71
End: 2021-07-04

## 2021-07-08 ENCOUNTER — AMBULATORY - HEALTHEAST (OUTPATIENT)
Dept: FAMILY MEDICINE | Facility: CLINIC | Age: 71
End: 2021-07-08

## 2021-07-08 DIAGNOSIS — S06.9X1S TRAUMATIC BRAIN INJURY, WITH LOSS OF CONSCIOUSNESS OF 30 MINUTES OR LESS, SEQUELA (H): ICD-10-CM

## 2021-07-08 DIAGNOSIS — G31.84 MILD COGNITIVE IMPAIRMENT WITH MEMORY LOSS: ICD-10-CM

## 2021-07-08 DIAGNOSIS — I10 ESSENTIAL HYPERTENSION: ICD-10-CM

## 2021-07-08 DIAGNOSIS — R42 DIZZINESS: ICD-10-CM

## 2021-07-14 PROBLEM — F32.0 MILD MAJOR DEPRESSION (H): Status: RESOLVED | Noted: 2020-02-08 | Resolved: 2020-12-15

## 2021-08-17 ENCOUNTER — DOCUMENTATION ONLY (OUTPATIENT)
Dept: NEUROLOGY | Facility: CLINIC | Age: 71
End: 2021-08-17

## 2021-08-17 NOTE — PROGRESS NOTES
Neuropsychology Note    Ms. Awan was originally scheduled for repeat neuropsychological evaluation in September 2020. Due to the fact that she had completed previous testing, we felt it would be most appropriate for her to be seen again in person for repeat evaluation. She was contacted at the time and asked to wait to be seen in person. (Due to the ongoing COVID-19 pandemic we had only been conducting Tele-Health evaluations at that time). As we are now conducting in person evaluations, we have been steadily making our way through the wait list of such deferred patients. Our service reached out to Ms. Awan about scheduling options. Unfortunately, she has [employer] insurance and we are not able to see her as this carrier is no longer accepted by [facility]. She was provided with alternative providers of neuropsychological services in the area.       Ms. Awan is welcome to contact our service at any time ([phone]) should she have any questions.     Narda Harman, PhD, LP, ABPP  Clinical Neuropsychologist  [facility]

## 2021-09-01 ENCOUNTER — OFFICE VISIT (OUTPATIENT)
Dept: FAMILY MEDICINE | Facility: CLINIC | Age: 71
End: 2021-09-01
Payer: COMMERCIAL

## 2021-09-01 VITALS
BODY MASS INDEX: 24.05 KG/M2 | DIASTOLIC BLOOD PRESSURE: 64 MMHG | SYSTOLIC BLOOD PRESSURE: 116 MMHG | RESPIRATION RATE: 16 BRPM | OXYGEN SATURATION: 95 % | TEMPERATURE: 98.6 F | WEIGHT: 135.75 LBS | HEART RATE: 75 BPM

## 2021-09-01 DIAGNOSIS — R55 PRE-SYNCOPE: Primary | ICD-10-CM

## 2021-09-01 DIAGNOSIS — S06.9X1S TRAUMATIC BRAIN INJURY, WITH LOSS OF CONSCIOUSNESS OF 30 MINUTES OR LESS, SEQUELA (H): ICD-10-CM

## 2021-09-01 DIAGNOSIS — G31.84 MILD COGNITIVE IMPAIRMENT WITH MEMORY LOSS: ICD-10-CM

## 2021-09-01 DIAGNOSIS — I10 ESSENTIAL HYPERTENSION: ICD-10-CM

## 2021-09-01 DIAGNOSIS — F41.9 ANXIETY: ICD-10-CM

## 2021-09-01 DIAGNOSIS — F33.2 SEVERE EPISODE OF RECURRENT MAJOR DEPRESSIVE DISORDER, WITHOUT PSYCHOTIC FEATURES (H): ICD-10-CM

## 2021-09-01 DIAGNOSIS — R68.89 SPELLS OF DECREASED ATTENTIVENESS: ICD-10-CM

## 2021-09-01 DIAGNOSIS — E51.9 THIAMINE DEFICIENCY: ICD-10-CM

## 2021-09-01 PROCEDURE — 99214 OFFICE O/P EST MOD 30 MIN: CPT | Performed by: FAMILY MEDICINE

## 2021-09-01 NOTE — PROGRESS NOTES
"    Assessment & Plan     Essential hypertension  Controlled with meds, continue same  - Cardiac Event Monitor Adult Pediatric    Traumatic brain injury, with loss of consciousness of 30 minutes or less, sequela (H)  I really wonder if this is the cause of some of her dizziness or other struggles. I all other evals are normal, will consider a specialist in head trauma  - Cardiac Event Monitor Adult Pediatric    Spells of decreased attentiveness  Etiology remains uncertain  - Cardiac Event Monitor Adult Pediatric    Severe episode of recurrent major depressive disorder, without psychotic features (H)  Mood is currently OK/stable  - Cardiac Event Monitor Adult Pediatric    Anxiety  \"on the edge\" meaning she easily gets anxious  - Cardiac Event Monitor Adult Pediatric    Thiamine deficiency  Supplemented but does not seem to be improving  - Cardiac Event Monitor Adult Pediatric    Pre-syncope  cont'd problem - will get a cardiac event monitor to try to capture what is going on during her episodes  - Cardiac Event Monitor Adult Pediatric        30 minutes spent on the date of the encounter doing chart review, history and exam, documentation and further activities per the note       Regular exercise    Return in about 2 months (around 11/1/2021) for Follow up.    Camille Love MD  [facility] LEILA Webster is a 70 year old who presents for the following health issues  accompanied by her spouse (\"Rian\")    HPI   Not feeling well  Supper 5:30, go to bed about 10:30; often has a snack before bed.    Review of Systems   Constitutional, HEENT, cardiovascular, pulmonary, gi and gu systems are negative, except as otherwise noted.      Objective    /64   Pulse 75   Temp 98.6  F (37  C) (Oral)   Resp 16   Wt 61.6 kg (135 lb 12 oz)   SpO2 95%   BMI 24.05 kg/m    Body mass index is 24.05 kg/m .  Physical Exam   GENERAL: healthy, alert and no distress  NECK: no adenopathy, no " asymmetry, masses, or scars and thyroid normal to palpation  RESP: lungs clear to auscultation - no rales, rhonchi or wheezes  CV: regular rate and rhythm, normal S1 S2, no S3 or S4, no murmur, click or rub, no peripheral edema and peripheral pulses strong  ABDOMEN: soft, nontender, no hepatosplenomegaly, no masses and bowel sounds normal  MS: no gross musculoskeletal defects noted, no edema    Office Visit - [facility] on 05/14/2021   Component Date Value Ref Range Status     Sodium 05/14/2021 140  136 - 145 mmol/L Final     Potassium 05/14/2021 4.1  3.5 - 5.0 mmol/L Final     Chloride 05/14/2021 100  98 - 107 mmol/L Final     Carbon Dioxide (CO2) 05/14/2021 25  22 - 31 mmol/L Final     Anion Gap 05/14/2021 15  5 - 18 mmol/L Final     Glucose 05/14/2021 99  70 - 125 mg/dL Final     Urea Nitrogen 05/14/2021 17  8 - 28 mg/dL Final     Creatinine 05/14/2021 0.88  0.60 - 1.10 mg/dL Final     GFR Estimate If Black 05/14/2021 >60  >60 mL/min/1.73m2 Final     GFR Estimate 05/14/2021 >60  >60 mL/min/1.73m2 Final     Bilirubin Total 05/14/2021 0.5  0.0 - 1.0 mg/dL Final     Calcium 05/14/2021 9.7  8.5 - 10.5 mg/dL Final     Protein Total 05/14/2021 7.6  6.0 - 8.0 g/dL Final     Albumin 05/14/2021 3.9  3.5 - 5.0 g/dL Final     Alkaline Phosphatase 05/14/2021 57  45 - 120 U/L Final     AST 05/14/2021 13  0 - 40 U/L Final     ALT 05/14/2021 11  0 - 45 U/L Final     GGT 05/14/2021 24  0 - 50 U/L Final     TSH 05/14/2021 1.30  0.30 - 5.00 uIU/mL Final     WBC 05/14/2021 10.2  4.0 - 11.0 thou/uL Final     RBC Count 05/14/2021 4.99  3.80 - 5.40 mill/uL Final     Hemoglobin 05/14/2021 15.2  12.0 - 16.0 g/dL Final     Hematocrit 05/14/2021 46.4  35.0 - 47.0 % Final     MCV 05/14/2021 93  80 - 100 fL Final     MCH 05/14/2021 30.5  27.0 - 34.0 pg Final     MCHC 05/14/2021 32.8  32.0 - 36.0 g/dL Final     RDW 05/14/2021 11.9  11.0 - 14.5 % Final     Platelet Count 05/14/2021 205  140 - 440 thou/uL Final     Mean Platelet Volume  05/14/2021 8.4  7.0 - 10.0 fL Final     % Neutrophils 05/14/2021 78* 50 - 70 % Final     % Lymphocytes 05/14/2021 12* 20 - 40 % Final     % Monocytes 05/14/2021 8  2 - 10 % Final     % Eosinophils 05/14/2021 1  0 - 6 % Final     % Basophils 05/14/2021 0  0 - 2 % Final     % Immature Granulocytes 05/14/2021 0  <=0 % Final     Absolute Neutrophils 05/14/2021 8.0* 2.0 - 7.7 thou/uL Final     Absolute Lymphocytes 05/14/2021 1.2  0.8 - 4.4 thou/uL Final     Absolute Monocytes 05/14/2021 0.9  0.0 - 0.9 thou/uL Final     Eosinophils Absolute 05/14/2021 0.1  0.0 - 0.4 thou/uL Final     Absolute Basophils 05/14/2021 0.0  0.0 - 0.2 thou/uL Final     Absolute Immature Granulocytes 05/14/2021 0.0  <=0.0 thou/uL Final     Color Urine 05/14/2021 Yellow  Colorless, Yellow, Straw, Light Yellow Final     Appearance Urine 05/14/2021 Clear  Clear Final     Glucose Urine 05/14/2021 Negative  Negative Final     Protein Albumin Urine 05/14/2021 Negative  Negative Final     Bilirubin Urine 05/14/2021 Negative  Negative Final     Urobilinogen Urine 05/14/2021 0.2 E.U./dL  0.2 E.U./dL, 1.0 E.U./dL Final     pH Urine 05/14/2021 5.5  5.0 - 8.0 Final     Blood Urine 05/14/2021 Moderate* Negative Final     Ketones Urine 05/14/2021 Negative  Negative Final     Nitrite Urine 05/14/2021 Negative  Negative Final     Leukocyte Esterase Urine 05/14/2021 Trace* Negative Final     Specific Gravity Urine 05/14/2021 1.020  1.005 - 1.030 Final     RBC Urine 05/14/2021 0-2  None Seen, 0-2 hpf Final     WBC Urine 05/14/2021 0-5  None Seen, 0-5 hpf Final     Bacteria Urine 05/14/2021 Few* None Seen Final     Squamous Epithelials Urine 05/14/2021 10-25* None Seen, 0-5 lpf Final     Culture 05/14/2021 Mixture of urogenital organisms   Final     Cholesterol 05/14/2021 201* <=199 mg/dL Final     Triglycerides 05/14/2021 182* <=149 mg/dL Final     Direct Measure HDL 05/14/2021 46* >=50 mg/dL Final     LDL Cholesterol Calculated 05/14/2021 119  <=129 mg/dL Final      Patient Fasting > 8hrs? 05/14/2021 Unknown   Final     Hepatitis B Surface Antibody 05/14/2021 Positive  Positive Final     Hepatitis C Antibody 05/14/2021 Positive* Negative Final     Hepatitis C High Resolution 05/14/2021 Indeterminate   Final    Comment: Hepatitis C genotyping is indeterminate. This test may be  unsuccessful if the HCV RNA viral load is less than  log 5.0 or 100,000 IU per mL. In addition to low  viral load, other conditions, such as PCR inhibitors, viral  genetic variation, etc., may cause RT-PCR failure resulting  in an indeterminate result.  INTERPRETIVE INFORMATION: Hepatitis C High Resolution Genotype   Hepatitis C viral RNA is assayed using reverse transcription   polymerase chain reaction (RT-PCR) to amplify specific portions of   both the Core and NS5B regions of the viral genome. The amplified   nucleic acid is sequenced bi-directionally using dye-terminator   chemistry ([employer]). Sequencing data is compared to a database of   characterized sequences.     Isolates of hepatitis C virus are grouped into six major   genotypes(1-6). These genotypes are subtyped according to sequence   characteristics. Sequencing both the Core and NS5B regions allows   for subtyping of all confirmed and most provisional genotypes,   in                           cluding differentiation of 1a from 1b and typing of genotype 6.  This test was developed and its performance characteristics   determined by [facility]. It has not been cleared or   approved by the US Food and Drug Administration. This test was   performed in a CLIA certified laboratory and is intended for   clinical purposes.  Performed By: [facility]  [address] [place] [id number]  : Sara Fernandez MD         No results found for this or any previous visit (from the past 24 hour(s)).

## 2021-09-27 ENCOUNTER — HOSPITAL ENCOUNTER (OUTPATIENT)
Dept: CARDIOLOGY | Facility: HOSPITAL | Age: 71
Discharge: HOME | End: 2021-09-27
Attending: FAMILY MEDICINE | Admitting: FAMILY MEDICINE
Payer: COMMERCIAL

## 2021-09-27 DIAGNOSIS — E51.9 THIAMINE DEFICIENCY: ICD-10-CM

## 2021-09-27 DIAGNOSIS — F33.2 SEVERE EPISODE OF RECURRENT MAJOR DEPRESSIVE DISORDER, WITHOUT PSYCHOTIC FEATURES (H): ICD-10-CM

## 2021-09-27 DIAGNOSIS — S06.9X1S TRAUMATIC BRAIN INJURY, WITH LOSS OF CONSCIOUSNESS OF 30 MINUTES OR LESS, SEQUELA (H): ICD-10-CM

## 2021-09-27 DIAGNOSIS — R68.89 SPELLS OF DECREASED ATTENTIVENESS: ICD-10-CM

## 2021-09-27 DIAGNOSIS — F41.9 ANXIETY: ICD-10-CM

## 2021-09-27 DIAGNOSIS — I10 ESSENTIAL HYPERTENSION: ICD-10-CM

## 2021-09-27 DIAGNOSIS — R55 PRE-SYNCOPE: ICD-10-CM

## 2021-09-27 PROCEDURE — 93270 REMOTE 30 DAY ECG REV/REPORT: CPT

## 2021-09-27 PROCEDURE — 93272 ECG/REVIEW INTERPRET ONLY: CPT | Performed by: INTERNAL MEDICINE

## 2021-10-08 DIAGNOSIS — R55 PRE-SYNCOPE: ICD-10-CM

## 2021-10-08 DIAGNOSIS — Z76.0 ENCOUNTER FOR MEDICATION REFILL: Primary | ICD-10-CM

## 2021-10-08 DIAGNOSIS — I49.3 PVC'S (PREMATURE VENTRICULAR CONTRACTIONS): ICD-10-CM

## 2021-10-09 RX ORDER — METOPROLOL SUCCINATE 50 MG/1
TABLET, EXTENDED RELEASE ORAL
Qty: 90 TABLET | Refills: 4 | Status: ON HOLD | OUTPATIENT
Start: 2021-10-09 | End: 2022-10-04

## 2021-10-19 PROBLEM — F32.9 MAJOR DEPRESSION: Status: RESOLVED | Noted: 2020-02-08 | Resolved: 2020-12-15

## 2021-10-25 DIAGNOSIS — I10 ESSENTIAL HYPERTENSION: ICD-10-CM

## 2021-10-25 DIAGNOSIS — R68.89 SPELLS OF DECREASED ATTENTIVENESS: ICD-10-CM

## 2021-10-25 DIAGNOSIS — Z76.0 ENCOUNTER FOR MEDICATION REFILL: Primary | ICD-10-CM

## 2021-10-25 RX ORDER — LOSARTAN POTASSIUM 100 MG/1
TABLET ORAL
Qty: 90 TABLET | Refills: 4 | Status: SHIPPED | OUTPATIENT
Start: 2021-10-25 | End: 2021-12-14

## 2021-10-25 RX ORDER — LOSARTAN POTASSIUM 100 MG/1
TABLET ORAL
Qty: 90 TABLET | Refills: 4 | Status: SHIPPED | OUTPATIENT
Start: 2021-10-25 | End: 2022-04-30 | Stop reason: ALTCHOICE

## 2021-10-25 RX ORDER — FOLIC ACID 1 MG/1
TABLET ORAL
Qty: 90 TABLET | Refills: 4 | Status: SHIPPED | OUTPATIENT
Start: 2021-10-25 | End: 2022-12-30

## 2021-10-26 ENCOUNTER — TRANSFERRED RECORDS (OUTPATIENT)
Dept: HEALTH INFORMATION MANAGEMENT | Facility: CLINIC | Age: 71
End: 2021-10-26

## 2021-10-26 ENCOUNTER — OFFICE VISIT (OUTPATIENT)
Dept: FAMILY MEDICINE | Facility: CLINIC | Age: 71
End: 2021-10-26
Payer: COMMERCIAL

## 2021-10-26 VITALS
HEART RATE: 69 BPM | TEMPERATURE: 98.6 F | WEIGHT: 139.5 LBS | OXYGEN SATURATION: 92 % | RESPIRATION RATE: 16 BRPM | BODY MASS INDEX: 24.71 KG/M2 | SYSTOLIC BLOOD PRESSURE: 118 MMHG | DIASTOLIC BLOOD PRESSURE: 60 MMHG

## 2021-10-26 DIAGNOSIS — F33.1 MODERATE EPISODE OF RECURRENT MAJOR DEPRESSIVE DISORDER (H): ICD-10-CM

## 2021-10-26 DIAGNOSIS — F33.2 SEVERE EPISODE OF RECURRENT MAJOR DEPRESSIVE DISORDER, WITHOUT PSYCHOTIC FEATURES (H): ICD-10-CM

## 2021-10-26 DIAGNOSIS — S09.90XS TRAUMATIC INJURY OF HEAD, SEQUELA: ICD-10-CM

## 2021-10-26 DIAGNOSIS — E51.9 THIAMINE DEFICIENCY: ICD-10-CM

## 2021-10-26 DIAGNOSIS — K70.30 ALCOHOLIC CIRRHOSIS OF LIVER WITHOUT ASCITES (H): Primary | ICD-10-CM

## 2021-10-26 DIAGNOSIS — F22 PARANOIA (H): ICD-10-CM

## 2021-10-26 PROCEDURE — 99215 OFFICE O/P EST HI 40 MIN: CPT | Performed by: FAMILY MEDICINE

## 2021-10-26 RX ORDER — VENLAFAXINE HYDROCHLORIDE 75 MG/1
150 CAPSULE, EXTENDED RELEASE ORAL DAILY
Qty: 180 CAPSULE | Refills: 4 | Status: SHIPPED | OUTPATIENT
Start: 2021-10-26 | End: 2021-11-09

## 2021-10-26 ASSESSMENT — PATIENT HEALTH QUESTIONNAIRE - PHQ9: SUM OF ALL RESPONSES TO PHQ QUESTIONS 1-9: 6

## 2021-10-26 NOTE — PROGRESS NOTES
"  Assessment & Plan     Moderate episode of recurrent major depressive disorder (H)  This is bad right now. However, she is on her meds and is getting out some with her . With the holidays coming, there will likely be improvement    Alcoholic cirrhosis of liver without ascites (H)  Measured fibrosis today - await results.    Thiamine deficiency  She's on supplement    Traumatic injury of head, sequela  Likely, her back and forth with mood, coping, etc. Are a result from this    Paranoia (H)  Due to the paranoia, her getting out more might end up being more of a problem (she does not get paranoid about her ). She continues to only shower when he is home due to her paranoia. Not sure if increasing any of her meds will help...      Ordering of each unique test  Prescription drug management  40 minutes spent on the date of the encounter doing chart review, history and exam, documentation and further activities per the note       Return in about 3 months (around 1/26/2022) for Follow up.    Camille Love MD  [facility] LEILA Webster is a 70 year old who presents for the following health issues     HPI   Low mood - gets out on weekends to hear bands with her  (\"Rian\"). While she's glad to do that, she no longer dances. She is glad for the music. Knows she needs to do her exercising, etc, but does not know how to get herself to do it.    Fibrosis ultrasound done today    Review of Systems   No constipation, no stomach pain      Objective    /60   Pulse 69   Temp 98.6  F (37  C) (Oral)   Resp 16   Wt 63.3 kg (139 lb 8 oz)   SpO2 92%   BMI 24.71 kg/m    Body mass index is 24.71 kg/m .  Physical Exam   Gen: NAD but down, sad  Ht: reg s1s2  Lungs: clear  Mood; low  Affect: congruent, yet she's talkative    [facility] Visit - [facility] on 05/14/2021   Component Date Value Ref Range Status     Sodium 05/14/2021 140  136 - 145 mmol/L Final     Potassium 05/14/2021 " 4.1  3.5 - 5.0 mmol/L Final     Chloride 05/14/2021 100  98 - 107 mmol/L Final     Carbon Dioxide (CO2) 05/14/2021 25  22 - 31 mmol/L Final     Anion Gap 05/14/2021 15  5 - 18 mmol/L Final     Glucose 05/14/2021 99  70 - 125 mg/dL Final     Urea Nitrogen 05/14/2021 17  8 - 28 mg/dL Final     Creatinine 05/14/2021 0.88  0.60 - 1.10 mg/dL Final     GFR Estimate If Black 05/14/2021 >60  >60 mL/min/1.73m2 Final     GFR Estimate 05/14/2021 >60  >60 mL/min/1.73m2 Final     Bilirubin Total 05/14/2021 0.5  0.0 - 1.0 mg/dL Final     Calcium 05/14/2021 9.7  8.5 - 10.5 mg/dL Final     Protein Total 05/14/2021 7.6  6.0 - 8.0 g/dL Final     Albumin 05/14/2021 3.9  3.5 - 5.0 g/dL Final     Alkaline Phosphatase 05/14/2021 57  45 - 120 U/L Final     AST 05/14/2021 13  0 - 40 U/L Final     ALT 05/14/2021 11  0 - 45 U/L Final     GGT 05/14/2021 24  0 - 50 U/L Final     TSH 05/14/2021 1.30  0.30 - 5.00 uIU/mL Final     WBC 05/14/2021 10.2  4.0 - 11.0 thou/uL Final     RBC Count 05/14/2021 4.99  3.80 - 5.40 mill/uL Final     Hemoglobin 05/14/2021 15.2  12.0 - 16.0 g/dL Final     Hematocrit 05/14/2021 46.4  35.0 - 47.0 % Final     MCV 05/14/2021 93  80 - 100 fL Final     MCH 05/14/2021 30.5  27.0 - 34.0 pg Final     MCHC 05/14/2021 32.8  32.0 - 36.0 g/dL Final     RDW 05/14/2021 11.9  11.0 - 14.5 % Final     Platelet Count 05/14/2021 205  140 - 440 thou/uL Final     Mean Platelet Volume 05/14/2021 8.4  7.0 - 10.0 fL Final     % Neutrophils 05/14/2021 78* 50 - 70 % Final     % Lymphocytes 05/14/2021 12* 20 - 40 % Final     % Monocytes 05/14/2021 8  2 - 10 % Final     % Eosinophils 05/14/2021 1  0 - 6 % Final     % Basophils 05/14/2021 0  0 - 2 % Final     % Immature Granulocytes 05/14/2021 0  <=0 % Final     Absolute Neutrophils 05/14/2021 8.0* 2.0 - 7.7 thou/uL Final     Absolute Lymphocytes 05/14/2021 1.2  0.8 - 4.4 thou/uL Final     Absolute Monocytes 05/14/2021 0.9  0.0 - 0.9 thou/uL Final     Eosinophils Absolute 05/14/2021 0.1  0.0  - 0.4 thou/uL Final     Absolute Basophils 05/14/2021 0.0  0.0 - 0.2 thou/uL Final     Absolute Immature Granulocytes 05/14/2021 0.0  <=0.0 thou/uL Final     Color Urine 05/14/2021 Yellow  Colorless, Yellow, Straw, Light Yellow Final     Appearance Urine 05/14/2021 Clear  Clear Final     Glucose Urine 05/14/2021 Negative  Negative Final     Protein Albumin Urine 05/14/2021 Negative  Negative Final     Bilirubin Urine 05/14/2021 Negative  Negative Final     Urobilinogen Urine 05/14/2021 0.2 E.U./dL  0.2 E.U./dL, 1.0 E.U./dL Final     pH Urine 05/14/2021 5.5  5.0 - 8.0 Final     Blood Urine 05/14/2021 Moderate* Negative Final     Ketones Urine 05/14/2021 Negative  Negative Final     Nitrite Urine 05/14/2021 Negative  Negative Final     Leukocyte Esterase Urine 05/14/2021 Trace* Negative Final     Specific Gravity Urine 05/14/2021 1.020  1.005 - 1.030 Final     RBC Urine 05/14/2021 0-2  None Seen, 0-2 hpf Final     WBC Urine 05/14/2021 0-5  None Seen, 0-5 hpf Final     Bacteria Urine 05/14/2021 Few* None Seen Final     Squamous Epithelials Urine 05/14/2021 10-25* None Seen, 0-5 lpf Final     Culture 05/14/2021 Mixture of urogenital organisms   Final     Cholesterol 05/14/2021 201* <=199 mg/dL Final     Triglycerides 05/14/2021 182* <=149 mg/dL Final     Direct Measure HDL 05/14/2021 46* >=50 mg/dL Final     LDL Cholesterol Calculated 05/14/2021 119  <=129 mg/dL Final     Patient Fasting > 8hrs? 05/14/2021 Unknown   Final     Hepatitis B Surface Antibody 05/14/2021 Positive  Positive Final     Hepatitis C Antibody 05/14/2021 Positive* Negative Final     Hepatitis C High Resolution 05/14/2021 Indeterminate   Final    Comment: Hepatitis C genotyping is indeterminate. This test may be  unsuccessful if the HCV RNA viral load is less than  log 5.0 or 100,000 IU per mL. In addition to low  viral load, other conditions, such as PCR inhibitors, viral  genetic variation, etc., may cause RT-PCR failure resulting  in an  indeterminate result.  INTERPRETIVE INFORMATION: Hepatitis C High Resolution Genotype   Hepatitis C viral RNA is assayed using reverse transcription   polymerase chain reaction (RT-PCR) to amplify specific portions of   both the Core and NS5B regions of the viral genome. The amplified   nucleic acid is sequenced bi-directionally using dye-terminator   chemistry ([employer]). Sequencing data is compared to a database of   characterized sequences.     Isolates of hepatitis C virus are grouped into six major   genotypes(1-6). These genotypes are subtyped according to sequence   characteristics. Sequencing both the Core and NS5B regions allows   for subtyping of all confirmed and most provisional genotypes,   in                           cluding differentiation of 1a from 1b and typing of genotype 6.  This test was developed and its performance characteristics   determined by [facility]. It has not been cleared or   approved by the US Food and Drug Administration. This test was   performed in a CLIA certified laboratory and is intended for   clinical purposes.  Performed By: [facility]  [address] [place] [id number]  : Sara Fernandez MD

## 2021-10-26 NOTE — TELEPHONE ENCOUNTER
Reason for Call:  Medication or medication refill:    Do you use a [facility]?  Name of the pharmacy and phone number for the current request:  [facility] #[id number] [address] [facility] [address]    Name of the medication requested: venlafaxine (EFFEXOR XR) 75 MG 24 hr capsule    Other request: Patient states she saw Dr. Love today and was told prescription will be sent to pharmacy. Patient call pharmacy and was told Dr. Love needs to call - patient is not sure why. Can Dr. Love submit prescription electronically or do we need to call? Med qued.    Can we leave a detailed message on this number? YES    Phone number patient can be reached at: Cell number on file:    Telephone Information:   Mobile [phone]       Best Time: any    Call taken on 10/26/2021 at 4:37 PM by Vane Crowe

## 2021-10-28 PROBLEM — F33.2 SEVERE EPISODE OF RECURRENT MAJOR DEPRESSIVE DISORDER, WITHOUT PSYCHOTIC FEATURES (H): Status: RESOLVED | Noted: 2020-11-11 | Resolved: 2021-10-28

## 2021-10-28 PROBLEM — F33.1 MODERATE EPISODE OF RECURRENT MAJOR DEPRESSIVE DISORDER (H): Status: ACTIVE | Noted: 2021-10-28

## 2021-10-29 ENCOUNTER — TRANSFERRED RECORDS (OUTPATIENT)
Dept: HEALTH INFORMATION MANAGEMENT | Facility: CLINIC | Age: 71
End: 2021-10-29

## 2021-11-09 ENCOUNTER — TELEPHONE (OUTPATIENT)
Dept: FAMILY MEDICINE | Facility: CLINIC | Age: 71
End: 2021-11-09
Payer: COMMERCIAL

## 2021-11-09 DIAGNOSIS — F33.2 SEVERE EPISODE OF RECURRENT MAJOR DEPRESSIVE DISORDER, WITHOUT PSYCHOTIC FEATURES (H): ICD-10-CM

## 2021-11-09 DIAGNOSIS — Z23 NEED FOR COVID-19 VACCINE: Primary | ICD-10-CM

## 2021-11-09 DIAGNOSIS — F33.1 MODERATE EPISODE OF RECURRENT MAJOR DEPRESSIVE DISORDER (H): ICD-10-CM

## 2021-11-09 DIAGNOSIS — Z59.9 FINANCIAL DIFFICULTIES: ICD-10-CM

## 2021-11-09 RX ORDER — VENLAFAXINE HYDROCHLORIDE 150 MG/1
150 TABLET, EXTENDED RELEASE ORAL DAILY
Qty: 90 TABLET | Refills: 4 | Status: SHIPPED | OUTPATIENT
Start: 2022-01-20 | End: 2022-04-30

## 2021-11-09 RX ORDER — VENLAFAXINE HYDROCHLORIDE 75 MG/1
150 CAPSULE, EXTENDED RELEASE ORAL DAILY
Qty: 180 CAPSULE | Refills: 4 | Status: SHIPPED | OUTPATIENT
Start: 2021-11-09 | End: 2021-11-09

## 2021-11-09 RX ORDER — VENLAFAXINE HYDROCHLORIDE 75 MG/1
150 CAPSULE, EXTENDED RELEASE ORAL DAILY
Qty: 180 CAPSULE | Refills: 0 | Status: SHIPPED | OUTPATIENT
Start: 2021-11-09 | End: 2022-04-30

## 2021-11-09 SDOH — ECONOMIC STABILITY - INCOME SECURITY: PROBLEM RELATED TO HOUSING AND ECONOMIC CIRCUMSTANCES, UNSPECIFIED: Z59.9

## 2021-11-09 NOTE — TELEPHONE ENCOUNTER
I ordered the venlafaxine to the [facility] pharmacy today - hoping it will be covered by them AND delivered to her.

## 2021-11-09 NOTE — TELEPHONE ENCOUNTER
Venlafaxine sent to her [facility]. Please let her know. I can't believe her insurance won't let her see me - but please schedule her at the earliest date they will allow her to see me.    thanks

## 2021-11-09 NOTE — TELEPHONE ENCOUNTER
"Please call Eleanor (last name is said MARY ANNjamshid).  She has a h/o depression and did not \"show\" for her last visit which was virtual--- let me know if she does not seem OK.    1) help her to schedule a nurse-only visit to get her Covid booster (order placed)    2) ask if she has an insurance problem --she has NO appointments made.... if she does, Dr. Love will have our financial  help her.    3) please help her to schedule a follow up appointment with Dr. Love as soon as one can be made and a month after that.  "

## 2021-11-09 NOTE — TELEPHONE ENCOUNTER
"[employer] won't cover half of the visit, so she didn't want to come to last visit and \"be stuck with the bill.\" Patient has [employer] and Medicare for insurance, and patient was told by [employer] that they will not cover an appointment for this month or next month for her to see Dr. Love.     Patient states that her venlafaxine (75 mg) is running low and at her last visit Dr. Love told her to start taking 150 mg a day. Author informed patient that Dr. Love sent in prescription today, November 9, for 150 mg daily to [facility].    Patient is requesting her venlafaxine be sent to [facility] [address] in [place].    Patient states that she does not want to schedule her covid booster appointment yet.     Patient would like call back from care team to let her know if her prescription can be sent to [facility] either by EOD today or tomorrow.    Routing to PCP.    Carmen NASH RN          "

## 2021-11-10 ENCOUNTER — PATIENT OUTREACH (OUTPATIENT)
Dept: NURSING | Facility: CLINIC | Age: 71
End: 2021-11-10
Payer: COMMERCIAL

## 2021-11-10 NOTE — PROGRESS NOTES
Clinic Care Coordination Contact      Clinical Data: [facility] Outreach    Spoke with patient regarding [facility] referral regarding her health insurance concerns. [facility] cannot provide expertise on Medicare plans. [facility]W provided Eleanor with the phone number for [employer], [phone] so that she can find out the best way to receive support with choosing a new Medicare plan.    [facility] order was removed as patient will follow up with [employer] independently.

## 2021-11-10 NOTE — TELEPHONE ENCOUNTER
Informed patient of her prescription being sent to the [facility] [address]. Patient verbalized understanding and had no questions at call completion.    Patient is going to look into her insurance to see when they will cover another visit with Dr. Love. Patient also interested in switching insurances with the assistance of [facility].    Routing to PCP for fyi.    Carmen NASH RN

## 2021-12-14 DIAGNOSIS — E51.9 THIAMINE DEFICIENCY: Primary | ICD-10-CM

## 2021-12-14 DIAGNOSIS — Z76.0 ENCOUNTER FOR MEDICATION REFILL: Primary | ICD-10-CM

## 2021-12-14 NOTE — TELEPHONE ENCOUNTER
Reason for Call:  Medication or medication refill:    Do you use a [facility]?  Name of the pharmacy and phone number for the current request: [facility] [address] [place]    Name of the medication requested:   Losartan 100 mg    Other request: not due to renew until 12/23  Pt is going out of town and needs by 12/21 please    Can we leave a detailed message on this number? YES    Phone number patient can be reached at: Home number on file [phone] (home)    Best Time: anytime    Call taken on 12/14/2021 at 12:34 PM by Deneen Londono

## 2021-12-15 RX ORDER — LOSARTAN POTASSIUM 100 MG/1
100 TABLET ORAL DAILY
Qty: 90 TABLET | Refills: 3 | Status: SHIPPED | OUTPATIENT
Start: 2021-12-15 | End: 2022-04-30

## 2021-12-15 RX ORDER — CALCIUM CARBONATE/VITAMIN D3 600 MG-10
TABLET ORAL
Qty: 100 TABLET | Refills: 3 | Status: ON HOLD | OUTPATIENT
Start: 2021-12-15 | End: 2023-08-12

## 2022-01-13 ENCOUNTER — TELEPHONE (OUTPATIENT)
Dept: FAMILY MEDICINE | Facility: CLINIC | Age: 72
End: 2022-01-13
Payer: COMMERCIAL

## 2022-01-13 DIAGNOSIS — R68.89 SPELLS OF DECREASED ATTENTIVENESS: ICD-10-CM

## 2022-01-13 DIAGNOSIS — K70.30 ALCOHOLIC CIRRHOSIS OF LIVER WITHOUT ASCITES (H): ICD-10-CM

## 2022-01-13 DIAGNOSIS — I10 ESSENTIAL HYPERTENSION: ICD-10-CM

## 2022-01-13 DIAGNOSIS — F33.2 SEVERE EPISODE OF RECURRENT MAJOR DEPRESSIVE DISORDER, WITHOUT PSYCHOTIC FEATURES (H): ICD-10-CM

## 2022-01-13 DIAGNOSIS — R55 PRE-SYNCOPE: Primary | ICD-10-CM

## 2022-01-13 DIAGNOSIS — G31.84 MILD COGNITIVE IMPAIRMENT WITH MEMORY LOSS: ICD-10-CM

## 2022-01-13 DIAGNOSIS — S09.90XS TRAUMATIC INJURY OF HEAD, SEQUELA: ICD-10-CM

## 2022-01-13 NOTE — TELEPHONE ENCOUNTER
Patient calls back and gave writer updated ins info. Writer updated [facility] in chart. Patient would like neurology referral to Dr. Mahajan at [facility].

## 2022-01-13 NOTE — TELEPHONE ENCOUNTER
General Call:     Who is calling:  Patient    Reason for Call: Unable to find a neurologist that taken [facility] or [facility].  Pt is having dizzy spells and almost falls.  Found [employer] on [employer] and gave info to call.  Suggested calling insurances to see who they will cover as well.     Date of last appointment with provider: Scheduled appt today for 140 pm on 3/18/22 with PCP    Okay to leave a detailed message:Yes at Home number on file [phone] (home)     Call taken on 1//13/22 at 255 pm by Noy Tipton CMA, CMT

## 2022-02-28 DIAGNOSIS — G31.84 MILD COGNITIVE IMPAIRMENT WITH MEMORY LOSS: ICD-10-CM

## 2022-02-28 DIAGNOSIS — Z76.0 ENCOUNTER FOR MEDICATION REFILL: Primary | ICD-10-CM

## 2022-02-28 DIAGNOSIS — S06.9X1S TRAUMATIC BRAIN INJURY, WITH LOSS OF CONSCIOUSNESS OF 30 MINUTES OR LESS, SEQUELA (H): ICD-10-CM

## 2022-02-28 DIAGNOSIS — R68.89 SPELLS OF DECREASED ATTENTIVENESS: ICD-10-CM

## 2022-02-28 DIAGNOSIS — F22 PARANOIA (H): ICD-10-CM

## 2022-02-28 RX ORDER — RISPERIDONE 1 MG/1
TABLET ORAL
Qty: 180 TABLET | Refills: 3 | Status: ON HOLD | OUTPATIENT
Start: 2022-02-28 | End: 2022-10-04

## 2022-03-18 ENCOUNTER — OFFICE VISIT (OUTPATIENT)
Dept: FAMILY MEDICINE | Facility: CLINIC | Age: 72
End: 2022-03-18
Payer: COMMERCIAL

## 2022-03-18 VITALS
TEMPERATURE: 97.9 F | RESPIRATION RATE: 20 BRPM | BODY MASS INDEX: 24.1 KG/M2 | OXYGEN SATURATION: 94 % | HEART RATE: 84 BPM | DIASTOLIC BLOOD PRESSURE: 62 MMHG | SYSTOLIC BLOOD PRESSURE: 126 MMHG | WEIGHT: 136 LBS | HEIGHT: 63 IN

## 2022-03-18 DIAGNOSIS — K70.30 ALCOHOLIC CIRRHOSIS OF LIVER WITHOUT ASCITES (H): ICD-10-CM

## 2022-03-18 DIAGNOSIS — F10.21 ALCOHOL DEPENDENCE IN REMISSION (H): ICD-10-CM

## 2022-03-18 DIAGNOSIS — I10 ESSENTIAL HYPERTENSION: ICD-10-CM

## 2022-03-18 DIAGNOSIS — R90.89 ABNORMAL FINDING ON MRI OF BRAIN: ICD-10-CM

## 2022-03-18 DIAGNOSIS — G31.84 MILD COGNITIVE IMPAIRMENT WITH MEMORY LOSS: Primary | ICD-10-CM

## 2022-03-18 DIAGNOSIS — F22 PARANOIA (H): ICD-10-CM

## 2022-03-18 DIAGNOSIS — F41.9 ANXIETY: ICD-10-CM

## 2022-03-18 DIAGNOSIS — S06.9X1S TRAUMATIC BRAIN INJURY, WITH LOSS OF CONSCIOUSNESS OF 30 MINUTES OR LESS, SEQUELA (H): ICD-10-CM

## 2022-03-18 DIAGNOSIS — R55 PRE-SYNCOPE: ICD-10-CM

## 2022-03-18 DIAGNOSIS — G40.909 SEIZURE DISORDER (H): ICD-10-CM

## 2022-03-18 DIAGNOSIS — S09.90XS TRAUMATIC INJURY OF HEAD, SEQUELA: ICD-10-CM

## 2022-03-18 PROBLEM — R56.9 ALCOHOL WITHDRAWAL SEIZURE WITH DELIRIUM (H): Status: RESOLVED | Noted: 2020-08-17 | Resolved: 2022-03-18

## 2022-03-18 PROBLEM — B18.2 HEPATITIS C CARRIER (H): Status: ACTIVE | Noted: 2018-07-17

## 2022-03-18 PROBLEM — F10.931 ALCOHOL WITHDRAWAL SEIZURE WITH DELIRIUM (H): Status: RESOLVED | Noted: 2020-08-17 | Resolved: 2022-03-18

## 2022-03-18 PROBLEM — K74.00 HEPATIC FIBROSIS: Status: ACTIVE | Noted: 2022-03-18

## 2022-03-18 PROCEDURE — 99215 OFFICE O/P EST HI 40 MIN: CPT | Performed by: FAMILY MEDICINE

## 2022-03-18 NOTE — PROGRESS NOTES
"  Assessment & Plan     Mild cognitive impairment with memory loss  Eleanor continues to get worse - will recheck her MRI, do an EEG (if we can figure out how to order it) and send her to Dr. Mahajan to see if he believes she's having seizures  - MR Brain w/o & w Contrast  - EEG    Traumatic brain injury, with loss of consciousness of 30 minutes or less, sequela (H)  Between this and her history of alcohol use, she might have brain dysfunction  - MR Brain w/o & w Contrast  - EEG    Pre-syncope  She has episodes which might be seizures, but might be something else - need more testing to know  - MR Brain w/o & w Contrast  - EEG    Alcohol dependence in remission (H)  It's been many years since she drank, but the consequences might still be showing up in her current struggles  - MR Brain w/o & w Contrast  - EEG    Seizure disorder (H)  Possible - eval more with EEG and neurology consult  - MR Brain w/o & w Contrast  - EEG    Abnormal finding on MRI of brain  Recheck to see if it has progressed like the clinical symptoms have  - MR Brain w/o & w Contrast  - EEG    Alcoholic cirrhosis of liver without ascites (H)  Need to recheck ultrasound of liver sometime  - EEG    Paranoia (H)  - EEG    Anxiety  This is quite strong and debilitating - keeps her from getting out; now she is not longer speaking much, too. This is a very big change from her previous \"normal.\" I believe it is a symptom of her decreasing function    Essential hypertension  Controlled. I believe she's taking her meds correctly    Traumatic injury of head, sequela  Noted- as above    Review of external notes as documented elsewhere in note  Ordering of each unique test  Prescription drug management  45 minutes spent on the date of the encounter doing chart review, history and exam, documentation and further activities per the note    Return in about 6 weeks (around 4/29/2022).    Camille Love MD  [facility] LEILA Webster " "is a 71 year old who presents for the following health issues  accompanied by her father.    HPI   Feeling anxious- she's worried \"all the time\" but she's not sure what she's anxious about.    Dizzy- she feels insecure, unstable about doing anything anymore. She sits around home and no longer does much. Even if she thinks of doing something, she talks herself out of it.    Eyes watering- feels no emotions (this is not crying), just happens out of the blue, does not happen at night; knows she needs to be seen at the eye doctor's. Has not called. , her , has written out the phone number and set it by the phone, but day after day goes by without her calling.    No longer talks much. She used to talk incessantly. They drove up to [place] and she did not say a word the entire time.    Review of Systems         Objective    /62   Pulse 84   Temp 97.9  F (36.6  C) (Oral)   Resp 20   Ht 1.6 m (5' 3\")   Wt 61.7 kg (136 lb)   SpO2 94%   BMI 24.09 kg/m    Body mass index is 24.09 kg/m .  Physical Exam   GENERAL: alert, no distress, frail, appears older than stated age and has a bit of a blank stare  EYES: Eyes grossly normal to inspection, nystagmus laterally is rapid and with many bounces, and retina is pale  NECK: no adenopathy, no asymmetry, masses, or scars and thyroid normal to palpation  MS: no gross musculoskeletal defects noted, no edema  NEURO: decreased bulk and tone, tremor none, abnormal mental status - seems blunted, speech normal and gait normal including heel/toe/tandem walking  PSYCH: concentration poor, affect normal/bright, affect flat yet slightly anxious, judgement and insight impaired    Reviewed past MRI of the brain          "

## 2022-04-01 ENCOUNTER — TRANSFERRED RECORDS (OUTPATIENT)
Dept: MULTI SPECIALTY CLINIC | Facility: CLINIC | Age: 72
End: 2022-04-01
Payer: COMMERCIAL

## 2022-04-05 ENCOUNTER — HOSPITAL ENCOUNTER (OUTPATIENT)
Dept: MRI IMAGING | Facility: HOSPITAL | Age: 72
Discharge: HOME | End: 2022-04-05
Attending: FAMILY MEDICINE | Admitting: FAMILY MEDICINE
Payer: COMMERCIAL

## 2022-04-05 DIAGNOSIS — R90.89 ABNORMAL FINDING ON MRI OF BRAIN: ICD-10-CM

## 2022-04-05 DIAGNOSIS — S06.9X1S TRAUMATIC BRAIN INJURY, WITH LOSS OF CONSCIOUSNESS OF 30 MINUTES OR LESS, SEQUELA (H): ICD-10-CM

## 2022-04-05 DIAGNOSIS — G31.84 MILD COGNITIVE IMPAIRMENT WITH MEMORY LOSS: ICD-10-CM

## 2022-04-05 DIAGNOSIS — G40.909 SEIZURE DISORDER (H): ICD-10-CM

## 2022-04-05 DIAGNOSIS — R55 PRE-SYNCOPE: ICD-10-CM

## 2022-04-05 DIAGNOSIS — F41.0 PANIC ATTACK: ICD-10-CM

## 2022-04-05 DIAGNOSIS — Z76.0 ENCOUNTER FOR MEDICATION REFILL: Primary | ICD-10-CM

## 2022-04-05 DIAGNOSIS — F10.21 ALCOHOL DEPENDENCE IN REMISSION (H): ICD-10-CM

## 2022-04-05 DIAGNOSIS — F32.0 MILD MAJOR DEPRESSION (H): ICD-10-CM

## 2022-04-05 PROCEDURE — 70551 MRI BRAIN STEM W/O DYE: CPT

## 2022-04-05 RX ORDER — ESCITALOPRAM OXALATE 20 MG/1
20 TABLET ORAL DAILY
Qty: 90 TABLET | Refills: 4 | Status: SHIPPED | OUTPATIENT
Start: 2022-04-05 | End: 2023-05-22

## 2022-04-05 NOTE — TELEPHONE ENCOUNTER
Reason for Call:  Medication or medication refill:    Do you use a [facility]?  no    Name of the pharmacy and phone number for the current request: [facility] @ [phone]    Name of the medication requested: Escitalopram 20 mg take one daily    Last office visit: today    Can we leave a detailed message on this number? NO    Phone number patient can be reached at: Home number on file [phone] (home)    Best Time: any    Call taken on 4/5/2022 at 4:00 PM by Noy Tipton CMA, CMT

## 2022-04-07 ENCOUNTER — ANCILLARY PROCEDURE (OUTPATIENT)
Dept: NEUROLOGY | Facility: CLINIC | Age: 72
End: 2022-04-07
Attending: FAMILY MEDICINE
Payer: COMMERCIAL

## 2022-04-07 DIAGNOSIS — G40.909 SEIZURE DISORDER (H): ICD-10-CM

## 2022-04-07 DIAGNOSIS — S06.9X1S TRAUMATIC BRAIN INJURY, WITH LOSS OF CONSCIOUSNESS OF 30 MINUTES OR LESS, SEQUELA (H): ICD-10-CM

## 2022-04-07 DIAGNOSIS — R90.89 ABNORMAL FINDING ON MRI OF BRAIN: ICD-10-CM

## 2022-04-07 DIAGNOSIS — K70.30 ALCOHOLIC CIRRHOSIS OF LIVER WITHOUT ASCITES (H): ICD-10-CM

## 2022-04-07 DIAGNOSIS — G31.84 MILD COGNITIVE IMPAIRMENT WITH MEMORY LOSS: ICD-10-CM

## 2022-04-07 DIAGNOSIS — F22 PARANOIA (H): ICD-10-CM

## 2022-04-07 DIAGNOSIS — R55 PRE-SYNCOPE: ICD-10-CM

## 2022-04-07 DIAGNOSIS — F10.21 ALCOHOL DEPENDENCE IN REMISSION (H): ICD-10-CM

## 2022-04-13 ENCOUNTER — OFFICE VISIT (OUTPATIENT)
Dept: NEUROLOGY | Facility: CLINIC | Age: 72
End: 2022-04-13
Attending: FAMILY MEDICINE
Payer: COMMERCIAL

## 2022-04-13 ENCOUNTER — LAB (OUTPATIENT)
Dept: LAB | Facility: HOSPITAL | Age: 72
End: 2022-04-13
Payer: COMMERCIAL

## 2022-04-13 VITALS
BODY MASS INDEX: 24.63 KG/M2 | HEIGHT: 63 IN | WEIGHT: 139 LBS | HEART RATE: 82 BPM | DIASTOLIC BLOOD PRESSURE: 64 MMHG | SYSTOLIC BLOOD PRESSURE: 117 MMHG

## 2022-04-13 DIAGNOSIS — G31.84 MILD COGNITIVE IMPAIRMENT WITH MEMORY LOSS: ICD-10-CM

## 2022-04-13 DIAGNOSIS — R41.9 NEUROCOGNITIVE DISORDER: Primary | ICD-10-CM

## 2022-04-13 DIAGNOSIS — R55 PRE-SYNCOPE: ICD-10-CM

## 2022-04-13 DIAGNOSIS — F33.2 SEVERE EPISODE OF RECURRENT MAJOR DEPRESSIVE DISORDER, WITHOUT PSYCHOTIC FEATURES (H): ICD-10-CM

## 2022-04-13 DIAGNOSIS — S09.90XS TRAUMATIC INJURY OF HEAD, SEQUELA: ICD-10-CM

## 2022-04-13 DIAGNOSIS — R68.89 SPELLS OF DECREASED ATTENTIVENESS: ICD-10-CM

## 2022-04-13 DIAGNOSIS — K70.30 ALCOHOLIC CIRRHOSIS OF LIVER WITHOUT ASCITES (H): ICD-10-CM

## 2022-04-13 DIAGNOSIS — I10 ESSENTIAL HYPERTENSION: ICD-10-CM

## 2022-04-13 DIAGNOSIS — R41.9 NEUROCOGNITIVE DISORDER: ICD-10-CM

## 2022-04-13 PROBLEM — Z63.79 STRESSFUL LIFE EVENT AFFECTING FAMILY: Status: RESOLVED | Noted: 2020-07-08 | Resolved: 2022-04-13

## 2022-04-13 LAB — TSH SERPL DL<=0.005 MIU/L-ACNC: 0.99 UIU/ML (ref 0.3–5)

## 2022-04-13 PROCEDURE — 83921 ORGANIC ACID SINGLE QUANT: CPT

## 2022-04-13 PROCEDURE — 84425 ASSAY OF VITAMIN B-1: CPT

## 2022-04-13 PROCEDURE — 84443 ASSAY THYROID STIM HORMONE: CPT

## 2022-04-13 PROCEDURE — 82607 VITAMIN B-12: CPT

## 2022-04-13 PROCEDURE — 99205 OFFICE O/P NEW HI 60 MIN: CPT | Performed by: PSYCHIATRY & NEUROLOGY

## 2022-04-13 PROCEDURE — 82746 ASSAY OF FOLIC ACID SERUM: CPT

## 2022-04-13 PROCEDURE — 36415 COLL VENOUS BLD VENIPUNCTURE: CPT

## 2022-04-13 ASSESSMENT — MONTREAL COGNITIVE ASSESSMENT (MOCA)
6. READ LIST OF DIGITS [FORWARD/BACKWARD]: 0
WHAT LEVEL OF EDUCATION WAS ATTAINED: 1
10. [FLUENCY] NAME WORDS STARTING WITH DESIGNATED LETTER: 0
4. NAME EACH OF THE THREE ANIMALS SHOWN: 3
11. FOR EACH PAIR OF WORDS, WHAT CATEGORY DO THEY BELONG TO (OUT OF 2): 2
VISUOSPATIAL/EXECUTIVE SUBSCORE: 3
13. ORIENTATION SUBSCORE: 6
8. SERIAL SUBTRACTION OF 7S: 1
WHAT IS THE TOTAL SCORE (OUT OF 30): 19
9. REPEAT EACH SENTENCE: 2
7. [VIGILENCE] TAP WHEN HEARING DESIGNATED LETTER: 1
12. MEMORY INDEX SCORE: 0

## 2022-04-13 NOTE — NURSING NOTE
HIGINIO COGNITIVE ASSESSMENT (MOCA)  Version 7.1 Original Version  VISUOSPATIAL/EXECUTIVE               COPY CUBE      [ 0   ]                                [ 0   ] DRAW CLOCK (Ten past eleven)  (3 points)    [ 1   ]                    [  1  ]               [1    ]       Contour            Numbers     Hands POINTS              3     / 5   NAMING    [ 1  ]                                                                        [  1  ]                                             [  1  ]  Libeverley Valdivia                                Camel                 3    / 3   MEMORY Read list of words, subject must repeat them. Do 2 trials, even if 1st trial is successful. Do a recall after 5 minutes  FACE VELVET Nondenominational SMITHA RED No Points    1st x   x      2nd x x  x     ATTENTION Read list of digits (1 digit/sec) Subject has to repeat in the forward order       [ 0   ]   2  1  8  5  4                                [  0  ] 7 4 2                     0     /2   Read list of letters. The subject must tap with his hand at each letter A. No points if > 2 errors.  [  1  ] F B A C M N A A J K L B A F A K D E A A A J A M O F A A B        1      /1   Serial 7 subtraction starting at 100          [1    ] 93         [  1  ] 86          [ 0   ] 79          [ 0   ] 72         [    ] 65   4 or 5 correct subtractions: 3 points,  2 or 3 correct: 2 points,  1correct: 1 point,   0 correct: 0 points       1     /3   LANGUAGE Repeat: I only know that Asaf is the one to help today. [ 1    ]                                      The cat always hid under the couch when dogs were in the room. [ 1  ]           2    /2   Fluency: Name maximum number of words in one minute that begin with the letter F                                                                                                                    [   0 ] _8__ (N > 11 words)         0      /1   ABSTRACTION  Similarity between e.g. banana-orange=fruit                                                                   [1    ] train-bicycle                      [  1  ] watch-ruler        2      /2   DELAYED  RECALL Has to recall words  WITH NO CUE FACE  [0    ] VELVET  [ 0   ] Jewish  [ 0   ]  SMITHA  [  0  ] RED  [  0  ] Points for UNCUED recall only     0       /5           OPTIONAL Category cue           Multiple choice cue          ORIENTATION  [ 1   ] Date     [ 1   ] Month       [ 1   ] Year      [ 1   ] Day      [ 1  ] Place        [  1  ] City      6 /6   TOTAL  Normal > 26/30 Add 1 point if < 12 years education     19 /30
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
## 2022-04-13 NOTE — NURSING NOTE
Chief Complaint   Patient presents with     Loss of Consciousness     Few months of dizzy spells and syncope- Discuss EEG results      Cognitive impairment     MOCA 19/30     Natalya De La Torre CMA on 4/13/2022 at 1:35 PM
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
## 2022-04-14 LAB
FOLATE SERPL-MCNC: >20 NG/ML
VIT B12 SERPL-MCNC: 683 PG/ML (ref 213–816)

## 2022-04-19 LAB — VIT B1 PYROPHOSHATE BLD-SCNC: 294 NMOL/L

## 2022-04-21 LAB — METHYLMALONATE SERPL-SCNC: 0.27 UMOL/L (ref 0–0.4)

## 2022-04-29 ENCOUNTER — OFFICE VISIT (OUTPATIENT)
Dept: FAMILY MEDICINE | Facility: CLINIC | Age: 72
End: 2022-04-29
Payer: COMMERCIAL

## 2022-04-29 VITALS
BODY MASS INDEX: 23.96 KG/M2 | TEMPERATURE: 98.5 F | HEART RATE: 105 BPM | OXYGEN SATURATION: 95 % | DIASTOLIC BLOOD PRESSURE: 62 MMHG | SYSTOLIC BLOOD PRESSURE: 98 MMHG | WEIGHT: 135.25 LBS | RESPIRATION RATE: 16 BRPM

## 2022-04-29 DIAGNOSIS — Z00.00 PREVENTATIVE HEALTH CARE: ICD-10-CM

## 2022-04-29 DIAGNOSIS — S06.9X1S TRAUMATIC BRAIN INJURY, WITH LOSS OF CONSCIOUSNESS OF 30 MINUTES OR LESS, SEQUELA (H): Primary | ICD-10-CM

## 2022-04-29 DIAGNOSIS — R90.89 ABNORMAL FINDING ON MRI OF BRAIN: ICD-10-CM

## 2022-04-29 DIAGNOSIS — R41.89 COGNITIVE IMPAIRMENT: ICD-10-CM

## 2022-04-29 DIAGNOSIS — Z76.0 ENCOUNTER FOR MEDICATION REFILL: ICD-10-CM

## 2022-04-29 DIAGNOSIS — F10.21 ALCOHOL DEPENDENCE IN REMISSION (H): ICD-10-CM

## 2022-04-29 LAB
ALBUMIN SERPL-MCNC: 3.9 G/DL (ref 3.5–5)
ALP SERPL-CCNC: 59 U/L (ref 45–120)
ALT SERPL W P-5'-P-CCNC: 15 U/L (ref 0–45)
ANION GAP SERPL CALCULATED.3IONS-SCNC: 13 MMOL/L (ref 5–18)
AST SERPL W P-5'-P-CCNC: 16 U/L (ref 0–40)
BASOPHILS # BLD AUTO: 0 10E3/UL (ref 0–0.2)
BASOPHILS NFR BLD AUTO: 0 %
BILIRUB SERPL-MCNC: 0.5 MG/DL (ref 0–1)
BUN SERPL-MCNC: 10 MG/DL (ref 8–28)
CALCIUM SERPL-MCNC: 10 MG/DL (ref 8.5–10.5)
CHLORIDE BLD-SCNC: 103 MMOL/L (ref 98–107)
CO2 SERPL-SCNC: 25 MMOL/L (ref 22–31)
CREAT SERPL-MCNC: 0.85 MG/DL (ref 0.6–1.1)
EOSINOPHIL # BLD AUTO: 0 10E3/UL (ref 0–0.7)
EOSINOPHIL NFR BLD AUTO: 1 %
ERYTHROCYTE [DISTWIDTH] IN BLOOD BY AUTOMATED COUNT: 11.9 % (ref 10–15)
GFR SERPL CREATININE-BSD FRML MDRD: 73 ML/MIN/1.73M2
GGT SERPL-CCNC: 24 U/L (ref 0–50)
GLUCOSE BLD-MCNC: 97 MG/DL (ref 70–125)
HCT VFR BLD AUTO: 44.7 % (ref 35–47)
HGB BLD-MCNC: 14.8 G/DL (ref 11.7–15.7)
IMM GRANULOCYTES # BLD: 0 10E3/UL
IMM GRANULOCYTES NFR BLD: 0 %
LYMPHOCYTES # BLD AUTO: 1.2 10E3/UL (ref 0.8–5.3)
LYMPHOCYTES NFR BLD AUTO: 13 %
MCH RBC QN AUTO: 30.1 PG (ref 26.5–33)
MCHC RBC AUTO-ENTMCNC: 33.1 G/DL (ref 31.5–36.5)
MCV RBC AUTO: 91 FL (ref 78–100)
MONOCYTES # BLD AUTO: 0.6 10E3/UL (ref 0–1.3)
MONOCYTES NFR BLD AUTO: 7 %
NEUTROPHILS # BLD AUTO: 6.9 10E3/UL (ref 1.6–8.3)
NEUTROPHILS NFR BLD AUTO: 79 %
PLATELET # BLD AUTO: 216 10E3/UL (ref 150–450)
POTASSIUM BLD-SCNC: 4.2 MMOL/L (ref 3.5–5)
PROT SERPL-MCNC: 7.5 G/DL (ref 6–8)
RBC # BLD AUTO: 4.92 10E6/UL (ref 3.8–5.2)
SODIUM SERPL-SCNC: 141 MMOL/L (ref 136–145)
WBC # BLD AUTO: 8.7 10E3/UL (ref 4–11)

## 2022-04-29 PROCEDURE — 80053 COMPREHEN METABOLIC PANEL: CPT | Performed by: FAMILY MEDICINE

## 2022-04-29 PROCEDURE — 99215 OFFICE O/P EST HI 40 MIN: CPT | Performed by: FAMILY MEDICINE

## 2022-04-29 PROCEDURE — 36415 COLL VENOUS BLD VENIPUNCTURE: CPT | Performed by: FAMILY MEDICINE

## 2022-04-29 PROCEDURE — 82977 ASSAY OF GGT: CPT | Performed by: FAMILY MEDICINE

## 2022-04-29 PROCEDURE — 85025 COMPLETE CBC W/AUTO DIFF WBC: CPT | Performed by: FAMILY MEDICINE

## 2022-04-29 NOTE — PROGRESS NOTES
Assessment & Plan     Traumatic brain injury, with loss of consciousness of 30 minutes or less, sequela (H)  Likely, her TBI and other factors (smoking prolonged stress, etc) are all coming together and her mental health and cognitive function is deteriorating.   - CBC with platelets and differential  - GGT  - Comprehensive metabolic panel (BMP + Alb, Alk Phos, ALT, AST, Total. Bili, TP)  - CBC with platelets and differential  - GGT  - Comprehensive metabolic panel (BMP + Alb, Alk Phos, ALT, AST, Total. Bili, TP)    cognitive impairment with memory loss  Her impairment is now moderate and progressing  - CBC with platelets and differential  - GGT  - Comprehensive metabolic panel (BMP + Alb, Alk Phos, ALT, AST, Total. Bili, TP)  - CBC with platelets and differential  - GGT  - Comprehensive metabolic panel (BMP + Alb, Alk Phos, ALT, AST, Total. Bili, TP)    Alcohol dependence in remission (H)  In remission, yet the consequences are quite evident  - CBC with platelets and differential  - GGT  - Comprehensive metabolic panel (BMP + Alb, Alk Phos, ALT, AST, Total. Bili, TP)  - CBC with platelets and differential  - GGT  - Comprehensive metabolic panel (BMP + Alb, Alk Phos, ALT, AST, Total. Bili, TP)    Abnormal finding on MRI of brain  The MRI demonstrates what we are seeing clinically - brain deterioration.   - CBC with platelets and differential  - GGT  - Comprehensive metabolic panel (BMP + Alb, Alk Phos, ALT, AST, Total. Bili, TP)  - CBC with platelets and differential  - GGT  - Comprehensive metabolic panel (BMP + Alb, Alk Phos, ALT, AST, Total. Bili, TP)    Preventative health care  reviewed  - REVIEW OF HEALTH MAINTENANCE PROTOCOL ORDERS      Review of external notes as documented elsewhere in note  Ordering of each unique test  Prescription drug management  40 minutes spent on the date of the encounter doing chart review, history and exam, documentation and further activities per the note    Return in about 2  months (around 6/29/2022) for Follow up.    Camille Love MD  [facility] LEILA Webster is a 71 year old who presents for the following health issues  accompanied by her spouse.    HPI   Fewer dizzy spells. She's glad for his. She has not fallen.    Watery eyes, jannette right    Drooling - left side of mouth. She realizes this is happening at times.    Left armpit cyst- she wants me to check this    MRI - needs to be re-done    Appetite - 2 meals/day; will eat PB&J; gets fruits      Review of Systems         Objective    BP 98/62   Pulse 105   Temp 98.5  F (36.9  C) (Oral)   Resp 16   Wt 61.3 kg (135 lb 4 oz)   SpO2 95%   BMI 23.96 kg/m    Body mass index is 23.96 kg/m .  Physical Exam   GENERAL: healthy, no distress, frail, elderly and fatigued  RESP: lungs clear to auscultation - no rales, rhonchi or wheezes  CV: regular rates and rhythm, normal S1 S2, no S3 or S4 and no peripheral edema  ABDOMEN: soft, nontender, no hepatosplenomegaly, no masses and bowel sounds normal  PSYCH: concentration poor, affect flat, judgement and insight impaired and appearance well groomed    Results for orders placed or performed in visit on 04/29/22   GGT     Status: Normal   Result Value Ref Range    GGT 24 0 - 50 U/L   Comprehensive metabolic panel (BMP + Alb, Alk Phos, ALT, AST, Total. Bili, TP)     Status: Normal   Result Value Ref Range    Sodium 141 136 - 145 mmol/L    Potassium 4.2 3.5 - 5.0 mmol/L    Chloride 103 98 - 107 mmol/L    Carbon Dioxide (CO2) 25 22 - 31 mmol/L    Anion Gap 13 5 - 18 mmol/L    Urea Nitrogen 10 8 - 28 mg/dL    Creatinine 0.85 0.60 - 1.10 mg/dL    Calcium 10.0 8.5 - 10.5 mg/dL    Glucose 97 70 - 125 mg/dL    Alkaline Phosphatase 59 45 - 120 U/L    AST 16 0 - 40 U/L    ALT 15 0 - 45 U/L    Protein Total 7.5 6.0 - 8.0 g/dL    Albumin 3.9 3.5 - 5.0 g/dL    Bilirubin Total 0.5 0.0 - 1.0 mg/dL    GFR Estimate 73 >60 mL/min/1.73m2   CBC with platelets and  differential     Status: None   Result Value Ref Range    WBC Count 8.7 4.0 - 11.0 10e3/uL    RBC Count 4.92 3.80 - 5.20 10e6/uL    Hemoglobin 14.8 11.7 - 15.7 g/dL    Hematocrit 44.7 35.0 - 47.0 %    MCV 91 78 - 100 fL    MCH 30.1 26.5 - 33.0 pg    MCHC 33.1 31.5 - 36.5 g/dL    RDW 11.9 10.0 - 15.0 %    Platelet Count 216 150 - 450 10e3/uL    % Neutrophils 79 %    % Lymphocytes 13 %    % Monocytes 7 %    % Eosinophils 1 %    % Basophils 0 %    % Immature Granulocytes 0 %    Absolute Neutrophils 6.9 1.6 - 8.3 10e3/uL    Absolute Lymphocytes 1.2 0.8 - 5.3 10e3/uL    Absolute Monocytes 0.6 0.0 - 1.3 10e3/uL    Absolute Eosinophils 0.0 0.0 - 0.7 10e3/uL    Absolute Basophils 0.0 0.0 - 0.2 10e3/uL    Absolute Immature Granulocytes 0.0 <=0.4 10e3/uL   CBC with platelets and differential     Status: None    Narrative    The following orders were created for panel order CBC with platelets and differential.  Procedure                               Abnormality         Status                     ---------                               -----------         ------                     CBC with platelets and d...[[id number]]                      Final result                 Please view results for these tests on the individual orders.

## 2022-04-30 RX ORDER — LOSARTAN POTASSIUM 50 MG/1
50 TABLET ORAL DAILY
Qty: 90 TABLET | Refills: 4 | Status: ON HOLD | OUTPATIENT
Start: 2022-04-30 | End: 2022-10-04

## 2022-05-13 ENCOUNTER — TELEPHONE (OUTPATIENT)
Dept: NEUROLOGY | Facility: CLINIC | Age: 72
End: 2022-05-13
Payer: COMMERCIAL

## 2022-05-13 DIAGNOSIS — G31.84 MILD COGNITIVE IMPAIRMENT WITH MEMORY LOSS: Primary | ICD-10-CM

## 2022-05-13 RX ORDER — LORAZEPAM 1 MG/1
TABLET ORAL
Qty: 1 TABLET | Refills: 0 | Status: SHIPPED | OUTPATIENT
Start: 2022-05-13 | End: 2022-09-29

## 2022-05-13 NOTE — TELEPHONE ENCOUNTER
Patient requests oral sedation for MRI- please send to pharmacy on file if ok  Also, order will need to be updated. You can addend your visit from 4/13/22 and edit the MRI order to state with oral sedation  Natalya De La Torre CMA on 5/13/2022 at 1:38 PM

## 2022-05-18 ENCOUNTER — HOSPITAL ENCOUNTER (OUTPATIENT)
Dept: ULTRASOUND IMAGING | Facility: HOSPITAL | Age: 72
Discharge: HOME | End: 2022-05-18
Attending: PSYCHIATRY & NEUROLOGY | Admitting: PSYCHIATRY & NEUROLOGY
Payer: COMMERCIAL

## 2022-05-18 DIAGNOSIS — R55 PRE-SYNCOPE: ICD-10-CM

## 2022-05-18 DIAGNOSIS — R68.89 SPELLS OF DECREASED ATTENTIVENESS: ICD-10-CM

## 2022-05-18 PROCEDURE — 93880 EXTRACRANIAL BILAT STUDY: CPT

## 2022-06-06 ENCOUNTER — HOSPITAL ENCOUNTER (OUTPATIENT)
Dept: CARDIOLOGY | Facility: HOSPITAL | Age: 72
Discharge: HOME | End: 2022-06-06
Attending: PSYCHIATRY & NEUROLOGY
Payer: COMMERCIAL

## 2022-06-06 DIAGNOSIS — R55 PRE-SYNCOPE: ICD-10-CM

## 2022-06-06 DIAGNOSIS — R68.89 SPELLS OF DECREASED ATTENTIVENESS: ICD-10-CM

## 2022-06-06 LAB — LVEF ECHO: NORMAL

## 2022-06-06 PROCEDURE — 93306 TTE W/DOPPLER COMPLETE: CPT

## 2022-06-06 PROCEDURE — 93306 TTE W/DOPPLER COMPLETE: CPT | Mod: 26 | Performed by: GENERAL ACUTE CARE HOSPITAL

## 2022-06-06 PROCEDURE — 93270 REMOTE 30 DAY ECG REV/REPORT: CPT

## 2022-07-07 PROCEDURE — 93272 ECG/REVIEW INTERPRET ONLY: CPT | Performed by: INTERNAL MEDICINE
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
## 2022-07-14 ENCOUNTER — OFFICE VISIT (OUTPATIENT)
Dept: NEUROLOGY | Facility: CLINIC | Age: 72
End: 2022-07-14
Payer: COMMERCIAL

## 2022-07-14 VITALS
SYSTOLIC BLOOD PRESSURE: 146 MMHG | DIASTOLIC BLOOD PRESSURE: 91 MMHG | BODY MASS INDEX: 23.92 KG/M2 | WEIGHT: 135 LBS | HEART RATE: 101 BPM | HEIGHT: 63 IN

## 2022-07-14 DIAGNOSIS — R41.9 NEUROCOGNITIVE DISORDER: ICD-10-CM

## 2022-07-14 DIAGNOSIS — R56.9 SEIZURE-LIKE ACTIVITY (H): Primary | ICD-10-CM

## 2022-07-14 PROCEDURE — 99214 OFFICE O/P EST MOD 30 MIN: CPT | Performed by: PSYCHIATRY & NEUROLOGY

## 2022-07-14 RX ORDER — LORAZEPAM 1 MG/1
TABLET ORAL
Qty: 1 TABLET | Refills: 0 | Status: SHIPPED | OUTPATIENT
Start: 2022-07-14 | End: 2023-02-06

## 2022-07-14 NOTE — NURSING NOTE
Chief Complaint   Patient presents with     Results     Discuss MRI, ultrasound, holter, labs and echo. Neuropsych is scheduled on 1/18/23     Yesi Patterson LPN on 7/14/2022 at 9:57 AM
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
## 2022-07-29 ENCOUNTER — TELEPHONE (OUTPATIENT)
Dept: NEUROLOGY | Facility: CLINIC | Age: 72
End: 2022-07-29

## 2022-07-29 NOTE — TELEPHONE ENCOUNTER
[facility] Call Center    Phone Message    May a detailed message be left on voicemail: yes     Reason for Call:     [facility] patients  called please fax open MRI orders to [facility] in [place], they have not rec orders yet per [facility]    Action Taken: Other: [facility] neuro    Travel Screening: Not Applicable

## 2022-07-31 ENCOUNTER — HEALTH MAINTENANCE LETTER (OUTPATIENT)
Age: 72
End: 2022-07-31

## 2022-08-04 ENCOUNTER — TELEPHONE (OUTPATIENT)
Dept: NEUROLOGY | Facility: CLINIC | Age: 72
End: 2022-08-04

## 2022-08-04 NOTE — TELEPHONE ENCOUNTER
Patient: Eleanor Awan  YOB: 1950  Sex: Female  Phone: [phone]    [facility] MRN: [id number]  Exam Date: 08/02/2022     EXAM: MRI BRAIN WITH AND WITHOUT CONTRAST    CLINICAL INFORMATION: Memory loss. Dizziness. Symptoms ongoing for one year.    TECHNICAL INFORMATION: Sagittal T1-weighted, axial flair, T2-weighted, diffusion-weighted, coronal T2*weighted and postcontrast coronal and axial T1-weighted images of the brain with high resolution oblique coronal T2-weighted and T2-weighted FLAIR images through the medial temporal lobes were obtained in the 30 degrees tilted position on a 0.63 Grecia open/upright MRI scanner. SEDATION: None. CONTRAST: Examination was performed with the concomitant and uneventful administration of intravenous gadolinium (Dotarem 11 mL).    INTERPRETATION: No comparison.    Motion artifact is present on all imaging sequences limiting fine detail. There are areas of confluent T2 prolongation demonstrated in the bilateral periventricular white matter and yon with small areas of punctate T2 prolongation in the left centrum semiovale and subcortical white matter of the left frontal lobe and right subinsular region. Chronic appearing lacunar infarct is evident in the periventricular white matter adjacent to the body left lateral ventricle, but there are no areas of abnormally restricted diffusion. There are no areas of abnormal magnetic susceptibility artifact or parenchymal or meningeal enhancement. There is no midline shift and the basal cisterns are widely patent. Ventricles and sulci are normal in size and configuration. Intracranial arterial flow voids are present and the dural venous sinuses are patent. There are no abnormal extra-axial fluid collections. Accounting for patient motion, the medial temporal lobes, including the hippocampi and parahippocampal gyral formations maintain normal and symmetrical size and signal.    Calvarial marrow signal is normal throughout. Imaged  skull base soft tissue structures and portions of the orbits are unremarkable. Mastoid air cells and cavities are well aerated. Mild mucosal thickening is demonstrated in scattered bilateral ethmoid air cells. Limited images through the upper cervical spine demonstrate lateral C3-4 and C4-5 facet arthrosis with degenerative anterolisthesis of C4 on C5 and degenerative retrolisthesis of C5 on C6. Annular bulging is evident at C4-5 and C5-6.    CONCLUSION:    1. Motion artifact on all imaging sequences limits fine detail, but there is no intracranial mass, intracranial hemorrhage or acute or subacute infarction.    2. Chronic appearing lacunar infarct in the periventricular white matter adjacent to the body left lateral ventricle.    3. Scattered areas of punctate and confluent white matter T2 hyperintensity are nonspecific, but likely reflect mild to moderate microangiopathic change.    4. No evidence of mesial temporal sclerosis.    5. Mild, patchy bilateral ethmoid air cell mucosal thickening.    6. Upper cervical spinal degenerative spondylosis.    SC        Electronically signed on 8/2/2022 1:39:00 PM by Erwin Ellison M.D.

## 2022-08-16 ENCOUNTER — TELEPHONE (OUTPATIENT)
Dept: NEUROLOGY | Facility: CLINIC | Age: 72
End: 2022-08-16

## 2022-08-16 NOTE — TELEPHONE ENCOUNTER
[facility] Call Center    Phone Message    May a detailed message be left on voicemail: yes     Reason for Call:     Patients  [facility] called looking for MRI Results, please call back to discuss. Call back number [phone]    Action Taken: Other: [facility] neurology    Travel Screening: Not Applicable

## 2022-08-17 NOTE — TELEPHONE ENCOUNTER
Spoke to pt's  , relayed to him that [employer] message was sent with these results.  Relayed to  per Dr. Mahajan:    MRI brain shows age-related changes.  No abnormality noted to suggest seizures.  Pt is scheduled on 9/9/22 for EEG and follow up with Dr. Mahajan.    Yesi Patterson LPN on 8/17/2022 at 9:42 AM

## 2022-08-17 NOTE — TELEPHONE ENCOUNTER
Pt's  requesting MRI results done at [facility]:    Patient: Eleanor Awan  YOB: 1950  Sex: Female  Phone: [phone]     [facility] MRN: [id number]  Exam Date: 08/02/2022     EXAM: MRI BRAIN WITH AND WITHOUT CONTRAST     CLINICAL INFORMATION: Memory loss. Dizziness. Symptoms ongoing for one year.     TECHNICAL INFORMATION: Sagittal T1-weighted, axial flair, T2-weighted, diffusion-weighted, coronal T2*weighted and postcontrast coronal and axial T1-weighted images of the brain with high resolution oblique coronal T2-weighted and T2-weighted FLAIR images through the medial temporal lobes were obtained in the 30 degrees tilted position on a 0.63 Grecia open/upright MRI scanner. SEDATION: None. CONTRAST: Examination was performed with the concomitant and uneventful administration of intravenous gadolinium (Dotarem 11 mL).     INTERPRETATION: No comparison.     Motion artifact is present on all imaging sequences limiting fine detail. There are areas of confluent T2 prolongation demonstrated in the bilateral periventricular white matter and yon with small areas of punctate T2 prolongation in the left centrum semiovale and subcortical white matter of the left frontal lobe and right subinsular region. Chronic appearing lacunar infarct is evident in the periventricular white matter adjacent to the body left lateral ventricle, but there are no areas of abnormally restricted diffusion. There are no areas of abnormal magnetic susceptibility artifact or parenchymal or meningeal enhancement. There is no midline shift and the basal cisterns are widely patent. Ventricles and sulci are normal in size and configuration. Intracranial arterial flow voids are present and the dural venous sinuses are patent. There are no abnormal extra-axial fluid collections. Accounting for patient motion, the medial temporal lobes, including the hippocampi and parahippocampal gyral formations maintain normal and symmetrical size and  signal.     Calvarial marrow signal is normal throughout. Imaged skull base soft tissue structures and portions of the orbits are unremarkable. Mastoid air cells and cavities are well aerated. Mild mucosal thickening is demonstrated in scattered bilateral ethmoid air cells. Limited images through the upper cervical spine demonstrate lateral C3-4 and C4-5 facet arthrosis with degenerative anterolisthesis of C4 on C5 and degenerative retrolisthesis of C5 on C6. Annular bulging is evident at C4-5 and C5-6.     CONCLUSION:     1. Motion artifact on all imaging sequences limits fine detail, but there is no intracranial mass, intracranial hemorrhage or acute or subacute infarction.     2. Chronic appearing lacunar infarct in the periventricular white matter adjacent to the body left lateral ventricle.     3. Scattered areas of punctate and confluent white matter T2 hyperintensity are nonspecific, but likely reflect mild to moderate microangiopathic change.     4. No evidence of mesial temporal sclerosis.     5. Mild, patchy bilateral ethmoid air cell mucosal thickening.     6. Upper cervical spinal degenerative spondylosis.     SC           Electronically signed on 8/2/2022 1:39:00 PM by Erwin Ellison M.D        Pt has upcoming appt on 9/9/22 with EEG prior.    Yesi Patterson LPN on 8/17/2022 at 8:40 AM

## 2022-09-09 ENCOUNTER — OFFICE VISIT (OUTPATIENT)
Dept: NEUROLOGY | Facility: CLINIC | Age: 72
End: 2022-09-09
Payer: COMMERCIAL

## 2022-09-09 VITALS
SYSTOLIC BLOOD PRESSURE: 96 MMHG | WEIGHT: 135 LBS | HEART RATE: 66 BPM | HEIGHT: 63 IN | DIASTOLIC BLOOD PRESSURE: 78 MMHG | BODY MASS INDEX: 23.92 KG/M2

## 2022-09-09 DIAGNOSIS — R41.9 NEUROCOGNITIVE DISORDER: ICD-10-CM

## 2022-09-09 DIAGNOSIS — G40.209 PARTIAL SYMPTOMATIC EPILEPSY WITH COMPLEX PARTIAL SEIZURES, NOT INTRACTABLE, WITHOUT STATUS EPILEPTICUS (H): ICD-10-CM

## 2022-09-09 DIAGNOSIS — R56.9 SEIZURE-LIKE ACTIVITY (H): Primary | ICD-10-CM

## 2022-09-09 PROCEDURE — 99214 OFFICE O/P EST MOD 30 MIN: CPT | Performed by: PSYCHIATRY & NEUROLOGY

## 2022-09-09 RX ORDER — LEVETIRACETAM 500 MG/1
TABLET ORAL
Qty: 180 TABLET | Refills: 3 | Status: SHIPPED | OUTPATIENT
Start: 2022-09-09 | End: 2023-02-06

## 2022-09-09 RX ORDER — LEVETIRACETAM 500 MG/1
500 TABLET ORAL 2 TIMES DAILY
Qty: 60 TABLET | Refills: 11 | Status: SHIPPED | OUTPATIENT
Start: 2022-09-09 | End: 2022-09-09
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
## 2022-09-09 NOTE — TELEPHONE ENCOUNTER
Request for 90 day supply of Keppra  Medication T'd for review and signature  Natalya De La Torre CMA on 9/9/2022 at 1:42 PM

## 2022-09-28 ENCOUNTER — HOSPITAL ENCOUNTER (INPATIENT)
Facility: HOSPITAL | Age: 72
LOS: 5 days | Discharge: HOME HEALTH | DRG: 100 | End: 2022-10-04
Attending: STUDENT IN AN ORGANIZED HEALTH CARE EDUCATION/TRAINING PROGRAM | Admitting: HOSPITALIST
Payer: COMMERCIAL

## 2022-09-28 ENCOUNTER — APPOINTMENT (OUTPATIENT)
Dept: CT IMAGING | Facility: HOSPITAL | Age: 72
DRG: 100 | End: 2022-09-28
Attending: STUDENT IN AN ORGANIZED HEALTH CARE EDUCATION/TRAINING PROGRAM
Payer: COMMERCIAL

## 2022-09-28 DIAGNOSIS — R65.10 SIRS (SYSTEMIC INFLAMMATORY RESPONSE SYNDROME) (H): ICD-10-CM

## 2022-09-28 DIAGNOSIS — I16.1 HYPERTENSIVE EMERGENCY: ICD-10-CM

## 2022-09-28 DIAGNOSIS — R56.9 SEIZURE-LIKE ACTIVITY (H): ICD-10-CM

## 2022-09-28 DIAGNOSIS — G93.41 ACUTE METABOLIC ENCEPHALOPATHY: Primary | ICD-10-CM

## 2022-09-28 DIAGNOSIS — Z76.0 ENCOUNTER FOR MEDICATION REFILL: ICD-10-CM

## 2022-09-28 DIAGNOSIS — R78.81 BACTEREMIA: ICD-10-CM

## 2022-09-28 DIAGNOSIS — G40.909 SEIZURE DISORDER (H): ICD-10-CM

## 2022-09-28 LAB
AMMONIA PLAS-SCNC: 11 UMOL/L (ref 11–51)
BASOPHILS # BLD AUTO: 0.1 10E3/UL (ref 0–0.2)
BASOPHILS NFR BLD AUTO: 0 %
EOSINOPHIL # BLD AUTO: 0 10E3/UL (ref 0–0.7)
EOSINOPHIL NFR BLD AUTO: 0 %
ERYTHROCYTE [DISTWIDTH] IN BLOOD BY AUTOMATED COUNT: 12 % (ref 10–15)
ETHANOL SERPL-MCNC: <0.01 G/DL
HCT VFR BLD AUTO: 51.8 % (ref 35–47)
HGB BLD-MCNC: 18.2 G/DL (ref 11.7–15.7)
IMM GRANULOCYTES # BLD: 0.3 10E3/UL
IMM GRANULOCYTES NFR BLD: 2 %
LYMPHOCYTES # BLD AUTO: 0.9 10E3/UL (ref 0.8–5.3)
LYMPHOCYTES NFR BLD AUTO: 4 %
MCH RBC QN AUTO: 31 PG (ref 26.5–33)
MCHC RBC AUTO-ENTMCNC: 35.1 G/DL (ref 31.5–36.5)
MCV RBC AUTO: 88 FL (ref 78–100)
MONOCYTES # BLD AUTO: 1.2 10E3/UL (ref 0–1.3)
MONOCYTES NFR BLD AUTO: 6 %
NEUTROPHILS # BLD AUTO: 19 10E3/UL (ref 1.6–8.3)
NEUTROPHILS NFR BLD AUTO: 88 %
NRBC # BLD AUTO: 0 10E3/UL
NRBC BLD AUTO-RTO: 0 /100
PLATELET # BLD AUTO: 253 10E3/UL (ref 150–450)
RBC # BLD AUTO: 5.88 10E6/UL (ref 3.8–5.2)
TROPONIN T SERPL HS-MCNC: 32 NG/L
WBC # BLD AUTO: 21.1 10E3/UL (ref 4–11)

## 2022-09-28 PROCEDURE — 84484 ASSAY OF TROPONIN QUANT: CPT | Performed by: STUDENT IN AN ORGANIZED HEALTH CARE EDUCATION/TRAINING PROGRAM

## 2022-09-28 PROCEDURE — 85004 AUTOMATED DIFF WBC COUNT: CPT | Performed by: STUDENT IN AN ORGANIZED HEALTH CARE EDUCATION/TRAINING PROGRAM

## 2022-09-28 PROCEDURE — 99285 EMERGENCY DEPT VISIT HI MDM: CPT | Mod: 25

## 2022-09-28 PROCEDURE — 83036 HEMOGLOBIN GLYCOSYLATED A1C: CPT | Performed by: HOSPITALIST

## 2022-09-28 PROCEDURE — 82077 ASSAY SPEC XCP UR&BREATH IA: CPT | Performed by: STUDENT IN AN ORGANIZED HEALTH CARE EDUCATION/TRAINING PROGRAM

## 2022-09-28 PROCEDURE — 93005 ELECTROCARDIOGRAM TRACING: CPT | Performed by: STUDENT IN AN ORGANIZED HEALTH CARE EDUCATION/TRAINING PROGRAM

## 2022-09-28 PROCEDURE — 80177 DRUG SCRN QUAN LEVETIRACETAM: CPT | Performed by: HOSPITALIST

## 2022-09-28 PROCEDURE — 82140 ASSAY OF AMMONIA: CPT | Performed by: STUDENT IN AN ORGANIZED HEALTH CARE EDUCATION/TRAINING PROGRAM

## 2022-09-28 PROCEDURE — 80053 COMPREHEN METABOLIC PANEL: CPT | Performed by: STUDENT IN AN ORGANIZED HEALTH CARE EDUCATION/TRAINING PROGRAM

## 2022-09-28 PROCEDURE — 36415 COLL VENOUS BLD VENIPUNCTURE: CPT | Performed by: STUDENT IN AN ORGANIZED HEALTH CARE EDUCATION/TRAINING PROGRAM

## 2022-09-28 PROCEDURE — 70450 CT HEAD/BRAIN W/O DYE: CPT

## 2022-09-28 PROCEDURE — 96365 THER/PROPH/DIAG IV INF INIT: CPT | Mod: 59

## 2022-09-28 ASSESSMENT — ACTIVITIES OF DAILY LIVING (ADL): ADLS_ACUITY_SCORE: 35

## 2022-09-29 ENCOUNTER — APPOINTMENT (OUTPATIENT)
Dept: NEUROLOGY | Facility: HOSPITAL | Age: 72
DRG: 100 | End: 2022-09-29
Attending: PSYCHIATRY & NEUROLOGY
Payer: COMMERCIAL

## 2022-09-29 ENCOUNTER — APPOINTMENT (OUTPATIENT)
Dept: CARDIOLOGY | Facility: HOSPITAL | Age: 72
DRG: 100 | End: 2022-09-29
Attending: HOSPITALIST
Payer: COMMERCIAL

## 2022-09-29 ENCOUNTER — APPOINTMENT (OUTPATIENT)
Dept: CT IMAGING | Facility: HOSPITAL | Age: 72
DRG: 100 | End: 2022-09-29
Attending: STUDENT IN AN ORGANIZED HEALTH CARE EDUCATION/TRAINING PROGRAM
Payer: COMMERCIAL

## 2022-09-29 ENCOUNTER — APPOINTMENT (OUTPATIENT)
Dept: MRI IMAGING | Facility: HOSPITAL | Age: 72
DRG: 100 | End: 2022-09-29
Attending: HOSPITALIST
Payer: COMMERCIAL

## 2022-09-29 PROBLEM — G93.41 ACUTE METABOLIC ENCEPHALOPATHY: Status: ACTIVE | Noted: 2022-09-29

## 2022-09-29 PROBLEM — I16.0 HYPERTENSIVE URGENCY: Status: ACTIVE | Noted: 2022-09-29

## 2022-09-29 PROBLEM — R65.10 SIRS (SYSTEMIC INFLAMMATORY RESPONSE SYNDROME) (H): Status: ACTIVE | Noted: 2022-09-29

## 2022-09-29 LAB
ALBUMIN SERPL BCG-MCNC: 3.9 G/DL (ref 3.5–5.2)
ALBUMIN SERPL BCG-MCNC: 4.5 G/DL (ref 3.5–5.2)
ALBUMIN UR-MCNC: 200 MG/DL
ALP SERPL-CCNC: 71 U/L (ref 35–104)
ALP SERPL-CCNC: 85 U/L (ref 35–104)
ALT SERPL W P-5'-P-CCNC: 13 U/L (ref 10–35)
ALT SERPL W P-5'-P-CCNC: 14 U/L (ref 10–35)
ANION GAP SERPL CALCULATED.3IONS-SCNC: 19 MMOL/L (ref 7–15)
ANION GAP SERPL CALCULATED.3IONS-SCNC: 25 MMOL/L (ref 7–15)
APPEARANCE UR: ABNORMAL
AST SERPL W P-5'-P-CCNC: 21 U/L (ref 10–35)
AST SERPL W P-5'-P-CCNC: 26 U/L (ref 10–35)
ATRIAL RATE - MUSE: 121 BPM
BACTERIA #/AREA URNS HPF: ABNORMAL /HPF
BASOPHILS # BLD AUTO: 0 10E3/UL (ref 0–0.2)
BASOPHILS NFR BLD AUTO: 0 %
BILIRUB SERPL-MCNC: 0.4 MG/DL
BILIRUB SERPL-MCNC: 0.9 MG/DL
BILIRUB UR QL STRIP: NEGATIVE
BUN SERPL-MCNC: 19.5 MG/DL (ref 8–23)
BUN SERPL-MCNC: 19.9 MG/DL (ref 8–23)
CALCIUM SERPL-MCNC: 10.4 MG/DL (ref 8.8–10.2)
CALCIUM SERPL-MCNC: 9.5 MG/DL (ref 8.8–10.2)
CHLORIDE SERPL-SCNC: 101 MMOL/L (ref 98–107)
CHLORIDE SERPL-SCNC: 95 MMOL/L (ref 98–107)
CK SERPL-CCNC: 84 U/L (ref 26–192)
COLOR UR AUTO: YELLOW
CREAT SERPL-MCNC: 0.8 MG/DL (ref 0.51–0.95)
CREAT SERPL-MCNC: 0.98 MG/DL (ref 0.51–0.95)
DEPRECATED HCO3 PLAS-SCNC: 18 MMOL/L (ref 22–29)
DEPRECATED HCO3 PLAS-SCNC: 18 MMOL/L (ref 22–29)
DIASTOLIC BLOOD PRESSURE - MUSE: NORMAL MMHG
EOSINOPHIL # BLD AUTO: 0 10E3/UL (ref 0–0.7)
EOSINOPHIL NFR BLD AUTO: 0 %
ERYTHROCYTE [DISTWIDTH] IN BLOOD BY AUTOMATED COUNT: 12.1 % (ref 10–15)
GFR SERPL CREATININE-BSD FRML MDRD: 61 ML/MIN/1.73M2
GFR SERPL CREATININE-BSD FRML MDRD: 78 ML/MIN/1.73M2
GLUCOSE BLDC GLUCOMTR-MCNC: 132 MG/DL (ref 70–99)
GLUCOSE BLDC GLUCOMTR-MCNC: 145 MG/DL (ref 70–99)
GLUCOSE SERPL-MCNC: 195 MG/DL (ref 70–99)
GLUCOSE SERPL-MCNC: 211 MG/DL (ref 70–99)
GLUCOSE UR STRIP-MCNC: 500 MG/DL
HBA1C MFR BLD: 5.7 %
HCT VFR BLD AUTO: 46.8 % (ref 35–47)
HGB BLD-MCNC: 16.1 G/DL (ref 11.7–15.7)
HGB UR QL STRIP: ABNORMAL
HOLD SPECIMEN: NORMAL
IMM GRANULOCYTES # BLD: 0.2 10E3/UL
IMM GRANULOCYTES NFR BLD: 1 %
INTERPRETATION ECG - MUSE: NORMAL
KETONES BLD-SCNC: 0.1 MMOL/L (ref 0–0.6)
KETONES UR STRIP-MCNC: 20 MG/DL
LACTATE SERPL-SCNC: 2.8 MMOL/L (ref 0.7–2)
LACTATE SERPL-SCNC: 3.9 MMOL/L (ref 0.7–2)
LACTATE SERPL-SCNC: 5 MMOL/L (ref 0.7–2)
LACTATE SERPL-SCNC: 8.4 MMOL/L (ref 0.7–2)
LEUKOCYTE ESTERASE UR QL STRIP: NEGATIVE
LEVETIRACETAM SERPL-MCNC: 7.6 ΜG/ML (ref 10–40)
LVEF ECHO: NORMAL
LYMPHOCYTES # BLD AUTO: 0.7 10E3/UL (ref 0.8–5.3)
LYMPHOCYTES NFR BLD AUTO: 5 %
MCH RBC QN AUTO: 30.6 PG (ref 26.5–33)
MCHC RBC AUTO-ENTMCNC: 34.4 G/DL (ref 31.5–36.5)
MCV RBC AUTO: 89 FL (ref 78–100)
MONOCYTES # BLD AUTO: 0.6 10E3/UL (ref 0–1.3)
MONOCYTES NFR BLD AUTO: 4 %
MUCOUS THREADS #/AREA URNS LPF: PRESENT /LPF
NEUTROPHILS # BLD AUTO: 13.5 10E3/UL (ref 1.6–8.3)
NEUTROPHILS NFR BLD AUTO: 90 %
NITRATE UR QL: NEGATIVE
NRBC # BLD AUTO: 0 10E3/UL
NRBC BLD AUTO-RTO: 0 /100
P AXIS - MUSE: 72 DEGREES
PH UR STRIP: 5.5 [PH] (ref 5–7)
PLATELET # BLD AUTO: 254 10E3/UL (ref 150–450)
POTASSIUM SERPL-SCNC: 3.6 MMOL/L (ref 3.4–5.3)
POTASSIUM SERPL-SCNC: 3.9 MMOL/L (ref 3.4–5.3)
PR INTERVAL - MUSE: 140 MS
PROCALCITONIN SERPL IA-MCNC: 0.32 NG/ML
PROT SERPL-MCNC: 7.3 G/DL (ref 6.4–8.3)
PROT SERPL-MCNC: 8.4 G/DL (ref 6.4–8.3)
QRS DURATION - MUSE: 68 MS
QT - MUSE: 322 MS
QTC - MUSE: 457 MS
R AXIS - MUSE: -26 DEGREES
RBC # BLD AUTO: 5.27 10E6/UL (ref 3.8–5.2)
RBC URINE: 3 /HPF
SARS-COV-2 RNA RESP QL NAA+PROBE: NEGATIVE
SODIUM SERPL-SCNC: 138 MMOL/L (ref 136–145)
SODIUM SERPL-SCNC: 138 MMOL/L (ref 136–145)
SP GR UR STRIP: 1.03 (ref 1–1.03)
SYSTOLIC BLOOD PRESSURE - MUSE: NORMAL MMHG
T AXIS - MUSE: 49 DEGREES
T4 FREE SERPL-MCNC: 1.31 NG/DL (ref 0.9–1.7)
TROPONIN T SERPL HS-MCNC: 25 NG/L
TROPONIN T SERPL HS-MCNC: 36 NG/L
TROPONIN T SERPL HS-MCNC: 40 NG/L
TROPONIN T SERPL HS-MCNC: NORMAL NG/L
TSH SERPL DL<=0.005 MIU/L-ACNC: 5.46 UIU/ML (ref 0.3–4.2)
UROBILINOGEN UR STRIP-MCNC: <2 MG/DL
VENTRICULAR RATE- MUSE: 121 BPM
WBC # BLD AUTO: 14.9 10E3/UL (ref 4–11)
WBC URINE: 5 /HPF

## 2022-09-29 PROCEDURE — 96375 TX/PRO/DX INJ NEW DRUG ADDON: CPT

## 2022-09-29 PROCEDURE — 99223 1ST HOSP IP/OBS HIGH 75: CPT | Mod: AI | Performed by: HOSPITALIST

## 2022-09-29 PROCEDURE — 84484 ASSAY OF TROPONIN QUANT: CPT | Performed by: STUDENT IN AN ORGANIZED HEALTH CARE EDUCATION/TRAINING PROGRAM

## 2022-09-29 PROCEDURE — 258N000003 HC RX IP 258 OP 636: Performed by: STUDENT IN AN ORGANIZED HEALTH CARE EDUCATION/TRAINING PROGRAM

## 2022-09-29 PROCEDURE — C8924 2D TTE W OR W/O FOL W/CON,FU: HCPCS

## 2022-09-29 PROCEDURE — C9803 HOPD COVID-19 SPEC COLLECT: HCPCS

## 2022-09-29 PROCEDURE — 80053 COMPREHEN METABOLIC PANEL: CPT | Performed by: HOSPITALIST

## 2022-09-29 PROCEDURE — 81001 URINALYSIS AUTO W/SCOPE: CPT | Performed by: STUDENT IN AN ORGANIZED HEALTH CARE EDUCATION/TRAINING PROGRAM

## 2022-09-29 PROCEDURE — 87077 CULTURE AEROBIC IDENTIFY: CPT | Performed by: STUDENT IN AN ORGANIZED HEALTH CARE EDUCATION/TRAINING PROGRAM

## 2022-09-29 PROCEDURE — 93321 DOPPLER ECHO F-UP/LMTD STD: CPT | Mod: 26 | Performed by: INTERNAL MEDICINE

## 2022-09-29 PROCEDURE — 95816 EEG AWAKE AND DROWSY: CPT | Mod: 26 | Performed by: PSYCHIATRY & NEUROLOGY

## 2022-09-29 PROCEDURE — 82010 KETONE BODYS QUAN: CPT | Performed by: STUDENT IN AN ORGANIZED HEALTH CARE EDUCATION/TRAINING PROGRAM

## 2022-09-29 PROCEDURE — 96366 THER/PROPH/DIAG IV INF ADDON: CPT | Mod: 59

## 2022-09-29 PROCEDURE — 250N000011 HC RX IP 250 OP 636: Performed by: STUDENT IN AN ORGANIZED HEALTH CARE EDUCATION/TRAINING PROGRAM

## 2022-09-29 PROCEDURE — 87149 DNA/RNA DIRECT PROBE: CPT | Performed by: STUDENT IN AN ORGANIZED HEALTH CARE EDUCATION/TRAINING PROGRAM

## 2022-09-29 PROCEDURE — 83605 ASSAY OF LACTIC ACID: CPT | Performed by: HOSPITALIST

## 2022-09-29 PROCEDURE — G0378 HOSPITAL OBSERVATION PER HR: HCPCS

## 2022-09-29 PROCEDURE — 250N000011 HC RX IP 250 OP 636: Performed by: HOSPITALIST

## 2022-09-29 PROCEDURE — 255N000002 HC RX 255 OP 636: Performed by: HOSPITALIST

## 2022-09-29 PROCEDURE — 84443 ASSAY THYROID STIM HORMONE: CPT | Performed by: STUDENT IN AN ORGANIZED HEALTH CARE EDUCATION/TRAINING PROGRAM

## 2022-09-29 PROCEDURE — 82550 ASSAY OF CK (CPK): CPT | Performed by: HOSPITALIST

## 2022-09-29 PROCEDURE — 99223 1ST HOSP IP/OBS HIGH 75: CPT | Performed by: PSYCHIATRY & NEUROLOGY

## 2022-09-29 PROCEDURE — 84145 PROCALCITONIN (PCT): CPT | Performed by: STUDENT IN AN ORGANIZED HEALTH CARE EDUCATION/TRAINING PROGRAM

## 2022-09-29 PROCEDURE — 83605 ASSAY OF LACTIC ACID: CPT | Performed by: STUDENT IN AN ORGANIZED HEALTH CARE EDUCATION/TRAINING PROGRAM

## 2022-09-29 PROCEDURE — 71275 CT ANGIOGRAPHY CHEST: CPT

## 2022-09-29 PROCEDURE — 36415 COLL VENOUS BLD VENIPUNCTURE: CPT | Performed by: HOSPITALIST

## 2022-09-29 PROCEDURE — 93325 DOPPLER ECHO COLOR FLOW MAPG: CPT | Mod: 26 | Performed by: INTERNAL MEDICINE

## 2022-09-29 PROCEDURE — 250N000009 HC RX 250: Performed by: INTERNAL MEDICINE

## 2022-09-29 PROCEDURE — 250N000011 HC RX IP 250 OP 636: Performed by: EMERGENCY MEDICINE

## 2022-09-29 PROCEDURE — 82040 ASSAY OF SERUM ALBUMIN: CPT | Performed by: HOSPITALIST

## 2022-09-29 PROCEDURE — 36415 COLL VENOUS BLD VENIPUNCTURE: CPT | Performed by: STUDENT IN AN ORGANIZED HEALTH CARE EDUCATION/TRAINING PROGRAM

## 2022-09-29 PROCEDURE — 93308 TTE F-UP OR LMTD: CPT | Mod: 26 | Performed by: INTERNAL MEDICINE

## 2022-09-29 PROCEDURE — 84439 ASSAY OF FREE THYROXINE: CPT | Performed by: STUDENT IN AN ORGANIZED HEALTH CARE EDUCATION/TRAINING PROGRAM

## 2022-09-29 PROCEDURE — 70551 MRI BRAIN STEM W/O DYE: CPT

## 2022-09-29 PROCEDURE — U0005 INFEC AGEN DETEC AMPLI PROBE: HCPCS | Performed by: STUDENT IN AN ORGANIZED HEALTH CARE EDUCATION/TRAINING PROGRAM

## 2022-09-29 PROCEDURE — 85025 COMPLETE CBC W/AUTO DIFF WBC: CPT | Performed by: HOSPITALIST

## 2022-09-29 PROCEDURE — 96361 HYDRATE IV INFUSION ADD-ON: CPT

## 2022-09-29 PROCEDURE — 95816 EEG AWAKE AND DROWSY: CPT

## 2022-09-29 PROCEDURE — 96376 TX/PRO/DX INJ SAME DRUG ADON: CPT

## 2022-09-29 PROCEDURE — 120N000001 HC R&B MED SURG/OB

## 2022-09-29 PROCEDURE — 84484 ASSAY OF TROPONIN QUANT: CPT | Performed by: HOSPITALIST

## 2022-09-29 PROCEDURE — 93325 DOPPLER ECHO COLOR FLOW MAPG: CPT

## 2022-09-29 PROCEDURE — 258N000003 HC RX IP 258 OP 636: Performed by: HOSPITALIST

## 2022-09-29 PROCEDURE — 250N000013 HC RX MED GY IP 250 OP 250 PS 637: Performed by: HOSPITALIST

## 2022-09-29 RX ORDER — METOPROLOL SUCCINATE 50 MG/1
100 TABLET, EXTENDED RELEASE ORAL DAILY
Status: DISCONTINUED | OUTPATIENT
Start: 2022-09-29 | End: 2022-09-29

## 2022-09-29 RX ORDER — ACETAMINOPHEN 325 MG/1
650 TABLET ORAL EVERY 6 HOURS PRN
Status: DISCONTINUED | OUTPATIENT
Start: 2022-09-29 | End: 2022-10-04 | Stop reason: HOSPADM

## 2022-09-29 RX ORDER — LORAZEPAM 2 MG/ML
1 INJECTION INTRAMUSCULAR
Status: DISCONTINUED | OUTPATIENT
Start: 2022-09-29 | End: 2022-10-04 | Stop reason: HOSPADM

## 2022-09-29 RX ORDER — THIAMINE HYDROCHLORIDE 100 MG/ML
100 INJECTION, SOLUTION INTRAMUSCULAR; INTRAVENOUS DAILY
Status: DISCONTINUED | OUTPATIENT
Start: 2022-09-29 | End: 2022-09-30

## 2022-09-29 RX ORDER — AMOXICILLIN 250 MG
2 CAPSULE ORAL 2 TIMES DAILY PRN
Status: DISCONTINUED | OUTPATIENT
Start: 2022-09-29 | End: 2022-10-04 | Stop reason: HOSPADM

## 2022-09-29 RX ORDER — AMOXICILLIN 250 MG
1 CAPSULE ORAL 2 TIMES DAILY PRN
Status: DISCONTINUED | OUTPATIENT
Start: 2022-09-29 | End: 2022-10-04 | Stop reason: HOSPADM

## 2022-09-29 RX ORDER — RISPERIDONE 2 MG/1
2 TABLET ORAL AT BEDTIME
Status: DISCONTINUED | OUTPATIENT
Start: 2022-09-29 | End: 2022-10-04 | Stop reason: HOSPADM

## 2022-09-29 RX ORDER — SODIUM CHLORIDE 9 MG/ML
INJECTION, SOLUTION INTRAVENOUS CONTINUOUS
Status: ACTIVE | OUTPATIENT
Start: 2022-09-29 | End: 2022-09-30

## 2022-09-29 RX ORDER — HYDRALAZINE HYDROCHLORIDE 20 MG/ML
10 INJECTION INTRAMUSCULAR; INTRAVENOUS EVERY 6 HOURS PRN
Status: DISCONTINUED | OUTPATIENT
Start: 2022-09-29 | End: 2022-09-29

## 2022-09-29 RX ORDER — VENLAFAXINE HYDROCHLORIDE 75 MG/1
150 CAPSULE, EXTENDED RELEASE ORAL DAILY
COMMUNITY
End: 2022-10-05 | Stop reason: ALTCHOICE

## 2022-09-29 RX ORDER — METOPROLOL TARTRATE 1 MG/ML
5 INJECTION, SOLUTION INTRAVENOUS ONCE
Status: COMPLETED | OUTPATIENT
Start: 2022-09-29 | End: 2022-09-29

## 2022-09-29 RX ORDER — VENLAFAXINE HYDROCHLORIDE 150 MG/1
150 CAPSULE, EXTENDED RELEASE ORAL DAILY
Status: DISCONTINUED | OUTPATIENT
Start: 2022-09-29 | End: 2022-10-04 | Stop reason: HOSPADM

## 2022-09-29 RX ORDER — VENLAFAXINE 75 MG/1
150 TABLET ORAL DAILY
COMMUNITY
End: 2022-09-29

## 2022-09-29 RX ORDER — IOPAMIDOL 755 MG/ML
100 INJECTION, SOLUTION INTRAVASCULAR ONCE
Status: COMPLETED | OUTPATIENT
Start: 2022-09-29 | End: 2022-09-29

## 2022-09-29 RX ORDER — FOLIC ACID 1 MG/1
1000 TABLET ORAL DAILY
Status: DISCONTINUED | OUTPATIENT
Start: 2022-09-29 | End: 2022-10-04 | Stop reason: HOSPADM

## 2022-09-29 RX ORDER — ACETAMINOPHEN 650 MG/1
650 SUPPOSITORY RECTAL EVERY 6 HOURS PRN
Status: DISCONTINUED | OUTPATIENT
Start: 2022-09-29 | End: 2022-10-04 | Stop reason: HOSPADM

## 2022-09-29 RX ORDER — ONDANSETRON 2 MG/ML
4 INJECTION INTRAMUSCULAR; INTRAVENOUS EVERY 30 MIN PRN
Status: DISCONTINUED | OUTPATIENT
Start: 2022-09-29 | End: 2022-10-04 | Stop reason: HOSPADM

## 2022-09-29 RX ORDER — AMLODIPINE BESYLATE 5 MG/1
5 TABLET ORAL DAILY
Status: DISCONTINUED | OUTPATIENT
Start: 2022-09-29 | End: 2022-10-01

## 2022-09-29 RX ORDER — LABETALOL HYDROCHLORIDE 5 MG/ML
20 INJECTION, SOLUTION INTRAVENOUS EVERY 4 HOURS PRN
Status: DISCONTINUED | OUTPATIENT
Start: 2022-09-29 | End: 2022-10-04 | Stop reason: HOSPADM

## 2022-09-29 RX ORDER — LOSARTAN POTASSIUM 50 MG/1
100 TABLET ORAL DAILY
Status: DISCONTINUED | OUTPATIENT
Start: 2022-09-29 | End: 2022-10-04 | Stop reason: HOSPADM

## 2022-09-29 RX ORDER — LORAZEPAM 0.5 MG/1
0.5 TABLET ORAL
Status: DISCONTINUED | OUTPATIENT
Start: 2022-09-29 | End: 2022-10-04 | Stop reason: HOSPADM

## 2022-09-29 RX ORDER — METOPROLOL SUCCINATE 50 MG/1
50 TABLET, EXTENDED RELEASE ORAL DAILY
Status: DISCONTINUED | OUTPATIENT
Start: 2022-09-29 | End: 2022-10-01

## 2022-09-29 RX ORDER — LABETALOL HYDROCHLORIDE 5 MG/ML
10 INJECTION, SOLUTION INTRAVENOUS EVERY 4 HOURS PRN
Status: DISCONTINUED | OUTPATIENT
Start: 2022-09-29 | End: 2022-09-29

## 2022-09-29 RX ORDER — ONDANSETRON 4 MG/1
4 TABLET, ORALLY DISINTEGRATING ORAL EVERY 6 HOURS PRN
Status: DISCONTINUED | OUTPATIENT
Start: 2022-09-29 | End: 2022-10-04 | Stop reason: HOSPADM

## 2022-09-29 RX ORDER — LEVETIRACETAM 500 MG/1
500 TABLET ORAL 2 TIMES DAILY
Status: DISCONTINUED | OUTPATIENT
Start: 2022-09-29 | End: 2022-10-04 | Stop reason: HOSPADM

## 2022-09-29 RX ORDER — ONDANSETRON 2 MG/ML
4 INJECTION INTRAMUSCULAR; INTRAVENOUS EVERY 6 HOURS PRN
Status: DISCONTINUED | OUTPATIENT
Start: 2022-09-29 | End: 2022-10-04 | Stop reason: HOSPADM

## 2022-09-29 RX ORDER — HYDRALAZINE HYDROCHLORIDE 20 MG/ML
10 INJECTION INTRAMUSCULAR; INTRAVENOUS EVERY 4 HOURS PRN
Status: DISCONTINUED | OUTPATIENT
Start: 2022-09-29 | End: 2022-10-04 | Stop reason: HOSPADM

## 2022-09-29 RX ORDER — ESCITALOPRAM OXALATE 20 MG/1
20 TABLET ORAL DAILY
Status: DISCONTINUED | OUTPATIENT
Start: 2022-09-29 | End: 2022-10-04 | Stop reason: HOSPADM

## 2022-09-29 RX ADMIN — AMLODIPINE BESYLATE 5 MG: 5 TABLET ORAL at 14:57

## 2022-09-29 RX ADMIN — ESCITALOPRAM OXALATE 20 MG: 20 TABLET ORAL at 12:39

## 2022-09-29 RX ADMIN — RISPERIDONE 2 MG: 2 TABLET ORAL at 21:00

## 2022-09-29 RX ADMIN — SODIUM CHLORIDE: 9 INJECTION, SOLUTION INTRAVENOUS at 05:40

## 2022-09-29 RX ADMIN — HYDRALAZINE HYDROCHLORIDE 10 MG: 20 INJECTION, SOLUTION INTRAMUSCULAR; INTRAVENOUS at 21:02

## 2022-09-29 RX ADMIN — ONDANSETRON 4 MG: 2 INJECTION INTRAMUSCULAR; INTRAVENOUS at 01:52

## 2022-09-29 RX ADMIN — THIAMINE HYDROCHLORIDE 100 MG: 100 INJECTION, SOLUTION INTRAMUSCULAR; INTRAVENOUS at 09:01

## 2022-09-29 RX ADMIN — LEVETIRACETAM 500 MG: 500 TABLET, FILM COATED ORAL at 12:39

## 2022-09-29 RX ADMIN — LOSARTAN POTASSIUM 100 MG: 50 TABLET, FILM COATED ORAL at 10:50

## 2022-09-29 RX ADMIN — LABETALOL HYDROCHLORIDE 10 MG: 5 INJECTION, SOLUTION INTRAVENOUS at 09:14

## 2022-09-29 RX ADMIN — LABETALOL HYDROCHLORIDE 20 MG: 5 INJECTION, SOLUTION INTRAVENOUS at 06:37

## 2022-09-29 RX ADMIN — VENLAFAXINE HYDROCHLORIDE 150 MG: 150 CAPSULE, EXTENDED RELEASE ORAL at 12:39

## 2022-09-29 RX ADMIN — FOLIC ACID 1000 MCG: 1 TABLET ORAL at 10:50

## 2022-09-29 RX ADMIN — IOPAMIDOL 100 ML: 755 INJECTION, SOLUTION INTRAVENOUS at 03:33

## 2022-09-29 RX ADMIN — PERFLUTREN 3 ML: 6.52 INJECTION, SUSPENSION INTRAVENOUS at 13:03

## 2022-09-29 RX ADMIN — LEVETIRACETAM 500 MG: 500 TABLET, FILM COATED ORAL at 20:57

## 2022-09-29 RX ADMIN — SODIUM CHLORIDE, POTASSIUM CHLORIDE, SODIUM LACTATE AND CALCIUM CHLORIDE 1000 ML: 600; 310; 30; 20 INJECTION, SOLUTION INTRAVENOUS at 00:24

## 2022-09-29 RX ADMIN — HYDRALAZINE HYDROCHLORIDE 10 MG: 20 INJECTION, SOLUTION INTRAMUSCULAR; INTRAVENOUS at 10:07

## 2022-09-29 RX ADMIN — METOPROLOL TARTRATE 5 MG: 5 INJECTION INTRAVENOUS at 19:03

## 2022-09-29 RX ADMIN — METOPROLOL SUCCINATE ER TABLETS 50 MG: 50 TABLET, FILM COATED, EXTENDED RELEASE ORAL at 10:50

## 2022-09-29 RX ADMIN — LEVETIRACETAM 1000 MG: 100 INJECTION, SOLUTION INTRAVENOUS at 02:06

## 2022-09-29 RX ADMIN — SODIUM CHLORIDE: 9 INJECTION, SOLUTION INTRAVENOUS at 23:12

## 2022-09-29 ASSESSMENT — ACTIVITIES OF DAILY LIVING (ADL)
ADLS_ACUITY_SCORE: 35
DEPENDENT_IADLS:: INDEPENDENT;TRANSPORTATION;MEDICATION MANAGEMENT
ADLS_ACUITY_SCORE: 37
ADLS_ACUITY_SCORE: 35

## 2022-09-29 NOTE — ED NOTES
"She seems too confused to answer the MRI questions appropriately. She failed the first question which was do you have a cardiac pacemaker. She said \"I guess so yes.\" she has no surgical marks on her chest. I do not feel she can reliably answer the MRI questionnaire.   "

## 2022-09-29 NOTE — PROGRESS NOTES
[facility] Hospitalist Progress Note  [facility]  Admission date: 9/28/2022    Summary:    71 year old female who was brought to the ED by ambulance for evaluation of altered mental status in setting of recent diagnosis of seizure disorder and initiation of Keppra.  Past medical history of TBI, neurocognitive disorder, alcohol abuse, thiamine deficiency, alcohol cirrhosis, hepatitis C, PVCs, hypertension, BPPV, depression, allergic rhinitis, osteoporosis.    Assessment/Plan    #Acute metabolic encephalopathy likely due to complex partial seizure d/o and possible hypertensive encephalopathy  -EEG, MRI r/o PRES,   -keppra 500BID  -BP control  -neurology consult     #Hypertensive crisis/emergency  -resume home losartan and toprol-xl  -start IV labetalol and IV hydralazine PRN  -goal SBP ~160, consider nicardipine if having difficulty getting control     #Lactic acidosis - improving, continue to monitor.  Agree likely secondary to seizure, dehydration, and possible hypertensive crisis contributing as well.     #Stress hyperglycemia- Normal A1c. Improving     #minimally elevated high-sensitivity troponin T - due to severe HTN.  No s/s of ACS.  Check limited ECHO.     #Polycythemia - hemoconcentrated    #Hx EtOH abuse - thiamine, folate    #Mood disorder - effexor and risperdal    #microscopic hematuria - long standing issue.  F/u outpatient with urinary system imaging.    Checklist:  Code Status: Full Code    Diet: Combination Diet Low Saturated Fat Na <2400mg Diet, No Caffeine Diet    Gtz Catheter: Not present  Central Lines: None  DVT px:  Pneumatic Compression Devices        Auto-populated based on system request - if relevant they will be addressed above:  Clinically Significant Risk Factors Present on Admission          # Hypercalcemia: Ca = 10.4 mg/dL (Ref range: 8.8 - 10.2 mg/dL) and/or iCa = N/A on admission, will monitor as appropriate   # Anion Gap Metabolic Acidosis: AG = 25 mmol/L (Ref range: 7  - 15 mmol/L) on admission, will monitor and treat as appropriate      # Hypertension: home medication list includes antihypertensive(s)          Auto-populated from discharge tab:     Expected Discharge Date: 09/30/2022                 Overnight Events/Subjective/Notable results:  Alert and cooperative but confused.    No HA, SOB, chest pain.  Cannot recall events leading to admission    4 point ROS otherwise negative    Objective    Vital signs in last 24 hours  Temp:  [98  F (36.7  C)-99.1  F (37.3  C)] 99.1  F (37.3  C)  Pulse:  [] 101  Resp:  [15-22] 19  BP: (151-210)/() 151/80  SpO2:  [92 %-99 %] 95 % O2 Device: None (Room air)    Weight:   120 lbs 0 oz    Intake/Output last 3 shifts  I/O last 3 completed shifts:  In: 1100 [IV Piggyback:1100]  Out: 1 [Emesis/NG output:1]  Body mass index is 20.6 kg/m .    Physical Exam  General:  Alert, cooperative, no distress  Neurologic:  Confused, oriented to hospital but doesn't know which, facial symmetry preserved, fluent speech.  Hooked up to EEG  Psych: calm, mood and affect appropriate to situation  HEENT:  Anicteric, MMM  CV: RRR no MRG, normal S1 and S2, no edema  Lungs: CTAB.  Easyrespirations  Abd: soft, NT, normoactive BS  Skin: no rashes or jaundice noted on exposed skin.    Central Lines and Tubes: None (no trejo, CVC, feeding tubes)      I have reviewed all labs, medications, imaging studies in the last 24 hours.  Pertinent findings&changes discussed above.    Data      Discussed with: RN    Time spent thus far 45 min. Attempted to call  and no answer. D/w radiology and has had MRI recently and obtaining x-ray neck and A/P to r/o foreign body for MRI.  Already had chest and head imaging.      Jose Medina MD  Internal Medicine Hospitalist

## 2022-09-29 NOTE — PROGRESS NOTES
Bedside EEG was performed that included photic stimulations. No hyperventilation due to patient state. The patient was both awake and drowsy during the recording.  EEG #   Skins-Intact  EEG system was used.[id number]
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
## 2022-09-29 NOTE — ED TRIAGE NOTES
Patient presents via EMS with altered mental status, high BP and blood sugar. Patient lives at home with . History of TBI.     Triage Assessment     Row Name 09/28/22 [id number]       Triage Assessment (Adult)    Airway WDL WDL    Additional Documentation Breath Sounds (Group)       Respiratory WDL    Respiratory WDL WDL       Breath Sounds    Breath Sounds All Fields    All Lung Fields Breath Sounds diminished       Skin Circulation/Temperature WDL    Skin Circulation/Temperature WDL WDL       Cardiac WDL    Cardiac WDL rhythm;X    Cardiac Rhythm tachycardic       Peripheral/Neurovascular WDL    Peripheral Neurovascular WDL WDL       Cognitive/Neuro/Behavioral WDL    Cognitive/Neuro/Behavioral WDL orientation    Orientation disoriented to;time;situation
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
## 2022-09-29 NOTE — PHARMACY-ADMISSION MEDICATION HISTORY
Pharmacy Note - Admission Medication History    Pertinent Provider Information: Patient is a poor historian, but has a reliable fill history, unable to get a hold of  .   ______________________________________________________________________    Prior To Admission (PTA) med list completed and updated in EMR.       PTA Med List   Medication Sig Last Dose     calcium-vitamin D 500 mg(1,250mg) -200 unit per tablet [CALCIUM-VITAMIN D 500 MG(1,250MG) -200 UNIT PER TABLET] Take 1 tablet by mouth 2 (two) times a day with meals. 9/28/2022 at AM     escitalopram (LEXAPRO) 20 MG tablet Take 1 tablet (20 mg) by mouth daily 9/28/2022 at AM     folic acid (FOLVITE) 1 MG tablet TAKE ONE TABLET BY MOUTH DAILY 9/28/2022 at AM     levETIRAcetam (KEPPRA) 500 MG tablet TAKE 1 TABLET(500 MG) BY MOUTH TWICE DAILY 9/27/2022 at PM     losartan (COZAAR) 50 MG tablet Take 1 tablet (50 mg) by mouth daily (Patient taking differently: Take 100 mg by mouth daily) 9/28/2022 at AM     metoprolol succinate ER (TOPROL-XL) 50 MG 24 hr tablet TAKE 1 TABLET BY MOUTH EVERY NIGHT AT BEDTIME TO HELP HEART RACING 9/27/2022 at PM     risperiDONE (RISPERDAL) 1 MG tablet [RISPERIDONE (RISPERDAL) 1 MG TABLET] Take 2mg PO qhs 9/27/2022 at PM     Thiamine Mononitrate (B1) 100 MG TABS TAKE ONE TABLET BY MOUTH DAILY 9/28/2022 at AM     venlafaxine (EFFEXOR XR) 75 MG 24 hr capsule Take 150 mg by mouth daily 9/28/2022 at AM       Information source(s): Patient and CareEverywhere/SureScripts  Method of interview communication: in-person    Summary of Changes to PTA Med List  New: Venlafaxine  Discontinued: None  Changed: Losartan changed from 50 mg daily to 100 mg daily    Patient was asked about OTC/herbal products specifically.  PTA med list reflects this.    In the past week, patient estimated taking medication this percent of the time:  50-90% due to forgetfulness.    Allergies were reviewed, assessed, and updated with the patient.      Patient does not  use any multi-dose medications prior to admission.    The information provided in this note is only as accurate as the sources available at the time of the update(s).    Thank you for the opportunity to participate in the care of this patient.    DAMION HOLLIS, Resident [facility]  9/29/2022 8:55 AM

## 2022-09-29 NOTE — CONSULTS
Care Management Initial Consult    General Information  Assessment completed with: Patient, Spouse or significant other, brandon Ron  Type of CM/SW Visit: Initial Assessment    Primary Care Provider verified and updated as needed: Yes   Readmission within the last 30 days: no previous admission in last 30 days   Return Category: Progression of disease  Reason for Consult: discharge planning  Advance Care Planning: Advance Care Planning Reviewed: no concerns identified     General Information Comments: hx of TBI and normally forgetful and sometimes confused, but this is more than usual, per .    Communication Assessment  Patient's communication style: spoken language (English or Bilingual)             Cognitive  Cognitive/Neuro/Behavioral: mood/behavior, level of consciousness  Level of Consciousness: alert     Orientation: disoriented to, time, situation  Mood/Behavior: labile          Living Environment:   People in home: spouse     Current living Arrangements: house      Able to return to prior arrangements: yes       Family/Social Support:  Care provided by: spouse/significant other  Provides care for: no one  Marital Status:             Description of Support System: Supportive, Involved    Support Assessment: Adequate family and caregiver support, Adequate social supports    Current Resources:   Patient receiving home care services: No     Community Resources: None  Equipment currently used at home: none  Supplies currently used at home: None    Employment/Financial:  Employment Status:          Financial Concerns: No concerns identified   Referral to Financial Worker: No       Lifestyle & Psychosocial Needs:  Social Determinants of Health     Tobacco Use: Medium Risk     Smoking Tobacco Use: Former Smoker     Smokeless Tobacco Use: Never Used   Alcohol Use: Not on file   Financial Resource Strain: Not on file   Food Insecurity: Not on file   Transportation Needs: Not on file   Physical  Activity: Not on file   Stress: Not on file   Social Connections: Not on file   Intimate Partner Violence: Not on file   Depression: Not at risk     PHQ-2 Score: 2   Housing Stability: Not on file       Functional Status:  Prior to admission patient needed assistance:   Dependent ADLs:: Independent  Dependent IADLs:: Independent, Transportation, Medication Management  Assesssment of Functional Status: Not at baseline with ADL Functioning, Not at baseline with mobility, Not at  functional baseline    Mental Health Status:  Mental Health Status: No Current Concerns       Chemical Dependency Status:                Values/Beliefs:  Spiritual, Cultural Beliefs, Yarsani Practices, Values that affect care:                 Additional Information:  Assessed, lives w/spouse, Asaf. Discussed REINOSO w/Asaf, pt has hx of TBI and goal is to go home w/home care if recommended. CM to follow for discharge needs.      Mendy Crowe RN

## 2022-09-29 NOTE — ED NOTES
Bed: JNED-22  Expected date: 9/28/22  Expected time: 10:22 PM  Means of arrival: Ambulance  Comments:  [facility] 71 F AMS, Sinus Stach, HTN, BG elevated no neurological deficits.
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
## 2022-09-30 ENCOUNTER — APPOINTMENT (OUTPATIENT)
Dept: CT IMAGING | Facility: HOSPITAL | Age: 72
DRG: 100 | End: 2022-09-30
Attending: HOSPITALIST
Payer: COMMERCIAL

## 2022-09-30 LAB
ANION GAP SERPL CALCULATED.3IONS-SCNC: 10 MMOL/L (ref 7–15)
BUN SERPL-MCNC: 20.3 MG/DL (ref 8–23)
CALCIUM SERPL-MCNC: 9 MG/DL (ref 8.8–10.2)
CHLORIDE SERPL-SCNC: 104 MMOL/L (ref 98–107)
CREAT SERPL-MCNC: 0.58 MG/DL (ref 0.51–0.95)
DEPRECATED HCO3 PLAS-SCNC: 24 MMOL/L (ref 22–29)
ENTEROCOCCUS FAECALIS: NOT DETECTED
ENTEROCOCCUS FAECIUM: NOT DETECTED
ERYTHROCYTE [DISTWIDTH] IN BLOOD BY AUTOMATED COUNT: 12.5 % (ref 10–15)
GFR SERPL CREATININE-BSD FRML MDRD: >90 ML/MIN/1.73M2
GLUCOSE SERPL-MCNC: 125 MG/DL (ref 70–99)
HCT VFR BLD AUTO: 42.8 % (ref 35–47)
HGB BLD-MCNC: 14.5 G/DL (ref 11.7–15.7)
LACTATE SERPL-SCNC: 1.3 MMOL/L (ref 0.7–2)
LEVETIRACETAM SERPL-MCNC: 14 ΜG/ML (ref 10–40)
LISTERIA SPECIES (DETECTED/NOT DETECTED): NOT DETECTED
MCH RBC QN AUTO: 30.5 PG (ref 26.5–33)
MCHC RBC AUTO-ENTMCNC: 33.9 G/DL (ref 31.5–36.5)
MCV RBC AUTO: 90 FL (ref 78–100)
PLATELET # BLD AUTO: 198 10E3/UL (ref 150–450)
POTASSIUM SERPL-SCNC: 3.7 MMOL/L (ref 3.4–5.3)
RBC # BLD AUTO: 4.76 10E6/UL (ref 3.8–5.2)
SODIUM SERPL-SCNC: 138 MMOL/L (ref 136–145)
STAPHYLOCOCCUS AUREUS: NOT DETECTED
STAPHYLOCOCCUS EPIDERMIDIS: NOT DETECTED
STAPHYLOCOCCUS LUGDUNENSIS: NOT DETECTED
STAPHYLOCOCCUS SPECIES: NOT DETECTED
STREPTOCOCCUS AGALACTIAE: NOT DETECTED
STREPTOCOCCUS ANGINOSUS GROUP: NOT DETECTED
STREPTOCOCCUS PNEUMONIAE: NOT DETECTED
STREPTOCOCCUS PYOGENES: NOT DETECTED
STREPTOCOCCUS SPECIES: NOT DETECTED
WBC # BLD AUTO: 16.2 10E3/UL (ref 4–11)

## 2022-09-30 PROCEDURE — 120N000001 HC R&B MED SURG/OB

## 2022-09-30 PROCEDURE — 87077 CULTURE AEROBIC IDENTIFY: CPT | Performed by: INTERNAL MEDICINE

## 2022-09-30 PROCEDURE — 99232 SBSQ HOSP IP/OBS MODERATE 35: CPT | Performed by: PSYCHIATRY & NEUROLOGY

## 2022-09-30 PROCEDURE — 250N000011 HC RX IP 250 OP 636: Performed by: HOSPITALIST

## 2022-09-30 PROCEDURE — 258N000003 HC RX IP 258 OP 636: Performed by: HOSPITALIST

## 2022-09-30 PROCEDURE — 36415 COLL VENOUS BLD VENIPUNCTURE: CPT | Performed by: INTERNAL MEDICINE

## 2022-09-30 PROCEDURE — 83605 ASSAY OF LACTIC ACID: CPT | Performed by: HOSPITALIST

## 2022-09-30 PROCEDURE — 999N000127 HC STATISTIC PERIPHERAL IV START W US GUIDANCE

## 2022-09-30 PROCEDURE — 85027 COMPLETE CBC AUTOMATED: CPT | Performed by: HOSPITALIST

## 2022-09-30 PROCEDURE — 80177 DRUG SCRN QUAN LEVETIRACETAM: CPT | Performed by: PSYCHIATRY & NEUROLOGY

## 2022-09-30 PROCEDURE — 80048 BASIC METABOLIC PNL TOTAL CA: CPT | Performed by: HOSPITALIST

## 2022-09-30 PROCEDURE — 250N000013 HC RX MED GY IP 250 OP 250 PS 637: Performed by: HOSPITALIST

## 2022-09-30 PROCEDURE — 99233 SBSQ HOSP IP/OBS HIGH 50: CPT | Performed by: HOSPITALIST

## 2022-09-30 PROCEDURE — 74177 CT ABD & PELVIS W/CONTRAST: CPT

## 2022-09-30 RX ORDER — CEFTRIAXONE 1 G/1
1 INJECTION, POWDER, FOR SOLUTION INTRAMUSCULAR; INTRAVENOUS EVERY 24 HOURS
Status: DISCONTINUED | OUTPATIENT
Start: 2022-09-30 | End: 2022-10-01

## 2022-09-30 RX ORDER — CEFAZOLIN SODIUM 1 G/50ML
1250 SOLUTION INTRAVENOUS ONCE
Status: COMPLETED | OUTPATIENT
Start: 2022-09-30 | End: 2022-09-30

## 2022-09-30 RX ORDER — IOPAMIDOL 755 MG/ML
100 INJECTION, SOLUTION INTRAVASCULAR ONCE
Status: COMPLETED | OUTPATIENT
Start: 2022-09-30 | End: 2022-09-30

## 2022-09-30 RX ADMIN — LEVETIRACETAM 500 MG: 500 TABLET, FILM COATED ORAL at 08:49

## 2022-09-30 RX ADMIN — VANCOMYCIN HYDROCHLORIDE 1250 MG: 5 INJECTION, POWDER, LYOPHILIZED, FOR SOLUTION INTRAVENOUS at 03:21

## 2022-09-30 RX ADMIN — THIAMINE HCL TAB 100 MG 100 MG: 100 TAB at 17:06

## 2022-09-30 RX ADMIN — LOSARTAN POTASSIUM 100 MG: 50 TABLET, FILM COATED ORAL at 08:49

## 2022-09-30 RX ADMIN — METOPROLOL SUCCINATE ER TABLETS 50 MG: 50 TABLET, FILM COATED, EXTENDED RELEASE ORAL at 08:49

## 2022-09-30 RX ADMIN — FOLIC ACID 1000 MCG: 1 TABLET ORAL at 08:49

## 2022-09-30 RX ADMIN — ESCITALOPRAM OXALATE 20 MG: 20 TABLET ORAL at 08:49

## 2022-09-30 RX ADMIN — VENLAFAXINE HYDROCHLORIDE 150 MG: 150 CAPSULE, EXTENDED RELEASE ORAL at 09:39

## 2022-09-30 RX ADMIN — IOPAMIDOL 100 ML: 755 INJECTION, SOLUTION INTRAVENOUS at 17:34

## 2022-09-30 RX ADMIN — THIAMINE HYDROCHLORIDE 100 MG: 100 INJECTION, SOLUTION INTRAMUSCULAR; INTRAVENOUS at 09:39

## 2022-09-30 RX ADMIN — LEVETIRACETAM 500 MG: 500 TABLET, FILM COATED ORAL at 20:09

## 2022-09-30 RX ADMIN — HYDRALAZINE HYDROCHLORIDE 10 MG: 20 INJECTION, SOLUTION INTRAMUSCULAR; INTRAVENOUS at 04:44

## 2022-09-30 RX ADMIN — CEFTRIAXONE SODIUM 1 G: 1 INJECTION, POWDER, FOR SOLUTION INTRAMUSCULAR; INTRAVENOUS at 11:48

## 2022-09-30 RX ADMIN — AMLODIPINE BESYLATE 5 MG: 5 TABLET ORAL at 08:49

## 2022-09-30 RX ADMIN — VANCOMYCIN HYDROCHLORIDE 750 MG: 5 INJECTION, POWDER, LYOPHILIZED, FOR SOLUTION INTRAVENOUS at 22:01

## 2022-09-30 RX ADMIN — VANCOMYCIN HYDROCHLORIDE 750 MG: 5 INJECTION, POWDER, LYOPHILIZED, FOR SOLUTION INTRAVENOUS at 12:38

## 2022-09-30 RX ADMIN — RISPERIDONE 2 MG: 2 TABLET ORAL at 21:56

## 2022-09-30 ASSESSMENT — ACTIVITIES OF DAILY LIVING (ADL)
ADLS_ACUITY_SCORE: 29
TOILETING_ISSUES: NO
ADLS_ACUITY_SCORE: 37
FALL_HISTORY_WITHIN_LAST_SIX_MONTHS: NO
ADLS_ACUITY_SCORE: 37
DIFFICULTY_EATING/SWALLOWING: NO
ADLS_ACUITY_SCORE: 29
ADLS_ACUITY_SCORE: 37
ADLS_ACUITY_SCORE: 37
DRESSING/BATHING_DIFFICULTY: NO
ADLS_ACUITY_SCORE: 25
CHANGE_IN_FUNCTIONAL_STATUS_SINCE_ONSET_OF_CURRENT_ILLNESS/INJURY: NO
WALKING_OR_CLIMBING_STAIRS_DIFFICULTY: NO
WEAR_GLASSES_OR_BLIND: YES
ADLS_ACUITY_SCORE: 25
ADLS_ACUITY_SCORE: 35
VISION_MANAGEMENT: WEAR GLASSES
ADLS_ACUITY_SCORE: 37
DOING_ERRANDS_INDEPENDENTLY_DIFFICULTY: YES
CONCENTRATING,_REMEMBERING_OR_MAKING_DECISIONS_DIFFICULTY: YES
DIFFICULTY_COMMUNICATING: NO

## 2022-09-30 NOTE — PROGRESS NOTES
SPIRITUAL HEALTH SERVICES Progress Note      Saw pt Eleanor ALCAZAR Lv per admission screening. Eleanor's , Asaf, also present.     Illness Narrative - Eleanor was confused and unable to articular medical history at this time.  Asaf shared about her history with brain injury and that she possibly had a seizure.     Distress - Ongoing health challenges.     Coping - Patient comes from [employer] vane tradition.  is source of support for Eleanor.     Plan - A  will continue to visit as able or per request by patient/family/staff.      Saud Cruz MDiv, [facility]  Lead Staff , [facility]  [phone]

## 2022-09-30 NOTE — SIGNIFICANT EVENT
Significant Event Note    Time of event: 2:40 AM September 30, 2022    Description of event:  Notified by nursing staff of positive blood culture: Positive cocci  Source is unclear, possible contamination    Plan:  Start IV vancomycin and repeat blood culture    Discussed with: bedside nurse    Kobe Mcduffie MD

## 2022-09-30 NOTE — PHARMACY-VANCOMYCIN DOSING SERVICE
Pharmacy Vancomycin Initial Note  Date of Service 2022  Patient's  1950  71 year old, female    Indication: Bacteremia (GPC)    Current estimated CrCl = Estimated Creatinine Clearance: 76.4 mL/min (based on SCr of 0.58 mg/dL).    Creatinine for last 3 days  2022: 11:22 PM Creatinine 0.98 mg/dL  2022:  7:17 AM Creatinine 0.80 mg/dL  2022:  3:10 AM Creatinine 0.58 mg/dL    Recent Vancomycin Level(s) for last 3 days  No results found for requested labs within last 72 hours.      Vancomycin IV Administrations (past 72 hours)                   vancomycin (VANCOCIN) 1,250 mg in sodium chloride 0.9 % 250 mL intermittent infusion (mg) 1,250 mg New Bag 22 0321                Nephrotoxins and other renal medications (From now, onward)    Start     Dose/Rate Route Frequency Ordered Stop    22 0300  vancomycin (VANCOCIN) 1,250 mg in sodium chloride 0.9 % 250 mL intermittent infusion         1,250 mg  over 90 Minutes Intravenous ONCE 22 0244            Contrast Orders - past 72 hours (72h ago, onward)    Start     Dose/Rate Route Frequency Stop    22 1330  perflutren lipid microsphere (DEFINITY) injection SUSP 3 mL         3 mL Intravenous ONCE 22 1303    22 0130  iopamidol (ISOVUE-370) solution 100 mL         100 mL Intravenous ONCE 22 0333          [employer] Prediction of Planned Initial Vancomycin Regimen  Loading dose: 1250 mg IV on 22 at 03:21  Regimen: 750 mg IV every 12 hours.  Start time: 11:00 on 2022  Exposure target: AUC24 (range)400-600 mg/L.hr   AUC24,ss: 479 mg/L.hr  Probability of AUC24 > 400: 69 %  Ctrough,ss: 14.8 mg/L  Probability of Ctrough,ss > 20: 26 %  Probability of nephrotoxicity (Lodise LINDSEY ): 10 %          Plan:  1. Start vancomycin  1250 mg IV (started infusing at 03:21), followed by vancomycin 750 mg IV q12h, with first dose of the regimen starting at 11:00 (~7.5 hours after loading dose); this timing will  minimize sub-therapeutic intervals   2. Vancomycin monitoring method: AUC  3. Vancomycin therapeutic monitoring goal: 400-600 mg*h/L  4. Pharmacy will check vancomycin levels as appropriate in 1-3 Days.    5. Serum creatinine levels will be ordered a minimum of twice weekly.      Isela Torrez [facility]

## 2022-09-30 NOTE — PROGRESS NOTES
[facility] Hospitalist Progress Note  [facility]  Admission date: 9/28/2022    Summary:    71 year old female who was brought to the ED by ambulance for evaluation of altered mental status in setting of recent diagnosis of seizure disorder and initiation of Keppra.  Past medical history of TBI, neurocognitive disorder, alcohol abuse, thiamine deficiency, alcohol cirrhosis, hepatitis C, PVCs, hypertension, BPPV, depression, allergic rhinitis, osteoporosis.    Assessment/Plan    #Fever -   -GPC from 1 of 2 culture sites in both bottles  -On vanco&CTX await results and repeat cultures.  -No other focal source.  CT A/P.  CT chest on admission without source.     #Acute metabolic encephalopathy likely due to complex partial seizure d/o and possible hypertensive encephalopathy  -EEG without seizure and MRI without acute findings.  -keppra 500BID  -BP control  -neurology consult    #Hypertensive crisis/emergency - better controlled now.  -resumed home losartan, toprol-xl, and started norvasc.  -IV labetalol and IV hydralazine PRN     #Lactic acidosis - resolved.  Likely was secondary to seizure, dehydration, and possible hypertensive crisis contributing as well.     #Stress hyperglycemia- Normal A1c. Improving     #minimally elevated high-sensitivity troponin T - due to severe HTN.  No s/s of ACS and limited ECHO without WMA.     #Polycythemia - hemoconcentrated    #Hx EtOH abuse - thiamine, folate    #Mood disorder - effexor and risperdal    #microscopic hematuria - long standing issue.  Getting CT as above.  F/u outpatient.     #alcohol related cognitive impairment    Checklist:  Code Status: Full Code    Diet: Combination Diet Low Saturated Fat Na <2400mg Diet, No Caffeine Diet    Gtz Catheter: Not present  Central Lines: None  DVT px:  Pneumatic Compression Devices        Auto-populated based on system request - if relevant they will be addressed above:  Clinically Significant Risk Factors Present on  Admission                 # Hypertension: home medication list includes antihypertensive(s)          Auto-populated from discharge tab:    Expected Discharge Date: 10/01/2022                 Overnight Events/Subjective/Notable results:  Noted ON events.  Tmax 100.3,   More alert today.  Cooperative.  Oriented to hospital.    No pain, HA, SOB, chest pain, abdominal pain    4 point ROS otherwise negative    Objective    Vital signs in last 24 hours  Temp:  [99.2  F (37.3  C)-100.3  F (37.9  C)] 100.3  F (37.9  C)  Pulse:  [] 110  Resp:  [11-22] 16  BP: (144-201)/(62-94) 159/73  SpO2:  [91 %-100 %] 95 % O2 Device: None (Room air)    Weight:   131 lbs 6.4 oz    Intake/Output last 3 shifts  I/O last 3 completed shifts:  In: 805 [I.V.:805]  Out: 550 [Urine:550]  Body mass index is 23.28 kg/m .    Physical Exam  General:  Alert, cooperative, no distress  Neurologic:  oriented to hospital but doesn't know which, facial symmetry preserved, fluent speech.  Neck supple and no pain with movement.  Psych: calm, mood and affect appropriate to situation  HEENT:  Anicteric, MMM  CV: RRR no MRG, normal S1 and S2, no edema  Lungs: CTAB.  Easyrespirations  Abd: soft, NT, normoactive BS  Skin: no rashes or jaundice noted on exposed skin.    Central Lines and Tubes: None (no trejo, CVC, feeding tubes)      I have reviewed all labs, medications, imaging studies in the last 24 hours.  Pertinent findings&changes discussed above.    Data      Jose Medina MD  Internal Medicine Hospitalist
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
## 2022-09-30 NOTE — PLAN OF CARE
Goal Outcome Evaluation:    Admitted to unit  around 07:30 am. Pt is alert to self only. Vs has been stable. No c/o pain. Tele is sinus tachy. Able to communicate needs.

## 2022-09-30 NOTE — PLAN OF CARE
"Goal Outcome Evaluation:      Pt alert and oriented to self, VSS, intermittent SBP >170mmhg prn bp meds given per MAR, beds rails padded for seizure precautions, blood culture drawn on 9/29 growing gram pos cocci in cluster DR Mcduffie informed and repeat BCX done and vanco started per MAR, pt denies pain, will continue to monitor.  Problem: Plan of Care - These are the overarching goals to be used throughout the patient stay.    Goal: Plan of Care Review/Shift Note  Description: The Plan of Care Review/Shift note should be completed every shift.  The Outcome Evaluation is a brief statement about your assessment that the patient is improving, declining, or no change.  This information will be displayed automatically on your shift note.  Outcome: Ongoing, Progressing  Goal: Patient-Specific Goal (Individualized)  Description: You can add care plan individualizations to a care plan. Examples of Individualization might be:  \"Parent requests to be called daily at 9am for status\", \"I have a hard time hearing out of my right ear\", or \"Do not touch me to wake me up as it startles me\".  Outcome: Ongoing, Progressing  Goal: Absence of Hospital-Acquired Illness or Injury  Outcome: Ongoing, Progressing  Intervention: Identify and Manage Fall Risk  Recent Flowsheet Documentation  Taken 9/30/2022 0500 by Josephine Hernandez, VERONIQUE  Safety Promotion/Fall Prevention:   fall prevention program maintained   clutter free environment maintained   patient and family education   nonskid shoes/slippers when out of bed  Taken 9/30/2022 0038 by Josephine Hernandez, RN  Safety Promotion/Fall Prevention:   fall prevention program maintained   clutter free environment maintained   patient and family education   nonskid shoes/slippers when out of bed  Taken 9/29/2022 2100 by Josephine Hernandez, RN  Safety Promotion/Fall Prevention:   fall prevention program maintained   clutter free environment maintained   patient and family education   nonskid shoes/slippers when out " of bed  Intervention: Prevent Skin Injury  Recent Flowsheet Documentation  Taken 9/30/2022 0500 by Josephine Hernandez RN  Body Position: position changed independently  Taken 9/30/2022 0038 by Josephine Hernandez RN  Body Position: position changed independently  Taken 9/29/2022 2100 by Josephine Hernandez RN  Body Position: position changed independently  Intervention: Prevent and Manage VTE (Venous Thromboembolism) Risk  Recent Flowsheet Documentation  Taken 9/30/2022 0500 by Josephine Hernandez RN  Activity Management: activity encouraged  Taken 9/30/2022 0038 by Josephine Hernandez RN  Activity Management: activity encouraged  Taken 9/29/2022 2100 by Josephine Hernandez RN  Activity Management: activity encouraged  Intervention: Prevent Infection  Recent Flowsheet Documentation  Taken 9/30/2022 0500 by Josephine Hernandez RN  Infection Prevention:   rest/sleep promoted   hand hygiene promoted  Taken 9/30/2022 0038 by Josephine Hernandez RN  Infection Prevention:   rest/sleep promoted   hand hygiene promoted  Taken 9/29/2022 2100 by Josephine Hernandez RN  Infection Prevention:   rest/sleep promoted   hand hygiene promoted  Goal: Optimal Comfort and Wellbeing  Outcome: Ongoing, Progressing  Goal: Readiness for Transition of Care  Outcome: Ongoing, Progressing     Problem: Risk for Delirium  Goal: Optimal Coping  Outcome: Ongoing, Progressing  Goal: Improved Behavioral Control  Outcome: Ongoing, Progressing  Goal: Improved Attention and Thought Clarity  Outcome: Ongoing, Progressing  Goal: Improved Sleep  Outcome: Ongoing, Progressing

## 2022-10-01 ENCOUNTER — APPOINTMENT (OUTPATIENT)
Dept: ULTRASOUND IMAGING | Facility: HOSPITAL | Age: 72
DRG: 100 | End: 2022-10-01
Attending: HOSPITALIST
Payer: COMMERCIAL

## 2022-10-01 LAB
ALBUMIN SERPL BCG-MCNC: 3.7 G/DL (ref 3.5–5.2)
ALP SERPL-CCNC: 47 U/L (ref 35–104)
ALT SERPL W P-5'-P-CCNC: 11 U/L (ref 10–35)
ANION GAP SERPL CALCULATED.3IONS-SCNC: 8 MMOL/L (ref 7–15)
AST SERPL W P-5'-P-CCNC: 19 U/L (ref 10–35)
BILIRUB DIRECT SERPL-MCNC: <0.2 MG/DL (ref 0–0.3)
BILIRUB SERPL-MCNC: 0.6 MG/DL
BUN SERPL-MCNC: 17.8 MG/DL (ref 8–23)
CALCIUM SERPL-MCNC: 8.7 MG/DL (ref 8.8–10.2)
CHLORIDE SERPL-SCNC: 104 MMOL/L (ref 98–107)
CREAT SERPL-MCNC: 0.63 MG/DL (ref 0.51–0.95)
CREAT SERPL-MCNC: 0.63 MG/DL (ref 0.51–0.95)
DEPRECATED HCO3 PLAS-SCNC: 24 MMOL/L (ref 22–29)
ERYTHROCYTE [DISTWIDTH] IN BLOOD BY AUTOMATED COUNT: 12.5 % (ref 10–15)
GFR SERPL CREATININE-BSD FRML MDRD: >90 ML/MIN/1.73M2
GFR SERPL CREATININE-BSD FRML MDRD: >90 ML/MIN/1.73M2
GLUCOSE SERPL-MCNC: 109 MG/DL (ref 70–99)
HCT VFR BLD AUTO: 38.1 % (ref 35–47)
HGB BLD-MCNC: 12.8 G/DL (ref 11.7–15.7)
LACTATE SERPL-SCNC: 0.8 MMOL/L (ref 0.7–2)
MCH RBC QN AUTO: 31 PG (ref 26.5–33)
MCHC RBC AUTO-ENTMCNC: 33.6 G/DL (ref 31.5–36.5)
MCV RBC AUTO: 92 FL (ref 78–100)
PLATELET # BLD AUTO: 144 10E3/UL (ref 150–450)
POTASSIUM SERPL-SCNC: 3.5 MMOL/L (ref 3.4–5.3)
PROT SERPL-MCNC: 6.2 G/DL (ref 6.4–8.3)
RBC # BLD AUTO: 4.13 10E6/UL (ref 3.8–5.2)
SODIUM SERPL-SCNC: 136 MMOL/L (ref 136–145)
VANCOMYCIN SERPL-MCNC: 8.3 UG/ML
WBC # BLD AUTO: 9.3 10E3/UL (ref 4–11)

## 2022-10-01 PROCEDURE — 82565 ASSAY OF CREATININE: CPT | Performed by: HOSPITALIST

## 2022-10-01 PROCEDURE — 99222 1ST HOSP IP/OBS MODERATE 55: CPT | Performed by: INTERNAL MEDICINE

## 2022-10-01 PROCEDURE — 36415 COLL VENOUS BLD VENIPUNCTURE: CPT | Performed by: HOSPITALIST

## 2022-10-01 PROCEDURE — 80048 BASIC METABOLIC PNL TOTAL CA: CPT | Performed by: HOSPITALIST

## 2022-10-01 PROCEDURE — 99232 SBSQ HOSP IP/OBS MODERATE 35: CPT | Performed by: PSYCHIATRY & NEUROLOGY

## 2022-10-01 PROCEDURE — 76705 ECHO EXAM OF ABDOMEN: CPT

## 2022-10-01 PROCEDURE — 83605 ASSAY OF LACTIC ACID: CPT | Performed by: HOSPITALIST

## 2022-10-01 PROCEDURE — 250N000013 HC RX MED GY IP 250 OP 250 PS 637: Performed by: HOSPITALIST

## 2022-10-01 PROCEDURE — 99233 SBSQ HOSP IP/OBS HIGH 50: CPT | Performed by: HOSPITALIST

## 2022-10-01 PROCEDURE — 120N000001 HC R&B MED SURG/OB

## 2022-10-01 PROCEDURE — 258N000003 HC RX IP 258 OP 636: Performed by: HOSPITALIST

## 2022-10-01 PROCEDURE — 85027 COMPLETE CBC AUTOMATED: CPT | Performed by: HOSPITALIST

## 2022-10-01 PROCEDURE — G0008 ADMIN INFLUENZA VIRUS VAC: HCPCS | Performed by: HOSPITALIST

## 2022-10-01 PROCEDURE — 82248 BILIRUBIN DIRECT: CPT | Performed by: HOSPITALIST

## 2022-10-01 PROCEDURE — 87040 BLOOD CULTURE FOR BACTERIA: CPT | Performed by: HOSPITALIST

## 2022-10-01 PROCEDURE — 90662 IIV NO PRSV INCREASED AG IM: CPT | Performed by: HOSPITALIST

## 2022-10-01 PROCEDURE — 250N000011 HC RX IP 250 OP 636: Performed by: HOSPITALIST

## 2022-10-01 PROCEDURE — 80202 ASSAY OF VANCOMYCIN: CPT | Performed by: HOSPITALIST

## 2022-10-01 PROCEDURE — 80053 COMPREHEN METABOLIC PANEL: CPT | Performed by: HOSPITALIST

## 2022-10-01 RX ORDER — METOPROLOL TARTRATE 25 MG/1
50 TABLET, FILM COATED ORAL 2 TIMES DAILY
Status: DISCONTINUED | OUTPATIENT
Start: 2022-10-01 | End: 2022-10-01

## 2022-10-01 RX ORDER — METOPROLOL SUCCINATE 25 MG/1
25 TABLET, EXTENDED RELEASE ORAL ONCE
Status: COMPLETED | OUTPATIENT
Start: 2022-10-01 | End: 2022-10-01

## 2022-10-01 RX ORDER — AMLODIPINE BESYLATE 2.5 MG/1
2.5 TABLET ORAL DAILY
Status: DISCONTINUED | OUTPATIENT
Start: 2022-10-02 | End: 2022-10-04 | Stop reason: HOSPADM

## 2022-10-01 RX ORDER — METOPROLOL SUCCINATE 100 MG/1
100 TABLET, EXTENDED RELEASE ORAL DAILY
Status: DISCONTINUED | OUTPATIENT
Start: 2022-10-02 | End: 2022-10-01

## 2022-10-01 RX ORDER — METOPROLOL SUCCINATE 50 MG/1
50 TABLET, EXTENDED RELEASE ORAL ONCE
Status: DISCONTINUED | OUTPATIENT
Start: 2022-10-01 | End: 2022-10-01

## 2022-10-01 RX ADMIN — LOSARTAN POTASSIUM 100 MG: 50 TABLET, FILM COATED ORAL at 08:44

## 2022-10-01 RX ADMIN — METOPROLOL SUCCINATE ER TABLETS 50 MG: 50 TABLET, FILM COATED, EXTENDED RELEASE ORAL at 08:44

## 2022-10-01 RX ADMIN — CEFTRIAXONE SODIUM 1 G: 1 INJECTION, POWDER, FOR SOLUTION INTRAMUSCULAR; INTRAVENOUS at 08:43

## 2022-10-01 RX ADMIN — THIAMINE HCL TAB 100 MG 100 MG: 100 TAB at 08:44

## 2022-10-01 RX ADMIN — ESCITALOPRAM OXALATE 20 MG: 20 TABLET ORAL at 08:43

## 2022-10-01 RX ADMIN — VANCOMYCIN HYDROCHLORIDE 750 MG: 5 INJECTION, POWDER, LYOPHILIZED, FOR SOLUTION INTRAVENOUS at 11:46

## 2022-10-01 RX ADMIN — RISPERIDONE 2 MG: 2 TABLET ORAL at 22:50

## 2022-10-01 RX ADMIN — VANCOMYCIN HYDROCHLORIDE 750 MG: 5 INJECTION, POWDER, LYOPHILIZED, FOR SOLUTION INTRAVENOUS at 23:58

## 2022-10-01 RX ADMIN — LEVETIRACETAM 500 MG: 500 TABLET, FILM COATED ORAL at 20:15

## 2022-10-01 RX ADMIN — INFLUENZA A VIRUS A/VICTORIA/2570/2019 IVR-215 (H1N1) ANTIGEN (FORMALDEHYDE INACTIVATED), INFLUENZA A VIRUS A/DARWIN/9/2021 SAN-010 (H3N2) ANTIGEN (FORMALDEHYDE INACTIVATED), INFLUENZA B VIRUS B/PHUKET/3073/2013 ANTIGEN (FORMALDEHYDE INACTIVATED), AND INFLUENZA B VIRUS B/MICHIGAN/01/2021 ANTIGEN (FORMALDEHYDE INACTIVATED) 0.7 ML: 60; 60; 60; 60 INJECTION, SUSPENSION INTRAMUSCULAR at 11:46

## 2022-10-01 RX ADMIN — AMLODIPINE BESYLATE 5 MG: 5 TABLET ORAL at 08:45

## 2022-10-01 RX ADMIN — LEVETIRACETAM 500 MG: 500 TABLET, FILM COATED ORAL at 08:44

## 2022-10-01 RX ADMIN — VENLAFAXINE HYDROCHLORIDE 150 MG: 150 CAPSULE, EXTENDED RELEASE ORAL at 08:43

## 2022-10-01 RX ADMIN — FOLIC ACID 1000 MCG: 1 TABLET ORAL at 08:44

## 2022-10-01 RX ADMIN — METOPROLOL SUCCINATE 25 MG: 25 TABLET, EXTENDED RELEASE ORAL at 11:48

## 2022-10-01 ASSESSMENT — ACTIVITIES OF DAILY LIVING (ADL)
ADLS_ACUITY_SCORE: 35
ADLS_ACUITY_SCORE: 29
ADLS_ACUITY_SCORE: 35
ADLS_ACUITY_SCORE: 29
ADLS_ACUITY_SCORE: 35
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
## 2022-10-01 NOTE — SIGNIFICANT EVENT
Significant Event Note    Time of event: 5:19 AM October 1, 2022    Description of event:  Notified by nurse that patient triggered sepsis protocol due to tachycardia to 102. Broad spectrum antibiotics have already been initiated on this patient due to positive blood culture obtained on admission.     Patient is vitally stable aside from mild tachycardia. Lactates will be collected, although I expect these to be elevated and do not think any further work up needs to be completed at this point.     Plan:  - trend lactate per sepsis protocol   - continue empiric antibiotics at this time     Edna Chin MD PGY-1   [facility]

## 2022-10-01 NOTE — PLAN OF CARE
Problem: Plan of Care - These are the overarching goals to be used throughout the patient stay.    Goal: Plan of Care Review/Shift Note  Description: The Plan of Care Review/Shift note should be completed every shift.  The Outcome Evaluation is a brief statement about your assessment that the patient is improving, declining, or no change.  This information will be displayed automatically on your shift note.  Outcome: Ongoing, Progressing     Problem: Plan of Care - These are the overarching goals to be used throughout the patient stay.    Goal: Absence of Hospital-Acquired Illness or Injury  Intervention: Prevent Skin Injury  Recent Flowsheet Documentation  Taken 10/1/2022 0903 by Basilio Garcia RN  Body Position: position changed independently     Problem: Plan of Care - These are the overarching goals to be used throughout the patient stay.    Goal: Absence of Hospital-Acquired Illness or Injury  Intervention: Prevent and Manage VTE (Venous Thromboembolism) Risk  Recent Flowsheet Documentation  Taken 10/1/2022 1008 by Basilio Garcia, RN  Activity Management: activity adjusted per tolerance  Taken 10/1/2022 0903 by Basilio Garcia, RN  Activity Management:   activity adjusted per tolerance   up to commode     Problem: Risk for Delirium  Goal: Optimal Coping  Outcome: Ongoing, Progressing     Problem: Hypertension Comorbidity  Goal: Blood Pressure in Desired Range  Outcome: Ongoing, Progressing     Problem: Seizure Disorder Comorbidity  Goal: Maintenance of Seizure Control  Outcome: Ongoing, Progressing   Goal Outcome Evaluation:

## 2022-10-01 NOTE — PLAN OF CARE
Problem: Plan of Care - These are the overarching goals to be used throughout the patient stay.    Goal: Absence of Hospital-Acquired Illness or Injury  Intervention: Prevent and Manage VTE (Venous Thromboembolism) Risk  Recent Flowsheet Documentation  Taken 9/30/2022 2100 by Lacey Puentes RN  Activity Management: activity adjusted per tolerance  Taken 9/30/2022 1700 by Lacey Puentes RN  Activity Management: activity adjusted per tolerance     Problem: Risk for Delirium  Goal: Improved Attention and Thought Clarity  Outcome: Ongoing, Progressing     Problem: Seizure Disorder Comorbidity  Goal: Maintenance of Seizure Control  Outcome: Ongoing, Progressing     Goal Outcome Evaluation: Patient alert  and oriented to self, and place, reported no pain throughout the shift,  seizure precaution maintained.

## 2022-10-01 NOTE — PLAN OF CARE
Problem: Plan of Care - These are the overarching goals to be used throughout the patient stay.    Goal: Optimal Comfort and Wellbeing  Outcome: Ongoing, Progressing   Goal Outcome Evaluation:                    Pt oriented to self only. No s/s of pain.  LSC. BS active. Purewick in place.    BC + for  Gram + cocci in cluster. Also triggered Sepsis BPA. Lactic 0.8  House officer notified.

## 2022-10-01 NOTE — PROGRESS NOTES
"CLINICAL NUTRITION SERVICES - ASSESSMENT NOTE     Nutrition Prescription    RECOMMENDATIONS FOR MDs/PROVIDERS TO ORDER:    Malnutrition Status:    Unable to determine-need nutrition hx and NFPE    Recommendations already ordered by Registered Dietitian (RD):  Add 1 ensure enlive daily to meal tray    Future/Additional Recommendations:  Follow po intake, weight, labs, poc     REASON FOR ASSESSMENT  Eleanor Awan is a/an 71 year old female assessed by the dietitian for Admission Nutrition Risk Screen for positive-unsure about weight loss and noted to be eating fine PTA    Pt with past medical history of TBI, neurocognitive disorder, alcohol abuse, thiamine deficiency, alcoholic cirrhosis, hepatitis C, PVCs, hypertension, BPPV, depression, allergic rhinitis, osteoporosis. She was admitted with 1 day of AMS in the setting of recent dx of seizure disorder and initiation of Keppra.  Found to have lactic acidosis and hypertensive crisis thought to be due to seizure and hypertensive encephalopathy.    NUTRITION HISTORY  Unable to obtain as pt is out of her room    CURRENT NUTRITION ORDERS  Diet: Low Saturated Fat/2400 mg Sodium  Intake/Tolerance: 50% of those meals recorded    LABS  Labs reviewed: Na 136, K 3.5, alb 3.7, Glu 109    MEDICATIONS  Medications reviewed: norvasc, rocephin, folic acid, keppra, thiamine, vancomycin    ANTHROPOMETRICS  Height: 160 cm (5' 3\")  Most Recent Weight: 59.6 kg (131 lb 6.4 oz)    IBW: 52.3 kg  BMI: Normal BMI  Weight History:   Wt Readings from Last 10 Encounters:   09/30/22 59.6 kg (131 lb 6.4 oz)   09/09/22 61.2 kg (135 lb)   07/14/22 61.2 kg (135 lb)   04/29/22 61.3 kg (135 lb 4 oz)   04/13/22 63 kg (139 lb)   03/18/22 61.7 kg (136 lb)   10/26/21 63.3 kg (139 lb 8 oz)   09/01/21 61.6 kg (135 lb 12 oz)   05/14/21 63.8 kg (140 lb 12 oz)   03/17/21 62 kg (136 lb 12 oz)   weight down 4 lb in 3 weeks (2.9%)    Dosing Weight: 59.6 kg/ current weight    ASSESSED NUTRITION NEEDS  Estimated " Energy Needs: 1490+ kcals/day (25+ Johann/Kg)  Justification: Maintenance  Estimated Protein Needs: 60 grams protein/day (1 g/Kg)  Justification: Maintenance  Estimated Fluid Needs: 9168-6311 mL/day (25 - 30 mL/kg)   Justification: Maintenance    PHYSICAL FINDINGS  See malnutrition section below.  Skin: intact  Ganesh score=19, nutrition noted as adequate  GI: WDL per nurse  Last BM not documented    MALNUTRITION:  % Weight Loss:  Weight loss does not meet criteria for malnutrition   % Intake:  No decreased intake noted per admit nutrition risk screen  Subcutaneous Fat Loss:  Unable to view pt  Muscle Loss:  Unable to view pt  Fluid Retention:  None noted    Malnutrition Diagnosis: Unable to determine due to need nutrition history and NFPE    NUTRITION DIAGNOSIS  Inadequate oral intake related to acute illness as evidenced by taking 50% of meals      INTERVENTIONS  Implementation  Nutrition Education: Not appropriate at this time due to patient condition   Medical food supplement therapy     Goals  Patient to consume % of nutritionally adequate meals three times per day, or the equivalent with supplements/snacks.  Maintain weight     Monitoring/Evaluation  Progress toward goals will be monitored and evaluated per protocol.

## 2022-10-01 NOTE — PHARMACY-VANCOMYCIN DOSING SERVICE
Vancomycin dosing.  Current dose = 750mg iv q12h  +BC-Gm + cocci in clusters in 1 of 2  on 2 different days.  Will order vancomycin level tonight at 9pm (~2 hrs before 2300 dose)  Will follow up dosing when level is back.  Matthew Martino RPH on 10/1/2022 at 9:32 AM

## 2022-10-01 NOTE — PROGRESS NOTES
Care Management Follow Up    Length of Stay (days): 2    Expected Discharge Date: 10/03/2022     Concerns to be Addressed: US, IV ABX, cultures pending. ID consult     Patient plan of care discussed at interdisciplinary rounds: Yes    Anticipated Discharge Disposition:  Home     Anticipated Discharge Services:    Anticipated Discharge DME:      Patient/family educated on Medicare website which has current facility and service quality ratings:    Education Provided on the Discharge Plan:    Patient/Family in Agreement with the Plan:      Referrals Placed by CM/SW:  None at this time  Private pay costs discussed: Not applicable    Additional Information:  Chart review:  Lives with spouse. Has a history of TBI and goal is home. CM to follow for medical progression, recommendations and final discharge plan.      Ronit Argueta RN
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
## 2022-10-01 NOTE — PROGRESS NOTES
[facility] Hospitalist Progress Note  [facility]  Admission date: 9/28/2022    Summary:    71F with who presented with 1 day of AMS in the setting of recent diagnosis of seizure disorder and initiation of Keppra.  Found to have a lactic acidosis and hypertensive crisis and presentation thought to be due to seizure and hypertensive encephalopathy.  Keppra load IV.  Lactate and hemodynamics improved with IVF and anti-HTNives.  Now growing GPCs in blood.  ? If underlying infection triggered presentation.    Past medical history of TBI, neurocognitive disorder, alcohol abuse, thiamine deficiency, alcohol cirrhosis, hepatitis C, PVCs, hypertension, BPPV, depression, allergic rhinitis, osteoporosis.    Assessment/Plan    #Fever, GPC bacteremia -   -GPCs now from 2 of 4 cultures.  Await speciation.  ? Real vs. recurrent contaminant.  Would treat as real for now given leukocytosis and low grade temps.  -no clear source on CT CAP and urinalysis.  Some ?of gallbladder wall thickening and urothelial thickening on CT A/P but UA without significant pyuria and no RUQ pain to suggest cholecystits.  ? Translocation event from processes in ureter.   Speciation should help  -check RUQ US  -continue vanco and ceftriaxone for now pending culture data  -ID consult.    #Atrial tachycardia - increase toprol to 100mg     #Acute metabolic encephalopathy likely due to complex partial seizure d/o and possible hypertensive encephalopathy  -EEG without seizure and MRI without acute findings.  -keppra 500BID  -BP control  -neurology consult    #Hypertensive crisis/emergency - better controlled now.  -resumed home losartan, toprol-xl increased to 100mg for atrial tach, and started norvasc.  -IV labetalol and IV hydralazine PRN     #Lactic acidosis - resolved.  Likely was secondary to seizure, dehydration, and possible hypertensive crisis contributing as well.    #microscopic hematuria - long standing issue on review of UAs.  L  proximal ureteral thickening on CT.  Needs urology f/u outpatient for evaluation.     #Stress hyperglycemia- Normal A1c. Improving     #minimally elevated high-sensitivity troponin T - due to severe HTN.  No s/s of ACS and limited ECHO without WMA.     #Polycythemia - hemoconcentrated    #Hx EtOH abuse - thiamine, folate    #Mood disorder - effexor and risperdal    #alcohol related cognitive impairment    Checklist:  Code Status: Full Code    Diet: Combination Diet Low Saturated Fat Na <2400mg Diet, No Caffeine Diet  Room Service    Gtz Catheter: Not present  Central Lines: None  DVT px:  Pneumatic Compression Devices        Auto-populated based on system request - if relevant they will be addressed above:  Clinically Significant Risk Factors Present on Admission                      Auto-populated from discharge tab:    Expected Discharge Date: 10/03/2022    Discharge Delays: IV Medication - consider oral or Home Infusion  Imaging Result Pending (enter specific test & time in comments)    Discharge Comments: EEG completed   +Blood cultures- IV abx         Overnight Events/Subjective/Notable results:  Low grade fevers and leukocytosis resolved  Asymptomatic atrial tach this AM  More alert today.  Cooperative.    No pain, HA, SOB, chest pain, abdominal pain, back pain or joint pains    4 point ROS otherwise negative    Objective    Vital signs in last 24 hours  Temp:  [98.4  F (36.9  C)-98.8  F (37.1  C)] 98.4  F (36.9  C)  Pulse:  [100-109] 109  Resp:  [17-19] 17  BP: (120-156)/(56-86) 156/75  SpO2:  [91 %-99 %] 94 % O2 Device: None (Room air)    Weight:   131 lbs 6.4 oz    Intake/Output last 3 shifts  I/O last 3 completed shifts:  In: 1060 [P.O.:800; I.V.:260]  Out: 850 [Urine:850]  Body mass index is 23.28 kg/m .    Physical Exam  General:  Alert, cooperative, no distress  Neurologic:  oriented to hospital, facial symmetry preserved, fluent speech.  Neck supple and no pain with movement.  Psych: calm, mood and  affect appropriate to situation  HEENT:  Anicteric, MMM  CV: RRR no MRG, normal S1 and S2, no edema  Lungs: CTAB.  Easyrespirations  Abd: soft, no RUQ TTP, NT, normoactive BS, no flank or CVA TTP  Skin: no rashes or jaundice noted on exposed skin.    MSK: no back or joint pain.s  Central Lines and Tubes: None (no trejo, CVC, feeding tubes)      I have reviewed all labs, medications, imaging studies in the last 24 hours.  Pertinent findings&changes discussed above.    Data      Jose Medina MD  Internal Medicine Hospitalist

## 2022-10-02 LAB
ANION GAP SERPL CALCULATED.3IONS-SCNC: 5 MMOL/L (ref 7–15)
BACTERIA BLD CULT: ABNORMAL
BACTERIA BLD CULT: ABNORMAL
BUN SERPL-MCNC: 18.4 MG/DL (ref 8–23)
CALCIUM SERPL-MCNC: 8.6 MG/DL (ref 8.8–10.2)
CHLORIDE SERPL-SCNC: 106 MMOL/L (ref 98–107)
CREAT SERPL-MCNC: 0.58 MG/DL (ref 0.51–0.95)
DEPRECATED HCO3 PLAS-SCNC: 25 MMOL/L (ref 22–29)
ERYTHROCYTE [DISTWIDTH] IN BLOOD BY AUTOMATED COUNT: 12.2 % (ref 10–15)
GFR SERPL CREATININE-BSD FRML MDRD: >90 ML/MIN/1.73M2
GLUCOSE SERPL-MCNC: 98 MG/DL (ref 70–99)
HCT VFR BLD AUTO: 37.9 % (ref 35–47)
HGB BLD-MCNC: 12.3 G/DL (ref 11.7–15.7)
MCH RBC QN AUTO: 30.3 PG (ref 26.5–33)
MCHC RBC AUTO-ENTMCNC: 32.5 G/DL (ref 31.5–36.5)
MCV RBC AUTO: 93 FL (ref 78–100)
PLATELET # BLD AUTO: 143 10E3/UL (ref 150–450)
POTASSIUM SERPL-SCNC: 3.6 MMOL/L (ref 3.4–5.3)
RBC # BLD AUTO: 4.06 10E6/UL (ref 3.8–5.2)
SODIUM SERPL-SCNC: 136 MMOL/L (ref 136–145)
WBC # BLD AUTO: 6.1 10E3/UL (ref 4–11)

## 2022-10-02 PROCEDURE — 36415 COLL VENOUS BLD VENIPUNCTURE: CPT | Performed by: HOSPITALIST

## 2022-10-02 PROCEDURE — 250N000011 HC RX IP 250 OP 636: Performed by: INTERNAL MEDICINE

## 2022-10-02 PROCEDURE — 99232 SBSQ HOSP IP/OBS MODERATE 35: CPT | Performed by: PSYCHIATRY & NEUROLOGY

## 2022-10-02 PROCEDURE — 250N000011 HC RX IP 250 OP 636: Performed by: HOSPITALIST

## 2022-10-02 PROCEDURE — 120N000001 HC R&B MED SURG/OB

## 2022-10-02 PROCEDURE — 250N000013 HC RX MED GY IP 250 OP 250 PS 637: Performed by: HOSPITALIST

## 2022-10-02 PROCEDURE — 99232 SBSQ HOSP IP/OBS MODERATE 35: CPT | Performed by: INTERNAL MEDICINE

## 2022-10-02 PROCEDURE — 99232 SBSQ HOSP IP/OBS MODERATE 35: CPT | Performed by: HOSPITALIST

## 2022-10-02 PROCEDURE — 85027 COMPLETE CBC AUTOMATED: CPT | Performed by: HOSPITALIST

## 2022-10-02 PROCEDURE — 80048 BASIC METABOLIC PNL TOTAL CA: CPT | Performed by: HOSPITALIST

## 2022-10-02 RX ORDER — CEFAZOLIN SODIUM 2 G/100ML
2 INJECTION, SOLUTION INTRAVENOUS EVERY 8 HOURS
Status: DISCONTINUED | OUTPATIENT
Start: 2022-10-02 | End: 2022-10-04

## 2022-10-02 RX ORDER — VANCOMYCIN HYDROCHLORIDE 1 G/200ML
1000 INJECTION, SOLUTION INTRAVENOUS EVERY 12 HOURS
Status: DISCONTINUED | OUTPATIENT
Start: 2022-10-02 | End: 2022-10-02

## 2022-10-02 RX ADMIN — LOSARTAN POTASSIUM 100 MG: 50 TABLET, FILM COATED ORAL at 09:06

## 2022-10-02 RX ADMIN — THIAMINE HCL TAB 100 MG 100 MG: 100 TAB at 09:06

## 2022-10-02 RX ADMIN — LEVETIRACETAM 500 MG: 500 TABLET, FILM COATED ORAL at 21:01

## 2022-10-02 RX ADMIN — VENLAFAXINE HYDROCHLORIDE 150 MG: 150 CAPSULE, EXTENDED RELEASE ORAL at 09:06

## 2022-10-02 RX ADMIN — RISPERIDONE 2 MG: 2 TABLET ORAL at 21:01

## 2022-10-02 RX ADMIN — VANCOMYCIN HYDROCHLORIDE 1000 MG: 1 INJECTION, SOLUTION INTRAVENOUS at 09:10

## 2022-10-02 RX ADMIN — LEVETIRACETAM 500 MG: 500 TABLET, FILM COATED ORAL at 09:06

## 2022-10-02 RX ADMIN — FOLIC ACID 1000 MCG: 1 TABLET ORAL at 09:06

## 2022-10-02 RX ADMIN — CEFAZOLIN SODIUM 2 G: 2 INJECTION, SOLUTION INTRAVENOUS at 21:00

## 2022-10-02 RX ADMIN — AMLODIPINE BESYLATE 2.5 MG: 2.5 TABLET ORAL at 09:06

## 2022-10-02 RX ADMIN — ESCITALOPRAM OXALATE 20 MG: 20 TABLET ORAL at 09:06

## 2022-10-02 RX ADMIN — METOPROLOL SUCCINATE 75 MG: 25 TABLET, EXTENDED RELEASE ORAL at 09:06

## 2022-10-02 ASSESSMENT — ACTIVITIES OF DAILY LIVING (ADL)
ADLS_ACUITY_SCORE: 31
ADLS_ACUITY_SCORE: 29
ADLS_ACUITY_SCORE: 31
ADLS_ACUITY_SCORE: 29
ADLS_ACUITY_SCORE: 29
ADLS_ACUITY_SCORE: 31

## 2022-10-02 NOTE — PROGRESS NOTES
"INFECTIOUS DISEASE FOLLOW UP NOTE      ASSESSMENT:    1. Positive blood cultures -- one of two sets  now identified as staph hominis (coag neg staph), ID on positive 1 of 2 sets from  also staph hominis. Staph hominis in one of two sets is usually a contaminant, and she does not have foreign body in place (valve replacement, pacemaker, central line, etc) to put her at risk for true coag neg staph bacteremia. 2 of 4 cultures of blood positive prior to antibiotics for it looks like same organism -- either coincidence and 2 episodes of skin contaminant or could be true transient bacteremia. Generally coag neg staph bacteremia in absence of foreign body is self-limited, treatable with short course of IV antibiotics. Staph hominis in this case is methicillin-sensitive.  2. Admitted with hypertensive crisis. Likely had seizure. Improved.   3. H/o TBI, seizure disorder    PLAN:  Cefazolin, duration 5-7 days from , so last day 10/4-6.   If staph hominis susceptibilities do not match between the 2 isolates, these are likely contaminants and cefazolin could be stopped.     Ernesto Dumont MD  [facility]  [phone] [facility] paging    ______________________________________________________________________    SUBJECTIVE / INTERVAL HISTORY: no fever. Still with some incoordination in legs. Feeling better. Tolerating antibiotics.  Reviewed results.     ROS: All other systems negative except as listed above.        OBJECTIVE:  /62 (BP Location: Right arm)   Pulse 73   Temp 99.3  F (37.4  C) (Oral)   Resp 18   Ht 1.6 m (5' 3\")   Wt 59.6 kg (131 lb 6.4 oz)   SpO2 97%   BMI 23.28 kg/m         Vital Signs  Temp: 99.3  F (37.4  C)  Temp src: Oral  Resp: 18  Pulse: 73  Pulse Rate Source: Monitor  BP: 131/62  BP Location: Right arm    Temp (24hrs), Av.8  F (37.1  C), Min:98.4  F (36.9  C), Max:99.3  F (37.4  C)      GEN: No acute distress.  Still some confusion.  here. "   RESPIRATORY:  Normal breathing pattern.   CARDIOVASCULAR:  Regular rate and rhythm. Normal S1 and S2. No murmur, click, gallop or rub.   ABDOMEN:  Soft, normal bowel sounds, non-tender, no masses  EXTREMITIES: No edema.  SKIN/HAIR/NAILS:  No rashes  IV: peripheral        Antibiotics:  ceftriaxone 9/30-10/1  Vancomycin 9/30-    Pertinent labs:    Recent Labs   Lab 10/02/22  0723 10/01/22  0519 09/30/22  0310   WBC 6.1 9.3 16.2*   HGB 12.3 12.8 14.5   HCT 37.9 38.1 42.8   * 144* 198        Recent Labs   Lab 10/02/22  0723 10/01/22  0519 09/30/22  0310    136 138   CO2 25 24 24   BUN 18.4 17.8 20.3        Lab Results   Component Value Date    CRP <0.1 05/15/2019    CRP <0.1 06/29/2018         Lab Results   Component Value Date    ALT 11 10/01/2022    AST 19 10/01/2022    GGT 24 04/29/2022    ALKPHOS 47 10/01/2022         MICROBIOLOGY DATA:  9/29 blood one of two -- GPC clusters -->staph hominis (methicillin-sensitive)  9/30 one of two -- GPC clusters --> staph hominius   10/1 pending    RADIOLOGY:  MR Brain w/o Contrast    Result Date: 9/29/2022  EXAM: MR BRAIN W/O CONTRAST LOCATION: [facility] DATE/TIME: 9/29/2022 12:24 PM INDICATION: Hypertension. Encephalopathy. COMPARISON: MRI of the brain 08/13/2019. MRI of the brain 03/08/2014. TECHNIQUE: Routine multiplanar multisequence head MRI without intravenous contrast. FINDINGS: INTRACRANIAL CONTENTS: No acute or subacute infarct. New FLAIR hyperintense signal changes involving the right precentral gyrus anterior right parietal lobe on axial FLAIR image 21. Chronic appearing lacunar infarct involving the left basal ganglia is new from 2020 imaging. No extra-axial fluid collection. No midline shift. Mild to moderate cerebral volume loss is mildly to moderately progressed from 2020 imaging. Scattered nonspecific T2/FLAIR hyperintensities within the cerebral white matter and yon most consistent with mild chronic microvascular ischemic  change. There is small area of focal volume loss in the anterior left temporal lobe with adjacent T2/FLAIR hyperintense signal, best visualized on axial FLAIR image 11. These FLAIR signal changes have increased over serial examinations dating back to 2014.   SELLA: No abnormality accounting for technique. OSSEOUS STRUCTURES/SOFT TISSUES: Normal marrow signal. The major intracranial vascular flow voids are maintained. ORBITS: No abnormality accounting for technique. SINUSES/MASTOIDS: No paranasal sinus mucosal disease. No middle ear or mastoid effusion.     IMPRESSION: 1.  No acute infarct or acute intracranial hemorrhage. 2.  Foci of interval chronic appearing ischemic change involving the left basal ganglia and corona radiata in addition to the watershed region of the right cerebral hemisphere. 3.  Small area of volume loss with adjacent FLAIR/hyperintense signal change in the anterior left temporal lobe, most consistent with sequela of a previous insult. 4.  Mild to moderate cerebral and cerebellar volume loss is progressed from 2019 imaging.    US Abdomen Limited    Result Date: 10/1/2022  EXAM: ULTRASOUND ABDOMEN LIMITED LOCATION: [facility] DATE/TIME: 10/01/2022, 11:22 AM INDICATION: Altered mental status. COMPARISON: None. TECHNIQUE: Limited abdominal ultrasound. FINDINGS: GALLBLADDER: Minimal wall thickening and/or pericholecystic fluid evident. No definite gallstones. BILE DUCTS: No biliary dilatation. The common duct measures 6 mm. LIVER: Increased echogenicity from diffuse fatty infiltration. No focal mass. RIGHT KIDNEY: No hydronephrosis. PANCREAS: The visualized portions are normal. No ascites.     IMPRESSION: 1.  Mild gallbladder wall thickening and/or pericholecystic fluid, cholecystitis not excluded. 2.  No cholelithiasis. 3.  Mild possible fatty infiltration of the liver.     Echocardiogram Limited    Result Date: 9/29/2022  [id number]^[place] [address]  Name: BUD KEATING MRN: [id number] : 1950 Study Date: 2022 12:49 PM Age: 71 yrs Gender: Female Patient Location: [facility] Reason For Study: Hypertensive Heart Disease Ordering Physician: LACY HORTON Referring Physician: LACY HORTON Performed By:   BSA: 1.6 m2 Height: 64 in Weight: 120 lb HR: 106 ______________________________________________________________________________ Procedure Limited Echo Adult. Definity (NDC #95061-088) given intravenously. ______________________________________________________________________________ Interpretation Summary  Left ventricular size, wall motion and function are normal. The ejection fraction is 60-65%. Normal right ventricle size and systolic function. No hemodynamically significant valvular abnormalities on 2D or color flow imaging. ______________________________________________________________________________ Left Ventricle Left ventricular size, wall motion and function are normal. The ejection fraction is 60-65%. There is normal left ventricular wall thickness. No regional wall motion abnormalities noted.  Right Ventricle Normal right ventricle size and systolic function.  Atria Normal left atrial size. Right atrial size is normal. There is no color Doppler evidence of an atrial shunt.  Mitral Valve Mitral valve leaflets appear normal. There is no evidence of mitral stenosis or clinically significant mitral regurgitation.  Tricuspid Valve Right ventricle systolic pressure estimate normal.  Aortic Valve Aortic valve leaflets appear normal. There is no evidence of aortic stenosis or clinically significant aortic regurgitation.  Pulmonic Valve The pulmonic valve is not well seen, but is grossly normal. This degree of valvular regurgitation is within normal limits.  Vessels The aorta root is normal. Normal size ascending aorta. IVC diameter <2.1 cm collapsing >50% with sniff suggests a normal RA  pressure of 3 mmHg.  Pericardium There is no pericardial effusion.  ______________________________________________________________________________ Time Measurements  MM HR: 106.0 BPM  ______________________________________________________________________________ Report approved by: Mane Olivares 09/29/2022 02:48 PM       CT Abdomen Pelvis w Contrast    Result Date: 9/30/2022  EXAM: CT ABDOMEN PELVIS W CONTRAST LOCATION: [facility] DATE/TIME: 9/30/2022 5:37 PM INDICATION: Bacteremia of unknown origin; altered mental status. COMPARISON: Chest CT 09/29/2022. TECHNIQUE: CT scan of the abdomen and pelvis was performed following injection of IV contrast. Multiplanar reformats were obtained. Dose reduction techniques were used. CONTRAST: 100 mL Isovue-370 FINDINGS: LOWER CHEST: Small hiatal hernia. HEPATOBILIARY: Liver enhances normally and is normal in size. Gallbladder is upper limits of normal size, with prominent gallbladder wall edema. There is high attenuation material within the gallbladder, compatible with vicarious excretion of contrast from yesterday's CT examination. Correlation with sonography is recommended to exclude an acute cholecystitis. No intrahepatic or intrahepatic biliary ductal dilatation. PANCREAS: Enhances normally. No peripancreatic inflammatory fat stranding. SPLEEN: Enhances normally. Normal size. ADRENAL GLANDS: Normal. KIDNEYS: Both kidneys enhance symmetrically, without hydronephrosis. No nephroureterolithiasis. Mild urothelial thickening and enhancement of the proximal left ureter; correlate with urinalysis to exclude upper urinary tract infection. Low-attenuation subcentimeter renal lesion(s). These are compatible with small benign cysts and no specific imaging evaluation or follow-up is recommended. Urinary bladder is mildly distended. PELVIC ORGANS: No suspicious abnormality. BOWEL: No evidence of acute gastrointestinal inflammation or obstruction. Colonic  diverticulosis, without evidence of diverticulitis. Normal appendix. No intraperitoneal free fluid or free air. LYMPH NODES: No suspicious abdominal or pelvic lymphadenopathy. VASCULATURE: No abdominal aortic aneurysm. Predominantly moderate atheromatous disease, with moderate to severe ostial narrowing of the superior mesenteric artery origin; distal patency is preserved. MUSCULOSKELETAL: No suspicious abnormality. OTHER: No additionally significant abnormalities.     IMPRESSION: 1.  Significant gallbladder wall edema, which can be seen in the setting of acute cholecystitis, hepatitis, fluid overload, or hypoproteinemia. Correlation with sonography recommended to exclude acute cholecystitis. 2.  Mild urothelial thickening and enhancement of the proximal left ureter. Correlation with urinalysis recommended to exclude an upper urinary tract infection. 3.  Colonic diverticulosis, without evidence of diverticulitis.     CT Chest Pulmonary Embolism w Contrast    Result Date: 9/29/2022  EXAM: CT CHEST PULMONARY EMBOLISM W CONTRAST LOCATION: [facility] DATE/TIME: 9/29/2022 3:33 AM INDICATION: TACHYCARDIA, SOB COMPARISON: Chest radiograph 08/17/2020. TECHNIQUE: CT chest pulmonary angiogram during arterial phase injection of IV contrast. Multiplanar reformats and MIP reconstructions were performed. Dose reduction techniques were used. CONTRAST: isovue 370 100ml FINDINGS: ANGIOGRAM CHEST: No pulmonary embolus. No aortic aneurysm or dissection. LUNGS AND PLEURA: 5 mm and 6 mm lobulated subpleural nodules in the right middle lobe consistent with benign intrapulmonary lymph nodes (image 178 of series 6). Small benign calcified granuloma at the base of the left lower lobe. No acute lung consolidation, pleural effusion or pneumothorax. MEDIASTINUM/AXILLAE: Normal. CORONARY ARTERY CALCIFICATION: Moderate. UPPER ABDOMEN: Normal. MUSCULOSKELETAL: Degenerative changes of the spine.     IMPRESSION: No  pulmonary embolus or other acute process in the chest.    Head CT w/o contrast    Result Date: 2022  EXAM: CT HEAD W/O CONTRAST LOCATION: [facility] DATE/TIME: 2022 11:40 PM INDICATION: Altered mental status. Confusion. COMPARISON: MRI brain dated 2022. MRI brain dated 2020. TECHNIQUE: Routine CT Head without IV contrast. Multiplanar reformats. Dose reduction techniques were used. FINDINGS: INTRACRANIAL CONTENTS: No intracranial hemorrhage, extraaxial collection, or mass effect. No acute large territory infarction. Small region of encephalomalacia identified involving the left temporal lobe, likely reflects a sequela of remote injury. Chronic left basal ganglia lacunar type infarctions. Mild to moderate presumed chronic small vessel ischemic changes. Mild generalized volume loss. No hydrocephalus. VISUALIZED ORBITS/SINUSES/MASTOIDS: No intraorbital abnormality. No paranasal sinus mucosal disease. No middle ear or mastoid effusion. BONES/SOFT TISSUES: No acute abnormality.     IMPRESSION: 1.  No CT evidence for acute intracranial process. 2.  Brain atrophy and chronic changes, as above.    EEG Awake or Drowsy Routine    Result Date: 2022  ELECTROENCEPHALOGRAM (EEG) REPORT [facility] NEUROLOGY [place] [address] [place] [address] Tel: [phone]  Fax: [phone] [email or web address] Eleanor Awan, ISA 1950, MRN [id number] PCP: Camille Love Date: 2022 Principal Diagnosis: Complex partial seizures History : Patient is being evaluated for complex partial seizures. Medication listed includes Keppra Description of the Recording:  This is a multichannel digital EEG recording done using the international 10-20 placement system. Background of the recording consists of an irregular 5-6 hz activity with an amplitude of 30-40  V.  In addition, occasionally there is  1-2 Hz activity with similar amplitude.  This diffusely slow  background attenuates with alerting procedures.  Photic stimulation performed with eyes open, according to the standard protocol, minimal change.  Hyperventilation was not performed.  Sleep was not obtained. During this recording, no sharp discharges, spikes or electrographic seizure activity was recorded. Impression: This is an abnormal EEG due to diffusely slow background in theta and delta range that suggests a nonspecific generalized cerebral dysfunction.  Such slowing can be seen in metabolic or toxic abnormalities, clinical correlation is recommended.  No sharp discharges or spikes were recorded during this recording to suggest a seizure disorder. Classification: Dysrhythmia grade II generalized                          Delta grade I generalized Rainer Mahajan MD [facility] (Former[facility], [facility] This note was dictated using voice recognition software.  Any grammatical or context distortions are unintentional and inherent to the software.     EEG Awake or Drowsy Routine    Result Date: 2022  ELECTROENCEPHALOGRAM (EEG) REPORT [facility] NEUROLOGY [place] [address] [place] [address] Tel: [phone]  Fax: [phone] [email or web address] Eleanor Awan,  1950, MRN [id number] PCP: Camille Love Date: 2022 Principal Diagnosis: Seizure-like activity (H) History : Patient is being evaluated for seizure-like activity. Medication listed includes no anticonvulsant Description of the Recording:  This is a multichannel digital EEG recording done using the international 10-20 placement system. Background of the recording consists of an irregular 8-9 hz activity with an amplitude of 20-40  V.  In addition, occasionally there is  4-5  hz activity with maximum voltage emphasis in the left hemisphere.  This diffusely slow background attenuates with alerting procedures.  Photic stimulation performed with eyes open,  according to the standard protocol, minimal change.  Hyperventilation was not performed.  Sleep was not obtained. Occasionally sharp discharges are seen in the left hemisphere associated with paroxysmal slowing but no clear evolution into focal or generalized rhythmic activity was seen Impression: This is an abnormal EEG due to paroxysmal slowing of the background and theta range that is maximally expressed in the left hemisphere associated with sharp discharges that suggest a nonspecific cerebral dysfunction with low threshold for focal seizure Classification: Dysrhythmia grade III left temporal                          Dysrhythmia grade II generalized maximum left Rainer Mahajan MD [facility] (Formerly, [facility], .[facility] This note was dictated using voice recognition software.  Any grammatical or context distortions are unintentional and inherent to the software.

## 2022-10-02 NOTE — PLAN OF CARE
Problem: Risk for Delirium  Goal: Optimal Coping  Outcome: Ongoing, Not Progressing   Goal Outcome Evaluation:    Pt has had no seizures. She knows who she is and recognizes her . She does not know the date or why she is here. She also often tells staff that she doesn't have to use the bathroom, when she has gone in her brief. She refuses to get up to BR, however, she can ambulate there with SBA. She did get up with CNA to bedside commode. She ate breakfast and lunch. She took all of her meds without issue.
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
## 2022-10-02 NOTE — PROGRESS NOTES
[facility] Hospitalist Progress Note  [facility]    Admission date: 9/28/2022    Summary:    71F with who presented with 1 day of AMS in the setting of recent diagnosis of seizure disorder and initiation of Keppra.  Found to have a lactic acidosis and hypertensive crisis and presentation thought to be due to seizure and hypertensive encephalopathy.  Keppra load IV.  Lactate and hemodynamics improved with IVF and anti-HTNives.  Now growing GPCs in blood.  Unclear if this is contamination or bacteremia.     PMH of TBI, neurocognitive disorder, alcohol abuse, thiamine deficiency, alcohol cirrhosis, hepatitis C, PVCs, hypertension, BPPV, depression, allergic rhinitis, osteoporosis.    Assessment/Plan    Acute metabolic encephalopathy likely due to complex partial seizure  -No recurrence of seizure activity  -CT of the head shows atrophy with no acute abnormality  -MRI without acute findings.  -EEG shows nonspecific slowing in theta and delta range no sharp activity seen to suggest seizures  -Echocardiogram shows normal ejection fraction with no significant valvular heart abnormality  -Seen by neurology. She is started on keppra 500BID. Keppra level was 14.   -Neurology consulted, appreciate recs    Hypertensive urgency  -Could be from complex seizure. Or hypertensive urgency could have contributed to encephalopathy as well. Not sure about medication compliance   -resumed home losartan, toprol-xl increased to 100mg for atrial tach, and started norvasc.  -BP is much better right now.   -Ct PRN IV labetalol and Ct PRN IV hydralazine    Fever, GPC bacteremia   -She had low grade temp on admit with leucocytosis. This could be from seizures too, but bacteremia in DD.   -GPCs now from 2 of 4 cultures. Real vs. recurrent contaminant on two occasions.  Would treat as real for now given leukocytosis and low grade temps.  -No clear source on CT CAP and urinalysis.  Some question of gallbladder wall thickening and  urothelial thickening on CT A/P but UA without significant pyuria and no RUQ pain to suggest cholecystits.    -ID consulted.  -Repeat BC negative so far  -ID changed Vanc to Ancef.  Plan for total 7 days course.     Atrial tachycardia - increase toprol to 100mg  - HR is now better controlled     Lactic acidosis - resolved.  Likely was secondary to seizure, dehydration, and possible hypertensive crisis contributing as well.    Microscopic hematuria - long standing issue on review of UAs.  L proximal ureteral thickening on CT.  Needs urology f/u outpatient for evaluation.     Stress hyperglycemia- Normal A1c. Improving     Minimally elevated high-sensitivity troponin T - due to severe HTN.  No s/s of ACS and limited ECHO without WMA.     Polycythemia - hemoconcentration. Now better.     Hx EtOH abuse - Ct thiamine, folate    Mood disorder -Ct effexor and risperdal    Alcohol related cognitive impairment    Code Status: Full Code    Diet: Combination Diet Low Saturated Fat Na <2400mg Diet, No Caffeine Diet  Room Service  Snacks/Supplements Adult: Ensure Enlive; With Meals    Gtz Catheter: Not present  Central Lines: None  DVT px:  Pneumatic Compression Devices    Disposition. Based on PT, OT assess. Home Vs Rehab. Barriers. Resolution of confusion. Anticipate 1-2 days       Objective    Vital signs in last 24 hours  Temp:  [98.4  F (36.9  C)-99.3  F (37.4  C)] 98.4  F (36.9  C)  Pulse:  [73-86] 73  Resp:  [18-20] 18  BP: (109-133)/(57-65) 109/57  SpO2:  [93 %-97 %] 94 % O2 Device: None (Room air)    Weight:   131 lbs 6.4 oz    Intake/Output last 3 shifts  I/O last 3 completed shifts:  In: 822 [P.O.:822]  Out: -   Body mass index is 23.28 kg/m .    Physical Exam  General:  Alert, cooperative, no distress. She is confused. Knows the name of the hospital. Can not tell me the year. Can not tell me who is the president. Was able to tell me her date of birth.   Neurologic: confusion noted. Oriented to place only. Facial  symmetry preserved, fluent speech.  Neck supple and no pain with movement.  Psych: calm, mood and affect appropriate to situation  HEENT:  Anicteric, MMM  CV: RRR no MRG, normal S1 and S2, no edema  Lungs: CTAB.  Easyrespirations  Abd: soft, no RUQ TTP, NT, normoactive BS, no flank or CVA TTP  Skin: no rashes or jaundice noted on exposed skin.    MSK: no back or joint pain.s    Central Lines and Tubes: None (no trejo, CVC, feeding tubes)      I have reviewed all labs, medications, imaging studies in the last 24 hours.  Pertinent findings&changes discussed above.    Data      Tono Palomino MD  Internal Medicine Hospitalist

## 2022-10-02 NOTE — PLAN OF CARE
Goal Outcome Evaluation:    Plan of Care Reviewed With: patient     Overall Patient Progress: improving    Denies pain. Alert, oriented to self and place, also forgetful. Continue IV abx, Vanco trough drawn tonight. Awiting further cultures, per ID. Tele NSR, HR in the 80s. Seizure precautions maintained. Pt able to use call light and make needs known.

## 2022-10-02 NOTE — PHARMACY-VANCOMYCIN DOSING SERVICE
Pharmacy Vancomycin Note  Date of Service 2022  Patient's  1950   71 year old, female    Indication: Bacteremia  Day of Therapy: 3  Current vancomycin regimen:  750 mg IV q12h  Current vancomycin monitoring method: AUC  Current vancomycin therapeutic monitoring goal: 400-600 mg*h/L    Insight[employer] Prediction of Current Vancomycin Regimen  Loading dose: 1250mg  Regimen: 750 mg IV every 12 hours.  Start time: 08:10 on 10/02/2022  Exposure target: AUC24 (range)400-600 mg/L.hr   AUC24,ss: 365 mg/L.hr  Probability of AUC24 > 400: 32 %  Ctrough,ss: 10.1 mg/L  Probability of Ctrough,ss > 20: 1 %  Probability of nephrotoxicity (Lodise LINDSEY ): 6 %      Current estimated CrCl = Estimated Creatinine Clearance: 77.1 mL/min (based on SCr of 0.63 mg/dL).    Creatinine for last 3 days  2022:  3:10 AM Creatinine 0.58 mg/dL  10/1/2022:  5:19 AM Creatinine 0.63 mg/dL;  5:19 AM Creatinine 0.63 mg/dL    Recent Vancomycin Levels (past 3 days)  10/1/2022:  9:14 PM Vancomycin 8.3 ug/mL    Vancomycin IV Administrations (past 72 hours)                   vancomycin 750 mg in 0.9% NaCl 250 mL intermittent infusion 750 mg (mg) 750 mg Given 10/01/22 2358     750 mg Given  1146     750 mg Given 22 2201     750 mg Given  1238    vancomycin (VANCOCIN) 1,250 mg in sodium chloride 0.9 % 250 mL intermittent infusion (mg) 1,250 mg New Bag 22 0321                Nephrotoxins and other renal medications (From now, onward)    Start     Dose/Rate Route Frequency Ordered Stop    22 1100  vancomycin 750 mg in 0.9% NaCl 250 mL intermittent infusion 750 mg         750 mg  over 60 Minutes Intravenous EVERY 12 HOURS 22 0448               Contrast Orders - past 72 hours (72h ago, onward)    Start     Dose/Rate Route Frequency Stop    22 1800  iopamidol (ISOVUE-370) solution 100 mL         100 mL Intravenous ONCE 22 1734    22 1330  perflutren lipid microsphere (DEFINITY) injection SUSP 3 mL          3 mL Intravenous ONCE 09/29/22 1303          Interpretation of levels and current regimen:  Vancomycin level is reflective of -600    Has serum creatinine changed greater than 50% in last 72 hours: No    Renal Function: Stable    InsightRX Prediction of Planned New Vancomycin Regimen  Loading dose: N/A  Regimen: 1000 mg IV every 12 hours.  Start time: 08:10 on 10/02/2022  Exposure target: AUC24 (range)400-600 mg/L.hr   AUC24,ss: 484 mg/L.hr  Probability of AUC24 > 400: 84 %  Ctrough,ss: 13.5 mg/L  Probability of Ctrough,ss > 20: 8 %  Probability of nephrotoxicity (Lodise LINDSEY 2009): 9 %      Plan:  1. Increase Dose to 1000mg iv q12h  2. Vancomycin monitoring method: AUC  3. Vancomycin therapeutic monitoring goal: 400-600 mg*h/L  4. Pharmacy will check vancomycin levels as appropriate in 1-3 Days.  5. Serum creatinine levels will be ordered daily for the first week of therapy and at least twice weekly for subsequent weeks.    Matthew Martino, [facility]

## 2022-10-02 NOTE — PLAN OF CARE
Problem: Risk for Delirium  Goal: Improved Sleep  Outcome: Ongoing, Progressing   Goal Outcome Evaluation:                    Pt denies pain. LSC. BS active. Purewick in place.   NSR on tele.

## 2022-10-03 ENCOUNTER — APPOINTMENT (OUTPATIENT)
Dept: OCCUPATIONAL THERAPY | Facility: HOSPITAL | Age: 72
DRG: 100 | End: 2022-10-03
Attending: HOSPITALIST
Payer: COMMERCIAL

## 2022-10-03 ENCOUNTER — APPOINTMENT (OUTPATIENT)
Dept: PHYSICAL THERAPY | Facility: HOSPITAL | Age: 72
DRG: 100 | End: 2022-10-03
Attending: HOSPITALIST
Payer: COMMERCIAL

## 2022-10-03 LAB
ANION GAP SERPL CALCULATED.3IONS-SCNC: 8 MMOL/L (ref 7–15)
BACTERIA BLD CULT: ABNORMAL
BACTERIA BLD CULT: ABNORMAL
BUN SERPL-MCNC: 16.6 MG/DL (ref 8–23)
CALCIUM SERPL-MCNC: 8.7 MG/DL (ref 8.8–10.2)
CHLORIDE SERPL-SCNC: 103 MMOL/L (ref 98–107)
CREAT SERPL-MCNC: 0.68 MG/DL (ref 0.51–0.95)
CREAT SERPL-MCNC: 0.68 MG/DL (ref 0.51–0.95)
DEPRECATED HCO3 PLAS-SCNC: 25 MMOL/L (ref 22–29)
ERYTHROCYTE [DISTWIDTH] IN BLOOD BY AUTOMATED COUNT: 11.9 % (ref 10–15)
GFR SERPL CREATININE-BSD FRML MDRD: >90 ML/MIN/1.73M2
GFR SERPL CREATININE-BSD FRML MDRD: >90 ML/MIN/1.73M2
GLUCOSE SERPL-MCNC: 89 MG/DL (ref 70–99)
HCT VFR BLD AUTO: 37.7 % (ref 35–47)
HGB BLD-MCNC: 12.6 G/DL (ref 11.7–15.7)
MCH RBC QN AUTO: 31.3 PG (ref 26.5–33)
MCHC RBC AUTO-ENTMCNC: 33.4 G/DL (ref 31.5–36.5)
MCV RBC AUTO: 94 FL (ref 78–100)
PLATELET # BLD AUTO: 143 10E3/UL (ref 150–450)
POTASSIUM SERPL-SCNC: 3.8 MMOL/L (ref 3.4–5.3)
RBC # BLD AUTO: 4.03 10E6/UL (ref 3.8–5.2)
SODIUM SERPL-SCNC: 136 MMOL/L (ref 136–145)
WBC # BLD AUTO: 6.5 10E3/UL (ref 4–11)

## 2022-10-03 PROCEDURE — 85027 COMPLETE CBC AUTOMATED: CPT | Performed by: HOSPITALIST

## 2022-10-03 PROCEDURE — 97162 PT EVAL MOD COMPLEX 30 MIN: CPT | Mod: GP

## 2022-10-03 PROCEDURE — 99232 SBSQ HOSP IP/OBS MODERATE 35: CPT | Performed by: FAMILY MEDICINE

## 2022-10-03 PROCEDURE — 97166 OT EVAL MOD COMPLEX 45 MIN: CPT | Mod: GO

## 2022-10-03 PROCEDURE — 99232 SBSQ HOSP IP/OBS MODERATE 35: CPT | Performed by: PSYCHIATRY & NEUROLOGY

## 2022-10-03 PROCEDURE — 80048 BASIC METABOLIC PNL TOTAL CA: CPT | Performed by: HOSPITALIST

## 2022-10-03 PROCEDURE — 97535 SELF CARE MNGMENT TRAINING: CPT | Mod: GO

## 2022-10-03 PROCEDURE — 99232 SBSQ HOSP IP/OBS MODERATE 35: CPT | Performed by: INTERNAL MEDICINE

## 2022-10-03 PROCEDURE — 250N000013 HC RX MED GY IP 250 OP 250 PS 637: Performed by: HOSPITALIST

## 2022-10-03 PROCEDURE — 250N000011 HC RX IP 250 OP 636: Performed by: INTERNAL MEDICINE

## 2022-10-03 PROCEDURE — 36415 COLL VENOUS BLD VENIPUNCTURE: CPT | Performed by: HOSPITALIST

## 2022-10-03 PROCEDURE — 120N000001 HC R&B MED SURG/OB

## 2022-10-03 PROCEDURE — 97116 GAIT TRAINING THERAPY: CPT | Mod: GP

## 2022-10-03 RX ADMIN — CEFAZOLIN SODIUM 2 G: 2 INJECTION, SOLUTION INTRAVENOUS at 20:13

## 2022-10-03 RX ADMIN — VENLAFAXINE HYDROCHLORIDE 150 MG: 150 CAPSULE, EXTENDED RELEASE ORAL at 08:21

## 2022-10-03 RX ADMIN — METOPROLOL SUCCINATE 75 MG: 25 TABLET, EXTENDED RELEASE ORAL at 08:21

## 2022-10-03 RX ADMIN — CEFAZOLIN SODIUM 2 G: 2 INJECTION, SOLUTION INTRAVENOUS at 14:22

## 2022-10-03 RX ADMIN — LEVETIRACETAM 500 MG: 500 TABLET, FILM COATED ORAL at 20:11

## 2022-10-03 RX ADMIN — ESCITALOPRAM OXALATE 20 MG: 20 TABLET ORAL at 08:21

## 2022-10-03 RX ADMIN — RISPERIDONE 2 MG: 2 TABLET ORAL at 21:31

## 2022-10-03 RX ADMIN — LOSARTAN POTASSIUM 100 MG: 50 TABLET, FILM COATED ORAL at 08:21

## 2022-10-03 RX ADMIN — FOLIC ACID 1000 MCG: 1 TABLET ORAL at 08:21

## 2022-10-03 RX ADMIN — CEFAZOLIN SODIUM 2 G: 2 INJECTION, SOLUTION INTRAVENOUS at 04:33

## 2022-10-03 RX ADMIN — LEVETIRACETAM 500 MG: 500 TABLET, FILM COATED ORAL at 08:21

## 2022-10-03 RX ADMIN — AMLODIPINE BESYLATE 2.5 MG: 2.5 TABLET ORAL at 08:20

## 2022-10-03 RX ADMIN — THIAMINE HCL TAB 100 MG 100 MG: 100 TAB at 08:20

## 2022-10-03 ASSESSMENT — ACTIVITIES OF DAILY LIVING (ADL)
ADLS_ACUITY_SCORE: 34
ADLS_ACUITY_SCORE: 31
ADLS_ACUITY_SCORE: 34
ADLS_ACUITY_SCORE: 31
ADLS_ACUITY_SCORE: 34
ADLS_ACUITY_SCORE: 34

## 2022-10-03 NOTE — PLAN OF CARE
Goal Outcome Evaluation:    Plan of Care Reviewed With: patient     Overall Patient Progress: improving         Denies pain. Alert and oriented x3. Up to commode with SBA. Seizure precautions maintained.  at bedside earlier in shift. Continue IV abx.

## 2022-10-03 NOTE — PLAN OF CARE
Problem: Risk for Delirium  Goal: Improved Sleep  Outcome: Ongoing, Progressing   Goal Outcome Evaluation:                    Pt more alert and clear.  LSC.  NSR on tele.  BS active.  Voiding without difficulty. Urine is concentrated.  Up with assist of 1 to BSC. Gait is more steady.

## 2022-10-03 NOTE — PROGRESS NOTES
10/03/22 1305   Quick Adds   Type of Visit Initial PT Evaluation   Living Environment   People in Home spouse   Current Living Arrangements house   Home Accessibility stairs to enter home   Number of Stairs, Main Entrance 4;5   Stair Railings, Main Entrance none   Transportation Anticipated family or friend will provide   Self-Care   Usual Activity Tolerance good   Current Activity Tolerance moderate   Equipment Currently Used at Home none   General Information   Onset of Illness/Injury or Date of Surgery 09/28/22   Referring Physician Tono Palomino MD   Patient/Family Therapy Goals Statement (PT) return home   Pertinent History of Current Problem (include personal factors and/or comorbidities that impact the POC) 71F with who presented with 1 day of AMS in the setting of recent diagnosis of seizure disorder and initiation of Keppra   Cognition   Affect/Mental Status (Cognition) flat/blunted affect   Strength (Manual Muscle Testing)   Strength (Manual Muscle Testing) Deficits observed during functional mobility   Bed Mobility   Comment, (Bed Mobility) Pt in the chair when PT arrived.   Transfers   Transfers sit-stand transfer   Sit-Stand Transfer   Sit-Stand [place] (Transfers) supervision   Assistive Device (Sit-Stand Transfers) walker, front-wheeled   Gait/Stairs (Locomotion)   [place] Level (Gait) contact guard   Assistive Device (Gait) walker, front-wheeled   Distance in Feet (Required for LE Total Joints) 50'   Pattern (Gait) step-through   Deviations/Abnormal Patterns (Gait) gait speed decreased   Clinical Impression   Criteria for Skilled Therapeutic Intervention Yes, treatment indicated   PT Diagnosis (PT) Impaired functional mobility   Influenced by the following impairments Weakness   Functional limitations due to impairments Impaired balance, strength, gait   Clinical Presentation (PT Evaluation Complexity) Stable/Uncomplicated   Clinical Presentation Rationale Presents as diagnosed    Clinical Decision Making (Complexity) moderate complexity   Planned Therapy Interventions (PT) balance training;bed mobility training;gait training;home exercise program;stair training;strengthening;transfer training   Anticipated Equipment Needs at Discharge (PT) walker, rolling   Risk & Benefits of therapy have been explained patient   PT Discharge Planning   PT Discharge Recommendation (DC Rec) home with assist;home with home care physical therapy   PT Rationale for DC Rec Recommend home PT to improve overall balance and strength.   Plan of Care Review   Plan of Care Reviewed With patient   Total Evaluation Time   Total Evaluation Time (Minutes) 10   Physical Therapy Goals   PT Frequency Daily   PT Predicted Duration/Target Date for Goal Attainment 10/10/22   PT Goals Bed Mobility;Transfers;Gait;Stairs   PT: Bed Mobility Independent;Supine to/from sit   PT: Transfers Supervision/stand-by assist;Sit to/from stand;Bed to/from chair   PT: Gait Supervision/stand-by assist;Rolling walker;150 feet   PT: Stairs Supervision/stand-by assist;4 stairs;5 stairs       Nina Perea, PT, DPT  10/3/2022

## 2022-10-03 NOTE — PLAN OF CARE
Goal Outcome Evaluation:    Pt awake all day, up in chair. Used commode. Agreed to do a bed bath, but refused to brush her teeth.  Spoke with daughter who is concerned with sending her home without home healthcare. She feels her moms current  will not care for her the way she needs. Suggested she speak with SW well as hospitalist when discharge planning occurs.

## 2022-10-03 NOTE — PROGRESS NOTES
10/03/22 1100   Quick Adds   Type of Visit Initial Occupational Therapy Evaluation   Living Environment   People in Home spouse   Current Living Arrangements house   Home Accessibility stairs to enter home   Number of Stairs, Main Entrance 5   Transportation Anticipated family or friend will provide   Living Environment Comments Pt Indep. w/ ADLS, walk-in shower and standard toilet   Self-Care   Equipment Currently Used at Home none   Fall history within last six months no   General Information   Onset of Illness/Injury or Date of Surgery 09/29/22   Referring Physician Dr. Fierro   Additional Occupational Profile Info/Pertinent History of Current Problem Eleanor Awan is a 71 year old female who was brought to the ED by ambulance for evaluation of altered mental status.   Cognitive Status Examination   Orientation Status orientation to person, place and time   Affect/Mental Status (Cognitive) confused   Range of Motion Comprehensive   General Range of Motion no range of motion deficits identified   Strength Comprehensive (MMT)   General Manual Muscle Testing (MMT) Assessment no strength deficits identified   Bed Mobility   Bed Mobility supine-sit   Supine-Sit [place] (Bed Mobility) supervision   Transfers   Transfers sit-stand transfer;toilet transfer   Sit-Stand Transfer   Sit-Stand [place] (Transfers) supervision   Toilet Transfer   [place] Level (Toilet Transfer) supervision   Balance   Balance Assessment sitting balance: static;standing balance: static   Balance Comments During ambulating, Pt had dynamic balance w/ a LOB, Pt recovered on own using bed, Pt improved in balance after ambulating.   Clinical Impression   Criteria for Skilled Therapeutic Interventions Met (OT) Yes, treatment indicated   OT Diagnosis confusion limiting ADLs and IADLs   Influenced by the following impairments Altered mental status   OT Problem List-Impairments impacting ADL problems related to;cognition    Assessment of Occupational Performance 3-5 Performance Deficits   Identified Performance Deficits Transfers, G/h, cognition, balance   Planned Therapy Interventions (OT) ADL retraining;IADL retraining;balance training;cognition   Clinical Decision Making Complexity (OT) moderate complexity   Risk & Benefits of therapy have been explained evaluation/treatment results reviewed;care plan/treatment goals reviewed   OT Discharge Planning   OT Discharge Recommendation (DC Rec) home with assist   OT Rationale for DC Rec Pt appears close to baseline with impairments likely due to cognition. Would need assistance with higher level IADL tasks.   Total Evaluation Time (Minutes)   Total Evaluation Time (Minutes) 6   OT Goals   Therapy Frequency (OT) Daily   OT Predicted Duration/Target Date for Goal Attainment 10/09/22   OT Goals Hygiene/Grooming;Cognition   OT: Hygiene/Grooming supervision/stand-by assist   OT: Cognitive Patient/caregiver will verbalize understanding of cognitive assessment results/recommendations as needed for safe discharge planning

## 2022-10-03 NOTE — PROGRESS NOTES
[facility] Hospitalist Progress Note  [facility]    Admission date: 9/28/2022    Summary:    71F with who presented with 1 day of AMS in the setting of recent diagnosis of seizure disorder and initiation of Keppra.  Found to have a lactic acidosis and hypertensive crisis and presentation thought to be due to seizure and hypertensive encephalopathy.  Keppra loaded via IV.  Lactate and hemodynamics improved with IVF and anti-HTNives.  Now growing GPCs in blood.  Unclear if this is contamination x2 or bacteremia that is transient.    PMH of TBI, neurocognitive disorder, alcohol abuse, thiamine deficiency, alcohol cirrhosis, hepatitis C, PVCs, hypertension, BPPV, depression, allergic rhinitis, osteoporosis.    Assessment/Plan    Acute metabolic encephalopathy likely due to complex partial seizure  -No recurrence of seizure activity  -CT of the head shows atrophy with no acute abnormality  -MRI without acute findings.  -EEG shows nonspecific slowing in theta and delta range no sharp activity seen to suggest seizures  -Echocardiogram shows normal ejection fraction with no significant valvular heart abnormality  -Seen by neurology. She is started on keppra 500BID. Keppra level was 14.   -Neurology consulted, appreciate recs    Hypertensive urgency  -Could be from complex seizure. Or hypertensive urgency could have contributed to encephalopathy as well. Not sure about medication compliance   -resumed home losartan, toprol-xl increased to 100mg for atrial tach, and started norvasc.  -BP is much better right now.   -Ct PRN IV labetalol and Ct PRN IV hydralazine    Fever, GPC bacteremia   -She had low grade temp on admit with leucocytosis. This could be from seizures too, but bacteremia in DD.   -GPCs now from 2 of 4 cultures. Real vs. recurrent contaminant on two occasions.  Would treat as real for now given leukocytosis and low grade temps.  -No clear source on CT CAP and urinalysis.  Some question of  gallbladder wall thickening and urothelial thickening on CT A/P but UA without significant pyuria and no RUQ pain to suggest cholecystits.    -ID consulted.  -Repeat BC negative so far  -ID changed Vanc to Ancef.  Plan for total 7 days course.  Could use ceftriax daily for home infusion if she can discharge.  No need for PICC line, can use peripheral IV.    Atrial tachycardia - increase toprol to 100mg  - HR is now better controlled     Lactic acidosis - resolved.  Likely was secondary to seizure, dehydration, and possible hypertensive crisis contributing as well.    Microscopic hematuria - long standing issue on review of UAs.  L proximal ureteral thickening on CT.    - Needs urology f/u outpatient for evaluation.     Stress hyperglycemia- Normal A1c. Improving     Minimally elevated high-sensitivity troponin T - due to severe HTN.  No s/s of ACS and limited ECHO without WMA.     Polycythemia - hemoconcentration. Now better.     Hx EtOH abuse - Ct thiamine, folate    Mood disorder -Ct effexor and risperdal    Alcohol related cognitive impairment    Code Status: Full Code    Diet: Combination Diet Low Saturated Fat Na <2400mg Diet, No Caffeine Diet  Room Service  Snacks/Supplements Adult: Ensure Enlive; With Meals    Gtz Catheter: Not present  Central Lines: None  DVT px:  Pneumatic Compression Devices    Disposition: First PT/OT eval today.  They are recommending home with home care.  Can discharge today with home infusion and home care if able to arrange otherwise discharge tomorrow.       Objective    Vital signs in last 24 hours  Temp:  [98  F (36.7  C)-99.2  F (37.3  C)] 99.2  F (37.3  C)  Pulse:  [74-86] 80  Resp:  [16-18] 18  BP: (104-127)/(54-63) 113/54  SpO2:  [94 %-97 %] 97 % O2 Device: None (Room air)    Weight:   131 lbs 6.4 oz    Intake/Output last 3 shifts  I/O last 3 completed shifts:  In: 681 [P.O.:480; I.V.:201]  Out: 750 [Urine:750]  Body mass index is 23.28 kg/m .    Physical Exam  General:   Alert, cooperative, no distress.  Oriented to recent events.   Neurologic: A+Ox3 Facial symmetry preserved, fluent speech.  Neck supple and no pain with movement.  Psych: calm, mood and affect appropriate to situation  HEENT:  Anicteric, MMM  CV: RRR no MRG, normal S1 and S2, no edema  Lungs: CTAB.  Easyrespirations  Abd: soft, no RUQ TTP, NT, normoactive BS, no flank or CVA TTP  Skin: no rashes or jaundice noted on exposed skin.    MSK: no back or joint pain.s    Central Lines and Tubes: None (no trejo, CVC, feeding tubes)      I have reviewed all labs, medications, imaging studies in the last 24 hours.  Pertinent findings&changes discussed above.

## 2022-10-03 NOTE — PROGRESS NOTES
"INFECTIOUS DISEASE FOLLOW UP NOTE      ASSESSMENT:    1. Positive blood cultures -- one of two sets 9/29 staph hominis, same organism 1 of 2 sets from 9/30. Staph hominis in one of two sets is usually a contaminant, and she does not have foreign body in place (valve replacement, pacemaker, central line, etc) to put her at risk for true coag neg staph bacteremia. 2 of 4 cultures of blood positive prior to antibiotics -- either coincidence and 2 episodes of skin contaminant or could be true transient bacteremia. Generally coag neg staph bacteremia in absence of foreign body is self-limited, treatable with short course of IV antibiotics. Staph hominis in this case is methicillin-sensitive.  2. Admitted with hypertensive crisis. Likely had seizure. Improved.   3. H/o TBI, seizure disorder    PLAN:  Cefazolin, duration 5-7 days from 9/30, so last day 10/4-6. Alternative is ceftriaxone 2mg IV q24h for once daily dosing at infusion center. If she is ready for discharge prior to that, potentially could get this just through peripheral IV for short course rather than placing PICC or midline.     Ernesto Dumont MD  [facility]  [phone] [facility] paging    ______________________________________________________________________    SUBJECTIVE / INTERVAL HISTORY: feels good, just tired. Denies pain. Temps normal. Tolerating antibiotics.       ROS: All other systems negative except as listed above.        OBJECTIVE:  /63 (BP Location: Right arm)   Pulse 86   Temp 98.8  F (37.1  C) (Oral)   Resp 16   Ht 1.6 m (5' 3\")   Wt 59.6 kg (131 lb 6.4 oz)   SpO2 97%   BMI 23.28 kg/m           GEN: No acute distress.  Still some confusion.  here.   RESPIRATORY:  Normal breathing pattern.   CARDIOVASCULAR:  Regular rate and rhythm. Normal S1 and S2. No murmur, click, gallop or rub.   ABDOMEN:  Soft, normal bowel sounds, non-tender, no masses  EXTREMITIES: No edema.  SKIN/HAIR/NAILS:  No " rashes  IV: peripheral        Antibiotics:  Cefazolin 10/2-  ceftriaxone 9/30-10/1  Vancomycin 9/30-10/2    Pertinent labs:    Recent Labs   Lab 10/03/22  0659 10/02/22  0723 10/01/22  0519   WBC 6.5 6.1 9.3   HGB 12.6 12.3 12.8   HCT 37.7 37.9 38.1   * 143* 144*        Recent Labs   Lab 10/03/22  0659 10/02/22  0723 10/01/22  0519    136 136   CO2 25 25 24   BUN 16.6 18.4 17.8        Lab Results   Component Value Date    CRP <0.1 05/15/2019    CRP <0.1 06/29/2018         Lab Results   Component Value Date    ALT 11 10/01/2022    AST 19 10/01/2022    GGT 24 04/29/2022    ALKPHOS 47 10/01/2022         MICROBIOLOGY DATA:  9/29 blood one of two -- GPC clusters -->staph hominis (methicillin-sensitive)  9/30 one of two -- GPC clusters --> staph hominius same susceptibilities apparently  10/1 negative to date    RADIOLOGY:  MR Brain w/o Contrast    Result Date: 9/29/2022  EXAM: MR BRAIN W/O CONTRAST LOCATION: [facility] DATE/TIME: 9/29/2022 12:24 PM INDICATION: Hypertension. Encephalopathy. COMPARISON: MRI of the brain 08/13/2019. MRI of the brain 03/08/2014. TECHNIQUE: Routine multiplanar multisequence head MRI without intravenous contrast. FINDINGS: INTRACRANIAL CONTENTS: No acute or subacute infarct. New FLAIR hyperintense signal changes involving the right precentral gyrus anterior right parietal lobe on axial FLAIR image 21. Chronic appearing lacunar infarct involving the left basal ganglia is new from 2020 imaging. No extra-axial fluid collection. No midline shift. Mild to moderate cerebral volume loss is mildly to moderately progressed from 2020 imaging. Scattered nonspecific T2/FLAIR hyperintensities within the cerebral white matter and yon most consistent with mild chronic microvascular ischemic change. There is small area of focal volume loss in the anterior left temporal lobe with adjacent T2/FLAIR hyperintense signal, best visualized on axial FLAIR image 11. These FLAIR  signal changes have increased over serial examinations dating back to 2014.   SELLA: No abnormality accounting for technique. OSSEOUS STRUCTURES/SOFT TISSUES: Normal marrow signal. The major intracranial vascular flow voids are maintained. ORBITS: No abnormality accounting for technique. SINUSES/MASTOIDS: No paranasal sinus mucosal disease. No middle ear or mastoid effusion.     IMPRESSION: 1.  No acute infarct or acute intracranial hemorrhage. 2.  Foci of interval chronic appearing ischemic change involving the left basal ganglia and corona radiata in addition to the watershed region of the right cerebral hemisphere. 3.  Small area of volume loss with adjacent FLAIR/hyperintense signal change in the anterior left temporal lobe, most consistent with sequela of a previous insult. 4.  Mild to moderate cerebral and cerebellar volume loss is progressed from 2019 imaging.    US Abdomen Limited    Result Date: 10/1/2022  EXAM: ULTRASOUND ABDOMEN LIMITED LOCATION: [facility] DATE/TIME: 10/01/2022, 11:22 AM INDICATION: Altered mental status. COMPARISON: None. TECHNIQUE: Limited abdominal ultrasound. FINDINGS: GALLBLADDER: Minimal wall thickening and/or pericholecystic fluid evident. No definite gallstones. BILE DUCTS: No biliary dilatation. The common duct measures 6 mm. LIVER: Increased echogenicity from diffuse fatty infiltration. No focal mass. RIGHT KIDNEY: No hydronephrosis. PANCREAS: The visualized portions are normal. No ascites.     IMPRESSION: 1.  Mild gallbladder wall thickening and/or pericholecystic fluid, cholecystitis not excluded. 2.  No cholelithiasis. 3.  Mild possible fatty infiltration of the liver.     Echocardiogram Limited    Result Date: 2022  [id number]^[place] [address]  Name: BUD KEATING MRN: [id number] : 1950 Study Date: 2022 12:49 PM Age: 71 yrs Gender: Female Patient  Location: [facility] Reason For Study: Hypertensive Heart Disease Ordering Physician: LACY HORTON Referring Physician: LACY HORTON Performed By: SIGIFREDO  BSA: 1.6 m2 Height: 64 in Weight: 120 lb HR: 106 ______________________________________________________________________________ Procedure Limited Echo Adult. Definity (NDC #49657-367) given intravenously. ______________________________________________________________________________ Interpretation Summary  Left ventricular size, wall motion and function are normal. The ejection fraction is 60-65%. Normal right ventricle size and systolic function. No hemodynamically significant valvular abnormalities on 2D or color flow imaging. ______________________________________________________________________________ Left Ventricle Left ventricular size, wall motion and function are normal. The ejection fraction is 60-65%. There is normal left ventricular wall thickness. No regional wall motion abnormalities noted.  Right Ventricle Normal right ventricle size and systolic function.  Atria Normal left atrial size. Right atrial size is normal. There is no color Doppler evidence of an atrial shunt.  Mitral Valve Mitral valve leaflets appear normal. There is no evidence of mitral stenosis or clinically significant mitral regurgitation.  Tricuspid Valve Right ventricle systolic pressure estimate normal.  Aortic Valve Aortic valve leaflets appear normal. There is no evidence of aortic stenosis or clinically significant aortic regurgitation.  Pulmonic Valve The pulmonic valve is not well seen, but is grossly normal. This degree of valvular regurgitation is within normal limits.  Vessels The aorta root is normal. Normal size ascending aorta. IVC diameter <2.1 cm collapsing >50% with sniff suggests a normal RA pressure of 3 mmHg.  Pericardium There is no pericardial effusion.  ______________________________________________________________________________ Time Measurements  MM HR: 106.0 BPM   ______________________________________________________________________________ Report approved by: Mane Olivares 09/29/2022 02:48 PM       CT Abdomen Pelvis w Contrast    Result Date: 9/30/2022  EXAM: CT ABDOMEN PELVIS W CONTRAST LOCATION: [facility] DATE/TIME: 9/30/2022 5:37 PM INDICATION: Bacteremia of unknown origin; altered mental status. COMPARISON: Chest CT 09/29/2022. TECHNIQUE: CT scan of the abdomen and pelvis was performed following injection of IV contrast. Multiplanar reformats were obtained. Dose reduction techniques were used. CONTRAST: 100 mL Isovue-370 FINDINGS: LOWER CHEST: Small hiatal hernia. HEPATOBILIARY: Liver enhances normally and is normal in size. Gallbladder is upper limits of normal size, with prominent gallbladder wall edema. There is high attenuation material within the gallbladder, compatible with vicarious excretion of contrast from yesterday's CT examination. Correlation with sonography is recommended to exclude an acute cholecystitis. No intrahepatic or intrahepatic biliary ductal dilatation. PANCREAS: Enhances normally. No peripancreatic inflammatory fat stranding. SPLEEN: Enhances normally. Normal size. ADRENAL GLANDS: Normal. KIDNEYS: Both kidneys enhance symmetrically, without hydronephrosis. No nephroureterolithiasis. Mild urothelial thickening and enhancement of the proximal left ureter; correlate with urinalysis to exclude upper urinary tract infection. Low-attenuation subcentimeter renal lesion(s). These are compatible with small benign cysts and no specific imaging evaluation or follow-up is recommended. Urinary bladder is mildly distended. PELVIC ORGANS: No suspicious abnormality. BOWEL: No evidence of acute gastrointestinal inflammation or obstruction. Colonic diverticulosis, without evidence of diverticulitis. Normal appendix. No intraperitoneal free fluid or free air. LYMPH NODES: No suspicious abdominal or pelvic lymphadenopathy.  VASCULATURE: No abdominal aortic aneurysm. Predominantly moderate atheromatous disease, with moderate to severe ostial narrowing of the superior mesenteric artery origin; distal patency is preserved. MUSCULOSKELETAL: No suspicious abnormality. OTHER: No additionally significant abnormalities.     IMPRESSION: 1.  Significant gallbladder wall edema, which can be seen in the setting of acute cholecystitis, hepatitis, fluid overload, or hypoproteinemia. Correlation with sonography recommended to exclude acute cholecystitis. 2.  Mild urothelial thickening and enhancement of the proximal left ureter. Correlation with urinalysis recommended to exclude an upper urinary tract infection. 3.  Colonic diverticulosis, without evidence of diverticulitis.     CT Chest Pulmonary Embolism w Contrast    Result Date: 9/29/2022  EXAM: CT CHEST PULMONARY EMBOLISM W CONTRAST LOCATION: [facility] DATE/TIME: 9/29/2022 3:33 AM INDICATION: TACHYCARDIA, SOB COMPARISON: Chest radiograph 08/17/2020. TECHNIQUE: CT chest pulmonary angiogram during arterial phase injection of IV contrast. Multiplanar reformats and MIP reconstructions were performed. Dose reduction techniques were used. CONTRAST: isovue 370 100ml FINDINGS: ANGIOGRAM CHEST: No pulmonary embolus. No aortic aneurysm or dissection. LUNGS AND PLEURA: 5 mm and 6 mm lobulated subpleural nodules in the right middle lobe consistent with benign intrapulmonary lymph nodes (image 178 of series 6). Small benign calcified granuloma at the base of the left lower lobe. No acute lung consolidation, pleural effusion or pneumothorax. MEDIASTINUM/AXILLAE: Normal. CORONARY ARTERY CALCIFICATION: Moderate. UPPER ABDOMEN: Normal. MUSCULOSKELETAL: Degenerative changes of the spine.     IMPRESSION: No pulmonary embolus or other acute process in the chest.    Head CT w/o contrast    Result Date: 9/29/2022  EXAM: CT HEAD W/O CONTRAST LOCATION: [facility]  DATE/TIME: 2022 11:40 PM INDICATION: Altered mental status. Confusion. COMPARISON: MRI brain dated 2022. MRI brain dated 2020. TECHNIQUE: Routine CT Head without IV contrast. Multiplanar reformats. Dose reduction techniques were used. FINDINGS: INTRACRANIAL CONTENTS: No intracranial hemorrhage, extraaxial collection, or mass effect. No acute large territory infarction. Small region of encephalomalacia identified involving the left temporal lobe, likely reflects a sequela of remote injury. Chronic left basal ganglia lacunar type infarctions. Mild to moderate presumed chronic small vessel ischemic changes. Mild generalized volume loss. No hydrocephalus. VISUALIZED ORBITS/SINUSES/MASTOIDS: No intraorbital abnormality. No paranasal sinus mucosal disease. No middle ear or mastoid effusion. BONES/SOFT TISSUES: No acute abnormality.     IMPRESSION: 1.  No CT evidence for acute intracranial process. 2.  Brain atrophy and chronic changes, as above.    EEG Awake or Drowsy Routine    Result Date: 2022  ELECTROENCEPHALOGRAM (EEG) REPORT [facility] NEUROLOGY [address] [place] [address] Tel: [phone]  Fax: [phone] [email or web address] ISA Anaya 1950, MRN [id number] PCP: Camille Love Date: 2022 Principal Diagnosis: Complex partial seizures History : Patient is being evaluated for complex partial seizures. Medication listed includes Keppra Description of the Recording:  This is a multichannel digital EEG recording done using the international 10-20 placement system. Background of the recording consists of an irregular 5-6 hz activity with an amplitude of 30-40  V.  In addition, occasionally there is  1-2 Hz activity with similar amplitude.  This diffusely slow background attenuates with alerting procedures.  Photic stimulation performed with eyes open, according to the standard protocol, minimal change.  Hyperventilation was not  performed.  Sleep was not obtained. During this recording, no sharp discharges, spikes or electrographic seizure activity was recorded. Impression: This is an abnormal EEG due to diffusely slow background in theta and delta range that suggests a nonspecific generalized cerebral dysfunction.  Such slowing can be seen in metabolic or toxic abnormalities, clinical correlation is recommended.  No sharp discharges or spikes were recorded during this recording to suggest a seizure disorder. Classification: Dysrhythmia grade II generalized                          Delta grade I generalized Rainer Mahajan MD [facility], [facility] This note was dictated using voice recognition software.  Any grammatical or context distortions are unintentional and inherent to the software.     EEG Awake or Drowsy Routine    Result Date: 2022  ELECTROENCEPHALOGRAM (EEG) REPORT [facility] NEUROLOGY [place] [address] [place] [address] Tel: [phone]  Fax: [phone] [email or web address] ISA Anaya 1950, MRN [id number] PCP: Camille Love Date: 2022 Principal Diagnosis: Seizure-like activity (H) History : Patient is being evaluated for seizure-like activity. Medication listed includes no anticonvulsant Description of the Recording:  This is a multichannel digital EEG recording done using the international 10-20 placement system. Background of the recording consists of an irregular 8-9 hz activity with an amplitude of 20-40  V.  In addition, occasionally there is  4-5  hz activity with maximum voltage emphasis in the left hemisphere.  This diffusely slow background attenuates with alerting procedures.  Photic stimulation performed with eyes open, according to the standard protocol, minimal change.  Hyperventilation was not performed.  Sleep was not obtained. Occasionally sharp discharges are seen in the left  hemisphere associated with paroxysmal slowing but no clear evolution into focal or generalized rhythmic activity was seen Impression: This is an abnormal EEG due to paroxysmal slowing of the background and theta range that is maximally expressed in the left hemisphere associated with sharp discharges that suggest a nonspecific cerebral dysfunction with low threshold for focal seizure Classification: Dysrhythmia grade III left temporal                          Dysrhythmia grade II generalized maximum left Rainer Mahajan MD [facility] (Formerly, [facility] This note was dictated using voice recognition software.  Any grammatical or context distortions are unintentional and inherent to the software.
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
## 2022-10-03 NOTE — PROGRESS NOTES
Likely can discharge pending PT/OT recs with outpatient infusion.  Will follow up post therapy eval.

## 2022-10-04 ENCOUNTER — MEDICAL CORRESPONDENCE (OUTPATIENT)
Dept: HEALTH INFORMATION MANAGEMENT | Facility: CLINIC | Age: 72
End: 2022-10-04

## 2022-10-04 VITALS
SYSTOLIC BLOOD PRESSURE: 130 MMHG | DIASTOLIC BLOOD PRESSURE: 77 MMHG | OXYGEN SATURATION: 96 % | HEIGHT: 63 IN | RESPIRATION RATE: 18 BRPM | WEIGHT: 131.4 LBS | BODY MASS INDEX: 23.28 KG/M2 | TEMPERATURE: 98.1 F | HEART RATE: 77 BPM

## 2022-10-04 PROBLEM — R78.81 BACTEREMIA: Status: ACTIVE | Noted: 2022-10-04

## 2022-10-04 LAB — BACTERIA BLD CULT: NO GROWTH

## 2022-10-04 PROCEDURE — 250N000011 HC RX IP 250 OP 636: Performed by: INTERNAL MEDICINE

## 2022-10-04 PROCEDURE — 250N000013 HC RX MED GY IP 250 OP 250 PS 637: Performed by: HOSPITALIST

## 2022-10-04 PROCEDURE — 99239 HOSP IP/OBS DSCHRG MGMT >30: CPT | Performed by: INTERNAL MEDICINE

## 2022-10-04 PROCEDURE — 99207 PR NO CHARGE LOS: CPT | Performed by: INTERNAL MEDICINE

## 2022-10-04 RX ORDER — ALBUTEROL SULFATE 90 UG/1
1-2 AEROSOL, METERED RESPIRATORY (INHALATION)
Status: CANCELLED
Start: 2022-10-05

## 2022-10-04 RX ORDER — ALBUTEROL SULFATE 0.83 MG/ML
2.5 SOLUTION RESPIRATORY (INHALATION)
Status: CANCELLED | OUTPATIENT
Start: 2022-10-05

## 2022-10-04 RX ORDER — HEPARIN SODIUM (PORCINE) LOCK FLUSH IV SOLN 100 UNIT/ML 100 UNIT/ML
5 SOLUTION INTRAVENOUS
Status: CANCELLED | OUTPATIENT
Start: 2022-10-05

## 2022-10-04 RX ORDER — EPINEPHRINE 1 MG/ML
0.3 INJECTION, SOLUTION INTRAMUSCULAR; SUBCUTANEOUS EVERY 5 MIN PRN
Status: CANCELLED | OUTPATIENT
Start: 2022-10-05

## 2022-10-04 RX ORDER — LOSARTAN POTASSIUM 100 MG/1
100 TABLET ORAL DAILY
Qty: 30 TABLET | Refills: 0 | Status: SHIPPED | OUTPATIENT
Start: 2022-10-05 | End: 2022-12-30

## 2022-10-04 RX ORDER — MEPERIDINE HYDROCHLORIDE 25 MG/ML
25 INJECTION INTRAMUSCULAR; INTRAVENOUS; SUBCUTANEOUS EVERY 30 MIN PRN
Status: CANCELLED | OUTPATIENT
Start: 2022-10-05

## 2022-10-04 RX ORDER — HEPARIN SODIUM,PORCINE 10 UNIT/ML
5 VIAL (ML) INTRAVENOUS
Status: CANCELLED | OUTPATIENT
Start: 2022-10-05

## 2022-10-04 RX ORDER — CEFTRIAXONE 2 G/1
2 INJECTION, POWDER, FOR SOLUTION INTRAMUSCULAR; INTRAVENOUS ONCE
Status: CANCELLED
Start: 2022-10-05 | End: 2022-10-05

## 2022-10-04 RX ORDER — DIPHENHYDRAMINE HYDROCHLORIDE 50 MG/ML
50 INJECTION INTRAMUSCULAR; INTRAVENOUS
Status: CANCELLED
Start: 2022-10-05

## 2022-10-04 RX ORDER — AMLODIPINE BESYLATE 2.5 MG/1
2.5 TABLET ORAL DAILY
Qty: 30 TABLET | Refills: 0 | Status: SHIPPED | OUTPATIENT
Start: 2022-10-05 | End: 2022-11-01

## 2022-10-04 RX ORDER — CEFTRIAXONE 2 G/1
2 INJECTION, POWDER, FOR SOLUTION INTRAMUSCULAR; INTRAVENOUS EVERY 24 HOURS
Status: DISCONTINUED | OUTPATIENT
Start: 2022-10-04 | End: 2022-10-04 | Stop reason: HOSPADM

## 2022-10-04 RX ORDER — CEFTRIAXONE 2 G/1
2 INJECTION, POWDER, FOR SOLUTION INTRAMUSCULAR; INTRAVENOUS EVERY 24 HOURS
Qty: 40 ML | Refills: 0 | Status: SHIPPED | OUTPATIENT
Start: 2022-10-04 | End: 2022-10-06

## 2022-10-04 RX ORDER — METOPROLOL SUCCINATE 25 MG/1
75 TABLET, EXTENDED RELEASE ORAL DAILY
Qty: 30 TABLET | Refills: 0 | Status: SHIPPED | OUTPATIENT
Start: 2022-10-05 | End: 2022-10-20

## 2022-10-04 RX ORDER — RISPERIDONE 2 MG/1
2 TABLET ORAL AT BEDTIME
Qty: 30 TABLET | Refills: 0 | Status: SHIPPED | OUTPATIENT
Start: 2022-10-04 | End: 2022-10-04

## 2022-10-04 RX ORDER — METHYLPREDNISOLONE SODIUM SUCCINATE 125 MG/2ML
125 INJECTION, POWDER, LYOPHILIZED, FOR SOLUTION INTRAMUSCULAR; INTRAVENOUS
Status: CANCELLED
Start: 2022-10-05

## 2022-10-04 RX ORDER — CEFTRIAXONE 2 G/1
2 INJECTION, POWDER, FOR SOLUTION INTRAMUSCULAR; INTRAVENOUS EVERY 24 HOURS
Qty: 40 ML | Refills: 0 | Status: SHIPPED | OUTPATIENT
Start: 2022-10-04 | End: 2022-10-04

## 2022-10-04 RX ORDER — RISPERIDONE 2 MG/1
2 TABLET ORAL AT BEDTIME
Qty: 30 TABLET | Refills: 0 | Status: SHIPPED | OUTPATIENT
Start: 2022-10-04 | End: 2022-11-11

## 2022-10-04 RX ADMIN — VENLAFAXINE HYDROCHLORIDE 150 MG: 150 CAPSULE, EXTENDED RELEASE ORAL at 09:52

## 2022-10-04 RX ADMIN — METOPROLOL SUCCINATE 75 MG: 25 TABLET, EXTENDED RELEASE ORAL at 09:52

## 2022-10-04 RX ADMIN — CEFTRIAXONE SODIUM 2 G: 2 INJECTION, POWDER, FOR SOLUTION INTRAMUSCULAR; INTRAVENOUS at 11:19

## 2022-10-04 RX ADMIN — FOLIC ACID 1000 MCG: 1 TABLET ORAL at 09:53

## 2022-10-04 RX ADMIN — LEVETIRACETAM 500 MG: 500 TABLET, FILM COATED ORAL at 09:52

## 2022-10-04 RX ADMIN — LOSARTAN POTASSIUM 100 MG: 50 TABLET, FILM COATED ORAL at 09:52

## 2022-10-04 RX ADMIN — CEFAZOLIN SODIUM 2 G: 2 INJECTION, SOLUTION INTRAVENOUS at 04:53

## 2022-10-04 RX ADMIN — AMLODIPINE BESYLATE 2.5 MG: 2.5 TABLET ORAL at 09:53

## 2022-10-04 RX ADMIN — ESCITALOPRAM OXALATE 20 MG: 20 TABLET ORAL at 09:53

## 2022-10-04 RX ADMIN — THIAMINE HCL TAB 100 MG 100 MG: 100 TAB at 09:53

## 2022-10-04 ASSESSMENT — ACTIVITIES OF DAILY LIVING (ADL)
ADLS_ACUITY_SCORE: 34

## 2022-10-04 NOTE — PLAN OF CARE
Problem: Risk for Delirium  Goal: Optimal Coping  Outcome: Ongoing, Progressing  Goal: Improved Behavioral Control  Outcome: Ongoing, Progressing  Goal: Improved Attention and Thought Clarity  Outcome: Ongoing, Progressing     Problem: Seizure Disorder Comorbidity  Goal: Maintenance of Seizure Control  Outcome: Ongoing, Progressing   Goal Outcome Evaluation:      Pt is alert and oriented to self and place. She denies pain or SOB. Lung sounds clear, on RA. HR in the 70's, non tele. On IV Cefazolin. Possible discharge today to home with home care and home infusion.

## 2022-10-04 NOTE — DISCHARGE INSTRUCTIONS
[facility], may use hospital entrance or cancer center entrance door.  Phone # [phone]  Address: [address] [place] [id number]  Wednesday, 10/05 appointment at 3 PM  Thursday, 10/06 appointment at 2 PM

## 2022-10-04 NOTE — DISCHARGE SUMMARY
[facility]  Hospitalist Discharge Summary      Date of Admission:  9/28/2022  Date of Discharge:  10/4/2022  Discharging Provider: Rafael Coleman MD  Discharge Service: Hospitalist Service    Discharge Diagnoses   Acute metabolic encephalopathy likely due to complex partial seizure   Hypertensive urgency   Gram-positive cocci bacteremia    Follow-ups Needed After Discharge   Follow-up Appointments     Follow-up and recommended labs and tests       Follow up with primary care provider, Camille Love, within 7 days for   hospital follow- up. Continue IV ceftriaxone 2g daily until 10/6/26.   ( will bring patient to infusion center)             Unresulted Labs Ordered in the Past 30 Days of this Admission     Date and Time Order Name Status Description    10/1/2022  7:02 AM Blood Culture Hand, Left Preliminary     10/1/2022  7:02 AM Blood Culture Hand, Left Preliminary     9/30/2022  2:40 AM Blood Culture Peripheral Blood Preliminary       These results will be followed up by Camille Love    Discharge Disposition   Discharged to home  Condition at discharge: Stable      Hospital Course   Eelanor Awan is a 71-year-old woman with a history of hypertension, prior TBI, cognitive impairment, alcohol abuse, thiamine deficiency, alcoholic cirrhosis, hepatitis C, PVCs, hypertension, BPPV and depression admitted 9/28/2022 due to acute metabolic encephalopathy secondary to complex partial seizure, hypertensive urgency and fever.    Brain CT and MRI showed no acute changes. EEG revealed nonspecific slowing in theta and delta range suggestive of seizures.  She received Keppra as recommended by neurologist.  Hypertensive urgency resolved with antihypertensive regimen adjustment.    Blood culture 9/30/2022 grew Staph hominis in 1 bottle.  She received IV antibiotics during hospital stay for episode of fever and gram-positive bacteremia of unclear source with plan to continue IV  ceftriaxone for the next 2 days as outpatient as recommended by infectious disease specialist.  Spouse plans to transport patient to the infusion center for doses of antibiotic therapy.    Symptoms are improved and patient is feeling much better.  She is clinically stable for discharge at this time.        Consultations This Hospital Stay   NEUROLOGY IP CONSULT  CARE MANAGEMENT / SOCIAL WORK IP CONSULT  PHARMACY TO DOSE VANCO  INFECTIOUS DISEASES IP CONSULT  PHYSICAL THERAPY ADULT IP CONSULT  OCCUPATIONAL THERAPY ADULT IP CONSULT    Code Status   Full Code    Time Spent on this Encounter   I, Rafael Coleman MD, personally saw the patient today and spent greater than 30 minutes discharging this patient.       Rafael Coleman MD  [address] [address]  Phone: [phone]  Fax: [phone]  ______________________________________________________________________    Physical Exam   Vital Signs: Temp: 98.1  F (36.7  C) Temp src: Oral BP: 130/77 Pulse: 77   Resp: 18 SpO2: 96 % O2 Device: None (Room air)    Weight: 131 lbs 6.4 oz     General:  Alert, cooperative, no distress.  Oriented to recent events.   Neurologic: A+Ox3 Facial symmetry preserved, fluent speech.  Neck supple and no pain with movement.  Psych: calm, mood and affect appropriate to situation  HEENT:  Anicteric, MMM  CV: RRR no MRG, normal S1 and S2, no edema  Lungs: CTAB.  Easyrespirations  Abd: soft, no RUQ TTP, NT, normoactive BS, no flank or CVA TTP  Skin: no rashes or jaundice noted on exposed skin.    MSK: no back or joint pain.       Primary Care Physician   Camille Love    Discharge Orders      Home Care Referral      Reason for your hospital stay    Acute metabolic encephalopathy likely due to complex partial seizure   Hypertensive urgency   GPC bacteremia     Follow-up and recommended labs and tests     Follow up with primary care provider, Camille Love, within 7 days  for hospital follow- up. Continue IV ceftriaxone 2g daily until 10/6/26. ( will bring patient to infusion center)     Activity    Your activity upon discharge: activity as tolerated     Diet    Follow this diet upon discharge: Orders Placed This Encounter      Room Service      Snacks/Supplements Adult: Ensure Enlive; With Meals      Combination Diet Low Saturated Fat Na <2400mg Diet, No Caffeine Diet       Significant Results and Procedures   Results for orders placed or performed during the hospital encounter of 09/28/22   Head CT w/o contrast    Narrative    EXAM: CT HEAD W/O CONTRAST  LOCATION: [facility]  DATE/TIME: 9/28/2022 11:40 PM    INDICATION: Altered mental status. Confusion.  COMPARISON: MRI brain dated 04/05/2022. MRI brain dated 08/18/2020.  TECHNIQUE: Routine CT Head without IV contrast. Multiplanar reformats. Dose reduction techniques were used.    FINDINGS:  INTRACRANIAL CONTENTS: No intracranial hemorrhage, extraaxial collection, or mass effect. No acute large territory infarction. Small region of encephalomalacia identified involving the left temporal lobe, likely reflects a sequela of remote injury.   Chronic left basal ganglia lacunar type infarctions. Mild to moderate presumed chronic small vessel ischemic changes. Mild generalized volume loss. No hydrocephalus.     VISUALIZED ORBITS/SINUSES/MASTOIDS: No intraorbital abnormality. No paranasal sinus mucosal disease. No middle ear or mastoid effusion.    BONES/SOFT TISSUES: No acute abnormality.      Impression    IMPRESSION:  1.  No CT evidence for acute intracranial process.  2.  Brain atrophy and chronic changes, as above.   CT Chest Pulmonary Embolism w Contrast    Narrative    EXAM: CT CHEST PULMONARY EMBOLISM W CONTRAST  LOCATION: [facility]  DATE/TIME: 9/29/2022 3:33 AM    INDICATION: TACHYCARDIA, SOB  COMPARISON: Chest radiograph 08/17/2020.  TECHNIQUE: CT chest pulmonary angiogram  during arterial phase injection of IV contrast. Multiplanar reformats and MIP reconstructions were performed. Dose reduction techniques were used.   CONTRAST: isovue 370 100ml    FINDINGS:  ANGIOGRAM CHEST: No pulmonary embolus. No aortic aneurysm or dissection.    LUNGS AND PLEURA: 5 mm and 6 mm lobulated subpleural nodules in the right middle lobe consistent with benign intrapulmonary lymph nodes (image 178 of series 6). Small benign calcified granuloma at the base of the left lower lobe. No acute lung   consolidation, pleural effusion or pneumothorax.    MEDIASTINUM/AXILLAE: Normal.    CORONARY ARTERY CALCIFICATION: Moderate.    UPPER ABDOMEN: Normal.    MUSCULOSKELETAL: Degenerative changes of the spine.      Impression    IMPRESSION:  No pulmonary embolus or other acute process in the chest.   MR Brain w/o Contrast    Narrative    EXAM: MR BRAIN W/O CONTRAST  LOCATION: [facility]  DATE/TIME: 9/29/2022 12:24 PM    INDICATION: Hypertension. Encephalopathy.  COMPARISON: MRI of the brain 08/13/2019. MRI of the brain 03/08/2014.  TECHNIQUE: Routine multiplanar multisequence head MRI without intravenous contrast.    FINDINGS:  INTRACRANIAL CONTENTS: No acute or subacute infarct. New FLAIR hyperintense signal changes involving the right precentral gyrus anterior right parietal lobe on axial FLAIR image 21. Chronic appearing lacunar infarct involving the left basal ganglia is   new from 2020 imaging. No extra-axial fluid collection. No midline shift. Mild to moderate cerebral volume loss is mildly to moderately progressed from 2020 imaging. Scattered nonspecific T2/FLAIR hyperintensities within the cerebral white matter and   yon most consistent with mild chronic microvascular ischemic change.    There is small area of focal volume loss in the anterior left temporal lobe with adjacent T2/FLAIR hyperintense signal, best visualized on axial FLAIR image 11. These FLAIR signal changes have  increased over serial examinations dating back to 2014.       SELLA: No abnormality accounting for technique.    OSSEOUS STRUCTURES/SOFT TISSUES: Normal marrow signal. The major intracranial vascular flow voids are maintained.     ORBITS: No abnormality accounting for technique.     SINUSES/MASTOIDS: No paranasal sinus mucosal disease. No middle ear or mastoid effusion.       Impression    IMPRESSION:  1.  No acute infarct or acute intracranial hemorrhage.  2.  Foci of interval chronic appearing ischemic change involving the left basal ganglia and corona radiata in addition to the watershed region of the right cerebral hemisphere.  3.  Small area of volume loss with adjacent FLAIR/hyperintense signal change in the anterior left temporal lobe, most consistent with sequela of a previous insult.  4.  Mild to moderate cerebral and cerebellar volume loss is progressed from 2019 imaging.   CT Abdomen Pelvis w Contrast    Narrative    EXAM: CT ABDOMEN PELVIS W CONTRAST  LOCATION: [facility]  DATE/TIME: 9/30/2022 5:37 PM    INDICATION: Bacteremia of unknown origin; altered mental status.  COMPARISON: Chest CT 09/29/2022.  TECHNIQUE: CT scan of the abdomen and pelvis was performed following injection of IV contrast. Multiplanar reformats were obtained. Dose reduction techniques were used.  CONTRAST: 100 mL Isovue-370    FINDINGS:    LOWER CHEST: Small hiatal hernia.    HEPATOBILIARY: Liver enhances normally and is normal in size.    Gallbladder is upper limits of normal size, with prominent gallbladder wall edema. There is high attenuation material within the gallbladder, compatible with vicarious excretion of contrast from yesterday's CT examination. Correlation with sonography is   recommended to exclude an acute cholecystitis.    No intrahepatic or intrahepatic biliary ductal dilatation.    PANCREAS: Enhances normally. No peripancreatic inflammatory fat stranding.    SPLEEN: Enhances normally.  Normal size.    ADRENAL GLANDS: Normal.    KIDNEYS: Both kidneys enhance symmetrically, without hydronephrosis.    No nephroureterolithiasis. Mild urothelial thickening and enhancement of the proximal left ureter; correlate with urinalysis to exclude upper urinary tract infection.     Low-attenuation subcentimeter renal lesion(s). These are compatible with small benign cysts and no specific imaging evaluation or follow-up is recommended.    Urinary bladder is mildly distended.    PELVIC ORGANS: No suspicious abnormality.    BOWEL: No evidence of acute gastrointestinal inflammation or obstruction. Colonic diverticulosis, without evidence of diverticulitis. Normal appendix.    No intraperitoneal free fluid or free air.    LYMPH NODES: No suspicious abdominal or pelvic lymphadenopathy.    VASCULATURE: No abdominal aortic aneurysm. Predominantly moderate atheromatous disease, with moderate to severe ostial narrowing of the superior mesenteric artery origin; distal patency is preserved.    MUSCULOSKELETAL: No suspicious abnormality.    OTHER: No additionally significant abnormalities.      Impression    IMPRESSION:   1.  Significant gallbladder wall edema, which can be seen in the setting of acute cholecystitis, hepatitis, fluid overload, or hypoproteinemia. Correlation with sonography recommended to exclude acute cholecystitis.  2.  Mild urothelial thickening and enhancement of the proximal left ureter. Correlation with urinalysis recommended to exclude an upper urinary tract infection.  3.  Colonic diverticulosis, without evidence of diverticulitis.       US Abdomen Limited    Narrative    EXAM: ULTRASOUND ABDOMEN LIMITED  LOCATION: [facility]  DATE/TIME: 10/01/2022, 11:22 AM    INDICATION: Altered mental status.  COMPARISON: None.  TECHNIQUE: Limited abdominal ultrasound.    FINDINGS:    GALLBLADDER: Minimal wall thickening and/or pericholecystic fluid evident. No definite gallstones.    BILE  DUCTS: No biliary dilatation. The common duct measures 6 mm.    LIVER: Increased echogenicity from diffuse fatty infiltration. No focal mass.    RIGHT KIDNEY: No hydronephrosis.    PANCREAS: The visualized portions are normal.    No ascites.      Impression    IMPRESSION:  1.  Mild gallbladder wall thickening and/or pericholecystic fluid, cholecystitis not excluded.  2.  No cholelithiasis.  3.  Mild possible fatty infiltration of the liver.     Echocardiogram Limited     Value    LVEF  60-65%    [facility]    [id number]  [id number]  [id number]  [id number]^[place] [address]     Name: BUD KEATING  MRN: [id number]  : 1950  Study Date: 2022 12:49 PM  Age: 71 yrs  Gender: Female  Patient Location: [facility]  Reason For Study: Hypertensive Heart Disease  Ordering Physician: LACY HORTON  Referring Physician: LACY HORTON  Performed By:      BSA: 1.6 m2  Height: 64 in  Weight: 120 lb  HR: 106  ______________________________________________________________________________  Procedure  Limited Echo Adult. Definity (NDC #63058-477) given intravenously.  ______________________________________________________________________________  Interpretation Summary     Left ventricular size, wall motion and function are normal. The ejection  fraction is 60-65%.  Normal right ventricle size and systolic function.  No hemodynamically significant valvular abnormalities on 2D or color flow  imaging.  ______________________________________________________________________________  Left Ventricle  Left ventricular size, wall motion and function are normal. The ejection  fraction is 60-65%. There is normal left ventricular wall thickness. No  regional wall motion abnormalities noted.     Right Ventricle  Normal right ventricle size and systolic function.     Atria  Normal left atrial size. Right atrial size is normal. There is no color  Doppler evidence of an atrial shunt.      Mitral Valve  Mitral valve leaflets appear normal. There is no evidence of mitral stenosis  or clinically significant mitral regurgitation.     Tricuspid Valve  Right ventricle systolic pressure estimate normal.     Aortic Valve  Aortic valve leaflets appear normal. There is no evidence of aortic stenosis  or clinically significant aortic regurgitation.     Pulmonic Valve  The pulmonic valve is not well seen, but is grossly normal. This degree of  valvular regurgitation is within normal limits.     Vessels  The aorta root is normal. Normal size ascending aorta. IVC diameter <2.1 cm  collapsing >50% with sniff suggests a normal RA pressure of 3 mmHg.     Pericardium  There is no pericardial effusion.     ______________________________________________________________________________  Time Measurements     MM HR: 106.0 BPM     ______________________________________________________________________________  Report approved by: Mane Olivares 09/29/2022 02:48 PM               Discharge Medications   Current Discharge Medication List      START taking these medications    Details   amLODIPine (NORVASC) 2.5 MG tablet Take 1 tablet (2.5 mg) by mouth daily  Qty: 30 tablet, Refills: 0    Associated Diagnoses: Acute metabolic encephalopathy; Hypertensive emergency; SIRS (systemic inflammatory response syndrome) (H)      cefTRIAXone (ROCEPHIN) 2 GM vial Inject 2 g into the vein every 24 hours for 2 days  Qty: 40 mL, Refills: 0    Associated Diagnoses: Acute metabolic encephalopathy; Hypertensive emergency; SIRS (systemic inflammatory response syndrome) (H)         CONTINUE these medications which have CHANGED    Details   losartan (COZAAR) 100 MG tablet Take 1 tablet (100 mg) by mouth daily  Qty: 30 tablet, Refills: 0    Associated Diagnoses: Acute metabolic encephalopathy; Hypertensive emergency; SIRS (systemic inflammatory response syndrome) (H)      metoprolol succinate ER (TOPROL XL) 25 MG 24 hr tablet Take 3 tablets  (75 mg) by mouth daily  Qty: 30 tablet, Refills: 0    Associated Diagnoses: Acute metabolic encephalopathy; Hypertensive emergency; SIRS (systemic inflammatory response syndrome) (H)      risperiDONE (RISPERDAL) 2 MG tablet Take 1 tablet (2 mg) by mouth At Bedtime  Qty: 30 tablet, Refills: 0    Associated Diagnoses: Acute metabolic encephalopathy; Hypertensive emergency; SIRS (systemic inflammatory response syndrome) (H)         CONTINUE these medications which have NOT CHANGED    Details   calcium-vitamin D 500 mg(1,250mg) -200 unit per tablet [CALCIUM-VITAMIN D 500 MG(1,250MG) -200 UNIT PER TABLET] Take 1 tablet by mouth 2 (two) times a day with meals.      escitalopram (LEXAPRO) 20 MG tablet Take 1 tablet (20 mg) by mouth daily  Qty: 90 tablet, Refills: 4    Associated Diagnoses: Mild major depression (H); Alcohol dependence in remission (H); Traumatic brain injury, with loss of consciousness of 30 minutes or less, sequela (H); Panic attack; Encounter for medication refill      folic acid (FOLVITE) 1 MG tablet TAKE ONE TABLET BY MOUTH DAILY  Qty: 90 tablet, Refills: 4    Associated Diagnoses: Encounter for medication refill      levETIRAcetam (KEPPRA) 500 MG tablet TAKE 1 TABLET(500 MG) BY MOUTH TWICE DAILY  Qty: 180 tablet, Refills: 3    Comments: **Patient requests 90 days supply**  Associated Diagnoses: Partial symptomatic epilepsy with complex partial seizures, not intractable, without status epilepticus (H)      Thiamine Mononitrate (B1) 100 MG TABS TAKE ONE TABLET BY MOUTH DAILY  Qty: 100 tablet, Refills: 3    Associated Diagnoses: Thiamine deficiency      venlafaxine (EFFEXOR XR) 75 MG 24 hr capsule Take 150 mg by mouth daily      LORazepam (ATIVAN) 1 MG tablet Take 1 tablet 30 minutes before MRI scan  Qty: 1 tablet, Refills: 0    Associated Diagnoses: Seizure-like activity (H)           Allergies   No Known Allergies

## 2022-10-04 NOTE — PLAN OF CARE
"Goal Outcome Evaluation:    Pt cleared to go home with  today. She is set to go to [facility] to complete her IV ABX infusions.  in the room during discharge teaching. He will be caring for her and taking her to appointments.   We discussed med changes and highlighted what she still needs tonight and what she should start tomorrow. She was given metoprolol and amlodipine, but her losartan is \"too early for refill\". [facility] is out of stock of Risperidone, instructed pt to  refill at [facility]. Discharge instructions look like she will need to  her IV ABX at [facility] as well, RNCM confirmed with [place] that those meds are available at infusion clinic and they should NOT take any IV meds from [facility]. If they have any further questions or issues, they can call P1.   The number to the infusion center at [facility] is also highlighted in the paperwork.    All belongings packed up by  and verified that they had everything.                        "

## 2022-10-04 NOTE — PLAN OF CARE
Problem: Plan of Care - These are the overarching goals to be used throughout the patient stay.    Goal: Optimal Comfort and Wellbeing  Outcome: Ongoing, Progressing   Goal Outcome Evaluation:  Pt denies pain.      Problem: Seizure Disorder Comorbidity  Goal: Maintenance of Seizure Control  Outcome: Ongoing, Progressing   No seizure activity witnessed.      Problem: Plan of Care - These are the overarching goals to be used throughout the patient stay.    Goal: Readiness for Transition of Care  Outcome: Ongoing, Progressing   Plan is for Pt to discharge to home with home care and infusion therapy possibly tomorrow if can be arranged.    Isela Hernandez RN

## 2022-10-04 NOTE — PLAN OF CARE
Occupational Therapy Discharge Summary    Reason for therapy discharge:    Discharged to home with home therapy.    Progress towards therapy goal(s). See goals on Care Plan in [facility] electronic health record for goal details.  Goals partially met.  Barriers to achieving goals:   discharge from facility.    Therapy recommendation(s):    Continued therapy is recommended.  Rationale/Recommendations:  Pt would benefit from continued skilled OT to increase independence in ADLs and IADLs..    Adam Baig OTS

## 2022-10-04 NOTE — PROGRESS NOTES
Chart review PT, OT recommended pt receive home care. SW contacted home care agencies, [facility] ([facility]) accepted referral for PT,OT, HHA.    JONO Laurent

## 2022-10-04 NOTE — PROGRESS NOTES
ID chart check    I have entered therapy plan order for Ceftriaxone for 10/5 and 10/6, to be given at outpatient infusion center. No further lab testing needed. Can use peripheral IV for this short course, remove at end of therapy.     Ernesto Dumont MD

## 2022-10-04 NOTE — PROGRESS NOTES
Chart reviewed for outpatient antibotic infusion needs. Spoke with Laurita with [facility] [phone] no availability for 10/5 and or 10/06; [facility] has availability for Wednesday 10/05, but no availability for Thursday, 10/06. Will update patient to discuss alternative options that of home infusion or that writer could check availability of outpatient infusion center in [place], [facility] and or [facility].    1145 Met with patient and pt's spouse to introduce care management role, provide update and assist with pt's discharge infusion needs. Pt and spouse would like for RNCM to check for availability at [place] and [facility].     1215 Spoke with VERONIQUE Cisse [place] [facility] [phone], option #2; able to accept patient need therapy order from MD. Patient accepted for outpatient infusion therapies Wednesday, 10/05 at 1500 and Thursday, 10/06 at 1400.    Dr. Dumont paged for therapy plan discharge order for antibiotic plan.

## 2022-10-05 ENCOUNTER — INFUSION THERAPY VISIT (OUTPATIENT)
Dept: INFUSION THERAPY | Facility: CLINIC | Age: 72
End: 2022-10-05
Attending: INTERNAL MEDICINE
Payer: COMMERCIAL

## 2022-10-05 ENCOUNTER — PATIENT OUTREACH (OUTPATIENT)
Dept: CARE COORDINATION | Facility: CLINIC | Age: 72
End: 2022-10-05

## 2022-10-05 ENCOUNTER — OFFICE VISIT (OUTPATIENT)
Dept: FAMILY MEDICINE | Facility: CLINIC | Age: 72
End: 2022-10-05
Payer: COMMERCIAL

## 2022-10-05 VITALS
RESPIRATION RATE: 18 BRPM | OXYGEN SATURATION: 99 % | SYSTOLIC BLOOD PRESSURE: 123 MMHG | DIASTOLIC BLOOD PRESSURE: 69 MMHG | TEMPERATURE: 97.7 F | HEART RATE: 75 BPM

## 2022-10-05 VITALS
TEMPERATURE: 98.8 F | WEIGHT: 129.75 LBS | OXYGEN SATURATION: 98 % | BODY MASS INDEX: 22.99 KG/M2 | RESPIRATION RATE: 16 BRPM | DIASTOLIC BLOOD PRESSURE: 56 MMHG | SYSTOLIC BLOOD PRESSURE: 108 MMHG | HEIGHT: 63 IN | HEART RATE: 79 BPM

## 2022-10-05 DIAGNOSIS — R41.89 COGNITIVE IMPAIRMENT: ICD-10-CM

## 2022-10-05 DIAGNOSIS — R90.89 ABNORMAL FINDING ON MRI OF BRAIN: ICD-10-CM

## 2022-10-05 DIAGNOSIS — F22 PARANOIA (H): ICD-10-CM

## 2022-10-05 DIAGNOSIS — R78.81 BACTEREMIA: Primary | ICD-10-CM

## 2022-10-05 DIAGNOSIS — S06.9X1S TRAUMATIC BRAIN INJURY, WITH LOSS OF CONSCIOUSNESS OF 30 MINUTES OR LESS, SEQUELA (H): Primary | ICD-10-CM

## 2022-10-05 DIAGNOSIS — R68.89 SPELLS OF DECREASED ATTENTIVENESS: ICD-10-CM

## 2022-10-05 DIAGNOSIS — F10.21 ALCOHOL DEPENDENCE IN REMISSION (H): ICD-10-CM

## 2022-10-05 DIAGNOSIS — G40.909 SEIZURE DISORDER (H): ICD-10-CM

## 2022-10-05 LAB — BACTERIA BLD CULT: NO GROWTH

## 2022-10-05 PROCEDURE — 99496 TRANSJ CARE MGMT HIGH F2F 7D: CPT | Performed by: FAMILY MEDICINE

## 2022-10-05 PROCEDURE — 250N000011 HC RX IP 250 OP 636: Performed by: INTERNAL MEDICINE

## 2022-10-05 PROCEDURE — 96365 THER/PROPH/DIAG IV INF INIT: CPT

## 2022-10-05 RX ORDER — DIPHENHYDRAMINE HYDROCHLORIDE 50 MG/ML
50 INJECTION INTRAMUSCULAR; INTRAVENOUS
Status: CANCELLED
Start: 2022-10-05

## 2022-10-05 RX ORDER — MEPERIDINE HYDROCHLORIDE 25 MG/ML
25 INJECTION INTRAMUSCULAR; INTRAVENOUS; SUBCUTANEOUS EVERY 30 MIN PRN
Status: CANCELLED | OUTPATIENT
Start: 2022-10-05

## 2022-10-05 RX ORDER — HEPARIN SODIUM (PORCINE) LOCK FLUSH IV SOLN 100 UNIT/ML 100 UNIT/ML
5 SOLUTION INTRAVENOUS
Status: CANCELLED | OUTPATIENT
Start: 2022-10-05

## 2022-10-05 RX ORDER — METHYLPREDNISOLONE SODIUM SUCCINATE 125 MG/2ML
125 INJECTION, POWDER, LYOPHILIZED, FOR SOLUTION INTRAMUSCULAR; INTRAVENOUS
Status: CANCELLED
Start: 2022-10-05

## 2022-10-05 RX ORDER — CEFTRIAXONE SODIUM 2 G/50ML
2 INJECTION, SOLUTION INTRAVENOUS ONCE
Status: DISCONTINUED | OUTPATIENT
Start: 2022-10-05 | End: 2022-10-05

## 2022-10-05 RX ORDER — CEFTRIAXONE SODIUM 2 G/50ML
2 INJECTION, SOLUTION INTRAVENOUS ONCE
Status: CANCELLED
Start: 2022-10-05 | End: 2022-10-05

## 2022-10-05 RX ORDER — ALBUTEROL SULFATE 90 UG/1
1-2 AEROSOL, METERED RESPIRATORY (INHALATION)
Status: CANCELLED
Start: 2022-10-05

## 2022-10-05 RX ORDER — EPINEPHRINE 1 MG/ML
0.3 INJECTION, SOLUTION, CONCENTRATE INTRAVENOUS EVERY 5 MIN PRN
Status: CANCELLED | OUTPATIENT
Start: 2022-10-05

## 2022-10-05 RX ORDER — HEPARIN SODIUM,PORCINE 10 UNIT/ML
5 VIAL (ML) INTRAVENOUS
Status: CANCELLED | OUTPATIENT
Start: 2022-10-05

## 2022-10-05 RX ORDER — ALBUTEROL SULFATE 0.83 MG/ML
2.5 SOLUTION RESPIRATORY (INHALATION)
Status: CANCELLED | OUTPATIENT
Start: 2022-10-05

## 2022-10-05 RX ORDER — CEFTRIAXONE 2 G/1
2 INJECTION, POWDER, FOR SOLUTION INTRAMUSCULAR; INTRAVENOUS ONCE
Status: COMPLETED | OUTPATIENT
Start: 2022-10-05 | End: 2022-10-05

## 2022-10-05 RX ADMIN — CEFTRIAXONE 2 G: 2 INJECTION, POWDER, FOR SOLUTION INTRAMUSCULAR; INTRAVENOUS at 15:29

## 2022-10-05 ASSESSMENT — PATIENT HEALTH QUESTIONNAIRE - PHQ9: SUM OF ALL RESPONSES TO PHQ QUESTIONS 1-9: 6

## 2022-10-05 NOTE — PROGRESS NOTES
[facility]    Background: Transitional Care Management program auto-identified and prompting a chart review by [facility] team.    Assessment: Upon chart review, [facility] Team member will cancel/close this episode of Transitional Care Management program due to reason below:    Patient has a follow up appointment with an appropriate provider today for hospital discharge    Plan: Transitional Care Management episode closed per reason above.      Nina Swain  Community Health Worker  [facility]  Ph: [phone]    *[facility] Care Resource Team does NOT follow patient ongoing. Referrals are identified based on internal discharge reports and the outreach is to ensure patient has an understanding of their discharge instructions.

## 2022-10-05 NOTE — PROGRESS NOTES
Infusion Nursing Note:  Eleanor Awan presents today for Rocephin.    Patient seen by provider today: No   present during visit today: Not Applicable.    Note: N/A.    Intravenous Access:  Peripheral IV placed.    Treatment Conditions:  Not Applicable.    Post Infusion Assessment:  Patient tolerated infusion without incident.  Blood return noted pre and post infusion.  Site patent and intact, free from redness, edema or discomfort.  No evidence of extravasations.   PIV left in place for tomorrow's infusion.    Discharge Plan:   Discharge instructions reviewed with: Patient.  Patient and/or family verbalized understanding of discharge instructions and all questions answered.  AVS to patient via [employer].  Patient will return 10/6/2022 for next appointment.   Patient discharged in stable condition accompanied by: self.  Departure Mode: Ambulatory.    Tanika Noriega RN

## 2022-10-05 NOTE — PROGRESS NOTES
Assessment & Plan     Traumatic brain injury, with loss of consciousness of 30 minutes or less, sequela (H)  At this point, it appears her brain dysfunction is causing significant impairment. Will support her and give therapy, but it is not clear how much rehab is possible.  - Med Therapy Management Referral  - Primary Care - Care Coordination Referral    Cognitive impairment  Significant - she cannot remember things even said just minutes earlier; however she's pleasant  - Med Therapy Management Referral  - Primary Care - Care Coordination Referral    Alcohol dependence in remission (H)  This also did damage that is irreparable and her liver is showing it  - Med Therapy Management Referral  - Primary Care - Care Coordination Referral    Abnormal finding on MRI of brain  In case MTM can help her and her  (Rian) figure out a good way for medication compliance.  - Med Therapy Management Referral    Seizure disorder (H)  New - she's on Keppra. Will check levels occasionally in order to be certain she's compliant; we really want to prevent seizures  - Med Therapy Management Referral  - Primary Care - Care Coordination Referral    Paranoia (H)  This is not a current problem, but should be watched for in case it relapses  - Med Therapy Management Referral  - Primary Care - Care Coordination Referral    Spells of decreased attentiveness  This is likely what preceded her seizures  - Med Therapy Management Referral  - Primary Care - Care Coordination Referral      Review of external notes as documented elsewhere in note  Prescription drug management  80 minutes spent on the date of the encounter doing chart review, history and exam, documentation and further activities per the note    No follow-ups on file.    Camille Love MD  [facility] LEILA Webster is a 71 year old accompanied by her spouse, presenting for the following health issues:  Hospital F/U    [facility]  "Follow-up Visit:    Hospital/Nursing Home/IP Rehab Facility: [facility]  Date of Admission: 09/28/2022  Date of Discharge: 10/04/2022  Reason(s) for Admission: altered mental status    Was your hospitalization related to COVID-19? No   Problems taking medications regularly:  Yes -  helps some  Medication changes since discharge: Keppra added  Problems adhering to non-medication therapy:  None yet, because  is home from work this week to help Eleanor. Next week, when he returns to work, might be more difficult.    Summary of hospitalization:  [facility] discharge summary reviewed  Diagnostic Tests/Treatments reviewed.  Follow up needed: needs follow up with PCP and with neurology (11/9/2022)  Other Healthcare Providers Involved in Patient s Care:         Homecare, Care Coordination, Specialist appointment - Neurology, Physical Therapy and MTM  Update since discharge: improved.   Post Medication Reconciliation Status:        Plan of care communicated with family    Review of Systems   Poor appetite      Objective    /56   Pulse 79   Temp 98.8  F (37.1  C) (Oral)   Resp 16   Ht 1.6 m (5' 3\")   Wt 58.9 kg (129 lb 12 oz)   SpO2 98%   BMI 22.98 kg/m    Body mass index is 22.98 kg/m .  Physical Exam   GENERAL: alert, no distress, frail and appears older than stated age  NECK: no adenopathy, no asymmetry, masses, or scars and thyroid normal to palpation  RESP: lungs clear to auscultation - no rales, no wheezes  CV: regular rate and rhythm, normal S1 S2, no murmur, no peripheral edema  SKIN: no suspicious lesions or rashes  PSYCH: concentration poor, affect flat, fatigued, judgement and insight impaired and appearance well groomed  Gait: while holding onto her 's arm, she had a steady, even gait with normal sized steps    No results found for this or any previous visit (from the past 24 hour(s)).        *call next week to determine follow up; can call " Rian at 8-9am or 12:30*

## 2022-10-06 ENCOUNTER — MEDICAL CORRESPONDENCE (OUTPATIENT)
Dept: HEALTH INFORMATION MANAGEMENT | Facility: CLINIC | Age: 72
End: 2022-10-06

## 2022-10-06 ENCOUNTER — INFUSION THERAPY VISIT (OUTPATIENT)
Dept: INFUSION THERAPY | Facility: CLINIC | Age: 72
End: 2022-10-06
Attending: INTERNAL MEDICINE
Payer: COMMERCIAL

## 2022-10-06 ENCOUNTER — TELEPHONE (OUTPATIENT)
Dept: FAMILY MEDICINE | Facility: CLINIC | Age: 72
End: 2022-10-06

## 2022-10-06 ENCOUNTER — PATIENT OUTREACH (OUTPATIENT)
Dept: CARE COORDINATION | Facility: CLINIC | Age: 72
End: 2022-10-06

## 2022-10-06 VITALS
RESPIRATION RATE: 18 BRPM | DIASTOLIC BLOOD PRESSURE: 48 MMHG | HEART RATE: 71 BPM | TEMPERATURE: 98.8 F | SYSTOLIC BLOOD PRESSURE: 102 MMHG

## 2022-10-06 DIAGNOSIS — R78.81 BACTEREMIA: Primary | ICD-10-CM

## 2022-10-06 LAB
BACTERIA BLD CULT: NO GROWTH
BACTERIA BLD CULT: NO GROWTH

## 2022-10-06 PROCEDURE — 250N000011 HC RX IP 250 OP 636: Performed by: INTERNAL MEDICINE

## 2022-10-06 PROCEDURE — 96365 THER/PROPH/DIAG IV INF INIT: CPT

## 2022-10-06 RX ORDER — CEFTRIAXONE 2 G/1
2 INJECTION, POWDER, FOR SOLUTION INTRAMUSCULAR; INTRAVENOUS ONCE
Status: COMPLETED | OUTPATIENT
Start: 2022-10-06 | End: 2022-10-06

## 2022-10-06 RX ORDER — CEFTRIAXONE SODIUM 2 G/50ML
2 INJECTION, SOLUTION INTRAVENOUS ONCE
Status: CANCELLED
Start: 2022-10-06 | End: 2022-10-06

## 2022-10-06 RX ORDER — CEFTRIAXONE SODIUM 2 G/50ML
2 INJECTION, SOLUTION INTRAVENOUS ONCE
Status: DISCONTINUED | OUTPATIENT
Start: 2022-10-06 | End: 2022-10-06 | Stop reason: CLARIF

## 2022-10-06 RX ORDER — ALBUTEROL SULFATE 90 UG/1
1-2 AEROSOL, METERED RESPIRATORY (INHALATION)
Status: CANCELLED
Start: 2022-10-06

## 2022-10-06 RX ORDER — ALBUTEROL SULFATE 0.83 MG/ML
2.5 SOLUTION RESPIRATORY (INHALATION)
Status: CANCELLED | OUTPATIENT
Start: 2022-10-06

## 2022-10-06 RX ORDER — HEPARIN SODIUM (PORCINE) LOCK FLUSH IV SOLN 100 UNIT/ML 100 UNIT/ML
5 SOLUTION INTRAVENOUS
Status: CANCELLED | OUTPATIENT
Start: 2022-10-06

## 2022-10-06 RX ORDER — HEPARIN SODIUM,PORCINE 10 UNIT/ML
5 VIAL (ML) INTRAVENOUS
Status: CANCELLED | OUTPATIENT
Start: 2022-10-06

## 2022-10-06 RX ORDER — METHYLPREDNISOLONE SODIUM SUCCINATE 125 MG/2ML
125 INJECTION, POWDER, LYOPHILIZED, FOR SOLUTION INTRAMUSCULAR; INTRAVENOUS
Status: CANCELLED
Start: 2022-10-06

## 2022-10-06 RX ORDER — MEPERIDINE HYDROCHLORIDE 25 MG/ML
25 INJECTION INTRAMUSCULAR; INTRAVENOUS; SUBCUTANEOUS EVERY 30 MIN PRN
Status: CANCELLED | OUTPATIENT
Start: 2022-10-06

## 2022-10-06 RX ORDER — DIPHENHYDRAMINE HYDROCHLORIDE 50 MG/ML
50 INJECTION INTRAMUSCULAR; INTRAVENOUS
Status: CANCELLED
Start: 2022-10-06

## 2022-10-06 RX ORDER — EPINEPHRINE 1 MG/ML
0.3 INJECTION, SOLUTION, CONCENTRATE INTRAVENOUS EVERY 5 MIN PRN
Status: CANCELLED | OUTPATIENT
Start: 2022-10-06

## 2022-10-06 RX ADMIN — CEFTRIAXONE SODIUM 2 G: 2 INJECTION, POWDER, FOR SOLUTION INTRAMUSCULAR; INTRAVENOUS at 14:12

## 2022-10-06 NOTE — TELEPHONE ENCOUNTER
Called HC provider with detail message left on vm of the request for verbal order for PT.  Provider approved request.  Call back number to clinic provided for questions.    JESENIA Crespo, RN  [facility]

## 2022-10-06 NOTE — PROGRESS NOTES
Infusion Nursing Note:  Eleanor Awan presents today for Rocephin.    Patient seen by provider today: No   present during visit today: Not Applicable.    Note: Denies any new medical concerns since yesterday.    Intravenous Access:  Peripheral IV placed.    Treatment Conditions:  Not Applicable.    Post Infusion Assessment:  Patient tolerated infusion without incident.  Blood return noted pre and post infusion.  Site patent and intact, free from redness, edema or discomfort.  No evidence of extravasations.  Access discontinued per protocol.     Discharge Plan:   Discharge instructions reviewed with: Patient.  Patient and/or family verbalized understanding of discharge instructions and all questions answered.  AVS to patient via [employer].  Patient will return as directed by MD for next appointment.   Patient discharged in stable condition accompanied by: self.  Departure Mode: Ambulatory.    Tanika Noriega RN

## 2022-10-06 NOTE — TELEPHONE ENCOUNTER
Reason for Call:  Home Health Care    Carter with [facility] called regarding (reason for call): Verbal order      Orders are needed for this patient. PT    PT: 2 x weekly x 2 weeks, 1 x weekly x 4 weeks     OT:  Will call next week for frequency    Skilled Nursing: n/a     Pt Provider: Ivan    Phone Number Homecare Nurse can be reached at: [phone]    Can we leave a detailed message on this number? YES    Phone number patient can be reached at: Home number on file [phone] (home)    Best Time: yes    Call taken on 10/6/2022 at 10:24 AM by Rehan Baron, RN    Will route encounter to covering provider.    NOAH CrespoN, RN  [facility]

## 2022-10-06 NOTE — PROGRESS NOTES
"Clinic Care Coordination Contact  Community Health Worker Initial Outreach    CHW Initial Information Gathering:  Referral Source: PCP  Preferred Hospital: [facility]  [phone]  Preferred Urgent Care: [facility] [place], [phone]  Current living arrangement:: I live in a private home with spouse  Type of residence:: Apartment  Community Resources: DME  Supplies Currently Used at Home: Other  Equipment Currently Used at Home: none  Informal Support system:: Spouse  No PCP office visit in Past Year: No  Transportation means:: Accessible car, Family  CHW Additional Questions  If ED/Hospital discharge, follow-up appointment scheduled as recommended?: N/A  Medication changes made following ED/Hospital discharge?: N/A  MyChart active?: No  Patient agreeable to assistance with activating MyChart?: No    Patient accepts CC: Yes. Patient scheduled for assessment with CCC CHARLENE on 10/10/2022 at 2:00 PM. Patient noted desire to discuss Eleanor was recently diagnosed with a seizure disorder; she has known brain atrophy and prognosis is not great; her  needs help being realistic about what he can do and what he can expect her to do. Is she safe being home alone while he works? Is she getting her medication on a regular basis? Is she eating?; She says she wants \"to get better\" but it is not clear how much better she can get. We want to support her for what she wants but also be honest that there is little chance for much recovery of her abilities..       "

## 2022-10-11 ENCOUNTER — TELEPHONE (OUTPATIENT)
Dept: FAMILY MEDICINE | Facility: CLINIC | Age: 72
End: 2022-10-11

## 2022-10-11 NOTE — TELEPHONE ENCOUNTER
Called [facility] to relay approval for the following OT orders:    Reason service is needed/diagnosis: 1x a week for 5 week for : ADL, safety awareness, activity tolerence, upper body strengthening, cognition.      Thanks

## 2022-10-11 NOTE — TELEPHONE ENCOUNTER
Order/Referral Request    Who is requesting: [facility]    Orders being requested: Verbal: OT    Reason service is needed/diagnosis: 1x a week for 5 week for : ADL, safety awareness, activity tolerence, upper body strengthening, cognition.     When are orders needed by: as soon as possible    Has this been discussed with Provider: No    Does patient have a preference on a Group/Provider/Facility? No    Does patient have an appointment scheduled?: No    Where to send orders: Please contact [facility] @ [phone] to provide verbal order.    Could we send this information to you in [employer] or would you prefer to receive a phone call?:   No preference   Okay to leave a detailed message?: Yes at Other phone number:  [phone]

## 2022-10-14 ENCOUNTER — MEDICAL CORRESPONDENCE (OUTPATIENT)
Dept: HEALTH INFORMATION MANAGEMENT | Facility: CLINIC | Age: 72
End: 2022-10-14

## 2022-10-14 DIAGNOSIS — Z53.9 DIAGNOSIS NOT YET DEFINED: Primary | ICD-10-CM

## 2022-10-14 PROCEDURE — G0180 MD CERTIFICATION HHA PATIENT: HCPCS | Performed by: FAMILY MEDICINE

## 2022-10-16 ENCOUNTER — MEDICAL CORRESPONDENCE (OUTPATIENT)
Dept: HEALTH INFORMATION MANAGEMENT | Facility: CLINIC | Age: 72
End: 2022-10-16

## 2022-10-19 ENCOUNTER — MEDICAL CORRESPONDENCE (OUTPATIENT)
Dept: HEALTH INFORMATION MANAGEMENT | Facility: CLINIC | Age: 72
End: 2022-10-19

## 2022-10-20 DIAGNOSIS — I16.1 HYPERTENSIVE EMERGENCY: ICD-10-CM

## 2022-10-20 DIAGNOSIS — Z76.0 ENCOUNTER FOR MEDICATION REFILL: Primary | ICD-10-CM

## 2022-10-20 DIAGNOSIS — R65.10 SIRS (SYSTEMIC INFLAMMATORY RESPONSE SYNDROME) (H): ICD-10-CM

## 2022-10-20 DIAGNOSIS — G93.41 ACUTE METABOLIC ENCEPHALOPATHY: ICD-10-CM

## 2022-10-20 RX ORDER — METOPROLOL SUCCINATE 25 MG/1
75 TABLET, EXTENDED RELEASE ORAL DAILY
Qty: 270 TABLET | Refills: 3 | Status: ON HOLD | OUTPATIENT
Start: 2022-10-20 | End: 2023-08-12

## 2022-10-20 NOTE — TELEPHONE ENCOUNTER
Medication Question or Refill    Contacts       Type Contact Phone/Fax    10/20/2022 10:08 AM CDT Phone (Incoming) Asaf Awan (Emergency Contact) [phone]          What medication are you calling about (include dose and sig)?: metoprolol succinate ER (TOPROL XL) 25 MG 24 hr tablet    Controlled Substance Agreement on file:   CSA -- Patient Level:    CSA: None found at the patient level.       Who prescribed the medication?: Dr. Coleman    Do you need a refill? Yes: out of medication    When did you use the medication last? 10/19/2022    Patient offered an appointment? No    Do you have any questions or concerns?  No    Preferred Pharmacy:   [facility] #[id number] [address] & [address] [address]  Phone: [phone] Fax: [phone]      Could we send this information to you in [employer] or would you prefer to receive a phone call?:   Patient would prefer a phone call   Okay to leave a detailed message?: Yes at Home number on file [phone] (home)

## 2022-11-01 DIAGNOSIS — Z76.0 ENCOUNTER FOR MEDICATION REFILL: Primary | ICD-10-CM

## 2022-11-01 DIAGNOSIS — G93.41 ACUTE METABOLIC ENCEPHALOPATHY: ICD-10-CM

## 2022-11-01 DIAGNOSIS — I16.1 HYPERTENSIVE EMERGENCY: ICD-10-CM

## 2022-11-01 DIAGNOSIS — R65.10 SIRS (SYSTEMIC INFLAMMATORY RESPONSE SYNDROME) (H): ICD-10-CM

## 2022-11-01 RX ORDER — AMLODIPINE BESYLATE 2.5 MG/1
2.5 TABLET ORAL DAILY
Qty: 90 TABLET | Refills: 3 | Status: SHIPPED | OUTPATIENT
Start: 2022-11-01 | End: 2023-03-10

## 2022-11-01 NOTE — TELEPHONE ENCOUNTER
Medication Question or Refill    Contacts       Type Contact Phone/Fax    11/01/2022 02:10 PM CDT Phone (Incoming) Asaf Awan (Emergency Contact) [phone]          What medication are you calling about (include dose and sig)?: amLODIPine (NORVASC) 2.5 MG tablet    Controlled Substance Agreement on file:   CSA -- Patient Level:    CSA: None found at the patient level.       Who prescribed the medication?: Rafael Coleman      Do you need a refill? Yes: only have 3 pill left.    When did you use the medication last? N/A    Patient offered an appointment? No    Do you have any questions or concerns?  No    Preferred Pharmacy:   [facility] #[id number] [address]  [address] [place] [address]  Phone: [phone] Fax: [phone]    [address] [address]  Phone: [phone] Fax: [phone]      Could we send this information to you in [employer] or would you prefer to receive a phone call?:   Patient would prefer a phone call   Okay to leave a detailed message?: Yes at Cell number on file:    Telephone Information:   Mobile [phone]

## 2022-11-07 ENCOUNTER — PATIENT OUTREACH (OUTPATIENT)
Dept: CARE COORDINATION | Facility: CLINIC | Age: 72
End: 2022-11-07

## 2022-11-07 NOTE — PROGRESS NOTES
Clinic Care Coordination Contact  [facility]    Clinical Data: Care Coordinator Outreach  Outreach attempted x 1. CHW called and unable to reach patient. CHW consulted with CCCSW as the assessment needs to be completed and CCC to call patient's spouse and complete the assessment. CHW scheduled patient on CCCSW's schedule for further follow up. CHW scheduled for next month outreach.

## 2022-11-11 ENCOUNTER — PATIENT OUTREACH (OUTPATIENT)
Dept: NURSING | Facility: CLINIC | Age: 72
End: 2022-11-11
Payer: COMMERCIAL

## 2022-11-11 ENCOUNTER — TELEPHONE (OUTPATIENT)
Dept: FAMILY MEDICINE | Facility: CLINIC | Age: 72
End: 2022-11-11

## 2022-11-11 DIAGNOSIS — R65.10 SIRS (SYSTEMIC INFLAMMATORY RESPONSE SYNDROME) (H): ICD-10-CM

## 2022-11-11 DIAGNOSIS — G93.41 ACUTE METABOLIC ENCEPHALOPATHY: ICD-10-CM

## 2022-11-11 DIAGNOSIS — Z76.0 ENCOUNTER FOR MEDICATION REFILL: Primary | ICD-10-CM

## 2022-11-11 DIAGNOSIS — I16.1 HYPERTENSIVE EMERGENCY: ICD-10-CM

## 2022-11-11 RX ORDER — RISPERIDONE 4 MG/1
4 TABLET ORAL AT BEDTIME
Qty: 30 TABLET | Refills: 3 | Status: SHIPPED | OUTPATIENT
Start: 2022-11-11 | End: 2023-03-10

## 2022-11-11 NOTE — PROGRESS NOTES
" Contact   Chart Review     Situation: Patient chart reviewed by .    Background:PCP referral to speak with spouse about pt's prognosis and any supports CCC is able to offer      Assessment:CCSW spoke with pt last month and was able to obtain very vague information. CCSW attempted to speak with spouse, Asaf today. CCSW called [phone] (earthmine) and \"the call was not able to go through\" dialed 3x and made sure dialed correct number.         Plan/Recommendations: CCSW continue trying to reach spouse.         JONO Knowles, [facility]  Social Work Care Coordinator    "

## 2022-11-11 NOTE — TELEPHONE ENCOUNTER
Please call Eleanor or her , Rian.  Ask them if the risperidone 2mg dose works well to help sleep. If not, when I refill it, I'll refill at 4mg.

## 2022-11-11 NOTE — TELEPHONE ENCOUNTER
Pt stated she is sleeping okay, but she would like to try the 4mg dose.      Devan De Jesus Cem Say, BSN RN  [facility]

## 2022-11-11 NOTE — TELEPHONE ENCOUNTER
Medication Question or Refill    Contacts       Type Contact Phone/Fax    11/11/2022 01:48 PM CST Phone (Incoming) Asaf Awan (Emergency Contact) [phone]          What medication are you calling about (include dose and sig)?: risperiDONE (RISPERDAL) 2 MG tablet    Controlled Substance Agreement on file:   CSA -- Patient Level:    CSA: None found at the patient level.       Who prescribed the medication?: Dr. Love    Do you need a refill? Yes    When did you use the medication last?     Patient offered an appointment? No    Do you have any questions or concerns?  No    Preferred Pharmacy:     [facility] #[id number] [address]  [address] [place] [address]  Phone: [phone] Fax: [phone]      Could we send this information to you in [employer] or would you prefer to receive a phone call?:   Patient would prefer a phone call   Okay to leave a detailed message?: Yes at Home number on file [phone] (home)

## 2022-12-15 ENCOUNTER — PATIENT OUTREACH (OUTPATIENT)
Dept: CARE COORDINATION | Facility: CLINIC | Age: 72
End: 2022-12-15

## 2022-12-15 NOTE — PROGRESS NOTES
Clinic Care Coordination Contact   Please review the chart and confirm if maintenance/graduate  is approved.    -Spoke with patient who stated no coordination assistance/helps, resources and support needed at this time.  -CCC routes this outreach encounter to CCC SW for further review and advise.

## 2022-12-21 ENCOUNTER — PATIENT OUTREACH (OUTPATIENT)
Dept: CARE COORDINATION | Facility: CLINIC | Age: 72
End: 2022-12-21

## 2022-12-21 NOTE — PROGRESS NOTES
Contact   Chart Review     Situation: Patient chart reviewed by .    Background: pt referred to CCC for assistance in processessing prognosis. Pt declines assistance at this time. CCSW has made attempts to connect with pt's spouse without sucessess.       Assessment: pt declines CCC services.       Plan/Recommendations: CCSW closed CCC encounter. It is clear pt would benefit form CCC services, CCSW has attempted to speak with spouse regarding pt's prognosis. Pt states he is at work, he's unavailable etc...        JONO Knowles, [facility]  Social Work Care Coordinator

## 2022-12-30 DIAGNOSIS — R65.10 SIRS (SYSTEMIC INFLAMMATORY RESPONSE SYNDROME) (H): ICD-10-CM

## 2022-12-30 DIAGNOSIS — Z76.0 ENCOUNTER FOR MEDICATION REFILL: Primary | ICD-10-CM

## 2022-12-30 DIAGNOSIS — I16.1 HYPERTENSIVE EMERGENCY: ICD-10-CM

## 2022-12-30 DIAGNOSIS — G93.41 ACUTE METABOLIC ENCEPHALOPATHY: ICD-10-CM

## 2022-12-30 RX ORDER — FOLIC ACID 1 MG/1
TABLET ORAL
Qty: 90 TABLET | Refills: 3 | Status: ON HOLD | OUTPATIENT
Start: 2022-12-30 | End: 2023-08-12

## 2022-12-30 RX ORDER — LOSARTAN POTASSIUM 100 MG/1
TABLET ORAL
Qty: 90 TABLET | Refills: 3 | Status: SHIPPED | OUTPATIENT
Start: 2022-12-30 | End: 2023-04-05

## 2023-01-02 ENCOUNTER — TELEPHONE (OUTPATIENT)
Dept: FAMILY MEDICINE | Facility: CLINIC | Age: 73
End: 2023-01-02
Payer: COMMERCIAL

## 2023-01-03 NOTE — TELEPHONE ENCOUNTER
"Please call Eleanor (you might have to talk to her , who goes by \"Rian\") and set up an appointment with PCP (actually, I'd like to have an appointment with her every 3 months, so set up as many as is possible).  "

## 2023-01-25 ENCOUNTER — OFFICE VISIT (OUTPATIENT)
Dept: FAMILY MEDICINE | Facility: CLINIC | Age: 73
End: 2023-01-25
Payer: COMMERCIAL

## 2023-01-25 ENCOUNTER — TELEPHONE (OUTPATIENT)
Dept: FAMILY MEDICINE | Facility: CLINIC | Age: 73
End: 2023-01-25

## 2023-01-25 VITALS
TEMPERATURE: 98 F | HEIGHT: 63 IN | DIASTOLIC BLOOD PRESSURE: 60 MMHG | SYSTOLIC BLOOD PRESSURE: 118 MMHG | RESPIRATION RATE: 16 BRPM | OXYGEN SATURATION: 97 % | WEIGHT: 134 LBS | HEART RATE: 68 BPM | BODY MASS INDEX: 23.74 KG/M2

## 2023-01-25 DIAGNOSIS — S06.9X1S TRAUMATIC BRAIN INJURY, WITH LOSS OF CONSCIOUSNESS OF 30 MINUTES OR LESS, SEQUELA (H): ICD-10-CM

## 2023-01-25 DIAGNOSIS — R41.89 COGNITIVE DECLINE: Primary | ICD-10-CM

## 2023-01-25 DIAGNOSIS — H57.13 EYE PAIN, BILATERAL: ICD-10-CM

## 2023-01-25 DIAGNOSIS — M81.0 AGE-RELATED OSTEOPOROSIS WITHOUT CURRENT PATHOLOGICAL FRACTURE: ICD-10-CM

## 2023-01-25 DIAGNOSIS — R55 PRE-SYNCOPE: ICD-10-CM

## 2023-01-25 DIAGNOSIS — G40.909 SEIZURE DISORDER (H): ICD-10-CM

## 2023-01-25 DIAGNOSIS — R41.89 COGNITIVE IMPAIRMENT: ICD-10-CM

## 2023-01-25 DIAGNOSIS — F10.21 ALCOHOL DEPENDENCE IN REMISSION (H): ICD-10-CM

## 2023-01-25 DIAGNOSIS — F32.0 MILD MAJOR DEPRESSION (H): ICD-10-CM

## 2023-01-25 LAB
ALBUMIN SERPL BCG-MCNC: 4.2 G/DL (ref 3.5–5.2)
ALP SERPL-CCNC: 64 U/L (ref 35–104)
ALT SERPL W P-5'-P-CCNC: 10 U/L (ref 10–35)
ANION GAP SERPL CALCULATED.3IONS-SCNC: 13 MMOL/L (ref 7–15)
AST SERPL W P-5'-P-CCNC: 21 U/L (ref 10–35)
BASOPHILS # BLD AUTO: 0 10E3/UL (ref 0–0.2)
BASOPHILS NFR BLD AUTO: 0 %
BILIRUB SERPL-MCNC: 0.3 MG/DL
BUN SERPL-MCNC: 13.9 MG/DL (ref 8–23)
CALCIUM SERPL-MCNC: 9.8 MG/DL (ref 8.8–10.2)
CHLORIDE SERPL-SCNC: 103 MMOL/L (ref 98–107)
CREAT SERPL-MCNC: 0.89 MG/DL (ref 0.51–0.95)
DEPRECATED HCO3 PLAS-SCNC: 24 MMOL/L (ref 22–29)
EOSINOPHIL # BLD AUTO: 0.1 10E3/UL (ref 0–0.7)
EOSINOPHIL NFR BLD AUTO: 2 %
ERYTHROCYTE [DISTWIDTH] IN BLOOD BY AUTOMATED COUNT: 11.7 % (ref 10–15)
GFR SERPL CREATININE-BSD FRML MDRD: 69 ML/MIN/1.73M2
GLUCOSE SERPL-MCNC: 131 MG/DL (ref 70–99)
HCT VFR BLD AUTO: 42.8 % (ref 35–47)
HGB BLD-MCNC: 14 G/DL (ref 11.7–15.7)
IMM GRANULOCYTES # BLD: 0 10E3/UL
IMM GRANULOCYTES NFR BLD: 0 %
LEVETIRACETAM SERPL-MCNC: 30.3 ΜG/ML (ref 10–40)
LYMPHOCYTES # BLD AUTO: 0.9 10E3/UL (ref 0.8–5.3)
LYMPHOCYTES NFR BLD AUTO: 15 %
MCH RBC QN AUTO: 29.8 PG (ref 26.5–33)
MCHC RBC AUTO-ENTMCNC: 32.7 G/DL (ref 31.5–36.5)
MCV RBC AUTO: 91 FL (ref 78–100)
MONOCYTES # BLD AUTO: 0.4 10E3/UL (ref 0–1.3)
MONOCYTES NFR BLD AUTO: 6 %
NEUTROPHILS # BLD AUTO: 4.8 10E3/UL (ref 1.6–8.3)
NEUTROPHILS NFR BLD AUTO: 76 %
PLATELET # BLD AUTO: 195 10E3/UL (ref 150–450)
POTASSIUM SERPL-SCNC: 3.9 MMOL/L (ref 3.4–5.3)
PROT SERPL-MCNC: 7.1 G/DL (ref 6.4–8.3)
RBC # BLD AUTO: 4.7 10E6/UL (ref 3.8–5.2)
SODIUM SERPL-SCNC: 140 MMOL/L (ref 136–145)
WBC # BLD AUTO: 6.2 10E3/UL (ref 4–11)

## 2023-01-25 PROCEDURE — 85025 COMPLETE CBC W/AUTO DIFF WBC: CPT | Performed by: FAMILY MEDICINE

## 2023-01-25 PROCEDURE — 99215 OFFICE O/P EST HI 40 MIN: CPT | Performed by: FAMILY MEDICINE

## 2023-01-25 PROCEDURE — 80177 DRUG SCRN QUAN LEVETIRACETAM: CPT | Performed by: FAMILY MEDICINE

## 2023-01-25 PROCEDURE — 80053 COMPREHEN METABOLIC PANEL: CPT | Performed by: FAMILY MEDICINE

## 2023-01-25 PROCEDURE — 36415 COLL VENOUS BLD VENIPUNCTURE: CPT | Performed by: FAMILY MEDICINE

## 2023-01-25 PROCEDURE — 99417 PROLNG OP E/M EACH 15 MIN: CPT | Performed by: FAMILY MEDICINE

## 2023-01-25 ASSESSMENT — PATIENT HEALTH QUESTIONNAIRE - PHQ9
SUM OF ALL RESPONSES TO PHQ QUESTIONS 1-9: 3
10. IF YOU CHECKED OFF ANY PROBLEMS, HOW DIFFICULT HAVE THESE PROBLEMS MADE IT FOR YOU TO DO YOUR WORK, TAKE CARE OF THINGS AT HOME, OR GET ALONG WITH OTHER PEOPLE: NOT DIFFICULT AT ALL
SUM OF ALL RESPONSES TO PHQ QUESTIONS 1-9: 3

## 2023-01-25 NOTE — PROGRESS NOTES
Assessment & Plan     Cognitive decline  Eleanor's cognition is declining. She is content/ok/safe but seems aware this is happening. Her , , is as supportive as he can be, while working 40 hrs/week.    Traumatic brain injury, with loss of consciousness of 30 minutes or less, sequela (H)  This is significant and likely part of the cause of her decline- as shown on brain imaging    Cognitive impairment  Noted years ago, but now progressing    Alcohol dependence in remission (H)  Also part of the etiology of her decline  - Comprehensive metabolic panel (BMP + Alb, Alk Phos, ALT, AST, Total. Bili, TP)  - Comprehensive metabolic panel (BMP + Alb, Alk Phos, ALT, AST, Total. Bili, TP)    Seizure disorder (H)  She is not having seizures, but has dizzy spells. Checking keppra level today even though she recently took a dose. Will also check a level in the late afternoon to compare the level  - Keppra (Levetiracetam) Level  - Keppra (Levetiracetam) Level  - Keppra (Levetiracetam) Level    Pre-syncope  I am calling her dizzy spells pre-syncope  - CBC with platelets and differential  - Comprehensive metabolic panel (BMP + Alb, Alk Phos, ALT, AST, Total. Bili, TP)  - CBC with platelets and differential  - Comprehensive metabolic panel (BMP + Alb, Alk Phos, ALT, AST, Total. Bili, TP)    Mild major depression (H)  She says she feels depressed. I encouraged her and Rian to remember happy memories - because they do have them. They agreed    Eye pain, bilateral  Try ketotifen prn  - ketotifen (ZADITOR) 0.025 % ophthalmic solution  Dispense: 5 mL; Refill: 3    Osteoporosis  She is taking calcium and vit D. Not able to get weight-bearing exercise. Uses a cane sometimes      Review of external notes as documented elsewhere in note  Ordering of each unique test  Prescription drug management  70 minutes spent on the date of the encounter doing chart review, history and exam, documentation and further activities per the  "note    Return in about 3 months (around 4/25/2023) for Follow up.    Camille Love MD  [facility] LEILA Webster is a 72 year old accompanied by her spouse, presenting for the following health issues:  Follow Up      HPI   Dizzy spells - these are just like what she used to get a few years ago (around 2019); they went away and now are back. She gets a feeling it is coming on, and then it comes. A few times she would have fallen if  (her ) did not catch her. These only happen in the evenings, never during the night or mid day.    Sleeps 10:30pm - 2pm; when she gets up,  is at work. She eats breakfast, a bit later lunch, and then supper after  is home about 5pm. She does not eat anything after 6pm.     She does not \"go out\" anymore.   She is not sure what she feels moodwise... when pressed she would say she's depressed.    Review of Systems   Occasional left ankle swelling      Objective    /60   Pulse 68   Temp 98  F (36.7  C) (Oral)   Resp 16   Ht 1.6 m (5' 3\")   Wt 60.8 kg (134 lb)   SpO2 97%   BMI 23.74 kg/m    Body mass index is 23.74 kg/m .  Physical Exam   GENERAL: alert, no distress, elderly and fatigued  NECK: no adenopathy, no asymmetry, masses, or scars and thyroid normal to palpation  RESP: lungs clear to auscultation - no rales, rhonchi or wheezes  CV: regular rate and rhythm, normal S1 S2, no S3 or S4, no murmur, click or rub, no peripheral edema and peripheral pulses strong  MS: no gross musculoskeletal defects noted, no edema  PSYCH: inattentive, affect flat, judgement and insight impaired and appearance well groomed    Results for orders placed or performed in visit on 01/25/23 (from the past 24 hour(s))   Keppra (Levetiracetam) Level   Result Value Ref Range    Keppra (Levetiracetam) Level 30.3 10.0 - 40.0  g/mL   CBC with platelets and differential    Narrative    The following orders were created for panel order CBC with " platelets and differential.  Procedure                               Abnormality         Status                     ---------                               -----------         ------                     CBC with platelets and d...[[id number]]                      Final result                 Please view results for these tests on the individual orders.   Comprehensive metabolic panel (BMP + Alb, Alk Phos, ALT, AST, Total. Bili, TP)   Result Value Ref Range    Sodium 140 136 - 145 mmol/L    Potassium 3.9 3.4 - 5.3 mmol/L    Chloride 103 98 - 107 mmol/L    Carbon Dioxide (CO2) 24 22 - 29 mmol/L    Anion Gap 13 7 - 15 mmol/L    Urea Nitrogen 13.9 8.0 - 23.0 mg/dL    Creatinine 0.89 0.51 - 0.95 mg/dL    Calcium 9.8 8.8 - 10.2 mg/dL    Glucose 131 (H) 70 - 99 mg/dL    Alkaline Phosphatase 64 35 - 104 U/L    AST 21 10 - 35 U/L    ALT 10 10 - 35 U/L    Protein Total 7.1 6.4 - 8.3 g/dL    Albumin 4.2 3.5 - 5.2 g/dL    Bilirubin Total 0.3 <=1.2 mg/dL    GFR Estimate 69 >60 mL/min/1.73m2   CBC with platelets and differential   Result Value Ref Range    WBC Count 6.2 4.0 - 11.0 10e3/uL    RBC Count 4.70 3.80 - 5.20 10e6/uL    Hemoglobin 14.0 11.7 - 15.7 g/dL    Hematocrit 42.8 35.0 - 47.0 %    MCV 91 78 - 100 fL    MCH 29.8 26.5 - 33.0 pg    MCHC 32.7 31.5 - 36.5 g/dL    RDW 11.7 10.0 - 15.0 %    Platelet Count 195 150 - 450 10e3/uL    % Neutrophils 76 %    % Lymphocytes 15 %    % Monocytes 6 %    % Eosinophils 2 %    % Basophils 0 %    % Immature Granulocytes 0 %    Absolute Neutrophils 4.8 1.6 - 8.3 10e3/uL    Absolute Lymphocytes 0.9 0.8 - 5.3 10e3/uL    Absolute Monocytes 0.4 0.0 - 1.3 10e3/uL    Absolute Eosinophils 0.1 0.0 - 0.7 10e3/uL    Absolute Basophils 0.0 0.0 - 0.2 10e3/uL    Absolute Immature Granulocytes 0.0 <=0.4 10e3/uL       ----- Service Performed and Documented by Resident or Fellow ------            Patient answers are not available for this visit.

## 2023-01-25 NOTE — TELEPHONE ENCOUNTER
"Please call this patient BUT talk to her  \"Rian\" as Eleanor is unable to remember appointments reliably. It is best to reach him about 5pm when he's home from work.    Reschedule her lab only appointment to 2/4 or earlier. Dr. Mahajan wants to have that result when he sees Eleanor 2/6 (thus the lab only appointment after seeing Dr. Mahajan does not work).  "

## 2023-01-25 NOTE — TELEPHONE ENCOUNTER
"Camille Love MD 5 hours ago (11:22 AM)     JE  Please call this patient BUT talk to her  \"Rian\" as Eleanor is unable to remember appointments reliably. It is best to reach him about 5pm when he's home from work.     Reschedule her lab only appointment to 2/4 or earlier. Dr. Mahajan wants to have that result when he sees Eleanor 2/6 (thus the lab only appointment after seeing Dr. Mahajan does not work).         Note      Called spouse per provider request above in attempt to relay message. However, no answer. Message left on  rquest a return call with clinic number provided.    JESENIA Crespo, RN  [facility]    "

## 2023-01-26 NOTE — TELEPHONE ENCOUNTER
Called pt and spoke to . Relayed lab result and lab appt date. He verbalized understanding and no further questions.    Devan De Jesus Cem Say, BSN RN  [facility]      ----- Message from Camille Love MD sent at 1/25/2023  5:33 PM CST -----  Team - please call patient with results. Let her know her blood count is normal - including platelets. Her Keppra level is normal, too, but we still need the second test in the late afternoon to check the level then.

## 2023-01-30 ENCOUNTER — LAB (OUTPATIENT)
Dept: LAB | Facility: CLINIC | Age: 73
End: 2023-01-30
Payer: COMMERCIAL

## 2023-01-30 DIAGNOSIS — G40.909 SEIZURE DISORDER (H): ICD-10-CM

## 2023-01-30 LAB — LEVETIRACETAM SERPL-MCNC: 21.9 ΜG/ML (ref 10–40)

## 2023-01-30 PROCEDURE — 36415 COLL VENOUS BLD VENIPUNCTURE: CPT

## 2023-01-30 PROCEDURE — 80177 DRUG SCRN QUAN LEVETIRACETAM: CPT

## 2023-01-31 ENCOUNTER — TELEPHONE (OUTPATIENT)
Dept: FAMILY MEDICINE | Facility: CLINIC | Age: 73
End: 2023-01-31
Payer: COMMERCIAL

## 2023-01-31 NOTE — TELEPHONE ENCOUNTER
"----- Message from Camille Love MD sent at 1/31/2023 12:09 PM CST -----  Team - please call patient with results. Call at the very end of the day so the patient's , \"Rian\" is home from work. Let Eleanor and Rian know the Keppra levels are normal, which is very good. We'll see what Dr. Mahajan says.  "

## 2023-01-31 NOTE — LETTER
February 6, 2023      Eleanor Awan  [address]  [place] [address]        Dear ,    We are writing to inform you of your test results.    The Keppra levels are normal, which is very good. We'll see what Dr. Mahajan says.    Resulted Orders   Keppra (Levetiracetam) Level   Result Value Ref Range    Keppra (Levetiracetam) Level 21.9 10.0 - 40.0  g/mL       If you have any questions or concerns, please call the clinic at the number listed above.       Sincerely,       Camille Love MD
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
## 2023-02-06 ENCOUNTER — OFFICE VISIT (OUTPATIENT)
Dept: NEUROLOGY | Facility: CLINIC | Age: 73
End: 2023-02-06
Payer: COMMERCIAL

## 2023-02-06 ENCOUNTER — LAB (OUTPATIENT)
Dept: LAB | Facility: CLINIC | Age: 73
End: 2023-02-06
Payer: COMMERCIAL

## 2023-02-06 VITALS
DIASTOLIC BLOOD PRESSURE: 48 MMHG | HEIGHT: 63 IN | HEART RATE: 75 BPM | WEIGHT: 134 LBS | SYSTOLIC BLOOD PRESSURE: 87 MMHG | BODY MASS INDEX: 23.74 KG/M2

## 2023-02-06 DIAGNOSIS — G40.209 PARTIAL SYMPTOMATIC EPILEPSY WITH COMPLEX PARTIAL SEIZURES, NOT INTRACTABLE, WITHOUT STATUS EPILEPTICUS (H): ICD-10-CM

## 2023-02-06 DIAGNOSIS — G40.209 PARTIAL SYMPTOMATIC EPILEPSY WITH COMPLEX PARTIAL SEIZURES, NOT INTRACTABLE, WITHOUT STATUS EPILEPTICUS (H): Primary | ICD-10-CM

## 2023-02-06 DIAGNOSIS — R41.9 NEUROCOGNITIVE DISORDER: ICD-10-CM

## 2023-02-06 PROBLEM — S06.9X1S TRAUMATIC BRAIN INJURY, WITH LOSS OF CONSCIOUSNESS OF 30 MINUTES OR LESS, SEQUELA (H): Status: RESOLVED | Noted: 2020-02-08 | Resolved: 2023-02-06

## 2023-02-06 PROBLEM — R65.10 SIRS (SYSTEMIC INFLAMMATORY RESPONSE SYNDROME) (H): Status: RESOLVED | Noted: 2022-09-29 | Resolved: 2023-02-06

## 2023-02-06 PROBLEM — I16.0 HYPERTENSIVE URGENCY: Status: RESOLVED | Noted: 2022-09-29 | Resolved: 2023-02-06

## 2023-02-06 PROBLEM — R78.81 BACTEREMIA: Status: RESOLVED | Noted: 2022-10-04 | Resolved: 2023-02-06

## 2023-02-06 PROBLEM — I16.1 HYPERTENSIVE EMERGENCY: Status: RESOLVED | Noted: 2022-09-29 | Resolved: 2023-02-06

## 2023-02-06 PROBLEM — G93.41 ACUTE METABOLIC ENCEPHALOPATHY: Status: RESOLVED | Noted: 2022-09-29 | Resolved: 2023-02-06

## 2023-02-06 PROBLEM — J30.1 ALLERGIC RHINITIS DUE TO POLLEN: Status: RESOLVED | Noted: 2019-06-25 | Resolved: 2023-02-06

## 2023-02-06 LAB — LEVETIRACETAM SERPL-MCNC: 20.1 ΜG/ML (ref 10–40)

## 2023-02-06 PROCEDURE — 36415 COLL VENOUS BLD VENIPUNCTURE: CPT

## 2023-02-06 PROCEDURE — 80177 DRUG SCRN QUAN LEVETIRACETAM: CPT

## 2023-02-06 PROCEDURE — 99214 OFFICE O/P EST MOD 30 MIN: CPT | Performed by: PSYCHIATRY & NEUROLOGY

## 2023-02-06 RX ORDER — LEVETIRACETAM 500 MG/1
500 TABLET ORAL 2 TIMES DAILY
Qty: 180 TABLET | Refills: 3 | Status: ON HOLD | OUTPATIENT
Start: 2023-02-06 | End: 2023-08-12

## 2023-02-06 NOTE — TELEPHONE ENCOUNTER
Called pt x 3 in an attempt to relay lab result. Left VM to call back. Letter also mailed to pt.    If pt calls back, please relay Dr. Love's message. Thanks.     Devan Jain, BSN RN  [facility]
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
## 2023-02-06 NOTE — NURSING NOTE
Chief Complaint   Patient presents with     Complex partial seizure     Follow up- Last Keppra level 1/30/23 21.9     Neurocognitive disorder     Neuropsychology was scheduled for 1/18/23 but patient and spouse report they did not know about it      Natalya De La Torre CMA on 2/6/2023 at 1:54 PM

## 2023-03-03 ENCOUNTER — TELEPHONE (OUTPATIENT)
Dept: FAMILY MEDICINE | Facility: CLINIC | Age: 73
End: 2023-03-03
Payer: COMMERCIAL

## 2023-03-03 NOTE — TELEPHONE ENCOUNTER
Medication Question or Refill    Contacts       Type Contact Phone/Fax    03/03/2023 04:03 PM CST Phone (Incoming) Asaf Awan (Emergency Contact) [phone]          What medication are you calling about (include dose and sig)?: risperiDONE (RISPERDAL) 4 MG tablet    Preferred Pharmacy:   [id number] [address]  [address] [place] [id number]  Phone: [phone] Fax: [phone]      Controlled Substance Agreement on file:   CSA -- Patient Level:    CSA: None found at the patient level.       Who prescribed the medication?: Dr. Love    Do you need a refill? No    When did you use the medication last? Still use    Patient offered an appointment? No    Do you have any questions or concerns?  Yes: Caller state caller picked up patient prescription today and the color of the pill is not white, but brown color. Caller would like advise clarification of pill.      Could we send this information to you in [facility] or would you prefer to receive a phone call?:   Patient would prefer a phone call   Okay to leave a detailed message?: Yes at Cell number on file:    Telephone Information:   Mobile [phone]

## 2023-03-03 NOTE — TELEPHONE ENCOUNTER
Was there a dose change for the patient that could explain the color change? If not, CMA could contact pharmacy? Thank you for reviewing!

## 2023-03-04 NOTE — TELEPHONE ENCOUNTER
The risperidone tablet ordered changed from 2mg to 4mg back in November 2022. Just when the pharmacy dispensed the 4mg tablet is not in the prescriber's control, so it would still be worth asking the pharmacy when they made the change.  Sometimes a pharmacy just gets a medication from a different company and that is the cause of the pill color change.

## 2023-03-06 NOTE — TELEPHONE ENCOUNTER
CMT left message x 1. Please review message thread below and advise the patient as indicated. Please schedule if necessary or indicated in message thread.      When patient calls back please advise her per MD response below. Thank you!

## 2023-03-07 ENCOUNTER — TELEPHONE (OUTPATIENT)
Dept: NURSING | Facility: CLINIC | Age: 73
End: 2023-03-07
Payer: COMMERCIAL

## 2023-03-07 NOTE — TELEPHONE ENCOUNTER
returning call from a few days ago - consent on file    Relayed the below message from the provider:        No additional questions.    Sandoval Martin RN on 3/7/2023 at 5:23 PM

## 2023-03-08 ENCOUNTER — TELEPHONE (OUTPATIENT)
Dept: FAMILY MEDICINE | Facility: CLINIC | Age: 73
End: 2023-03-08
Payer: COMMERCIAL

## 2023-03-08 DIAGNOSIS — R41.89 COGNITIVE IMPAIRMENT: ICD-10-CM

## 2023-03-08 DIAGNOSIS — R41.89 COGNITIVE DECLINE: Primary | ICD-10-CM

## 2023-03-08 DIAGNOSIS — M81.0 AGE-RELATED OSTEOPOROSIS WITHOUT CURRENT PATHOLOGICAL FRACTURE: ICD-10-CM

## 2023-03-08 DIAGNOSIS — R68.89 SPELLS OF DECREASED ATTENTIVENESS: ICD-10-CM

## 2023-03-08 DIAGNOSIS — S06.9X1S TRAUMATIC BRAIN INJURY, WITH LOSS OF CONSCIOUSNESS OF 30 MINUTES OR LESS, SEQUELA (H): ICD-10-CM

## 2023-03-08 DIAGNOSIS — G40.909 SEIZURE DISORDER (H): ICD-10-CM

## 2023-03-08 DIAGNOSIS — F32.0 MILD MAJOR DEPRESSION (H): ICD-10-CM

## 2023-03-08 DIAGNOSIS — F10.21 ALCOHOL DEPENDENCE IN REMISSION (H): ICD-10-CM

## 2023-03-08 DIAGNOSIS — F33.2 SEVERE EPISODE OF RECURRENT MAJOR DEPRESSIVE DISORDER, WITHOUT PSYCHOTIC FEATURES (H): ICD-10-CM

## 2023-03-08 NOTE — TELEPHONE ENCOUNTER
I will try to get home care to help Eleanor with showers, and I'll see if Care Coordination might help her to get a PCA (personal care attendant). We have had great trouble getting home care services as many home care agencies are low on staffing. I think Eleanor's services stopped because of this.  Our Clinic  can see if Eleanor qualifies to get a PCA.    Please call and tell the daughter the above

## 2023-03-08 NOTE — TELEPHONE ENCOUNTER
Chart reviewed. Please review findings below.     A Dr. Coleman ordered home health therapies and aides in October 2022. Saw Dr. Love 1/25/23.     Author contacted [facility] to speak to home health RN. The staff states that patient has only been receiving (per orders) PT/OT.     Does MD wish to review and order home health? Thank you!

## 2023-03-08 NOTE — TELEPHONE ENCOUNTER
Order/Referral Request    Who is requesting: Daughter Kia    Orders being requested: Home Care    Reason service is needed/diagnosis: Pt has not showered in over a month and daughter live out of state. Pt's significant other has not been able to help her shower. Daughter stated that Pt use to have HC services to help her but that has stopped for a while now.     When are orders needed by: ASAP    Has this been discussed with Provider:     Does patient have a preference on a Group/Provider/Facility? N/A    Does patient have an appointment scheduled?: No    Where to send orders: N/A    Could we send this information to you in [email or web address] or would you prefer to receive a phone call?:   Patient's Daughter would prefer a phone call   Okay to leave a detailed message?: Yes at [phone]

## 2023-03-09 NOTE — TELEPHONE ENCOUNTER
Called pt in an attempt to relay Dr. Love's message. Left VM to call back.    - if pt calls back, please relay Dr. Love's message. thanks    Devan Jain, BSN RN  [facility]

## 2023-03-10 ENCOUNTER — TELEPHONE (OUTPATIENT)
Dept: FAMILY MEDICINE | Facility: CLINIC | Age: 73
End: 2023-03-10
Payer: COMMERCIAL

## 2023-03-10 DIAGNOSIS — I16.1 HYPERTENSIVE EMERGENCY: ICD-10-CM

## 2023-03-10 DIAGNOSIS — R65.10 SIRS (SYSTEMIC INFLAMMATORY RESPONSE SYNDROME) (H): ICD-10-CM

## 2023-03-10 DIAGNOSIS — Z76.0 ENCOUNTER FOR MEDICATION REFILL: ICD-10-CM

## 2023-03-10 DIAGNOSIS — G93.41 ACUTE METABOLIC ENCEPHALOPATHY: ICD-10-CM

## 2023-03-10 RX ORDER — RISPERIDONE 2 MG/1
2 TABLET ORAL AT BEDTIME
Qty: 15 TABLET | Refills: 1 | Status: SHIPPED | OUTPATIENT
Start: 2023-03-10 | End: 2023-04-24

## 2023-03-10 NOTE — TELEPHONE ENCOUNTER
At the end of the day on 3/10/23, please call this patient and speak with her  Asaf/. Let him know Dr. Love wants to change the risperidone dose from 4mg at bedtime to 2mg. Dr. Love hopes he can cut the pill in half, starting tonight.    If Eleanor is not taking it anymore, do not re-start it.    Dr. Love wants Eleanor to take 2mg each night x 2-3 weeks and then stop it. If a refill is needed, that's been sent to their pharmacy. If the refill is not needed, then do not pick it up.

## 2023-03-10 NOTE — TELEPHONE ENCOUNTER
Patient Returning Call    Reason for call:  TC called Kia, daughter, and relayed RN message below.  Kia is concerned about her antidepressant med if the dose is too high.  She is on 20 mg.      Pt has not taken a shower in a month per daughter.  Pt shows no interest in anything - not going out, not talking on the phone.      Information relayed to patient:  Relay to PCP    Patient has additional questions:  Yes    What are your questions/concerns:  Pt wants her mom back because she is not acting herself.  Pt would call in to clinic frequently but no longer does. Pt wore the pants in the family so  does not speak up.     Could we send this information to you in [employer] or would you prefer to receive a phone call?:   Patient would prefer a phone call     Okay to leave a detailed message?: Yes call Kia daughter in [place]    Call taken on 3/10/23 at 10 am by ANGELLA Loyola

## 2023-03-10 NOTE — TELEPHONE ENCOUNTER
Patients  returns call. Writer relay RN/provider's message below. TC also relayed earlier message about pt to spouse.  Caller verbalizes understanding and has no further questions. Thanks    Noy Tipton CMA ,  [facility]  March 10, 2023 3:21 PM     POD #1 today  s/p complex spinal surgery and reconstruction  Will continue drains for now and monitor output  May start removing drains tomorrow if output remains low  Continue care per PMD and all consultants.

## 2023-03-10 NOTE — TELEPHONE ENCOUNTER
I called in a vulerable adult report.    Then I called Kia, her daughter (who has called recently).    Questioned risperidone dose - why is this being used? My mom does not have bipolar or schizophrenia.  I told her it was being used because of paranoia and insomnia. I said we would try decreasing and then even stopping the dose.  I mentioned that the anti-seizure medication sometimes blunts people, but Keppra is usually well-tolerated.    She's been so off for a year. She used to talk so much and now she says nothing even when asked a question. She's not herself. Eleanor's friend Cornelia does not get replies when contacting Eleanor. Kia really hopes her mom might improve with the med change. She also is really glad we are trying to re-instate home health.

## 2023-03-10 NOTE — TELEPHONE ENCOUNTER
Called pt and  in an attempt to relay Dr. Love's message. Let VM to call back.    - if pt and  call back, please relay Dr. Love's message. Thanks    Devan Jain, BSN RN  [facility]

## 2023-03-13 ENCOUNTER — PATIENT OUTREACH (OUTPATIENT)
Dept: CARE COORDINATION | Facility: CLINIC | Age: 73
End: 2023-03-13
Payer: COMMERCIAL

## 2023-03-13 NOTE — PROGRESS NOTES
Clinic Care Coordination Contact  [facility]/Voicemail    Clinical Data: Care Coordinator Outreach  Outreach attempted x 1. CHW attempted to call patient upon CCC referral received and left message on patient's voicemail with call back information and requested return call.    Plan: Care Coordinator will try to reach patient again in 1-2 business days.

## 2023-03-14 ENCOUNTER — PATIENT OUTREACH (OUTPATIENT)
Dept: CARE COORDINATION | Facility: CLINIC | Age: 73
End: 2023-03-14
Payer: COMMERCIAL

## 2023-03-14 NOTE — PROGRESS NOTES
Clinic Care Coordination Contact  Community Health Worker Initial Outreach    CHW Initial Information Gathering:  Referral Source: PCP  Preferred Hospital: [facility]  [phone]  Preferred Urgent Care: [facility], [phone]  Current living arrangement:: I live in a private home with family  Type of residence:: Apartment  Community Resources: None  Supplies Currently Used at Home: None  Equipment Currently Used at Home: none  Informal Support system:: Spouse, Friends  No PCP office visit in Past Year: No  Transportation means:: Family, Accessible car  CHW Additional Questions  If ED/Hospital discharge, follow-up appointment scheduled as recommended?: N/A  Medication changes made following ED/Hospital discharge?: N/A  MyChart active?: No  Patient agreeable to assistance with activating MyChart?: No    Patient accepts CC: Yes. Patient scheduled for assessment with CCC CHARLENE on 3/23/2023 at 2:00 PM. Patient noted desire to discuss  works and patient is alone daily, she would really benefit from a PCA and PCP thinks she'd qualify.     Patient is not really interested in home making but not sure about PCA and agreed to speak with CCC SW and will discuss further resources  In interest in.

## 2023-03-23 ENCOUNTER — PATIENT OUTREACH (OUTPATIENT)
Dept: NURSING | Facility: CLINIC | Age: 73
End: 2023-03-23
Payer: COMMERCIAL

## 2023-03-23 SDOH — ECONOMIC STABILITY: TRANSPORTATION INSECURITY
IN THE PAST 12 MONTHS, HAS THE LACK OF TRANSPORTATION KEPT YOU FROM MEDICAL APPOINTMENTS OR FROM GETTING MEDICATIONS?: NO

## 2023-03-23 SDOH — ECONOMIC STABILITY: FOOD INSECURITY: WITHIN THE PAST 12 MONTHS, YOU WORRIED THAT YOUR FOOD WOULD RUN OUT BEFORE YOU GOT MONEY TO BUY MORE.: NEVER TRUE

## 2023-03-23 SDOH — ECONOMIC STABILITY: FOOD INSECURITY: WITHIN THE PAST 12 MONTHS, THE FOOD YOU BOUGHT JUST DIDN'T LAST AND YOU DIDN'T HAVE MONEY TO GET MORE.: NEVER TRUE

## 2023-03-23 SDOH — ECONOMIC STABILITY: TRANSPORTATION INSECURITY
IN THE PAST 12 MONTHS, HAS LACK OF TRANSPORTATION KEPT YOU FROM MEETINGS, WORK, OR FROM GETTING THINGS NEEDED FOR DAILY LIVING?: NO

## 2023-03-23 ASSESSMENT — LIFESTYLE VARIABLES
HOW OFTEN DO YOU HAVE SIX OR MORE DRINKS ON ONE OCCASION: NEVER
HOW OFTEN DO YOU HAVE A DRINK CONTAINING ALCOHOL: NEVER
AUDIT-C TOTAL SCORE: 0
SKIP TO QUESTIONS 9-10: 1
HOW MANY STANDARD DRINKS CONTAINING ALCOHOL DO YOU HAVE ON A TYPICAL DAY: PATIENT DOES NOT DRINK

## 2023-03-23 ASSESSMENT — SOCIAL DETERMINANTS OF HEALTH (SDOH)
HOW OFTEN DO YOU GET TOGETHER WITH FRIENDS OR RELATIVES?: NEVER
DO YOU BELONG TO ANY CLUBS OR ORGANIZATIONS SUCH AS CHURCH GROUPS UNIONS, FRATERNAL OR ATHLETIC GROUPS, OR SCHOOL GROUPS?: NO
HOW OFTEN DO YOU ATTEND CHURCH OR RELIGIOUS SERVICES?: NEVER
IN A TYPICAL WEEK, HOW MANY TIMES DO YOU TALK ON THE PHONE WITH FAMILY, FRIENDS, OR NEIGHBORS?: NEVER
HOW OFTEN DO YOU ATTENT MEETINGS OF THE CLUB OR ORGANIZATION YOU BELONG TO?: NEVER
HOW HARD IS IT FOR YOU TO PAY FOR THE VERY BASICS LIKE FOOD, HOUSING, MEDICAL CARE, AND HEATING?: NOT HARD AT ALL

## 2023-03-23 NOTE — COMMUNITY RESOURCES LIST (ENGLISH)
03/23/2023   [facility] - Outpatient Clinics  N/A  For questions about this resource list or additional care needs, please contact your primary care clinic or care manager.  Phone: [phone]   Email: N/A   Address: [address] [place] [id number]   Hours: N/A        Mental Health       Individual counseling  1  [facility] Distance: 2.92 miles      In-Person, Phone/Virtual   [address] [place] [address]  Language: English  Hours: Mon - Thu 9:00 AM - 5:00 PM  Fees: Insurance, Self Pay, Sliding Fee   Phone: (427) 966-8675 Email: info@EnglishCentral Website: http://www.[email or web address].org/     2  [facility] - Psychotherapy Distance: 3.53 miles      In-Person, Phone/Virtual   [address] [place] [address]  Language: English  Hours: Mon - Thu 8:30 AM - 9:00 PM , Fri 8:30 AM - 5:00 PM , Sat 8:30 AM - 4:30 PM  Fees: Insurance, Self Pay, Sliding Fee   Phone: (696) 223-8661 Email: contactus@[employer] Website: [email or web address]/     Mental health support group  3  [employer] [facility] Distance: 6.47 miles      In-Person, Phone/Virtual   [address] [place] [address]  Language: English  Hours: Mon - Thu 12:00 PM - 5:00 PM  Fees: Free   Phone: (210) 773-1556 Email: info@emotions"[employer]us.org Website: http://emotionsanonymous.org/     4  [facility] Distance: 6.66 miles      In-Person, Phone/Virtual   [address] [place] [address]  Language: English  Hours: Mon - Fri 8:00 AM - 5:00 PM  Fees: Free   Phone: (401) 904-2458 Email: kinga@[facility].org Website: http://www.namihelps.org/support/crisis-resources          Important Numbers & Websites       Emergency Services   911  City Services   311  Poison Control   (615) 805-3148  Suicide Prevention Lifeline   (947) 862-1508 (TALK)  Child Abuse Hotline    (684) 302-5655 (4-A-Child)  Sexual Assault Hotline   (131) 403-3045 (HOPE)  National Runaway Safeline   (564) 697-9659 (RUNAWAY)  All-Options Talkline   (284) 478-3048  Substance Abuse Referral   (731) 181-8711 (HELP)

## 2023-03-23 NOTE — COMMUNITY RESOURCES LIST (ENGLISH)
03/23/2023   [facility] - Outpatient Clinics  N/A  For questions about this resource list or additional care needs, please contact your primary care clinic or care manager.  Phone: [phone]   Email: N/A   Address: [address] [place] [id number]   Hours: N/A        Mental Health       Individual counseling  1  [facility] Distance: 2.92 miles      In-Person, Phone/Virtual   [address] [place] [address]  Language: English  Hours: Mon - Thu 9:00 AM - 5:00 PM  Fees: Insurance, Self Pay, Sliding Fee   Phone: (783) 836-1002 Email: info@[employer] Website: http://www.[email or web address]/     2  [facility] - Psychotherapy Distance: 3.53 miles      In-Person, Phone/Virtual   [address] [place] [address]  Language: English  Hours: Mon - Thu 8:30 AM - 9:00 PM , Fri 8:30 AM - 5:00 PM , Sat 8:30 AM - 4:30 PM  Fees: Insurance, Self Pay, Sliding Fee   Phone: (516) 852-8911 Email: contactus@[email or web address] Website: [email or web address]/locations/[place]/     Mental health support group  3  [employer] - [facility] Distance: 6.47 miles      In-Person, Phone/Virtual   [address] [place] [address]  Language: English  Hours: Mon - Thu 12:00 PM - 5:00 PM  Fees: Free   Phone: (715) 900-1701 Email: info@[employer].org Website: http://emotionsanonymous.org/     4  [facility] Distance: 6.66 miles      In-Person, Phone/Virtual   [address] [place] [address]  Language: English  Hours: Mon - Fri 8:00 AM - 5:00 PM  Fees: Free   Phone: (610) 226-4122 Email: kigna@[facility].org Website: http://www.namihelps.org/support/crisis-resources          Important Numbers & Websites       Emergency Services   911  City Services   311  Poison Control   (168) 871-4987  Suicide Prevention Lifeline   (478) 610-4870 (TALK)  Child Abuse Hotline    (122) 123-3659 (4-A-Child)  Sexual Assault Hotline   (243) 335-3271 (HOPE)  National Runaway Safeline   (135) 936-9418 (RUNAWAY)  All-Options Talkline   (509) 349-2485  Substance Abuse Referral   (542) 402-5496 (HELP)

## 2023-03-23 NOTE — PROGRESS NOTES
Clinic Care Coordination Contact    Clinic Care Coordination Contact  OUTREACH    Referral Information:  Referral Source: PCP    Primary Diagnosis: Behavioral Health    Chief Complaint   Patient presents with     Clinic Care Coordination - Initial        Universal Utilization: appropriate  Clinic Utilization  Difficulty keeping appointments:: No  Compliance Concerns: No  No-Show Concerns: No  No PCP office visit in Past Year: No  Utilization    Hospital Admissions  1             ED Visits  1             No Show Count (past year)  4                Current as of: 3/23/2023  2:12 AM              Clinical Concerns:  Current Medical Concerns:    Patient Active Problem List   Diagnosis Code     Osteoporosis M81.0     Mild cognitive impairment with memory loss G31.84     BPPV (benign paroxysmal positional vertigo) H81.10     Alcohol dependence in remission (H) F10.21     Paranoia (H) F22     Essential hypertension I10     Thiamine deficiency E51.9     Anxiety F41.9     Alcoholic cirrhosis of liver without ascites (H) K70.30     Moderate episode of recurrent major depressive disorder (H) F33.1     Hepatitis C carrier (H) B18.2     Partial symptomatic epilepsy with complex partial seizures, not intractable, without status epilepticus (H) G40.209       Current Behavioral Concerns: none    Education Provided to patient: CCC supports, resources and education   Pain  Pain (GOAL):: No  Health Maintenance Reviewed: Due/Overdue   Health Maintenance Due   Topic Date Due     DEXA  Never done     DEPRESSION ACTION PLAN  Never done     Pneumococcal Vaccine: 65+ Years (2 - PPSV23 if available, else PCV20) 07/10/2019     ZOSTER IMMUNIZATION (3 of 3) 07/10/2019     MEDICARE ANNUAL WELLNESS VISIT  02/07/2021     COVID-19 Vaccine (3 - Booster for Pfizer series) 05/17/2021     HEPATITIS B IMMUNIZATION (2 of 3 - Risk 3-dose series) 05/29/2021     Clinical Pathway: None    Medication Management:  Medication review status: Medications reviewed  and no changes reported per patient.             Functional Status:  Dependent ADLs:: Independent  Dependent IADLs:: Meal Preparation, Medication Management, Money Management, Transportation, Cleaning, Cooking  Bed or wheelchair confined:: No  Mobility Status: Independent  Any fall with injury in the past year?: No    Living Situation:  Current living arrangement:: I live in a private home with family  Type of residence:: Apartment    Lifestyle & Psychosocial Needs:    Social Determinants of Health     Tobacco Use: Medium Risk     Smoking Tobacco Use: Former     Smokeless Tobacco Use: Never     Passive Exposure: Never   Alcohol Use: Not At Risk     Frequency of Alcohol Consumption: Never     Average Number of Drinks: Patient does not drink     Frequency of Binge Drinking: Never   Financial Resource Strain: Low Risk      Difficulty of Paying Living Expenses: Not hard at all   Food Insecurity: No Food Insecurity     Worried About Running Out of Food in the Last Year: Never true     Ran Out of Food in the Last Year: Never true   Transportation Needs: No Transportation Needs     Lack of Transportation (Medical): No     Lack of Transportation (Non-Medical): No   Physical Activity: Not on file   Stress: Stress Concern Present     Feeling of Stress : Rather much   Social Connections: Socially Isolated     Frequency of Communication with Friends and Family: Never     Frequency of Social Gatherings with Friends and Family: Never     Attends Mormonism Services: Never     Active Member of Clubs or Organizations: No     Attends Club or Organization Meetings: Never     Marital Status:    Intimate Partner Violence: Not on file   Depression: Not at risk     PHQ-2 Score: 1   Housing Stability: Not on file     Diet:: Regular  Inadequate nutrition (GOAL):: No  Tube Feeding: No  Inadequate activity/exercise (GOAL):: No  Significant changes in sleep pattern (GOAL): No  Transportation means:: Family, Accessible car, Regular car      Hinduism or spiritual beliefs that impact treatment:: No  Mental health DX:: Yes  Mental health DX how managed:: Medication  Mental health management concern (GOAL):: Yes  Chemical Dependency Status: Past Concern  Informal Support system:: Spouse, Friends             Resources and Interventions:  Current Resources:      Community Resources: None  Supplies Currently Used at Home: None  Equipment Currently Used at Home: none  Employment Status: disabled         Advance Care Plan/Directive  Advanced Care Plans/Directives on file:: No    Referrals Placed: Mental Health      Care Plan:  Care Plan: Mental Health     Problem: Mental Health Symptoms Need Improvement     Goal: Improve management of mental health symptoms and establish with mental health/psychosocial supports     Start Date: 3/23/2023 Expected End Date: 3/23/2024    Priority: High    Note:     Barriers: depression, cognitive functioning  Strengths: willing to try therapy  Patient expressed understanding of goal: yes  Action steps to achieve this goal:  1. I will answer the phone when therapist/agency calls to set up appointment  2. I will attend my scheduled MH appointment  3. I will follow up with CCC monthly as to the progress Im making towards my goal.                          Patient/Caregiver understanding: yes    Outreach Frequency: monthly  Future Appointments              In 4 days Rainer Mahajan MD [facility] [place], [facility]    In 1 week Camille Love MD [facility] [place], [facility]    In 2 weeks Mark Soto MD [facility] [place], [facility]    In 1 month Edna Swain Psy.D, LP [facility], [facility] WBWW    In 3 months Dori Chavez [facility]    In 3 months Camille Love MD [facility] [place], [facility] SPRO          Plan: CCSW reached out to pt for  "referral for depression and diminishing cognition. Pt had trouble answering CCSW questions such as \"do you prepare your own meals?\" Pt stated she felt \"crappy\" but could  not elaborate on what crappy was. Pt stated she watches tv all day and likes watching game shows. She does not socialize with anyone outside of her spouse. Spouse works every day until 5:00pm. Clinic received a call that pt may not be showering or taking care of herself. CCSW reviewed chart, and completed adult protection report for self neglect. CCSW also requested welfare check from police department, so check on condition of home. Pt was agreeable to care coordination enrollment,and scheduling a mental health appointment for her. Pt requested an in person therapy visit, however most therapists are still making teletherapy visits.     Date/Time Submitted:   Thu Mar 23 2023 15:22:22   Web Report Number:[id number]  [facility] is not able to provide any further information regarding the status of submitted reports..      Mental Health appointment scheduled with [facility] on   June 22nd at 1:30pm video visit- pt can access appointment through [employer]. With provider JONO Hassan, [facility]  Social Work Care Coordinator    "

## 2023-04-04 ENCOUNTER — OFFICE VISIT (OUTPATIENT)
Dept: FAMILY MEDICINE | Facility: CLINIC | Age: 73
End: 2023-04-04
Payer: COMMERCIAL

## 2023-04-04 VITALS
SYSTOLIC BLOOD PRESSURE: 90 MMHG | RESPIRATION RATE: 16 BRPM | WEIGHT: 128.75 LBS | HEIGHT: 63 IN | DIASTOLIC BLOOD PRESSURE: 53 MMHG | BODY MASS INDEX: 22.81 KG/M2 | TEMPERATURE: 97.9 F | HEART RATE: 69 BPM | OXYGEN SATURATION: 97 %

## 2023-04-04 DIAGNOSIS — Z00.00 PREVENTATIVE HEALTH CARE: ICD-10-CM

## 2023-04-04 DIAGNOSIS — M62.838 SPASM OF MUSCLE: ICD-10-CM

## 2023-04-04 DIAGNOSIS — R73.09 ELEVATED GLUCOSE: ICD-10-CM

## 2023-04-04 DIAGNOSIS — G31.84 MILD COGNITIVE IMPAIRMENT WITH MEMORY LOSS: ICD-10-CM

## 2023-04-04 DIAGNOSIS — I10 ESSENTIAL HYPERTENSION: ICD-10-CM

## 2023-04-04 DIAGNOSIS — G40.209 PARTIAL SYMPTOMATIC EPILEPSY WITH COMPLEX PARTIAL SEIZURES, NOT INTRACTABLE, WITHOUT STATUS EPILEPTICUS (H): ICD-10-CM

## 2023-04-04 DIAGNOSIS — F33.2 MAJOR DEPRESSIVE DISORDER, RECURRENT EPISODE, SEVERE WITH CATATONIA (H): ICD-10-CM

## 2023-04-04 DIAGNOSIS — F06.1 MAJOR DEPRESSIVE DISORDER, RECURRENT EPISODE, SEVERE WITH CATATONIA (H): ICD-10-CM

## 2023-04-04 DIAGNOSIS — K70.30 ALCOHOLIC CIRRHOSIS OF LIVER WITHOUT ASCITES (H): ICD-10-CM

## 2023-04-04 DIAGNOSIS — M81.0 AGE RELATED OSTEOPOROSIS, UNSPECIFIED PATHOLOGICAL FRACTURE PRESENCE: Primary | ICD-10-CM

## 2023-04-04 LAB
ALBUMIN SERPL BCG-MCNC: 4.1 G/DL (ref 3.5–5.2)
ALBUMIN UR-MCNC: ABNORMAL MG/DL
ALP SERPL-CCNC: 62 U/L (ref 35–104)
ALT SERPL W P-5'-P-CCNC: 12 U/L (ref 10–35)
AMORPH CRY #/AREA URNS HPF: ABNORMAL /HPF
ANION GAP SERPL CALCULATED.3IONS-SCNC: 11 MMOL/L (ref 7–15)
APPEARANCE UR: ABNORMAL
AST SERPL W P-5'-P-CCNC: 21 U/L (ref 10–35)
BACTERIA #/AREA URNS HPF: ABNORMAL /HPF
BASOPHILS # BLD AUTO: 0 10E3/UL (ref 0–0.2)
BASOPHILS NFR BLD AUTO: 0 %
BILIRUB SERPL-MCNC: 0.4 MG/DL
BILIRUB UR QL STRIP: NEGATIVE
BUN SERPL-MCNC: 17.9 MG/DL (ref 8–23)
CALCIUM SERPL-MCNC: 10.5 MG/DL (ref 8.8–10.2)
CHLORIDE SERPL-SCNC: 103 MMOL/L (ref 98–107)
COLOR UR AUTO: YELLOW
CREAT SERPL-MCNC: 1 MG/DL (ref 0.51–0.95)
DEPRECATED HCO3 PLAS-SCNC: 26 MMOL/L (ref 22–29)
EOSINOPHIL # BLD AUTO: 0.2 10E3/UL (ref 0–0.7)
EOSINOPHIL NFR BLD AUTO: 3 %
ERYTHROCYTE [DISTWIDTH] IN BLOOD BY AUTOMATED COUNT: 12.1 % (ref 10–15)
GFR SERPL CREATININE-BSD FRML MDRD: 60 ML/MIN/1.73M2
GLUCOSE SERPL-MCNC: 100 MG/DL (ref 70–99)
GLUCOSE UR STRIP-MCNC: NEGATIVE MG/DL
HCT VFR BLD AUTO: 41.2 % (ref 35–47)
HGB BLD-MCNC: 13.4 G/DL (ref 11.7–15.7)
HGB UR QL STRIP: NEGATIVE
HYALINE CASTS #/AREA URNS LPF: ABNORMAL /LPF
IMM GRANULOCYTES # BLD: 0 10E3/UL
IMM GRANULOCYTES NFR BLD: 0 %
KETONES UR STRIP-MCNC: NEGATIVE MG/DL
LEUKOCYTE ESTERASE UR QL STRIP: NEGATIVE
LYMPHOCYTES # BLD AUTO: 1.2 10E3/UL (ref 0.8–5.3)
LYMPHOCYTES NFR BLD AUTO: 17 %
MCH RBC QN AUTO: 30.1 PG (ref 26.5–33)
MCHC RBC AUTO-ENTMCNC: 32.5 G/DL (ref 31.5–36.5)
MCV RBC AUTO: 93 FL (ref 78–100)
MONOCYTES # BLD AUTO: 0.5 10E3/UL (ref 0–1.3)
MONOCYTES NFR BLD AUTO: 7 %
MUCOUS THREADS #/AREA URNS LPF: PRESENT /LPF
NEUTROPHILS # BLD AUTO: 5 10E3/UL (ref 1.6–8.3)
NEUTROPHILS NFR BLD AUTO: 73 %
NITRATE UR QL: NEGATIVE
PH UR STRIP: 6 [PH] (ref 5–7)
PLATELET # BLD AUTO: 212 10E3/UL (ref 150–450)
POTASSIUM SERPL-SCNC: 4 MMOL/L (ref 3.4–5.3)
PROT SERPL-MCNC: 7.3 G/DL (ref 6.4–8.3)
RBC # BLD AUTO: 4.45 10E6/UL (ref 3.8–5.2)
RBC #/AREA URNS AUTO: ABNORMAL /HPF
SODIUM SERPL-SCNC: 140 MMOL/L (ref 136–145)
SP GR UR STRIP: 1.02 (ref 1–1.03)
SQUAMOUS #/AREA URNS AUTO: ABNORMAL /LPF
TSH SERPL DL<=0.005 MIU/L-ACNC: 1.87 UIU/ML (ref 0.3–4.2)
UROBILINOGEN UR STRIP-ACNC: 0.2 E.U./DL
WBC # BLD AUTO: 6.9 10E3/UL (ref 4–11)
WBC #/AREA URNS AUTO: ABNORMAL /HPF

## 2023-04-04 PROCEDURE — 81001 URINALYSIS AUTO W/SCOPE: CPT | Performed by: FAMILY MEDICINE

## 2023-04-04 PROCEDURE — 36415 COLL VENOUS BLD VENIPUNCTURE: CPT | Performed by: FAMILY MEDICINE

## 2023-04-04 PROCEDURE — 85025 COMPLETE CBC W/AUTO DIFF WBC: CPT | Performed by: FAMILY MEDICINE

## 2023-04-04 PROCEDURE — 99215 OFFICE O/P EST HI 40 MIN: CPT | Performed by: FAMILY MEDICINE

## 2023-04-04 PROCEDURE — 80053 COMPREHEN METABOLIC PANEL: CPT | Performed by: FAMILY MEDICINE

## 2023-04-04 PROCEDURE — 84443 ASSAY THYROID STIM HORMONE: CPT | Performed by: FAMILY MEDICINE

## 2023-04-04 PROCEDURE — 83036 HEMOGLOBIN GLYCOSYLATED A1C: CPT | Performed by: FAMILY MEDICINE

## 2023-04-04 PROCEDURE — 96127 BRIEF EMOTIONAL/BEHAV ASSMT: CPT | Performed by: FAMILY MEDICINE

## 2023-04-04 RX ORDER — DIAZEPAM 2 MG
2 TABLET ORAL EVERY 6 HOURS PRN
Qty: 4 TABLET | Refills: 0 | Status: SHIPPED | OUTPATIENT
Start: 2023-04-04 | End: 2023-08-09

## 2023-04-04 RX ORDER — DEXTROAMPHETAMINE SACCHARATE, AMPHETAMINE ASPARTATE, DEXTROAMPHETAMINE SULFATE AND AMPHETAMINE SULFATE 2.5; 2.5; 2.5; 2.5 MG/1; MG/1; MG/1; MG/1
10 TABLET ORAL 2 TIMES DAILY
Qty: 28 TABLET | Refills: 0 | Status: SHIPPED | OUTPATIENT
Start: 2023-04-04 | End: 2023-05-22

## 2023-04-04 ASSESSMENT — ANXIETY QUESTIONNAIRES
IF YOU CHECKED OFF ANY PROBLEMS ON THIS QUESTIONNAIRE, HOW DIFFICULT HAVE THESE PROBLEMS MADE IT FOR YOU TO DO YOUR WORK, TAKE CARE OF THINGS AT HOME, OR GET ALONG WITH OTHER PEOPLE: SOMEWHAT DIFFICULT
8. IF YOU CHECKED OFF ANY PROBLEMS, HOW DIFFICULT HAVE THESE MADE IT FOR YOU TO DO YOUR WORK, TAKE CARE OF THINGS AT HOME, OR GET ALONG WITH OTHER PEOPLE?: SOMEWHAT DIFFICULT
4. TROUBLE RELAXING: NOT AT ALL
6. BECOMING EASILY ANNOYED OR IRRITABLE: NOT AT ALL
GAD7 TOTAL SCORE: 0
7. FEELING AFRAID AS IF SOMETHING AWFUL MIGHT HAPPEN: NOT AT ALL
1. FEELING NERVOUS, ANXIOUS, OR ON EDGE: NOT AT ALL
3. WORRYING TOO MUCH ABOUT DIFFERENT THINGS: NOT AT ALL
2. NOT BEING ABLE TO STOP OR CONTROL WORRYING: NOT AT ALL
GAD7 TOTAL SCORE: 0
GAD7 TOTAL SCORE: 0
7. FEELING AFRAID AS IF SOMETHING AWFUL MIGHT HAPPEN: NOT AT ALL
5. BEING SO RESTLESS THAT IT IS HARD TO SIT STILL: NOT AT ALL

## 2023-04-04 ASSESSMENT — PATIENT HEALTH QUESTIONNAIRE - PHQ9
10. IF YOU CHECKED OFF ANY PROBLEMS, HOW DIFFICULT HAVE THESE PROBLEMS MADE IT FOR YOU TO DO YOUR WORK, TAKE CARE OF THINGS AT HOME, OR GET ALONG WITH OTHER PEOPLE: NOT DIFFICULT AT ALL
SUM OF ALL RESPONSES TO PHQ QUESTIONS 1-9: 8
SUM OF ALL RESPONSES TO PHQ QUESTIONS 1-9: 8

## 2023-04-04 NOTE — PROGRESS NOTES
Assessment & Plan     Major depressive disorder, recurrent episode, severe with catatonia (H)  She seems to almost not be present in her body - mood is low but she can hardly keep a train of thought to speak a sentence. She is not in distress but she's certainly not well. She's catatonic. Will see if Adderall helps in any way to perk her up. If not, she might need to go on hospice. Will have a nurse call to see if/how the Adderall works, next week.  - amphetamine-dextroamphetamine (ADDERALL) 10 MG tablet  Dispense: 28 tablet; Refill: 0  - UA Macro with Reflex to Micro and Culture - lab collect  - CBC with platelets and differential  - Comprehensive metabolic panel (BMP + Alb, Alk Phos, ALT, AST, Total. Bili, TP)  - TSH with free T4 reflex  - CBC with platelets and differential  - Comprehensive metabolic panel (BMP + Alb, Alk Phos, ALT, AST, Total. Bili, TP)  - TSH with free T4 reflex  - UA Macro with Reflex to Micro and Culture - lab collect  - UA Microscopic with Reflex to Culture    Osteoporosis  Need to be very careful she does not fall    Preventative health care  reviewed  - REVIEW OF HEALTH MAINTENANCE PROTOCOL ORDERS    Spasm of muscle  Her  will give this if/when she has a muscle spasm  - diazepam (VALIUM) 2 MG tablet  Dispense: 4 tablet; Refill: 0    Mild cognitive impairment with memory loss  This is no longer mild, it is major    Partial symptomatic epilepsy with complex partial seizures, not intractable, without status epilepticus (H)  Medication seems to be controlling this    Alcoholic cirrhosis of liver without ascites (H)  I believe this is more advanced - possibly contributing to her lower BP    Essential hypertension  She has had two visits with low Bps. At this point, I'll have her stop losartan in order to help her have a normal BP to better get blood to the brain.    Elevated glucose  Check A1-c. This might be negatively affecting her kidneys.      Review of external notes as documented  elsewhere in note  Ordering of each unique test  Prescription drug management  50 minutes spent by me on the date of the encounter doing chart review, history and exam, documentation and further activities per the note    Camille Love MD  [facility]            Jesus Awan is a 72 year old year old accompanied by her spouse, presenting for the following health issues:  Follow Up    History of Present Illness       Mental Health Follow-up:  Patient presents to follow-up on Depression & Anxiety.Patient's depression since last visit has been:  Good  The patient is having other symptoms associated with depression.  Patient's anxiety since last visit has been:  No change  The patient is having other symptoms associated with anxiety.  Any significant life events: health concerns  Patient is not feeling anxious or having panic attacks.  Patient has no concerns about alcohol or drug use.    Reason for visit:  Follow up and hearing problem    She eats 0-1 servings of fruits and vegetables daily.She consumes 2 sweetened beverage(s) daily.She exercises with enough effort to increase her heart rate 9 or less minutes per day.  She is missing 1 dose(s) of medications per week.    Today's PHQ-9         PHQ-9 Total Score: 8    PHQ-9 Q9 Thoughts of better off dead/self-harm past 2 weeks :   Not at all    How difficult have these problems made it for you to do your work, take care of things at home, or get along with other people: Not difficult at all  Today's LESIA-7 Score: 0     Nothing is different - just doing the same, Eleanor says.  Her , Kelsey, agrees.    She does not have much to say, even when given the chance.    Rian says she only wakes up at 2pm if he calls from work and tells her to get up. Then she's up till he gets home about 5pm. They have supper together and are in bed by 9pm.    They are not aware of any investigation into Eleanor's well-being.    Rian/Asaf is  "off work this week because of needing to go see his sister who's not well. He will take Eleanor with him.    Review of Systems         Objective    BP 90/53   Pulse 69   Temp 97.9  F (36.6  C) (Oral)   Resp 16   Ht 1.6 m (5' 3\")   Wt 58.4 kg (128 lb 12 oz)   SpO2 97%   BMI 22.81 kg/m    Body mass index is 22.81 kg/m .  Physical Exam   GENERAL: no distress, frail, appears older than stated age and has a steady, blank stare  NECK: no adenopathy, no asymmetry, masses, or scars and thyroid normal to palpation  RESP: lungs clear to auscultation - no rales, rhonchi or wheezes  CV: regular rate and rhythm, normal S1 S2, no S3 or S4, no murmur, click or rub, no peripheral edema and peripheral pulses strong  MS: muscle wasting generally  SKIN: pale  PSYCH: concentration poor, confused, disoriented, affect flat, judgement and insight impaired    Results for orders placed or performed in visit on 04/04/23   Comprehensive metabolic panel (BMP + Alb, Alk Phos, ALT, AST, Total. Bili, TP)     Status: Abnormal   Result Value Ref Range    Sodium 140 136 - 145 mmol/L    Potassium 4.0 3.4 - 5.3 mmol/L    Chloride 103 98 - 107 mmol/L    Carbon Dioxide (CO2) 26 22 - 29 mmol/L    Anion Gap 11 7 - 15 mmol/L    Urea Nitrogen 17.9 8.0 - 23.0 mg/dL    Creatinine 1.00 (H) 0.51 - 0.95 mg/dL    Calcium 10.5 (H) 8.8 - 10.2 mg/dL    Glucose 100 (H) 70 - 99 mg/dL    Alkaline Phosphatase 62 35 - 104 U/L    AST 21 10 - 35 U/L    ALT 12 10 - 35 U/L    Protein Total 7.3 6.4 - 8.3 g/dL    Albumin 4.1 3.5 - 5.2 g/dL    Bilirubin Total 0.4 <=1.2 mg/dL    GFR Estimate 60 (L) >60 mL/min/1.73m2   TSH with free T4 reflex     Status: Normal   Result Value Ref Range    TSH 1.87 0.30 - 4.20 uIU/mL   CBC with platelets and differential     Status: None   Result Value Ref Range    WBC Count 6.9 4.0 - 11.0 10e3/uL    RBC Count 4.45 3.80 - 5.20 10e6/uL    Hemoglobin 13.4 11.7 - 15.7 g/dL    Hematocrit 41.2 35.0 - 47.0 %    MCV 93 78 - 100 fL    MCH 30.1 26.5 - " 33.0 pg    MCHC 32.5 31.5 - 36.5 g/dL    RDW 12.1 10.0 - 15.0 %    Platelet Count 212 150 - 450 10e3/uL    % Neutrophils 73 %    % Lymphocytes 17 %    % Monocytes 7 %    % Eosinophils 3 %    % Basophils 0 %    % Immature Granulocytes 0 %    Absolute Neutrophils 5.0 1.6 - 8.3 10e3/uL    Absolute Lymphocytes 1.2 0.8 - 5.3 10e3/uL    Absolute Monocytes 0.5 0.0 - 1.3 10e3/uL    Absolute Eosinophils 0.2 0.0 - 0.7 10e3/uL    Absolute Basophils 0.0 0.0 - 0.2 10e3/uL    Absolute Immature Granulocytes 0.0 <=0.4 10e3/uL   UA Macro with Reflex to Micro and Culture - lab collect     Status: Abnormal    Specimen: Urine, NOS   Result Value Ref Range    Color Urine Yellow Colorless, Straw, Light Yellow, Yellow    Appearance Urine Slightly Cloudy (A) Clear    Glucose Urine Negative Negative mg/dL    Bilirubin Urine Negative Negative    Ketones Urine Negative Negative mg/dL    Specific Gravity Urine 1.020 1.005 - 1.030    Blood Urine Negative Negative    pH Urine 6.0 5.0 - 7.0    Protein Albumin Urine Trace (A) Negative mg/dL    Urobilinogen Urine 0.2 0.2, 1.0 E.U./dL    Nitrite Urine Negative Negative    Leukocyte Esterase Urine Negative Negative   UA Microscopic with Reflex to Culture     Status: Abnormal   Result Value Ref Range    Bacteria Urine Moderate (A) None Seen /HPF    RBC Urine 0-2 0-2 /HPF /HPF    WBC Urine 5-10 (A) 0-5 /HPF /HPF    Squamous Epithelials Urine Many (A) None Seen /LPF    Mucus Urine Present (A) None Seen /LPF    Amorphous Crystals Urine Moderate (A) None Seen /HPF    Hyaline Casts Urine 0-2 (A) None Seen /LPF    Narrative    Urine Culture not indicated   CBC with platelets and differential     Status: None    Narrative    The following orders were created for panel order CBC with platelets and differential.  Procedure                               Abnormality         Status                     ---------                               -----------         ------                     CBC with platelets and  d...[[id number]]                      Final result                 Please view results for these tests on the individual orders.

## 2023-04-05 ENCOUNTER — TELEPHONE (OUTPATIENT)
Dept: FAMILY MEDICINE | Facility: CLINIC | Age: 73
End: 2023-04-05

## 2023-04-05 ASSESSMENT — ANXIETY QUESTIONNAIRES: GAD7 TOTAL SCORE: 0

## 2023-04-05 ASSESSMENT — PATIENT HEALTH QUESTIONNAIRE - PHQ9
SUM OF ALL RESPONSES TO PHQ QUESTIONS 1-9: 8
10. IF YOU CHECKED OFF ANY PROBLEMS, HOW DIFFICULT HAVE THESE PROBLEMS MADE IT FOR YOU TO DO YOUR WORK, TAKE CARE OF THINGS AT HOME, OR GET ALONG WITH OTHER PEOPLE: NOT DIFFICULT AT ALL

## 2023-04-06 LAB — HBA1C MFR BLD: 5.4 % (ref 0–5.6)

## 2023-04-06 NOTE — TELEPHONE ENCOUNTER
Called Asaf and left a message to call back.    - if pt or  calls back, please relay Dr. Love's message below.    Devan De Jesus Cem Say, BSN RN  [facility]

## 2023-04-06 NOTE — TELEPHONE ENCOUNTER
Patient Returning Call    Reason for call:  Update on pt since lowering med  Pt is doing pretty good and is a little more talktative.  Pt called daughter 2 wks on her own without prompting  Has not called since 12/22.      Daughter wants to know what next steps are?  Family friend has come in x 3- 4 but pt only took a shower x2.  Last shower was a month and half ago.      Daughter wants to know progress on HC referral.  Will defer to  Arin and [facility] team.  In the past- especially with [facility] - they will not cover pt with mental illness or difficulties.      Information relayed to patient:  Will route to PCP and Arin    Patient has additional questions:  No      Could we send this information to you in [facility] [facility] [employer] or would you prefer to receive a phone call?:   Patient would prefer a phone call     Okay to leave a detailed message?: Yes at [phone]    Call taken on 4/6/23 at 1240 pm by ANGELLA Loyola

## 2023-04-06 NOTE — TELEPHONE ENCOUNTER
Antelmo Tinajero,   Per chart review, patient is already enrolled with you. Please see message below and assist with home care referral.     Please reach out if you have any questions.     Thank you,     no

## 2023-04-06 NOTE — TELEPHONE ENCOUNTER
Please call Dirk/her  Asaf. (dirk does not really hold conversations any longer).    Because Dirk's blood pressure has been low the past two visits, Dr. Love wants her to stop taking the BP lowering medication losartan. They can have her take 1/2 pill daily for a few days and then stop it completely.    Please also ask if they have tried the Adderall for Dirk yet. If they have, did it help her at all?

## 2023-04-07 NOTE — TELEPHONE ENCOUNTER
I do not know how to get a homecare agency on board. This patient desperately needs home care -- but if we cannot get that could she qualify for an [facility] worker?    It is super to hear the Adderall has helped some. I do not know if the help it gives will be sustained.    Possibly, if her blood pressure increases some with less BP lowering med, she could improve, too....

## 2023-04-24 NOTE — TELEPHONE ENCOUNTER
FYI - Status Update    Who is Calling: family member, daughter Kia    Update: Pt spouse DID NOT stop the Risperadol as PCP requested below.  Daughter will call pt spouse today.  Spouse was told to stop the med.      Kia is wondering why the adderall as it is not helping really.  Kia is going to call a close personal friend of the pt to ask if she could take her mom to her appts as pt spouse is quiet and doesn't ask questions.     TC inquired if pt has a person who is medical POA - to make decisions and get all medical info - daughter will look into that.  Thanks    Does caller want a call/response back: Yes     Could we send this information to you in [employer] or would you prefer to receive a phone call?:   Daughter  would prefer a phone call     Okay to leave a detailed message?: Yes at [phone]    Call taken on 4/24/23 at 850 am by FARNAZ Loyola

## 2023-04-24 NOTE — TELEPHONE ENCOUNTER
Hi Dr. Ivan Aggarwal I was reviewing chart and note that the patient's spouse is the only person released in Eleanor's chart. I am not certain who should be receiving your message below. No releases to speak to any family but the patient's spouse.

## 2023-04-24 NOTE — TELEPHONE ENCOUNTER
No note from Care Coordination since 3/23.  Hoping they might have found a home care agency. This patient sleeps until 2pm or so -and wakes only if her  calls from work. It would be very helpful if she could wake earlier in the day and get help taking her meds, getting more regular meals. I even wonder if she just needs to be in an assisted living or memory care place...    The Adderall is meant to help her be more awake during the day and DO things, not just sit. If it is not helping, then we'll stop it.

## 2023-04-25 ENCOUNTER — PATIENT OUTREACH (OUTPATIENT)
Dept: CARE COORDINATION | Facility: CLINIC | Age: 73
End: 2023-04-25
Payer: COMMERCIAL

## 2023-04-25 NOTE — PROGRESS NOTES
Clinic Care Coordination Contact  [facility]/Voicemail    Clinical Data: Care Coordinator Outreach  Outreach attempted x 1. CHW attempted to call patient 2x and unable to reach and left message on patient's voicemail with call back information and requested return call.    Plan: Care Coordinator will try to reach patient again in two weeks.

## 2023-04-27 ENCOUNTER — OFFICE VISIT (OUTPATIENT)
Dept: NEUROLOGY | Facility: CLINIC | Age: 73
End: 2023-04-27
Payer: COMMERCIAL

## 2023-04-27 DIAGNOSIS — F02.80 MAJOR NEUROCOGNITIVE DISORDER DUE TO MULTIPLE ETIOLOGIES (H): Primary | ICD-10-CM

## 2023-04-27 DIAGNOSIS — G40.209 PARTIAL SYMPTOMATIC EPILEPSY WITH COMPLEX PARTIAL SEIZURES, NOT INTRACTABLE, WITHOUT STATUS EPILEPTICUS (H): ICD-10-CM

## 2023-04-27 DIAGNOSIS — F41.9 ANXIETY: ICD-10-CM

## 2023-04-27 DIAGNOSIS — Z87.820 HISTORY OF TRAUMATIC BRAIN INJURY: ICD-10-CM

## 2023-04-27 DIAGNOSIS — F10.21 ALCOHOL DEPENDENCE IN REMISSION (H): ICD-10-CM

## 2023-04-27 PROCEDURE — 96116 NUBHVL XM PHYS/QHP 1ST HR: CPT | Performed by: CLINICAL NEUROPSYCHOLOGIST

## 2023-04-27 PROCEDURE — 96139 PSYCL/NRPSYC TST TECH EA: CPT | Performed by: CLINICAL NEUROPSYCHOLOGIST

## 2023-04-27 PROCEDURE — 96133 NRPSYC TST EVAL PHYS/QHP EA: CPT | Performed by: CLINICAL NEUROPSYCHOLOGIST

## 2023-04-27 PROCEDURE — 96138 PSYCL/NRPSYC TECH 1ST: CPT | Performed by: CLINICAL NEUROPSYCHOLOGIST

## 2023-04-27 PROCEDURE — 96132 NRPSYC TST EVAL PHYS/QHP 1ST: CPT | Performed by: CLINICAL NEUROPSYCHOLOGIST

## 2023-04-27 NOTE — PROGRESS NOTES
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    Labels for tests with Normal Distributions  Score Label Standard Score %ile Rank   Exceptionally high score  > 130 > 98   Above average score 120-129 91-97   High average score 110-119 75-90   Average score  25-74   Low average score 80-89 9-24   Below average score 70-79 2-8   Exceptionally low score < 70 < 2     Labels for tests with Non-Normal Distributions  Score Label %ile Rank   Within normal expectations/ limits score (WNL) > 24   Low average score 9-24   Below average score 2-8   Exceptionally low score < 2     The following test results utilize score labels as adapted from Mikey Nazario, Abelino Cordero, Pushpa Najera, ANDRES Lara, Jerry Smallwood Michael Westerveld & Conference Participatnts (2020): American Academy of Clinical Neuropsychology consensus conference statement on uniform labeling of performance test scores, The Clinical Neuropsychologist, DOI: 10.1080/[id number]. All scores contain some measure of error; scores are reported here as they are obtained by the individual (without reference to the range of error). These are meant as labels and not interpretation  of performance. While other relevant comments regarding task performance are provided below, please see the Summary, Impressions, and Diagnosis sections of this report for interpretation of the scores and the cognitive profile as a whole, including what does and does not constitute impairment for this particular individual.    BEHAVIORAL OBSERVATIONS:   Ms. Awan arrived on time and accompanied by her  to today's appointment. She was appropriately dressed and groomed. She appeared alert and engaged. She exhibited reduced eye blink with a somewhat surprised look. Response latencies were extremely long at times; other times, she was quick to respond.  No vision or hearing difficulties were observed. Conversational speech was quiet and slow. She did not talk much. No word-finding pauses or paraphasias were noted. Her thought process appeared linear and goal-directed. No hallucinations or delusions were apparent. Judgment and insight appeared limited. Her mood was dysthymic and her affect was flat. Rapport was established.     During the testing session, Ms. Awan was alert throughout. She extremely reserved yet cooperative throughout the evaluation. She continued to have an intense stare with reduced eye blink during testing. She understood test instructions without difficulty. Response latencies continued to vary from extremely prolonged to normal. She tended to pick at her nails and skin during testing. No other frontal signs were observed behaviorally. Ms. Awan appeared adequately motivated and engaged during testing. Her scores on embedded effort indices varied (impaired RDS, intact CVLT Forced Choice). Nevertheless, the following results are considered a reasonably valid estimation of her current cognitive abilities.    OPTIMAL PREMORBID INTELLECT:  Optimal premorbid intellectual abilities were estimated as falling in the low average range based on Ms. Awan's educational and occupational  "histories and performance on tasks least likely to be affected by acquired brain dysfunction.    SUMMARY OF TEST RESULTS:  ORIENTATION. Performance on a mental status exam, assessing orientation to personal and current information, resulted in a low average score. She was oriented to most personal information (although she stated that her current age was \"71\" instead of 72). She was oriented to time and date, and was able to correctly name the current and previous presidents. However, she stated that the current city was \"[place]\" (instead of [place]) and did not know the name of our clinic.    ATTENTION/WORKING MEMORY. The patient's score on a measure sensitive to sustained auditory-verbal attention and concentration (WAIS-IV Digit Span) was classified as exceptionally low, as she was able to recite up to 4 digits forward (a below average score), up to 3 digits backward (a low average score), and up to 3 digits in sequence (a below average score).      PROCESSING SPEED. Speeded digit-symbol coding was measured as an average score (WAIS-IV Coding). On a test of complex concentration that requires speeded numeric sequencing (Trails A), the patient's score was average for completion time and with 1 error. On the DKEFS Color-Word Interference Test, speeded color naming was low average, and speeded word reading was low average.     LANGUAGE PROCESSING. Language comprehension appeared intact. Sentence repetition was rated as \"severely defective\" (MORALEZ Sentence Repetition test).  Her verbal responses to the Cookie Theft picture were fairly minimal but within normal limits. The WAIS-IV Verbal Comprehension Index was below average (pro-rated VCI = 66), with a below average score on a subtest of word knowledge (Vocabulary), and an exceptionally low score on a measure of verbal abstract reasoning (Similarities). Confrontation naming was measured as an exceptionally low score (+10/15; BNT-15); she did not benefit much from " phonemic cues on that task (+1/5). The patient's performance on a test of phonemic fluency resulted in a low average score (COWAT), and her semantic fluency was below average (Category Fluency). Her writing sample was intact and there was no evidence of micrographia.     VISUOSPATIAL/CONSTRUCTIONAL SKILLS. The WAIS-IV Perceptual Reasoning Index was measured as a low average score (pro-rated TAE = 88), with average nonverbal abstract reasoning (Matrix Reasoning), and average visuoconstruction with three-dimensional blocks (Block Design). Copy of six simple geometric figures was within normal limits (BVMT-R Copy). On a Clock Drawing Task, the patient was able to draw a large, well-formed Ken[place]ze with unequally spaced numbers. Her clock hands converged nicely in the center of the clock face and were differentiated by length.      LEARNING/MEMORY. On the WMS-IV Logical Memory subtest, immediate memory for two paragraph-length stories resulted in a low average score. After a 20-minute delay, the patient's score on the delayed recall trial was low average with a 59% retention rate. On the recognition trial, the patient's score was classified as above average.    On a 9-item verbal list-learning task (CVLT-II Short Form), the patient acquired up to 4 words (44%) of the word list by the 4th and final learning trial (raw scores over trials = 4, 3, 3, 4). Total learning acquisition was ibelow average. Following a 30-second distractor task, the patient recalled 3 items, which was exceptionally low. After a 10-minute delay, the patient recalled 3 items, which was below average. Her recall did not benefit further from semantic cues (2 items; exceptionally low). Recognition discriminability was low average, as she recognized 7/9 items (low average) and made only 1 false-positive error (average).    On a visual memory measure (BVMT-R), immediate recall of six simple geometric figures over three learning trials was exceptionally low  "(raw scores over trials = 2, 2, 0 out of 12). Delayed recall of the figures was exceptionally low, with a 100% retention rate (raw score = 2 out of 12). Recognition discriminability was below average, as she correctly recognized 4/6 figures (below average) and made 1 false-positive error (low average).     EXECUTIVE FUNCTIONS. On a test of inhibition and cognitive flexibility (DKEFS Color-Word Interference Inhibition trial), the patient's score was low average for completion time and made 3 errors, which is average. On a test that requires speeded alpha-numeric sequencing/cognitive set-shifting (Trails B), the patient was unable to complete the task within the maximum time limit, so the test was discontinued (she reached item \"I\" at 6 minutes and made 2 errors at that point). Verbal and nonverbal abstract reasoning were exceptionally low and average, respectively (WAIS-IV Similarities and Matrix Reasoning). Phonemic fluency was low average (COWAT). On the Clock Drawing Test, the patient was unable to accurately represent analog time.     MOOD. On the GDS-15 a self report measure of depressive symptomatology, she obtained a score of 3, placing her in the range of normal. On the LESIA-7, a self-report measure of anxiety, she obtained a score of 7,  placing her in the range of mild anxiety.    ____________________________________________________________________________________    SERVICES PROVIDED & TIME:  On-site Office Visit Details   A clinical interview/neurobehavioral status examination was conducted with the patient and documented. I thoroughly reviewed the medical record, selected the neuropsychological test battery, provided supervision to the trained examiner/technician, interpreted/integrated patient data and test results, and engaged in clinical decision making, treatment planning and report writing/preparation. A trained examiner/technician administered and scored the neuropsychological tests (2+ tests).  " Please see below for a breakdown of time spent and the associated codes billed for these services.  Services   Time Spent  CPT Codes   Neurobehavioral Status Exam:  (e.g., clinical interview and neurobehavioral status exam, interpretation, report)   74 minutes   1 x 96116     Neuropsychological Evaluation Services:   (e.g., integration, interpretation, treatment planning, clinical decision making)   191 minutes   1 x 96132  2 x 96133   Neuropsychological Testing by Trained Examiner/Technician:  (e.g., test administration, scoring, 2+ tests administered)   195 minutes   1 x 96138  5 x 96139   For diagnostic and coding purposes, Ms. Awan has a history of complex partial seizures, hypertension, depression, anxiety, hypersomnia, marijuana use, alcohol use disorder (currently in remission), alcoholic cirrhosis of the liver, history of Wernicke's encephalopathy (8/2020), and remote TBI. She was referred for an evaluation of major neurocognitive disorder.

## 2023-04-28 NOTE — PROGRESS NOTES
The patient was seen for a neuropsychological evaluation for the purposes of diagnostic clarification and treatment planning. 105 minutes of face-to-face testing were provided by this writer. An additional 90 minutes were spent scoring and compiling test results. The patient was cooperative with testing. No concerns were brought to my attention. Please see Dr. Swain's report for a detailed description of the charges and interpretation and integration of the findings.

## 2023-05-09 ENCOUNTER — PATIENT OUTREACH (OUTPATIENT)
Dept: CARE COORDINATION | Facility: CLINIC | Age: 73
End: 2023-05-09
Payer: COMMERCIAL

## 2023-05-09 NOTE — PROGRESS NOTES
Clinic Care Coordination Contact  [facility]/Voicemail    Clinical Data: Care Coordinator Outreach  Outreach attempted x 2. CHW attempted to call patient following last attempt about 2 weeks ago and unable to reach and left message on patient's voicemail with call back information and requested return call.    Plan: Care Coordinator will try to reach patient again in 1 month.

## 2023-05-10 ENCOUNTER — PATIENT OUTREACH (OUTPATIENT)
Dept: CARE COORDINATION | Facility: CLINIC | Age: 73
End: 2023-05-10
Payer: COMMERCIAL

## 2023-05-10 NOTE — LETTER
[facility]  Patient Centered Plan of Care  About Me:        Patient Name:  Eleanor Keating    YOB: 1950  Age:         72 year old   Pat MRN:    [id number] Telephone Information:  Home Phone [phone]   Mobile [phone]       Address:  [address] [place] [address] [address]  [facility] [address] Email address:  [email or web address]      Emergency Contact(s)    Name Relationship Lgl Grd Work Phone Home Phone Mobile Phone   1. CANDACE KEATING Spouse    [phone]   2. MAMI MYRICK Other  [phone]             Primary language:  English     needed? No   [facility]:  [phone] op. 1  Other communication barriers:Cognitive impairment; Caregiver    Preferred Method of Communication:     Current living arrangement: I live in a private home with family    Mobility Status/ Medical Equipment: Independent        Health Maintenance  Health Maintenance Reviewed: Due/Overdue   Health Maintenance Due   Topic Date Due     DEXA  Never done     DEPRESSION ACTION PLAN  Never done     Pneumococcal Vaccine: 65+ Years (2 - PPSV23 if available, else PCV20) 07/10/2019     ZOSTER IMMUNIZATION (3 of 3) 07/10/2019     MEDICARE ANNUAL WELLNESS VISIT  02/07/2021     COVID-19 Vaccine (3 - Booster for Pfizer series) 05/17/2021     HEPATITIS B IMMUNIZATION (2 of 3 - Risk 3-dose series) 05/29/2021          My Access Plan  Medical Emergency 911   Primary Clinic Line [facility] [phone]   24 Hour Appointment Line [phone] or  [phone]) (toll-free)   24 Hour Nurse Line [phone] (toll-free)   Preferred Urgent Care [facility] [phone]       Preferred Hospital [facility]  [phone]       Preferred Pharmacy [id number] - [facility] [address]  [address] [facility] [address]     Behavioral Health Crisis Line The National Suicide Prevention Lifeline at 1-340.339.7996 or  Text/Call 988             My Care Team Members  Patient Care Team         Relationship Specialty Notifications Start End    Camille Love MD PCP - General Family Medicine  4/4/23     Phone: [phone] Fax: [phone]         [address] [facility] [address] [place] [address]    Danuta Kay, PharmD Pharmacist Pharmacist  10/6/20     Phone: [phone]          [facility] [address] [place] [address]    Camille Love MD Assigned PCP   6/16/21     Phone: [phone] Fax: [phone]         [address] [facility] [address] [place] [address]    Rainer Mahajan MD Assigned Neuroscience Provider   4/17/22     Phone: [phone] Fax: [phone]         [address] [facility] [address]    Dontae Bocanegra Community Health Worker Primary Care - CC Admissions 3/9/23     Phone: [phone] Fax: [phone]         [address] [facility] [address] [facility] [address]    Tanvi Hamilton, HIREN Lead Care Coordinator  Admissions 3/14/23               My Care Plans  Self Management and Treatment Plan  Care Plan  Care Plan: Mental Health       Problem: Mental Health Symptoms Need Improvement       Goal: Improve management of mental health symptoms and establish with mental health/psychosocial supports       Start Date: 3/23/2023 Expected End Date: 3/23/2024    Priority: High    Note:     Barriers: depression, cognitive functioning  Strengths: willing to try therapy  Patient expressed understanding of goal: yes  Action steps to achieve this goal:  1. I will answer the phone when therapist/agency calls to set up appointment  2. I will attend my scheduled  appointment  3. I will follow up with [facility] monthly as to the progress Im making towards my goal.                                 Action Plans on File:                       Advance Care Plans/Directives Type:   No data recorded    My Medical and Care Information  Problem List   Patient Active Problem List   Diagnosis     Osteoporosis     Mild cognitive  impairment with memory loss     BPPV (benign paroxysmal positional vertigo)     Alcohol dependence in remission (H)     Paranoia (H)     Essential hypertension     Thiamine deficiency     Anxiety     Alcoholic cirrhosis of liver without ascites (H)     Moderate episode of recurrent major depressive disorder (H)     Hepatitis C carrier (H)     Partial symptomatic epilepsy with complex partial seizures, not intractable, without status epilepticus (H)      Current Medications and Allergies:  See printed Medication Report.    Care Coordination Start Date: 3/8/2023   Frequency of Care Coordination: monthly       Form Last Updated: 05/10/2023

## 2023-05-10 NOTE — PROGRESS NOTES
Clinic Care Coordination Contact  Care Coordination Clinician Chart Review    Situation: Patient chart reviewed by Care Coordinator.       Background: Care Coordination Program started: 3/8/2023. Initial assessment completed and patient-centered care plan(s) were developed with participation from patient. Lead CC handed patient off to CHW for continued outreaches.       Assessment: Per chart review, patient outreach completed by CC CHW on 4/14 (unable to reach.  Patient is not actively working to accomplish goal(s). Patient's goal(s) appropriate and relevant at this time. Patient is not due for updated Plan of Care.  Assessments will be completed annually or as needed/with change of patient status.      Care Plan: Mental Health     Problem: Mental Health Symptoms Need Improvement     Goal: Improve management of mental health symptoms and establish with mental health/psychosocial supports     Start Date: 3/23/2023 Expected End Date: 3/23/2024    Priority: High    Note:     Barriers: depression, cognitive functioning  Strengths: willing to try therapy  Patient expressed understanding of goal: yes  Action steps to achieve this goal:  1. I will answer the phone when therapist/agency calls to set up appointment  2. I will attend my scheduled MH appointment  3. I will follow up with CCC monthly as to the progress Im making towards my goal.                             Plan/Recommendations: The patient will continue working with Care Coordination to achieve goal(s) as above. CHW will continue outreaches at minimum every 30 days and will involve Lead CC as needed or if patient is ready to move to Maintenance. Lead CC will continue to monitor CHW outreaches and patient's progress to goal(s) every 6 weeks.     Plan of Care updated and sent to patient: Yes, via [employer]     CHW has attempted multiple outeraches, without success. Pt would benefit form [facility] worker or PCA services if agreeable, please place referrals.          JONO Knowles, [facility]  Social Work Care Coordinator

## 2023-05-22 DIAGNOSIS — F10.21 ALCOHOL DEPENDENCE IN REMISSION (H): ICD-10-CM

## 2023-05-22 DIAGNOSIS — Z76.0 ENCOUNTER FOR MEDICATION REFILL: ICD-10-CM

## 2023-05-22 DIAGNOSIS — F41.0 PANIC ATTACK: ICD-10-CM

## 2023-05-22 DIAGNOSIS — S06.9X1S TRAUMATIC BRAIN INJURY, WITH LOSS OF CONSCIOUSNESS OF 30 MINUTES OR LESS, SEQUELA (H): ICD-10-CM

## 2023-05-22 DIAGNOSIS — F32.0 MILD MAJOR DEPRESSION (H): ICD-10-CM

## 2023-05-22 RX ORDER — ESCITALOPRAM OXALATE 20 MG/1
20 TABLET ORAL DAILY
Qty: 90 TABLET | Refills: 4 | Status: ON HOLD | OUTPATIENT
Start: 2023-05-22 | End: 2023-08-12

## 2023-05-23 ENCOUNTER — PATIENT OUTREACH (OUTPATIENT)
Dept: CARE COORDINATION | Facility: CLINIC | Age: 73
End: 2023-05-23
Payer: COMMERCIAL

## 2023-05-24 DIAGNOSIS — F32.0 MILD MAJOR DEPRESSION (H): Primary | ICD-10-CM

## 2023-05-24 DIAGNOSIS — S06.9X1S TRAUMATIC BRAIN INJURY, WITH LOSS OF CONSCIOUSNESS OF 30 MINUTES OR LESS, SEQUELA (H): ICD-10-CM

## 2023-05-24 DIAGNOSIS — F06.1 MAJOR DEPRESSIVE DISORDER, RECURRENT EPISODE, SEVERE WITH CATATONIA (H): ICD-10-CM

## 2023-05-24 DIAGNOSIS — F33.2 MAJOR DEPRESSIVE DISORDER, RECURRENT EPISODE, SEVERE WITH CATATONIA (H): ICD-10-CM

## 2023-05-24 DIAGNOSIS — M81.0 AGE RELATED OSTEOPOROSIS, UNSPECIFIED PATHOLOGICAL FRACTURE PRESENCE: ICD-10-CM

## 2023-05-24 DIAGNOSIS — B18.2 HEPATITIS C CARRIER (H): ICD-10-CM

## 2023-05-24 DIAGNOSIS — G40.209 PARTIAL SYMPTOMATIC EPILEPSY WITH COMPLEX PARTIAL SEIZURES, NOT INTRACTABLE, WITHOUT STATUS EPILEPTICUS (H): ICD-10-CM

## 2023-05-30 ENCOUNTER — PATIENT OUTREACH (OUTPATIENT)
Dept: CARE COORDINATION | Facility: CLINIC | Age: 73
End: 2023-05-30
Payer: COMMERCIAL

## 2023-05-30 NOTE — PROGRESS NOTES
"Community Paramedic Program  Community Health Worker Initial Outreach    Referral source: Pt's PCP  Reason(s) for visit: Initial Assessment   Goals for visit(s): Medication Compliance    Other   How often should patient be seen: Weekly   Preference on when patient should be seen: 3-7 Days     Comment: PCP:  Camille Love      Other info that would be helpful:  Eleanor has had a severe decline in her abilities, especially talking. She used to talk up a storm. Now she hardly talks and her  has to work for them to have any money. She needs help to wake and care for herself and take her meds and eat (all while her , Asaf/, is at work till 5). If she could be seen at least weekly to get up and bathe and dress and eat, that'd be very helpful. Maybe take a walk!     Initial visit: Tuesday, June 6th / 2 pm / CP Ella (date/time/CP)    Additional information:   Spoke with pt's  Asaf. Recapped my role and reason for calling. I gave him a brief description of the program and what to expect during an initial visit. Asaf said he thought this would be helpful but said \"I'm not sure Eleanor will feel the same way.\" He asked to schedule a visit for next week in the afternoon so he can make sure he's present.     Confirmed pt's home address and apartment number [id number]). Asaf said when the CP arrives, \"she can just come into the building and walk up to the apartment.\" He said they have a cat at home. Asaf wrote down the visit details, including my contact information, during the call. He agreed to reach out with questions or to reschedule if needed.    I asked Asaf about Eleanor's ability to speak, and he said \"she has no problem talking.\" I asked if there was any other important information that he'd like us to know, and he said that his wife \"sleeps most of the day when I'm gone.\" Thanked him for his time and let him know I will update pt's PCP about the initial CP visit after ending the call.    Routing to " pt's PCP and CC staff for awareness. CC staff have been unable to reach pt; will ask the CP to discuss with pt during a future visit. CC has goals to help pt with mental health resources and to access PCA and/or [facility] services.

## 2023-05-31 NOTE — TELEPHONE ENCOUNTER
Eleanor has a daughter who lives in [place]. If possible, please try calling the daughter with the CP as she might have other concerns to mention AND this can be a helpful update for her about her mom getting some help. Of course, only do this if Eleanor agrees.

## 2023-06-06 ENCOUNTER — ALLIED HEALTH/NURSE VISIT (OUTPATIENT)
Dept: OTHER | Facility: CLINIC | Age: 73
End: 2023-06-06
Payer: COMMERCIAL

## 2023-06-06 VITALS
OXYGEN SATURATION: 97 % | SYSTOLIC BLOOD PRESSURE: 92 MMHG | DIASTOLIC BLOOD PRESSURE: 50 MMHG | TEMPERATURE: 98.2 F | RESPIRATION RATE: 12 BRPM | HEART RATE: 57 BPM

## 2023-06-06 DIAGNOSIS — F06.1 MAJOR DEPRESSIVE DISORDER, RECURRENT EPISODE, SEVERE WITH CATATONIA (H): ICD-10-CM

## 2023-06-06 DIAGNOSIS — M81.0 AGE RELATED OSTEOPOROSIS, UNSPECIFIED PATHOLOGICAL FRACTURE PRESENCE: ICD-10-CM

## 2023-06-06 DIAGNOSIS — G40.209 PARTIAL SYMPTOMATIC EPILEPSY WITH COMPLEX PARTIAL SEIZURES, NOT INTRACTABLE, WITHOUT STATUS EPILEPTICUS (H): ICD-10-CM

## 2023-06-06 DIAGNOSIS — S06.9X1S TRAUMATIC BRAIN INJURY, WITH LOSS OF CONSCIOUSNESS OF 30 MINUTES OR LESS, SEQUELA (H): ICD-10-CM

## 2023-06-06 DIAGNOSIS — F33.2 MAJOR DEPRESSIVE DISORDER, RECURRENT EPISODE, SEVERE WITH CATATONIA (H): ICD-10-CM

## 2023-06-06 DIAGNOSIS — B18.2 HEPATITIS C CARRIER (H): ICD-10-CM

## 2023-06-06 PROCEDURE — 99207 PR COMMUNITY PARAMEDIC - PATIENT NOT BILLABLE: CPT

## 2023-06-06 NOTE — PROGRESS NOTES
Community Paramedics Initial Visit  June 6, 2023 TIME:1400    Eleanor Awan is a 72 year old female being seen at home for a Community Paramedic Home visit.    Present at appointment:  Patient, Community Paramedic, Spouse - Asaf         Chief Complaint   Patient presents with     Outreach       [place] Utilization:      Utilization    Hospital Admissions  1             ED Visits  1             No Show Count (past year)  5                Current as of: 5/31/2023 11:15 PM              Clinical Concerns:  Current Medical Concerns:  Hypotension    Current Behavioral Concerns: medication non compliance    Education Provided to patient: what each of her medications is taken for     Vitals: BP 92/50   Pulse 57   Temp 98.2  F (36.8  C)   Resp 12   SpO2 97%   BMI: There is no height or weight on file to calculate BMI.         Clinical Pathway: None    Review of Symptoms/PE    Skin: negative  Eyes: glasses  Ears/Nose/Throat: negative  Respiratory: No shortness of breath, dyspnea on exertion, cough, or hemoptysis  Cardiovascular: negative  Gastrointestinal: negative  Genitourinary: negative  Musculoskeletal: negative  Neurologic: incoordination and memory problems  Psychiatric: negative    Medication Management:  Patients spouse  Medications need to be refilled:none     Medications set up: Yes  Pill Box issued: Yes  Scale issued: No  Flu Shot given: No  COVID Vaccine Given: No  Lab draw or specimen collection: No  Food resource given: No  Collaborative visit with PCP: No  Wound Care: No  Health Maintenance Addressed Yes    Functional Status:       Living Situation:       Community Paramedics Home Safety Assessment  Floors:  Are there throw rugs on the floor?: Yes  Are there papers, books, towels, shoes, magazines on the floor?: No  Are there loose wires and cords you need to walk around? : No  Stairs and Steps  Are there papers, books, towels, shoes, magazines on the stairs?: No  Are some steps broken or uneven?:  No  Is there a light over the stairway?: No  Has the stairway bulb burned out?: No  Is the carpet on the steps loose or torn?: No  Are the handrails loose or broken?: No  Kitchen:  Are the things you use often on high shelves?: No  Is your step stool unsteady?: No  Bathrooms:  Is the tub or shower floor slippery?: No  Do you need support when you get in and out of the tub?: No  Bedrooms:  Is the light near the bed hard to reach?: No  Is the path from your bed to the bathroom dark?: No       Diet/Exercise/Sleep:       Transportation:           Psychosocial:       Core Healthy Days Survey - Healthy Days Survey - (Centers for Disease Control and Prevention - Used with Permission.)  Would you say that in general your health is: : Good  Now thinking about your physical health, which includes physical illness and injury, for how many days during the past 30 days was your physical health not good?: None  Now thinking about your mental health, which includes stress, depression, and problems with emotions, for how many days during the past 30 days was your mental health not good?: None  During the past 30 days, for about how many days did poor physical or mental health keep you from doing your usual activities, such as self-care, work, or recreation?: 15  CHDS SCORE: 0    No  Face to Face in Home / Community    Today's PHQ-2 Score:      Today's PHQ-9 Score:       4/4/2023     7:49 AM   PHQ-9 SCORE   PHQ-9 Total Score MyChart 8 (Mild depression)   PHQ-9 Total Score 8        Today's GAD7 score:       4/4/2023     7:54 AM   LESIA-7 SCORE   Total Score 0 (minimal anxiety)   Total Score 0         Care Plan:  Care Plan: Medication Regimen     Problem: Medication Adherence     Long-Range Goal: Consistently take Medications as Prescribed     Start Date: 6/6/2023                      Patient Active Problem List   Diagnosis     Osteoporosis     Mild cognitive impairment with memory loss     BPPV (benign paroxysmal positional vertigo)      Alcohol dependence in remission (H)     Paranoia (H)     Essential hypertension     Thiamine deficiency     Anxiety     Alcoholic cirrhosis of liver without ascites (H)     Moderate episode of recurrent major depressive disorder (H)     Hepatitis C carrier (H)     Partial symptomatic epilepsy with complex partial seizures, not intractable, without status epilepticus (H)         Current Outpatient Medications:      diazepam (VALIUM) 2 MG tablet, Take 1 tablet (2 mg) by mouth every 6 hours as needed for anxiety, Disp: 4 tablet, Rfl: 0     escitalopram (LEXAPRO) 20 MG tablet, Take 1 tablet (20 mg) by mouth daily, Disp: 90 tablet, Rfl: 4     folic acid (FOLVITE) 1 MG tablet, TAKE ONE TABLET BY MOUTH DAILY, Disp: 90 tablet, Rfl: 3     levETIRAcetam (KEPPRA) 500 MG tablet, Take 1 tablet (500 mg) by mouth 2 times daily, Disp: 180 tablet, Rfl: 3     metoprolol succinate ER (TOPROL XL) 25 MG 24 hr tablet, Take 3 tablets (75 mg) by mouth daily, Disp: 270 tablet, Rfl: 3     Thiamine Mononitrate (B1) 100 MG TABS, TAKE ONE TABLET BY MOUTH DAILY, Disp: 100 tablet, Rfl: 3     calcium-vitamin D 500 mg(1,250mg) -200 unit per tablet, [CALCIUM-VITAMIN D 500 MG(1,250MG) -200 UNIT PER TABLET] Take 1 tablet by mouth 2 (two) times a day with meals. (Patient not taking: Reported on 6/6/2023), Disp: , Rfl:      ketotifen (ZADITOR) 0.025 % ophthalmic solution, Place 1 drop into both eyes 2 times daily (Patient not taking: Reported on 6/6/2023), Disp: 5 mL, Rfl: 3    Time spent with patient: 60    The patient meets one or more of the following criteria:  * Has been identified by their primary care provider at risk of nursing home placement    Acute concern/Follow-up recommendations: patient not consistently taking her medications    Next CP visit scheduled: 6/14/23    Issues for Provider to follow up on: Community Paramedic visited with patient at her apartment. Explained CP program and answered patients questions. Set up patients  medications in a pill box provided by Community Paramedic. During set up noted that patient was still taking Losartan. Per chart on 4/4/23 visit with Dr Love, Losartan had been stopped due to low B/P's. Advised Eleanor that she was suppose to stop taking the Losartan. Wrote on bottle to stop taking as well. Had Eleanor explain her daily routine and talked about incorporating taking her medication into that routine. Goal for next week is to take her medication at 11am when she wakes up and starts eating her cereal. Her PM medications she will take when Asaf brings has her supper ready. Will keep her pill box on the coffee table to help with remembering.   Eleanor was awake and alert but appeared to be elizabeth  loss for words during the hour visit. When asked a question she would appear to be confused, have to think about it for a couple of seconds, take a deep breath and then answer in 2-3 word sentences. Words were clear when she did talk.   Both Asaf and Eleanor state that patient has not needed to take any of her diazepam as she has not had any episodes of anxiety and they both feel she is doing better with this.   Addressed getting exercise during the day. Patient states she feels unsteady when she walks and will only go outside if she is holding on to her . Patient unsure about using a cane at this time. Suggested trying to walk around the apartment 3-4 times per day. She agreed to try.  CP to do a follow up visit in one week to check on medication and exercise compliance  CP to reach out to Care Coordination for possible video visits with patient during Community Paramedic visits    Provider follow up visit needed: 7/7/23 with Dr Love

## 2023-06-14 ENCOUNTER — PATIENT OUTREACH (OUTPATIENT)
Dept: CARE COORDINATION | Facility: CLINIC | Age: 73
End: 2023-06-14

## 2023-06-14 ENCOUNTER — TELEPHONE (OUTPATIENT)
Dept: FAMILY MEDICINE | Facility: CLINIC | Age: 73
End: 2023-06-14

## 2023-06-14 ENCOUNTER — ALLIED HEALTH/NURSE VISIT (OUTPATIENT)
Dept: OTHER | Facility: CLINIC | Age: 73
End: 2023-06-14
Payer: COMMERCIAL

## 2023-06-14 ENCOUNTER — VIRTUAL VISIT (OUTPATIENT)
Dept: FAMILY MEDICINE | Facility: CLINIC | Age: 73
End: 2023-06-14
Payer: COMMERCIAL

## 2023-06-14 VITALS
TEMPERATURE: 98 F | OXYGEN SATURATION: 97 % | SYSTOLIC BLOOD PRESSURE: 118 MMHG | DIASTOLIC BLOOD PRESSURE: 68 MMHG | RESPIRATION RATE: 12 BRPM | HEART RATE: 72 BPM

## 2023-06-14 DIAGNOSIS — G31.84 MILD COGNITIVE IMPAIRMENT WITH MEMORY LOSS: ICD-10-CM

## 2023-06-14 DIAGNOSIS — F33.2 MAJOR DEPRESSIVE DISORDER, RECURRENT EPISODE, SEVERE WITH CATATONIA (H): ICD-10-CM

## 2023-06-14 DIAGNOSIS — S06.9X1S TRAUMATIC BRAIN INJURY, WITH LOSS OF CONSCIOUSNESS OF 30 MINUTES OR LESS, SEQUELA (H): Primary | ICD-10-CM

## 2023-06-14 DIAGNOSIS — F06.1 MAJOR DEPRESSIVE DISORDER, RECURRENT EPISODE, SEVERE WITH CATATONIA (H): ICD-10-CM

## 2023-06-14 PROCEDURE — 99441 PR PHYSICIAN TELEPHONE EVALUATION 5-10 MIN: CPT | Mod: 93 | Performed by: FAMILY MEDICINE

## 2023-06-14 PROCEDURE — 99207 PR COMMUNITY PARAMEDIC - PATIENT NOT BILLABLE: CPT

## 2023-06-14 ASSESSMENT — PATIENT HEALTH QUESTIONNAIRE - PHQ9
10. IF YOU CHECKED OFF ANY PROBLEMS, HOW DIFFICULT HAVE THESE PROBLEMS MADE IT FOR YOU TO DO YOUR WORK, TAKE CARE OF THINGS AT HOME, OR GET ALONG WITH OTHER PEOPLE: VERY DIFFICULT
SUM OF ALL RESPONSES TO PHQ QUESTIONS 1-9: 14
SUM OF ALL RESPONSES TO PHQ QUESTIONS 1-9: 14

## 2023-06-14 NOTE — PROGRESS NOTES
Clinic Care Coordination Contact    Community Health Worker Follow Up    Care Gaps:   Health Maintenance Due   Topic Date Due     DEXA  Never done     DEPRESSION ACTION PLAN  Never done     Pneumococcal Vaccine: 65+ Years (2 - PPSV23 if available, else PCV20) 07/10/2019     ZOSTER IMMUNIZATION (3 of 3) 07/10/2019     MEDICARE ANNUAL WELLNESS VISIT  02/07/2021     COVID-19 Vaccine (3 - Pfizer series) 05/17/2021     HEPATITIS B IMMUNIZATION (2 of 3 - Risk 3-dose series) 05/29/2021     MAMMO SCREENING  06/21/2023     PCP to discuss other care gaps with patient at the next follow up visit.     Care Plan:   Care Plan: Mental Health     Problem: Mental Health Symptoms Need Improvement     Goal: Improve management of mental health symptoms and establish with mental health/psychosocial supports     Start Date: 3/23/2023 Expected End Date: 3/23/2024    This Visit's Progress: 10%    Priority: High    Note:     Barriers: depression, cognitive functioning  Strengths: willing to try therapy  Patient expressed understanding of goal: yes  Action steps to achieve this goal:  1. I will answer the phone when therapist/agency calls to set up appointment  2. I will attend my scheduled MH appointment  3. I will follow up with CCC monthly as to the progress Im making towards my goal.                      Intervention and Education during outreach:  -Case review discussed with CCC SW today and CCC SW plans to outreach and make appropriate referral.     CHW Next Outreach: In one month.
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
## 2023-06-14 NOTE — TELEPHONE ENCOUNTER
Patient Quality Outreach    Patient is due for the following:   Depression  -  Depression follow-up visit    Next Steps:   Patient was scheduled for depression office visit    Type of outreach:    Phone, spoke to patient/parent. patient    Next Steps:  Reach out within 90 days via Phone.    Max number of attempts reached: No. Will try again in 90 days if patient still on fail list.    Questions for provider review:    None           Wendie Johnson  Chart routed to Care Team.

## 2023-06-14 NOTE — PROGRESS NOTES
Community Paramedics Follow-up Visit  June 14, 2023  TIME: 1400    Eleanor Awan is a 72 year old female being seen at home for a follow-up visit.    Present at appointment: patient, Community Paramedic           Chief Complaint   Patient presents with     Outreach     Recheck Medication       [place] Utilization:      Utilization    Hospital Admissions  1             ED Visits  1             No Show Count (past year)  5                Current as of: 6/7/2023  9:29 PM              /68   Pulse 72   Temp 98  F (36.7  C)   Resp 12   SpO2 97%     Clinical Concerns:  Current Medical Concerns:  Hypotension, Neurological disorder     Current Behavioral Concerns: medication compliance    Education Provided to patient:    Medication set up? Yes  Pill Box issued: No  Scale issued: No  Flu Shot given: No  COVID Vaccine Given: No  Lab draw or specimen collection: No  Food resource given: No  Collaborative visit with PCP: No  Wound Care: No  Health Maintenance Addressed No    Clinical Pathway: None    No  Face to Face in Home / Community    Review of Symptoms/PE    Skin: bruising  Eyes: negative  Ears/Nose/Throat: negative  Respiratory: No shortness of breath, dyspnea on exertion, cough, or hemoptysis  Cardiovascular: negative  Gastrointestinal: negative  Genitourinary: negative  Musculoskeletal: negative  Neurologic: negative  Psychiatric: negative    Time spent with patient: 60    The patient meets one or more of the following criteria:  * Requires services to prevent readmission to a nursing home or hospital    Acute concern/Follow-up recommendations: follow current treatment plan    Next CP visit scheduled: 6/28/23    Issues for Provider to follow up on: Community Paramedic visited with patient at her apartment. Patient states she has been good at taking her medications. Observed two days of am and pm medications in the pill box. Those two days had been set up correctly by herself or her . Calculation  suggests she missed taking one full day of her medications and had not taken her am medications for today. Patient states she is taking her medication when she sits down to eat her cereal at approx 11am. Her  is home by 5pm and makes supper and when she eats supper she takes her evening medication. She has been two weeks without taking the Losartan and today blood pressure was 118/68. She denies feeling dizzy anymore.   Explained to patient about working with a  to help set up a AHRMS worker or a PCA. 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 She was hesitant at the beginning of the conversation stating her  took care of her and she did not need any additional support. After talking for a while she agreed it would be nice to have someone come in during the day when her  is at work.   Talked about getting out and getting exercise. She was hesitant about walking but states she does sit on the balcony occasionally. Will follow up with Tanvi ALFORD) to coordinate a video visit with Eleanor and to start the process of getting a AHRMS or PCA.     Provider follow up visit needed: 7/7/23 with Dr Love

## 2023-06-20 ENCOUNTER — PATIENT OUTREACH (OUTPATIENT)
Dept: CARE COORDINATION | Facility: CLINIC | Age: 73
End: 2023-06-20
Payer: COMMERCIAL

## 2023-06-22 ENCOUNTER — TELEPHONE (OUTPATIENT)
Dept: PSYCHOLOGY | Facility: CLINIC | Age: 73
End: 2023-06-22
Payer: COMMERCIAL

## 2023-06-22 NOTE — TELEPHONE ENCOUNTER
Writer waited in [employer] virtual room, and patient did not show. Writer invited via text and email.  Writer called patient to see if she is having technological difficulties, left a message.  Patient can schedule through the counseling center phone number:  [facility] [phone].     Dori Chavez MA, LPC  6/22/23

## 2023-06-22 NOTE — TELEPHONE ENCOUNTER
Writer left a message to notify patient of the paperwork assigned in [employer] and to confirm the appointment at 1:30 pm. Writer asked patient to complete the paperwork prior to the appointment today, and that writer will enter the virtual room at 1:30 pm.    Dori Chavez MA, LPC  6/22/23

## 2023-06-26 ENCOUNTER — PATIENT OUTREACH (OUTPATIENT)
Dept: CARE COORDINATION | Facility: CLINIC | Age: 73
End: 2023-06-26
Payer: COMMERCIAL

## 2023-06-26 NOTE — PROGRESS NOTES
Clinic Care Coordination Contact  Care Coordination Clinician Chart Review    Situation: Patient chart reviewed by Care Coordinator.       Background: Care Coordination Program started: 3/8/2023. Initial assessment completed 3/23/23 and patient-centered care plan(s) were developed with participation from patient. Lead CC handed patient off to CHW for continued outreaches.       Assessment: Per chart review, patient outreach completed by CC CHW on 6/14/23. Patient is actively working to accomplish goal(s). Patient's goal(s) appropriate and relevant at this time.     Patient is not due for updated Plan of Care.      Assessments will be completed annually or as needed/with change of patient status. Due 3/23/24.        Care Plan: Mental Health     Problem: Mental Health Symptoms Need Improvement     Goal: Improve management of mental health symptoms and establish with mental health/psychosocial supports     Start Date: 3/23/2023 Expected End Date: 3/23/2024    This Visit's Progress: 10%    Priority: High    Note:     Barriers: depression, cognitive functioning  Strengths: willing to try therapy  Patient expressed understanding of goal: yes  Action steps to achieve this goal:  1. I will answer the phone when therapist/agency calls to set up appointment  2. I will attend my scheduled MH appointment  3. I will follow up with CCC monthly as to the progress Im making towards my goal.                             Plan/Recommendations: The patient will continue working with Care Coordination to achieve goal(s) as above.     CHW will continue outreaches at minimum every 30 days and will involve Lead CC as needed or if patient is ready to move to Maintenance.     Lead CC will continue to monitor CHW outreaches and patient's progress to goal(s) every 6 weeks.     Plan of Care updated and sent to patient: FERNANDA Garcia   [facility] Care Coordinator

## 2023-06-28 ENCOUNTER — ALLIED HEALTH/NURSE VISIT (OUTPATIENT)
Dept: OTHER | Facility: CLINIC | Age: 73
End: 2023-06-28
Payer: COMMERCIAL

## 2023-06-28 DIAGNOSIS — G31.84 MILD COGNITIVE IMPAIRMENT WITH MEMORY LOSS: ICD-10-CM

## 2023-06-28 DIAGNOSIS — S06.9X1S TRAUMATIC BRAIN INJURY, WITH LOSS OF CONSCIOUSNESS OF 30 MINUTES OR LESS, SEQUELA (H): Primary | ICD-10-CM

## 2023-06-28 PROCEDURE — 99207 PR COMMUNITY PARAMEDIC - PATIENT NOT BILLABLE: CPT

## 2023-06-29 VITALS
DIASTOLIC BLOOD PRESSURE: 75 MMHG | SYSTOLIC BLOOD PRESSURE: 124 MMHG | TEMPERATURE: 98 F | RESPIRATION RATE: 14 BRPM | HEART RATE: 68 BPM | OXYGEN SATURATION: 98 %

## 2023-06-29 NOTE — PROGRESS NOTES
Community Paramedics Follow-up Visit  June 28, 2023  TIME: 1400    Eleanor INGE Awan is a 72 year old female being seen at home for a follow-up visit.    Present at appointment:  Silvana Webster Paramedic         Chief Complaint   Patient presents with     Outreach     Recheck Medication       Universal Utilization:      Utilization    Hospital Admissions  1             ED Visits  1             No Show Count (past year)  5                Current as of: 6/28/2023  5:31 PM              /75   Pulse 68   Temp 98  F (36.7  C)   Resp 14   SpO2 98%     Clinical Concerns:  Current Medical Concerns:  Failure to thrive    Current Behavioral Concerns: medication compliance appears to be 100%    Education Provided to patient:    Medication set up? No  Pill Box issued: No  Scale issued: No  Flu Shot given: No  COVID Vaccine Given: No  Lab draw or specimen collection: No  Food resource given: No  Collaborative visit with PCP: No  Wound Care: No  Health Maintenance Addressed No    Clinical Pathway: None    No  Face to Face in Home / Community    Review of Symptoms/PE    Skin: negative  Eyes: negative  Ears/Nose/Throat: negative  Respiratory: No shortness of breath, dyspnea on exertion, cough, or hemoptysis  Cardiovascular: negative  Gastrointestinal: negative  Genitourinary: negative  Musculoskeletal: muscular weakness  Neurologic: incoordination  Psychiatric: negative    Time spent with patient: 45    The patient meets one or more of the following criteria:  * Has been identified by their primary care provider at risk of nursing home placement    Acute concern/Follow-up recommendations: follow current treatment plan    Next CP visit scheduled: 7/12/23    Issues for Provider to follow up on: Community Paramedic visited with Eleanor at her home. Upon arrival found Eleanor and her  in bed. Eleanor stated she had not been up yet for the day. Eleanor's medications has been set up by her  in the pill box and appeared  to be correct. Eleanor indicated that she has been taking her medication everyday with her breakfast and with her supper and states this is working very well for her.   She denies getting outside and states she only goes out when she goes for a ride with her . She does admit to sitting in her deck and watching traffic occasionally. Offered to go out for a walk today and patient denied stating she would like to spend time with her .   Went over recent visit with Dr Love and rescheduled missed psychology appointment. Patient unsure whey she missed this appointment. She wrote the new appointment on her calendar.   Patient would like to continue to have CP visit bi-weekly for a bit longer. CP to continue to work with Care Coordination on finding a PCA and ARHMS worker    Provider follow up visit needed: 7/7/23 - Did remind patient about this visit, she does have it on her calendar as well

## 2023-07-07 ENCOUNTER — VIRTUAL VISIT (OUTPATIENT)
Dept: FAMILY MEDICINE | Facility: CLINIC | Age: 73
End: 2023-07-07
Payer: COMMERCIAL

## 2023-07-07 ENCOUNTER — TELEPHONE (OUTPATIENT)
Dept: FAMILY MEDICINE | Facility: CLINIC | Age: 73
End: 2023-07-07

## 2023-07-07 DIAGNOSIS — I10 ESSENTIAL HYPERTENSION: ICD-10-CM

## 2023-07-07 DIAGNOSIS — Z12.31 VISIT FOR SCREENING MAMMOGRAM: ICD-10-CM

## 2023-07-07 DIAGNOSIS — R94.31 NONSPECIFIC ABNORMAL ELECTROCARDIOGRAM (ECG) (EKG): ICD-10-CM

## 2023-07-07 DIAGNOSIS — B18.2 HEPATITIS C CARRIER (H): Primary | ICD-10-CM

## 2023-07-07 DIAGNOSIS — G31.84 MILD COGNITIVE IMPAIRMENT WITH MEMORY LOSS: Primary | ICD-10-CM

## 2023-07-07 DIAGNOSIS — E51.9 THIAMINE DEFICIENCY: ICD-10-CM

## 2023-07-07 DIAGNOSIS — G40.209 PARTIAL SYMPTOMATIC EPILEPSY WITH COMPLEX PARTIAL SEIZURES, NOT INTRACTABLE, WITHOUT STATUS EPILEPTICUS (H): ICD-10-CM

## 2023-07-07 DIAGNOSIS — F33.2 MAJOR DEPRESSIVE DISORDER, RECURRENT EPISODE, SEVERE WITH CATATONIA (H): ICD-10-CM

## 2023-07-07 DIAGNOSIS — R73.09 ELEVATED GLUCOSE: ICD-10-CM

## 2023-07-07 DIAGNOSIS — M81.0 AGE RELATED OSTEOPOROSIS, UNSPECIFIED PATHOLOGICAL FRACTURE PRESENCE: ICD-10-CM

## 2023-07-07 DIAGNOSIS — F06.1 MAJOR DEPRESSIVE DISORDER, RECURRENT EPISODE, SEVERE WITH CATATONIA (H): ICD-10-CM

## 2023-07-07 DIAGNOSIS — G31.84 MILD COGNITIVE IMPAIRMENT WITH MEMORY LOSS: ICD-10-CM

## 2023-07-07 DIAGNOSIS — S06.9X1S TRAUMATIC BRAIN INJURY, WITH LOSS OF CONSCIOUSNESS OF 30 MINUTES OR LESS, SEQUELA (H): ICD-10-CM

## 2023-07-07 PROCEDURE — 99207 EKG 12-LEAD, TRACING ONLY: CPT | Performed by: FAMILY MEDICINE

## 2023-07-07 PROCEDURE — 99215 OFFICE O/P EST HI 40 MIN: CPT | Mod: VID | Performed by: FAMILY MEDICINE

## 2023-07-07 ASSESSMENT — PATIENT HEALTH QUESTIONNAIRE - PHQ9
SUM OF ALL RESPONSES TO PHQ QUESTIONS 1-9: 4
10. IF YOU CHECKED OFF ANY PROBLEMS, HOW DIFFICULT HAVE THESE PROBLEMS MADE IT FOR YOU TO DO YOUR WORK, TAKE CARE OF THINGS AT HOME, OR GET ALONG WITH OTHER PEOPLE: NOT DIFFICULT AT ALL
SUM OF ALL RESPONSES TO PHQ QUESTIONS 1-9: 4

## 2023-07-07 NOTE — PROGRESS NOTES
"Eleanor is a 72 year old who is being evaluated via a billable video visit.      How would you like to obtain your AVS? MyChart  If the video visit is dropped, the invitation should be resent by: Text to cell phone: [phone]  Will anyone else be joining your video visit? No          Assessment & Plan     Mild cognitive impairment with memory loss  Her cognition is impaired but does not seem to be declining more at this time    Major depressive disorder, recurrent episode, severe with catatonia (H)  Mood is markedly improved. She speaks and interacts - quite a difference from 6 months ago  - PRIMARY CARE FOLLOW-UP SCHEDULING    Osteoporosis- no fractures  On vit D. I encouraged her to resume exercising like she would do a few years ago    Visit for screening mammogram  She agreed to do this next time she comes to the clinic  - MA SCREENING DIGITAL BILAT - Future  (s+30)    Traumatic brain injury, with loss of consciousness of 30 minutes or less, sequela (H)  noted  - PRIMARY CARE FOLLOW-UP SCHEDULING    Partial symptomatic epilepsy with complex partial seizures, not intractable, without status epilepticus (H)  Seizures are now controlled with medication      Review of external notes as documented elsewhere in note  Prescription drug management  40 minutes spent by me on the date of the encounter doing chart review, history and exam, documentation and further activities per the note        Camille Love MD  [facility]            Jesus Webster is a 72 year old, presenting for the following health issues:  Follow Up        7/7/2023     7:32 AM   Additional Questions   Roomed by Rishi CONNELLY     HPI   Not sure what     Usually wakes about noon. Then she watches TV. Cereal for breakfast. Then has supper with Rian. Goes to bed about 11. During the day she mostly feels rested but occasionally needs a nap.  Nurse comes by once per week. \"we just talk\" but they help with meds.    Drinks " "lots of water. No coffee.    Mood - generally feels \"I don't know\" I usually feel pretty good.  Relationship with Rian is good. He is younger than her. She is unsure how much longer he will be employed. Relationship with daughter is good.    Bathes about once every two weeks. Agrees more could be good.    Home nurse is nice, respectful and humourus.    Any vacation? No plans. Occasionally gets out to eat. Does not get out to talk.     Has a brother in [place].    Review of Systems         Objective       Vitals:  No vitals were obtained today due to virtual visit.    Physical Exam   GENERAL: alert and no distress  PSYCH: concentration poor, inattentive, fatigued, judgement and insight impaired and appearance well groomed            "

## 2023-07-07 NOTE — TELEPHONE ENCOUNTER
Please call Eleanor around mid-August.    1) if her , , can bring her to her 8/30/23 appointment at 10am, then that's great. He should make arrangements so he can bring her. Also, book her to get a mammogram done when she comes in.    2) if he cannot bring her, so the visit will be by telephone, then please help her to schedule a lab-only appointment to get labs done AND a nurse-only appointment to check BP and a mammogram appointment PRIOR to the 8/30 appointment so we have the results to discuss.    Just ask and make sure she's doing ok in general - eating regular meals, sleeping OK, etc.    Thanks.

## 2023-07-12 ENCOUNTER — PATIENT OUTREACH (OUTPATIENT)
Dept: CARE COORDINATION | Facility: CLINIC | Age: 73
End: 2023-07-12

## 2023-07-12 NOTE — PROGRESS NOTES
Clinic Care Coordination - Chart Review Only    Situation: Ambulatory Care Coordination leader performing chart review related to staff coverage planning.    Assessment:    Care Coordination team is performing outreaches per standard work, however collaboration with Community Paramedic re: patients goals is needed    Plan: Covering CC SW assigned.  Covering CC SW will collaborate with CP on patient goals and send updated care plan as needed.

## 2023-07-12 NOTE — PROGRESS NOTES
Clinic Care Coordination Contact    Situation: Patient chart reviewed by care coordinator.    Background: PAPI CHANG reviewed chart for SERJIO coverage.     Assessment: PAPI CHANG determined there is currently no [facility] referral in place. Pt has no-showed previous therapy appointment and most recent appointment was canceled although it is not clear why.     Plan/Recommendations: At next visit from community paramedic the following information needs to be gathered: 1) any VA concerns? 2) is Pt still open to therapy and does Pt need help setting up next appointment? 3) is Pt open to [facility]? Once information has been gathered community paramedic and this PAPI CHANG can consult and move forward on referrals as needed.     PAPI CHANG reached out to community paramedic working with Pt via phone (work and cell) and left a message containing no identifying details on each number. PAPI CHANG also sent an email message to community paramedic with no identifying information.     PAPI CHANG was able to connect with community paramedic and discuss plan moving forward. PAPI CHANG will be cc'd on charting and will move forward with [facility] if Pt is interested.

## 2023-07-14 ENCOUNTER — PATIENT OUTREACH (OUTPATIENT)
Dept: CARE COORDINATION | Facility: CLINIC | Age: 73
End: 2023-07-14
Payer: COMMERCIAL

## 2023-07-14 NOTE — PROGRESS NOTES
Clinic Care Coordination Contact  [facility]/Voicemail       Clinical Data: Care Coordinator Outreach  Outreach attempted x 5.  Left message on patient's voicemail with call back information and requested return call.  Plan: Care Coordinator sent care coordination introduction letter on 7/14/23 via [employer]. Care Coordinator will try to reach patient again in 1-2 business days.    CC CHARLENE reached out to Pt about [facility] referral and left detailed message with phone number noting this CC SW will call back or Pt can call this CC SW. CC SW sent letter via my chart with this CC SW's contact information and information about [facility] referral that this CC SW can make as soon as CC SW is able to connect with Pt. CC SW will outreach again in 1-2 business days. CC CHARLENE connected with CP and CC CHW to keep them looped in on situation.     CC SW will do no further outreach on this task. 2 letters, 5 calls, 3 VMs. Pt has this CC SW's contact information and can call back; CC CHW will outreach in about a week and hopefully be able to get an update on if the Pt still wants [facility].

## 2023-07-14 NOTE — Clinical Note
FYI - outreached to Pt x2 today with no answer; left message and sent letter via My Chart to check on/complete [facility] enrollment. Will outreach again in 1-2 business days.  Dontae - if you get in touch at next outreach before I do; can you confirm if [facility] is still wanted so I can complete enrollment?

## 2023-07-14 NOTE — LETTER
[facility] CARE COORDINATION  [address] [place] [address] 1  [place] [address]   July 18, 2023    Eleanor Awan  [facility] [facility] [address] [place] [id number]      Dear Eleanor,    I have been attempting to reach you to find out if you are still interested in working with an [facility] worker. I would like to continue to work with you and provide any additional support you may need on achieving your health care related goals - including completing the referral for an [facility] worker. I would appreciate if you would give me a call at [phone] to let me know if you would like to continue working together. I know that there are many things that can affect our ability to communicate and I hope we can continue to work together.    All of us at the [facility] are invested in your health and are here to assist you in meeting your goals.     Sincerely,    Zakia Edmond, [facility] Clinical Care Coordinator  [facility]  [phone]

## 2023-07-14 NOTE — LETTER
[facility] CARE COORDINATION  [address] [place] [address] 1  [place] [address]   July 14, 2023    Eleanor Awan  [facility] [facility] [address] [place] [id number]      Dear Eleanor,    I have been attempting to reach you to discuss services available to you through Clinical Care Coordination - in particular [facility] services which you indicated interest in. I would like to work with you on this referral and provide any additional support you may need on achieving your health care related goals. I would appreciate if you would give me a call at [phone]; I will also continue to reach out to you. I know that there are many things that can affect our ability to communicate and I hope we can continue to work together.    All of us at the [facility] are invested in your health and are here to assist you in meeting your goals.     Sincerely,    Zakia Edmond, [facility] Clinical Care Coordinator  [facility]  [phone]

## 2023-07-14 NOTE — Clinical Note
To update you - I have not been able to get in touch with the Pt.   Dontae; hopefully at your outreach you will be able to get in touch and find out if the Pt still wants [facility]. If you do get in touch please let me know and I can move forward on the referral. Thank you - Zakia

## 2023-07-26 ENCOUNTER — ALLIED HEALTH/NURSE VISIT (OUTPATIENT)
Dept: OTHER | Facility: CLINIC | Age: 73
End: 2023-07-26
Payer: COMMERCIAL

## 2023-07-26 ENCOUNTER — PATIENT OUTREACH (OUTPATIENT)
Dept: CARE COORDINATION | Facility: CLINIC | Age: 73
End: 2023-07-26

## 2023-07-26 ENCOUNTER — ANCILLARY PROCEDURE (OUTPATIENT)
Dept: MAMMOGRAPHY | Facility: CLINIC | Age: 73
End: 2023-07-26
Attending: FAMILY MEDICINE
Payer: COMMERCIAL

## 2023-07-26 VITALS
RESPIRATION RATE: 14 BRPM | SYSTOLIC BLOOD PRESSURE: 110 MMHG | DIASTOLIC BLOOD PRESSURE: 61 MMHG | OXYGEN SATURATION: 96 % | TEMPERATURE: 98 F | HEART RATE: 55 BPM

## 2023-07-26 DIAGNOSIS — Z12.31 VISIT FOR SCREENING MAMMOGRAM: ICD-10-CM

## 2023-07-26 DIAGNOSIS — G31.84 MILD COGNITIVE IMPAIRMENT WITH MEMORY LOSS: ICD-10-CM

## 2023-07-26 DIAGNOSIS — S06.9X1S TRAUMATIC BRAIN INJURY, WITH LOSS OF CONSCIOUSNESS OF 30 MINUTES OR LESS, SEQUELA (H): Primary | ICD-10-CM

## 2023-07-26 PROCEDURE — 99207 PR COMMUNITY PARAMEDIC - PATIENT NOT BILLABLE: CPT

## 2023-07-26 PROCEDURE — 77067 SCR MAMMO BI INCL CAD: CPT | Mod: TC | Performed by: RADIOLOGY

## 2023-07-26 NOTE — PROGRESS NOTES
Community Paramedics Follow-up Visit  July 26, 2023  TIME: 1400    Eleanor Awan is a 72 year old female being seen at home for a follow-up visit.    Present at appointment:  patient, Community Paramedic, Rian-patients spouse         Chief Complaint   Patient presents with    Outreach       [facility] Utilization:      Utilization      Hospital Admissions  1             ED Visits  1             No Show Count (past year)  6                    Current as of: 7/25/2023  2:27 AM                /61   Pulse 55   Temp 98  F (36.7  C)   Resp 14   LMP  (LMP Unknown)   SpO2 96%     Clinical Concerns:  Current Medical Concerns:  mild cognitive impairment    Current Behavioral Concerns: medication compliance    Education Provided to patient:    Medication set up? No  Pill Box issued: No  Scale issued: No  Flu Shot given: No  COVID Vaccine Given: No  Lab draw or specimen collection: No  Food resource given: No  Collaborative visit with PCP: No  Wound Care: No  Health Maintenance Addressed Yes    Clinical Pathway: None    No  Face to Face in Home / Community    Review of Symptoms/PE    Skin: negative  Eyes: negative  Ears/Nose/Throat: negative  Respiratory: No shortness of breath, dyspnea on exertion, cough, or hemoptysis  Cardiovascular: negative  Gastrointestinal: negative  Genitourinary: negative  Musculoskeletal: negative  Neurologic: slow responses  Psychiatric: negative and depression stable    Time spent with patient: 45    The patient meets one or more of the following criteria:  * Has been identified by their primary care provider at risk of nursing home placement    Acute concern/Follow-up recommendations: follow current treatment plan    Next CP visit scheduled: 8/9/23    Issues for Provider to follow up on: Community Paramedic visited with patient at her home. Patient states she was not feeling well when she missed the last visit with the CP. She did also miss a few other appointments. Patient states  she is doing ok. Went over notes from Dr Love. Noted that patient was to get a Mammogram yesterday on 7/25 and patient missed this appointment. Patient agreed to allow CP to assist in rescheduling this appointment. Called and was able to get patient in today. Spouse is off work so he will take her in at 3:30. Shortened CP visit so they could go get the Mammogram completed. Talked with spouse in regards to request for next Dr visit in August with Dr Love be an in person visit. Spouse asked if he could get an earlier morning appointment. CP called scheduling and unfortunately unable to get an earlier appointment. CP to reach out to MD to see if labs should be collected prior to the August visit. If so, CP program can draw these labs at her home.  Patient states her routine tends to stay the same. She awakes around 11am or later, eats cereal and takes her medications. Her spouse makes supper. She takes her evening medications when she is eating her supper. She goes to bed around 11pm. She denies having any sleeping problems, she feels she is eating regularly, however, her spouse feels she has lost some weight. She has been getting out of the home and going grocery shopping with her spouse and she continues to sit out on her deck when she can.  Pill box gets set up weekly by her spouse and she does not believe she has missed any of her medications. She has not needed to take any of her Lorazepam and feels her anxiety is well controlled.  Talked to Eleanor about getting an [facility] worker and a PCA and she stated she needs to get a better explanation about what is involved with an [facility] worker. 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 Asked Eleanor to think it over as this may help her to have someone when her  is at work.    Provider follow up visit needed: 08/30/23 with Dr Love

## 2023-07-26 NOTE — PROGRESS NOTES
Clinic Care Coordination Contact    Community Health Worker Follow Up  -CHW called and spoke with patient who stated she's doing a lot better and getting services through [facility].   -Patient stated she does not need help or resources at this time such as [facility] or PCA services.   -Patient declined to speak with [facility] SW for resources that she may need.   -Per chart review, Community Paramedic is able to connect with patient.   -CHW routes this outreach encounter to further review chart and advise and CHW did not update goal as patient declined services.     CHW Next Outreach: Upon clinician advise.

## 2023-07-26 NOTE — TELEPHONE ENCOUNTER
Zakia,     I was able to get a hold of Eleanor today and she told she does not need anything such as PCA or [facility] services. Eleanor told she's getting services through [facility] for home visit and that's all she needs.       Thanks,  Dontae.

## 2023-08-08 ENCOUNTER — TELEPHONE (OUTPATIENT)
Dept: FAMILY MEDICINE | Facility: CLINIC | Age: 73
End: 2023-08-08
Payer: COMMERCIAL

## 2023-08-08 NOTE — TELEPHONE ENCOUNTER
Pt's  called regarding pt.    He stated pt has not been eating much for 2 days and thinks this might be related to her medications.    He also stated that lost around 10 lbs over the last month.    Pt has appt on 8/30/23 and  is wondering if pt can be seen sooner.      Devan De Jesus Cem Say, BSN RN  [facility]

## 2023-08-09 ENCOUNTER — HOSPITAL ENCOUNTER (INPATIENT)
Facility: HOSPITAL | Age: 73
LOS: 3 days | Discharge: HOME | DRG: 065 | End: 2023-08-12
Attending: EMERGENCY MEDICINE | Admitting: INTERNAL MEDICINE
Payer: COMMERCIAL

## 2023-08-09 ENCOUNTER — APPOINTMENT (OUTPATIENT)
Dept: CT IMAGING | Facility: HOSPITAL | Age: 73
DRG: 065 | End: 2023-08-09
Attending: EMERGENCY MEDICINE
Payer: COMMERCIAL

## 2023-08-09 ENCOUNTER — ALLIED HEALTH/NURSE VISIT (OUTPATIENT)
Dept: OTHER | Facility: CLINIC | Age: 73
End: 2023-08-09
Payer: COMMERCIAL

## 2023-08-09 ENCOUNTER — PATIENT OUTREACH (OUTPATIENT)
Dept: CARE COORDINATION | Facility: CLINIC | Age: 73
End: 2023-08-09

## 2023-08-09 ENCOUNTER — ANCILLARY PROCEDURE (OUTPATIENT)
Dept: MAMMOGRAPHY | Facility: CLINIC | Age: 73
DRG: 065 | End: 2023-08-09
Attending: FAMILY MEDICINE
Payer: COMMERCIAL

## 2023-08-09 VITALS
HEART RATE: 113 BPM | TEMPERATURE: 98.7 F | RESPIRATION RATE: 12 BRPM | SYSTOLIC BLOOD PRESSURE: 207 MMHG | OXYGEN SATURATION: 98 % | DIASTOLIC BLOOD PRESSURE: 109 MMHG

## 2023-08-09 DIAGNOSIS — G93.41 ACUTE METABOLIC ENCEPHALOPATHY: ICD-10-CM

## 2023-08-09 DIAGNOSIS — I10 ESSENTIAL HYPERTENSION: Primary | ICD-10-CM

## 2023-08-09 DIAGNOSIS — R11.2 NAUSEA AND VOMITING, UNSPECIFIED VOMITING TYPE: ICD-10-CM

## 2023-08-09 DIAGNOSIS — N64.89 BREAST ASYMMETRY: ICD-10-CM

## 2023-08-09 DIAGNOSIS — Z76.0 ENCOUNTER FOR MEDICATION REFILL: ICD-10-CM

## 2023-08-09 DIAGNOSIS — R65.10 SIRS (SYSTEMIC INFLAMMATORY RESPONSE SYNDROME) (H): ICD-10-CM

## 2023-08-09 DIAGNOSIS — I16.1 HYPERTENSIVE EMERGENCY: ICD-10-CM

## 2023-08-09 DIAGNOSIS — G40.209 PARTIAL SYMPTOMATIC EPILEPSY WITH COMPLEX PARTIAL SEIZURES, NOT INTRACTABLE, WITHOUT STATUS EPILEPTICUS (H): ICD-10-CM

## 2023-08-09 DIAGNOSIS — I63.9 ACUTE STROKE DUE TO ISCHEMIA (H): Primary | ICD-10-CM

## 2023-08-09 DIAGNOSIS — F10.21 ALCOHOL DEPENDENCE IN REMISSION (H): ICD-10-CM

## 2023-08-09 DIAGNOSIS — I10 ACCELERATED HYPERTENSION: ICD-10-CM

## 2023-08-09 DIAGNOSIS — F41.0 PANIC ATTACK: ICD-10-CM

## 2023-08-09 DIAGNOSIS — F32.0 MILD MAJOR DEPRESSION (H): ICD-10-CM

## 2023-08-09 DIAGNOSIS — S06.9X1S TRAUMATIC BRAIN INJURY, WITH LOSS OF CONSCIOUSNESS OF 30 MINUTES OR LESS, SEQUELA (H): ICD-10-CM

## 2023-08-09 DIAGNOSIS — E51.9 THIAMINE DEFICIENCY: ICD-10-CM

## 2023-08-09 LAB
ALBUMIN SERPL BCG-MCNC: 4.5 G/DL (ref 3.5–5.2)
ALBUMIN UR-MCNC: 50 MG/DL
ALP SERPL-CCNC: 128 U/L (ref 35–104)
ALT SERPL W P-5'-P-CCNC: 33 U/L (ref 0–50)
ANION GAP SERPL CALCULATED.3IONS-SCNC: 15 MMOL/L (ref 7–15)
APPEARANCE UR: CLEAR
APTT PPP: 24 SECONDS (ref 22–38)
AST SERPL W P-5'-P-CCNC: 37 U/L (ref 0–45)
ATRIAL RATE - MUSE: 109 BPM
BACTERIA #/AREA URNS HPF: ABNORMAL /HPF
BASOPHILS # BLD AUTO: 0 10E3/UL (ref 0–0.2)
BASOPHILS NFR BLD AUTO: 0 %
BILIRUB DIRECT SERPL-MCNC: <0.2 MG/DL (ref 0–0.3)
BILIRUB SERPL-MCNC: 0.7 MG/DL
BILIRUB UR QL STRIP: NEGATIVE
BUN SERPL-MCNC: 29.7 MG/DL (ref 8–23)
CALCIUM SERPL-MCNC: 10.2 MG/DL (ref 8.8–10.2)
CHLORIDE SERPL-SCNC: 98 MMOL/L (ref 98–107)
COLOR UR AUTO: ABNORMAL
CREAT SERPL-MCNC: 0.77 MG/DL (ref 0.51–0.95)
DEPRECATED HCO3 PLAS-SCNC: 23 MMOL/L (ref 22–29)
DIASTOLIC BLOOD PRESSURE - MUSE: NORMAL MMHG
EOSINOPHIL # BLD AUTO: 0 10E3/UL (ref 0–0.7)
EOSINOPHIL NFR BLD AUTO: 0 %
ERYTHROCYTE [DISTWIDTH] IN BLOOD BY AUTOMATED COUNT: 12 % (ref 10–15)
GFR SERPL CREATININE-BSD FRML MDRD: 82 ML/MIN/1.73M2
GLUCOSE SERPL-MCNC: 145 MG/DL (ref 70–99)
GLUCOSE UR STRIP-MCNC: NEGATIVE MG/DL
HCT VFR BLD AUTO: 45.6 % (ref 35–47)
HGB BLD-MCNC: 15.6 G/DL (ref 11.7–15.7)
HGB UR QL STRIP: ABNORMAL
IMM GRANULOCYTES # BLD: 0.4 10E3/UL
IMM GRANULOCYTES NFR BLD: 2 %
INR PPP: 1.02 (ref 0.85–1.15)
INTERPRETATION ECG - MUSE: NORMAL
KETONES UR STRIP-MCNC: NEGATIVE MG/DL
LEUKOCYTE ESTERASE UR QL STRIP: NEGATIVE
LIPASE SERPL-CCNC: 78 U/L (ref 13–60)
LYMPHOCYTES # BLD AUTO: 1.2 10E3/UL (ref 0.8–5.3)
LYMPHOCYTES NFR BLD AUTO: 6 %
MAGNESIUM SERPL-MCNC: 2.4 MG/DL (ref 1.7–2.3)
MCH RBC QN AUTO: 30.1 PG (ref 26.5–33)
MCHC RBC AUTO-ENTMCNC: 34.2 G/DL (ref 31.5–36.5)
MCV RBC AUTO: 88 FL (ref 78–100)
MONOCYTES # BLD AUTO: 1 10E3/UL (ref 0–1.3)
MONOCYTES NFR BLD AUTO: 5 %
MUCOUS THREADS #/AREA URNS LPF: PRESENT /LPF
NEUTROPHILS # BLD AUTO: 18.6 10E3/UL (ref 1.6–8.3)
NEUTROPHILS NFR BLD AUTO: 87 %
NITRATE UR QL: NEGATIVE
NRBC # BLD AUTO: 0 10E3/UL
NRBC BLD AUTO-RTO: 0 /100
NT-PROBNP SERPL-MCNC: 2103 PG/ML (ref 0–900)
P AXIS - MUSE: 79 DEGREES
PH UR STRIP: 8 [PH] (ref 5–7)
PLATELET # BLD AUTO: 255 10E3/UL (ref 150–450)
POTASSIUM SERPL-SCNC: 3.4 MMOL/L (ref 3.4–5.3)
PR INTERVAL - MUSE: 136 MS
PROT SERPL-MCNC: 7.8 G/DL (ref 6.4–8.3)
QRS DURATION - MUSE: 78 MS
QT - MUSE: 360 MS
QTC - MUSE: 484 MS
R AXIS - MUSE: 31 DEGREES
RBC # BLD AUTO: 5.19 10E6/UL (ref 3.8–5.2)
RBC URINE: 22 /HPF
SODIUM SERPL-SCNC: 136 MMOL/L (ref 136–145)
SP GR UR STRIP: 1.01 (ref 1–1.03)
SQUAMOUS EPITHELIAL: 2 /HPF
SYSTOLIC BLOOD PRESSURE - MUSE: NORMAL MMHG
T AXIS - MUSE: 70 DEGREES
TROPONIN T SERPL HS-MCNC: 22 NG/L
TROPONIN T SERPL HS-MCNC: 23 NG/L
TSH SERPL DL<=0.005 MIU/L-ACNC: 2.98 UIU/ML (ref 0.3–4.2)
UROBILINOGEN UR STRIP-MCNC: <2 MG/DL
VENTRICULAR RATE- MUSE: 109 BPM
WBC # BLD AUTO: 21.1 10E3/UL (ref 4–11)
WBC URINE: 1 /HPF

## 2023-08-09 PROCEDURE — 85025 COMPLETE CBC W/AUTO DIFF WBC: CPT | Performed by: EMERGENCY MEDICINE

## 2023-08-09 PROCEDURE — 85730 THROMBOPLASTIN TIME PARTIAL: CPT | Performed by: EMERGENCY MEDICINE

## 2023-08-09 PROCEDURE — 77061 BREAST TOMOSYNTHESIS UNI: CPT | Mod: LT

## 2023-08-09 PROCEDURE — 250N000011 HC RX IP 250 OP 636: Mod: JZ | Performed by: EMERGENCY MEDICINE

## 2023-08-09 PROCEDURE — 83690 ASSAY OF LIPASE: CPT | Performed by: EMERGENCY MEDICINE

## 2023-08-09 PROCEDURE — 250N000011 HC RX IP 250 OP 636: Performed by: EMERGENCY MEDICINE

## 2023-08-09 PROCEDURE — 76642 ULTRASOUND BREAST LIMITED: CPT | Mod: LT

## 2023-08-09 PROCEDURE — 250N000011 HC RX IP 250 OP 636: Mod: JZ | Performed by: INTERNAL MEDICINE

## 2023-08-09 PROCEDURE — 80177 DRUG SCRN QUAN LEVETIRACETAM: CPT | Performed by: EMERGENCY MEDICINE

## 2023-08-09 PROCEDURE — 96374 THER/PROPH/DIAG INJ IV PUSH: CPT | Mod: 59

## 2023-08-09 PROCEDURE — 99285 EMERGENCY DEPT VISIT HI MDM: CPT | Mod: 25

## 2023-08-09 PROCEDURE — 83735 ASSAY OF MAGNESIUM: CPT | Performed by: EMERGENCY MEDICINE

## 2023-08-09 PROCEDURE — G0378 HOSPITAL OBSERVATION PER HR: HCPCS

## 2023-08-09 PROCEDURE — 70450 CT HEAD/BRAIN W/O DYE: CPT

## 2023-08-09 PROCEDURE — 80053 COMPREHEN METABOLIC PANEL: CPT | Performed by: EMERGENCY MEDICINE

## 2023-08-09 PROCEDURE — 93005 ELECTROCARDIOGRAM TRACING: CPT | Performed by: EMERGENCY MEDICINE

## 2023-08-09 PROCEDURE — 71275 CT ANGIOGRAPHY CHEST: CPT

## 2023-08-09 PROCEDURE — 96376 TX/PRO/DX INJ SAME DRUG ADON: CPT

## 2023-08-09 PROCEDURE — 210N000001 HC R&B IMCU HEART CARE

## 2023-08-09 PROCEDURE — 99223 1ST HOSP IP/OBS HIGH 75: CPT | Performed by: INTERNAL MEDICINE

## 2023-08-09 PROCEDURE — 84484 ASSAY OF TROPONIN QUANT: CPT | Performed by: EMERGENCY MEDICINE

## 2023-08-09 PROCEDURE — 83880 ASSAY OF NATRIURETIC PEPTIDE: CPT | Performed by: EMERGENCY MEDICINE

## 2023-08-09 PROCEDURE — 84443 ASSAY THYROID STIM HORMONE: CPT | Performed by: EMERGENCY MEDICINE

## 2023-08-09 PROCEDURE — 81001 URINALYSIS AUTO W/SCOPE: CPT | Performed by: EMERGENCY MEDICINE

## 2023-08-09 PROCEDURE — 74177 CT ABD & PELVIS W/CONTRAST: CPT

## 2023-08-09 PROCEDURE — 36415 COLL VENOUS BLD VENIPUNCTURE: CPT | Performed by: EMERGENCY MEDICINE

## 2023-08-09 PROCEDURE — 85610 PROTHROMBIN TIME: CPT | Performed by: EMERGENCY MEDICINE

## 2023-08-09 PROCEDURE — 82248 BILIRUBIN DIRECT: CPT | Performed by: EMERGENCY MEDICINE

## 2023-08-09 PROCEDURE — 99207 PR COMMUNITY PARAMEDIC - PATIENT NOT BILLABLE: CPT

## 2023-08-09 PROCEDURE — 250N000013 HC RX MED GY IP 250 OP 250 PS 637: Performed by: INTERNAL MEDICINE

## 2023-08-09 PROCEDURE — 96375 TX/PRO/DX INJ NEW DRUG ADDON: CPT

## 2023-08-09 RX ORDER — HYDRALAZINE HYDROCHLORIDE 20 MG/ML
5 INJECTION INTRAMUSCULAR; INTRAVENOUS ONCE
Status: COMPLETED | OUTPATIENT
Start: 2023-08-09 | End: 2023-08-09

## 2023-08-09 RX ORDER — ONDANSETRON 2 MG/ML
4 INJECTION INTRAMUSCULAR; INTRAVENOUS ONCE
Status: COMPLETED | OUTPATIENT
Start: 2023-08-09 | End: 2023-08-09

## 2023-08-09 RX ORDER — ACETAMINOPHEN 325 MG/1
650 TABLET ORAL EVERY 6 HOURS PRN
Status: DISCONTINUED | OUTPATIENT
Start: 2023-08-09 | End: 2023-08-12 | Stop reason: HOSPADM

## 2023-08-09 RX ORDER — HYDRALAZINE HYDROCHLORIDE 20 MG/ML
10 INJECTION INTRAMUSCULAR; INTRAVENOUS ONCE
Status: COMPLETED | OUTPATIENT
Start: 2023-08-09 | End: 2023-08-09

## 2023-08-09 RX ORDER — LANOLIN ALCOHOL/MO/W.PET/CERES
3 CREAM (GRAM) TOPICAL
Status: DISCONTINUED | OUTPATIENT
Start: 2023-08-09 | End: 2023-08-12 | Stop reason: HOSPADM

## 2023-08-09 RX ORDER — ONDANSETRON 4 MG/1
4 TABLET, ORALLY DISINTEGRATING ORAL EVERY 6 HOURS PRN
Status: DISCONTINUED | OUTPATIENT
Start: 2023-08-09 | End: 2023-08-12 | Stop reason: HOSPADM

## 2023-08-09 RX ORDER — LEVETIRACETAM 500 MG/1
500 TABLET ORAL 2 TIMES DAILY
Status: DISCONTINUED | OUTPATIENT
Start: 2023-08-09 | End: 2023-08-12 | Stop reason: HOSPADM

## 2023-08-09 RX ORDER — HYDRALAZINE HYDROCHLORIDE 20 MG/ML
10 INJECTION INTRAMUSCULAR; INTRAVENOUS EVERY 4 HOURS PRN
Status: DISCONTINUED | OUTPATIENT
Start: 2023-08-09 | End: 2023-08-09

## 2023-08-09 RX ORDER — HYDRALAZINE HYDROCHLORIDE 20 MG/ML
10 INJECTION INTRAMUSCULAR; INTRAVENOUS EVERY 4 HOURS PRN
Status: DISCONTINUED | OUTPATIENT
Start: 2023-08-09 | End: 2023-08-12 | Stop reason: HOSPADM

## 2023-08-09 RX ORDER — LISINOPRIL 5 MG/1
10 TABLET ORAL DAILY
Status: DISCONTINUED | OUTPATIENT
Start: 2023-08-09 | End: 2023-08-09

## 2023-08-09 RX ORDER — AMOXICILLIN 250 MG
1 CAPSULE ORAL 2 TIMES DAILY PRN
Status: DISCONTINUED | OUTPATIENT
Start: 2023-08-09 | End: 2023-08-12 | Stop reason: HOSPADM

## 2023-08-09 RX ORDER — ESCITALOPRAM OXALATE 10 MG/1
20 TABLET ORAL DAILY
Status: DISCONTINUED | OUTPATIENT
Start: 2023-08-10 | End: 2023-08-12 | Stop reason: HOSPADM

## 2023-08-09 RX ORDER — FOLIC ACID 1 MG/1
1000 TABLET ORAL DAILY
Status: DISCONTINUED | OUTPATIENT
Start: 2023-08-10 | End: 2023-08-12 | Stop reason: HOSPADM

## 2023-08-09 RX ORDER — CALCIUM CARBONATE 500 MG/1
1000 TABLET, CHEWABLE ORAL 4 TIMES DAILY PRN
Status: DISCONTINUED | OUTPATIENT
Start: 2023-08-09 | End: 2023-08-12 | Stop reason: HOSPADM

## 2023-08-09 RX ORDER — AMOXICILLIN 250 MG
2 CAPSULE ORAL 2 TIMES DAILY PRN
Status: DISCONTINUED | OUTPATIENT
Start: 2023-08-09 | End: 2023-08-12 | Stop reason: HOSPADM

## 2023-08-09 RX ORDER — HYDRALAZINE HYDROCHLORIDE 20 MG/ML
5 INJECTION INTRAMUSCULAR; INTRAVENOUS EVERY 4 HOURS PRN
Status: DISCONTINUED | OUTPATIENT
Start: 2023-08-09 | End: 2023-08-09

## 2023-08-09 RX ORDER — LABETALOL HYDROCHLORIDE 5 MG/ML
20 INJECTION, SOLUTION INTRAVENOUS EVERY 4 HOURS PRN
Status: DISCONTINUED | OUTPATIENT
Start: 2023-08-09 | End: 2023-08-12 | Stop reason: HOSPADM

## 2023-08-09 RX ORDER — IOPAMIDOL 755 MG/ML
90 INJECTION, SOLUTION INTRAVASCULAR ONCE
Status: COMPLETED | OUTPATIENT
Start: 2023-08-09 | End: 2023-08-09

## 2023-08-09 RX ORDER — ACETAMINOPHEN 650 MG/1
650 SUPPOSITORY RECTAL EVERY 6 HOURS PRN
Status: DISCONTINUED | OUTPATIENT
Start: 2023-08-09 | End: 2023-08-12 | Stop reason: HOSPADM

## 2023-08-09 RX ORDER — ONDANSETRON 2 MG/ML
4 INJECTION INTRAMUSCULAR; INTRAVENOUS EVERY 6 HOURS PRN
Status: DISCONTINUED | OUTPATIENT
Start: 2023-08-09 | End: 2023-08-12 | Stop reason: HOSPADM

## 2023-08-09 RX ORDER — LISINOPRIL 5 MG/1
10 TABLET ORAL 2 TIMES DAILY
Status: DISCONTINUED | OUTPATIENT
Start: 2023-08-10 | End: 2023-08-12

## 2023-08-09 RX ADMIN — LISINOPRIL 10 MG: 5 TABLET ORAL at 19:36

## 2023-08-09 RX ADMIN — IOPAMIDOL 90 ML: 755 INJECTION, SOLUTION INTRAVENOUS at 17:10

## 2023-08-09 RX ADMIN — LEVETIRACETAM 500 MG: 500 TABLET, FILM COATED ORAL at 20:55

## 2023-08-09 RX ADMIN — HYDRALAZINE HYDROCHLORIDE 10 MG: 20 INJECTION, SOLUTION INTRAMUSCULAR; INTRAVENOUS at 18:23

## 2023-08-09 RX ADMIN — HYDRALAZINE HYDROCHLORIDE 10 MG: 20 INJECTION, SOLUTION INTRAMUSCULAR; INTRAVENOUS at 20:04

## 2023-08-09 RX ADMIN — LABETALOL HYDROCHLORIDE 20 MG: 5 INJECTION, SOLUTION INTRAVENOUS at 23:09

## 2023-08-09 RX ADMIN — HYDRALAZINE HYDROCHLORIDE 5 MG: 20 INJECTION, SOLUTION INTRAMUSCULAR; INTRAVENOUS at 17:09

## 2023-08-09 RX ADMIN — METOPROLOL SUCCINATE 75 MG: 25 TABLET, EXTENDED RELEASE ORAL at 21:25

## 2023-08-09 RX ADMIN — ONDANSETRON 4 MG: 2 INJECTION INTRAMUSCULAR; INTRAVENOUS at 15:55

## 2023-08-09 ASSESSMENT — ACTIVITIES OF DAILY LIVING (ADL)
ADLS_ACUITY_SCORE: 37
ADLS_ACUITY_SCORE: 35
ADLS_ACUITY_SCORE: 35
ADLS_ACUITY_SCORE: 37

## 2023-08-09 NOTE — TELEPHONE ENCOUNTER
Please call her  back - he might be at work - and ask if he can bring Eleanor at 11:30 or at 12:30 today? I will only see her briefly, but by seeing her and probably doing labs, we can determine what is needed.

## 2023-08-09 NOTE — ED PROVIDER NOTES
6:35 PM.  Medical patient signed out to me by Dr. Foster.  Patient presented with hypertension, headache and nausea.  Blood pressure improved after hydralazine.  Plan to admit patient to cardiac telemetry observation.  I spoke with the hospitalist who is in agreement.     Baltazar Elizabeth MD  08/09/23 [id number]

## 2023-08-09 NOTE — PROGRESS NOTES
Clinic Care Coordination Contact  Follow Up Progress Note      Assessment: SWCC was called via Teams during CP visit with Pt to discuss [facility] referral and answer questions. Pt is open to [facility].    Care Gaps:    Health Maintenance Due   Topic Date Due    DEXA  Never done    DEPRESSION ACTION PLAN  Never done    HEPATITIS A IMMUNIZATION (2 of 2 - Risk 2-dose series) 08/23/2018    Pneumococcal Vaccine: 65+ Years (2 - PPSV23 if available, else PCV20) 07/10/2019    ZOSTER IMMUNIZATION (3 of 3) 07/10/2019    MEDICARE ANNUAL WELLNESS VISIT  02/07/2021    COVID-19 Vaccine (3 - Pfizer series) 05/17/2021    HEPATITIS B IMMUNIZATION (2 of 3 - Risk 3-dose series) 05/29/2021       Postponed to next outreach.      Care Plans  Care Plan: Mental Health       Problem: Mental Health Symptoms Need Improvement       Goal: Improve management of mental health symptoms and establish with mental health/psychosocial supports       Start Date: 3/23/2023 Expected End Date: 3/23/2024    Recent Progress: 10%    Priority: High    Note:     Barriers: depression, cognitive functioning  Strengths: willing to try therapy  Patient expressed understanding of goal: yes  Action steps to achieve this goal:  1. I will answer the phone when therapist/agency calls to set up appointment  2. I will attend my scheduled MH appointment  4. I will work with [facility] moving forward with [facility] services, picking up the phone when they call and meeting with the worker in person - referral completed 8/9/23.   3. I will follow up with [facility] monthly as to the progress Im making towards my goal.                                Intervention/Education provided during outreach: [facility] referral with [facility]. Referral complete online 8/9/23          Plan:   SWCC and Pt talked about [facility]. Pt is open to trying [facility]. SWCC completed referral for Pt. Pt will answer phone moving forward to work with [facility]. CHWCC will check in to see how things are  going and follow-up on referral. CP will check in with this SWCC to see if SWCC needs to call in to next in person visit with Pt.   CHW Care Coordinator will follow up in about 4 weeks and     Care Coordination Clinician Chart Review    Situation: Patient chart reviewed by Care Coordinator.       Background: Care Coordination Program started: 3/8/2023. Initial assessment completed and patient-centered care plan(s) were developed with participation from patient. Lead CC handed patient off to CHW for continued outreaches.       Assessment: Per chart review, patient outreach completed by CC CHW on 7/26/23.  Patient is actively working to accomplish goal(s). Patient's goal(s) appropriate and relevant at this time. Patient is due for updated Plan of Care.  Assessments will be completed annually or as needed/with change of patient status.      Care Plan: Mental Health       Problem: Mental Health Symptoms Need Improvement       Goal: Improve management of mental health symptoms and establish with mental health/psychosocial supports       Start Date: 3/23/2023 Expected End Date: 3/23/2024    Recent Progress: 10%    Priority: High    Note:     Barriers: depression, cognitive functioning  Strengths: willing to try therapy  Patient expressed understanding of goal: yes  Action steps to achieve this goal:  1. I will answer the phone when therapist/agency calls to set up appointment  2. I will attend my scheduled MH appointment  4. I will work with [facility] moving forward with [facility] services, picking up the phone when they call and meeting with the worker in person - referral completed 8/9/23.   3. I will follow up with CCC monthly as to the progress Im making towards my goal.                                   Plan/Recommendations: The patient will continue working with Care Coordination to achieve goal(s) as above. CHW will continue outreaches at minimum every 30 days and will involve Lead CC as needed or if  patient is ready to move to Maintenance. Lead CC will continue to monitor CHW outreaches and patient's progress to goal(s) every 6 weeks.     Plan of Care updated and sent to patient: Yes, via [employer]

## 2023-08-09 NOTE — ED TRIAGE NOTES
Pt c/o nausea for a couple days. [facility] aide found pt to have high bp today when she checked her vitals. Denies sob, dizziness, cp, headache, or lightheadedness. Hx TBI- 40-50 years ago. Denies hx of htn.

## 2023-08-09 NOTE — Clinical Note
Hi Dontae - met with Pt today via teams; she is open to [facility]. Completed referral and updated care plan to include [facility]. Moved your next outreach to 4 weeks. Will keep you updated as to what I hear back from provider ([facility]). Thank you - Zaika
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
## 2023-08-09 NOTE — TELEPHONE ENCOUNTER
Called pt.'s , was able to get pt. DB for today at 12:30pm per JE's request.     Julio C Barrios, MSN, RN   [facility]

## 2023-08-09 NOTE — PROGRESS NOTES
Community Paramedics Follow-up Visit  August 9, 2023  TIME: 1400    Eleanor Awan is a 72 year old female being seen at home for a follow-up visit.    Present at appointment:  patient, Community Paramedic, Asaf, ada spouse         Chief Complaint   Patient presents with    Outreach       [facility] [place] Utilization:      Utilization      Hospital Admissions  1             ED Visits  1             No Show Count (past year)  6                    Current as of: 8/9/2023 12:49 PM                BP (!) 207/109   Pulse 113   Temp 98.7  F (37.1  C)   Resp 12   LMP  (LMP Unknown)   SpO2 98%     Clinical Concerns:  Current Medical Concerns:  Hypertensive crisis, tachycardic    Current Behavioral Concerns: increased memory loss    Education Provided to patient:    Medication set up? No  Pill Box issued: No  Scale issued: No  Flu Shot given: No  COVID Vaccine Given: No  Lab draw or specimen collection: No  Food resource given: No  Collaborative visit with PCP: No  Wound Care: No  Health Maintenance Addressed No    Clinical Pathway: None    No  Face to Face in Home / Community      Review of Symptoms/PE    Skin: diaphoretic  Eyes: negative  Ears/Nose/Throat: negative  Respiratory: No shortness of breath, dyspnea on exertion, cough, or hemoptysis  Cardiovascular: tachycardia and unsteady gate  Gastrointestinal: negative  Genitourinary: negative  Musculoskeletal: negative  Neurologic: memory problems  Psychiatric: negative    Time spent with patient: 40    The patient meets one or more of the following criteria:  * Has been identified by their primary care provider at risk of nursing home placement    Acute concern/Follow-up recommendations: patient is diaphoretic, hypertensive and tachycardic, consulted PCP who advised patient should go to the ER    Next CP visit scheduled: 8/16/23    Issues for Provider to follow up on: Community Paramedic visited with patient at her home. Patient states she has been unsteady the last  few days and not feeling well. Pt denied SOB, chest pain or any other pain. Noted patient had a 3:30 appt with Dr Love, her PCP today. During visit contacted FERNANDA Michelle via Teams and talked about the benefits of a [facility] worker. Advised Eleanor that the CP visits are short term and an [facility] worker would be more long term. Eleanor agreed. Zakia to send in a referral. During converstation noted Eleanor to be struggling with increased short term memory loss. Eleanor was struggling with remembering what she had done this am. Past visits Eleanor could tell CP her mornings events and what she had for breakfast. Today she couldn't remember if she had breakfast.   Spouse expressed concern for Eleanor's current weight loss and not eating. Took patients vital signs and found patient to be hypertensive 214/111 and 207/109. Patient was tachycardic at 113 and was diaphoretic.   Due to hypertension and other symptoms CP contacted PCP.  Dr Love asked that patient go directly to ER. Contacted patients spouse and he agreed to take Eleanor to [facility] ER in [place].     Provider follow up visit needed: Patient had an appt today with Dr Love to address patient's increased weight loss and not eating much. Due to hypertension and tachycardic, Dr Love recommended patient go directly to ER

## 2023-08-09 NOTE — LETTER
[facility] CARE COORDINATION  [address] [place] [address]   August 9, 2023        Eleanor Awan  [address]  [facility] [facility] [address]          Dear Eleanor,     Attached is an updated Patient Centered Plan of Care for your continued enrollment in Care Coordination. Please let us know if you have additional questions, concerns, or goals that we can assist with.    Sincerely,    Zakia Edmond, [facility] Clinical Care Coordinator  [facility]  Patient Centered Plan of Care  About Me:        Patient Name:  Eleanor Awan    YOB: 1950  Age:         72 year old   Pat MRN:    [id number] Telephone Information:  Home Phone [phone]   Mobile [phone]       Address:  [address]  [place] [address] Email address:  [email or web address]      Emergency Contact(s)    Name Relationship Lgl Grd Work Phone Home Phone Mobile Phone   1. RISHABHCANDACE Spouse    [phone]   2. MAMI MYRICK Other  [phone]             Primary language:  English     needed? No   [employer]:  [phone] op. 1  Other communication barriers:Cognitive impairment; Caregiver    Preferred Method of Communication:     Current living arrangement: I live in a private home with family    Mobility Status/ Medical Equipment: Independent        Health Maintenance  Health Maintenance Reviewed:   Health Maintenance Due   Topic Date Due    DEXA  Never done    DEPRESSION ACTION PLAN  Never done    HEPATITIS A IMMUNIZATION (2 of 2 - Risk 2-dose series) 08/23/2018    Pneumococcal Vaccine: 65+ Years (2 - PPSV23 if available, else PCV20) 07/10/2019    ZOSTER IMMUNIZATION (3 of 3) 07/10/2019    MEDICARE ANNUAL WELLNESS VISIT  02/07/2021    COVID-19 Vaccine (3 - Pfizer series) 05/17/2021    HEPATITIS B IMMUNIZATION (2 of 3 - Risk 3-dose series) 05/29/2021          My Access Plan  Medical Emergency 911   Primary  Clinic Line [facility] [phone]   24 Hour Appointment Line [phone] or  [phone]) (toll-free)   24 Hour Nurse Line [phone] (toll-free)   Preferred Urgent Care [facility], [phone]     Preferred Hospital [facility]  [phone]     Preferred Pharmacy [id number] [place] [id number]  AT [facility] [address]     Behavioral Health Crisis Line The National Suicide Prevention Lifeline at 1-441.149.4897 or Text/Call 988             My Care Team Members  Patient Care Team         Relationship Specialty Notifications Start End    Camille Love MD PCP - General Family Medicine  4/4/23     Phone: [phone] Fax: [phone]         [address] [facility] [address] [place] [address]    Danuta Kay, PharmD Pharmacist Pharmacist  10/6/20     Phone: [phone]          [facility] [address] [facility] [address] [place] [address]    Camille Love MD Assigned PCP   6/16/21     Phone: [phone] Fax: [phone]         [address] [facility] [address] [place] [address]    Rainer Mahajan MD Assigned Neuroscience Provider   4/17/22     Phone: [phone] Fax: [phone]         [address] [facility] [address]    Dontae Bocanegra Community Health Worker Primary Care - CC Admissions 3/9/23     Phone: [phone] Fax: [phone]         [address] [facility] [address] [facility] [address]    Tanvi Hamilton, ANAHISW Lead Care Coordinator  Admissions 3/14/23     Edna Swain Psy.D, LP Assigned Behavioral Health Provider   5/20/23     Phone: [phone] Fax: [phone]         [facility] [address] [facility] [address]    Ella Georges, NRP, CP Community Paramedic  Admissions 5/31/23     Phone: [phone]         Zakia Edmond LICSW Lead Care Coordinator  Admissions 7/12/23               My Care Plans  Self Management and Treatment Plan  Care Plan  Care Plan: Mental Health       Problem:  Mental Health Symptoms Need Improvement       Goal: Improve management of mental health symptoms and establish with mental health/psychosocial supports       Start Date: 3/23/2023 Expected End Date: 3/23/2024    Recent Progress: 10%    Priority: High    Note:     Barriers: depression, cognitive functioning  Strengths: willing to try therapy  Patient expressed understanding of goal: yes  Action steps to achieve this goal:  1. I will answer the phone when therapist/agency calls to set up appointment  2. I will attend my scheduled MH appointment  4. I will work with [facility] moving forward with [facility] services, picking up the phone when they call and meeting with the worker in person - referral completed 8/9/23.   3. I will follow up with [facility] monthly as to the progress Im making towards my goal.                                     My Medical and Care Information  Problem List   Patient Active Problem List   Diagnosis    Osteoporosis    Mild cognitive impairment with memory loss    BPPV (benign paroxysmal positional vertigo)    Alcohol dependence in remission (H)    Paranoia (H)    Essential hypertension    Thiamine deficiency    Anxiety    Alcoholic cirrhosis of liver without ascites (H)    Moderate episode of recurrent major depressive disorder (H)    Hepatitis C carrier (H)    Partial symptomatic epilepsy with complex partial seizures, not intractable, without status epilepticus (H)      Current Medications and Allergies:    Current Outpatient Medications   Medication    diazepam (VALIUM) 2 MG tablet    escitalopram (LEXAPRO) 20 MG tablet    folic acid (FOLVITE) 1 MG tablet    levETIRAcetam (KEPPRA) 500 MG tablet    metoprolol succinate ER (TOPROL XL) 25 MG 24 hr tablet    Thiamine Mononitrate (B1) 100 MG TABS     No current facility-administered medications for this visit.     No Known Allergies     Care Coordination Start Date: 3/8/2023   Frequency of Care Coordination: monthly     Form Last Updated:  08/09/2023

## 2023-08-10 ENCOUNTER — APPOINTMENT (OUTPATIENT)
Dept: MRI IMAGING | Facility: HOSPITAL | Age: 73
DRG: 065 | End: 2023-08-10
Payer: COMMERCIAL

## 2023-08-10 PROBLEM — I63.9 ACUTE STROKE DUE TO ISCHEMIA (H): Status: ACTIVE | Noted: 2023-08-10

## 2023-08-10 LAB
ALBUMIN SERPL BCG-MCNC: 3.9 G/DL (ref 3.5–5.2)
ALP SERPL-CCNC: 105 U/L (ref 35–104)
ALT SERPL W P-5'-P-CCNC: 28 U/L (ref 0–50)
ANION GAP SERPL CALCULATED.3IONS-SCNC: 11 MMOL/L (ref 7–15)
AST SERPL W P-5'-P-CCNC: 34 U/L (ref 0–45)
BILIRUB DIRECT SERPL-MCNC: <0.2 MG/DL (ref 0–0.3)
BILIRUB SERPL-MCNC: 0.7 MG/DL
BUN SERPL-MCNC: 18.7 MG/DL (ref 8–23)
CALCIUM SERPL-MCNC: 9.8 MG/DL (ref 8.8–10.2)
CHLORIDE SERPL-SCNC: 102 MMOL/L (ref 98–107)
CHOLEST SERPL-MCNC: 167 MG/DL
CREAT SERPL-MCNC: 0.68 MG/DL (ref 0.51–0.95)
DEPRECATED HCO3 PLAS-SCNC: 24 MMOL/L (ref 22–29)
ERYTHROCYTE [DISTWIDTH] IN BLOOD BY AUTOMATED COUNT: 11.9 % (ref 10–15)
GFR SERPL CREATININE-BSD FRML MDRD: >90 ML/MIN/1.73M2
GLUCOSE BLDC GLUCOMTR-MCNC: 100 MG/DL (ref 70–99)
GLUCOSE BLDC GLUCOMTR-MCNC: 132 MG/DL (ref 70–99)
GLUCOSE SERPL-MCNC: 130 MG/DL (ref 70–99)
HBA1C MFR BLD: 5.3 %
HCT VFR BLD AUTO: 42.7 % (ref 35–47)
HDLC SERPL-MCNC: 33 MG/DL
HGB BLD-MCNC: 14.2 G/DL (ref 11.7–15.7)
LACTATE SERPL-SCNC: 1.1 MMOL/L (ref 0.7–2)
LDLC SERPL CALC-MCNC: 110 MG/DL
LEVETIRACETAM SERPL-MCNC: 21.3 ΜG/ML (ref 10–40)
MAGNESIUM SERPL-MCNC: 2.4 MG/DL (ref 1.7–2.3)
MAGNESIUM SERPL-MCNC: 2.5 MG/DL (ref 1.7–2.3)
MCH RBC QN AUTO: 30 PG (ref 26.5–33)
MCHC RBC AUTO-ENTMCNC: 33.3 G/DL (ref 31.5–36.5)
MCV RBC AUTO: 90 FL (ref 78–100)
NONHDLC SERPL-MCNC: 134 MG/DL
PLATELET # BLD AUTO: 210 10E3/UL (ref 150–450)
POTASSIUM SERPL-SCNC: 3.4 MMOL/L (ref 3.4–5.3)
POTASSIUM SERPL-SCNC: 3.9 MMOL/L (ref 3.4–5.3)
PROT SERPL-MCNC: 6.8 G/DL (ref 6.4–8.3)
RBC # BLD AUTO: 4.73 10E6/UL (ref 3.8–5.2)
SODIUM SERPL-SCNC: 137 MMOL/L (ref 136–145)
TRIGL SERPL-MCNC: 118 MG/DL
WBC # BLD AUTO: 12.3 10E3/UL (ref 4–11)

## 2023-08-10 PROCEDURE — 80053 COMPREHEN METABOLIC PANEL: CPT | Performed by: INTERNAL MEDICINE

## 2023-08-10 PROCEDURE — 83036 HEMOGLOBIN GLYCOSYLATED A1C: CPT | Performed by: INTERNAL MEDICINE

## 2023-08-10 PROCEDURE — 250N000011 HC RX IP 250 OP 636: Mod: JZ | Performed by: INTERNAL MEDICINE

## 2023-08-10 PROCEDURE — 83735 ASSAY OF MAGNESIUM: CPT | Performed by: INTERNAL MEDICINE

## 2023-08-10 PROCEDURE — 36415 COLL VENOUS BLD VENIPUNCTURE: CPT | Performed by: INTERNAL MEDICINE

## 2023-08-10 PROCEDURE — 99233 SBSQ HOSP IP/OBS HIGH 50: CPT | Performed by: INTERNAL MEDICINE

## 2023-08-10 PROCEDURE — 250N000013 HC RX MED GY IP 250 OP 250 PS 637: Performed by: INTERNAL MEDICINE

## 2023-08-10 PROCEDURE — 80061 LIPID PANEL: CPT | Performed by: INTERNAL MEDICINE

## 2023-08-10 PROCEDURE — 99223 1ST HOSP IP/OBS HIGH 75: CPT | Performed by: PSYCHIATRY & NEUROLOGY

## 2023-08-10 PROCEDURE — 210N000001 HC R&B IMCU HEART CARE

## 2023-08-10 PROCEDURE — 83605 ASSAY OF LACTIC ACID: CPT | Performed by: INTERNAL MEDICINE

## 2023-08-10 PROCEDURE — 85027 COMPLETE CBC AUTOMATED: CPT | Performed by: INTERNAL MEDICINE

## 2023-08-10 PROCEDURE — 82248 BILIRUBIN DIRECT: CPT | Performed by: INTERNAL MEDICINE

## 2023-08-10 PROCEDURE — 84132 ASSAY OF SERUM POTASSIUM: CPT | Performed by: INTERNAL MEDICINE

## 2023-08-10 PROCEDURE — 70551 MRI BRAIN STEM W/O DYE: CPT

## 2023-08-10 RX ORDER — ONDANSETRON 4 MG/1
4 TABLET, ORALLY DISINTEGRATING ORAL EVERY 6 HOURS PRN
Status: DISCONTINUED | OUTPATIENT
Start: 2023-08-10 | End: 2023-08-10

## 2023-08-10 RX ORDER — ATORVASTATIN CALCIUM 10 MG/1
10 TABLET, FILM COATED ORAL EVERY EVENING
Status: DISCONTINUED | OUTPATIENT
Start: 2023-08-10 | End: 2023-08-12 | Stop reason: HOSPADM

## 2023-08-10 RX ORDER — ONDANSETRON 2 MG/ML
4 INJECTION INTRAMUSCULAR; INTRAVENOUS EVERY 6 HOURS PRN
Status: DISCONTINUED | OUTPATIENT
Start: 2023-08-10 | End: 2023-08-10

## 2023-08-10 RX ORDER — LORAZEPAM 2 MG/ML
1 INJECTION INTRAMUSCULAR ONCE
Status: COMPLETED | OUTPATIENT
Start: 2023-08-10 | End: 2023-08-10

## 2023-08-10 RX ORDER — ENOXAPARIN SODIUM 100 MG/ML
40 INJECTION SUBCUTANEOUS EVERY 24 HOURS
Status: DISCONTINUED | OUTPATIENT
Start: 2023-08-10 | End: 2023-08-12 | Stop reason: HOSPADM

## 2023-08-10 RX ORDER — ASPIRIN 81 MG/1
324 TABLET, CHEWABLE ORAL DAILY
Status: DISCONTINUED | OUTPATIENT
Start: 2023-08-10 | End: 2023-08-10

## 2023-08-10 RX ORDER — LIDOCAINE 40 MG/G
CREAM TOPICAL
Status: DISCONTINUED | OUTPATIENT
Start: 2023-08-10 | End: 2023-08-12 | Stop reason: HOSPADM

## 2023-08-10 RX ORDER — ASPIRIN 325 MG
325 TABLET, DELAYED RELEASE (ENTERIC COATED) ORAL DAILY
Status: DISCONTINUED | OUTPATIENT
Start: 2023-08-10 | End: 2023-08-12 | Stop reason: HOSPADM

## 2023-08-10 RX ORDER — ASPIRIN 300 MG/1
300 SUPPOSITORY RECTAL DAILY
Status: DISCONTINUED | OUTPATIENT
Start: 2023-08-10 | End: 2023-08-10

## 2023-08-10 RX ADMIN — THIAMINE HCL TAB 100 MG 100 MG: 100 TAB at 08:13

## 2023-08-10 RX ADMIN — FOLIC ACID 1000 MCG: 1 TABLET ORAL at 08:13

## 2023-08-10 RX ADMIN — LISINOPRIL 10 MG: 5 TABLET ORAL at 19:12

## 2023-08-10 RX ADMIN — ENOXAPARIN SODIUM 40 MG: 40 INJECTION SUBCUTANEOUS at 19:13

## 2023-08-10 RX ADMIN — METOPROLOL SUCCINATE 75 MG: 25 TABLET, EXTENDED RELEASE ORAL at 08:13

## 2023-08-10 RX ADMIN — LEVETIRACETAM 500 MG: 500 TABLET, FILM COATED ORAL at 19:12

## 2023-08-10 RX ADMIN — ATORVASTATIN CALCIUM 10 MG: 10 TABLET, FILM COATED ORAL at 19:12

## 2023-08-10 RX ADMIN — LISINOPRIL 10 MG: 5 TABLET ORAL at 08:13

## 2023-08-10 RX ADMIN — LEVETIRACETAM 500 MG: 500 TABLET, FILM COATED ORAL at 08:13

## 2023-08-10 RX ADMIN — ESCITALOPRAM OXALATE 20 MG: 20 TABLET, FILM COATED ORAL at 08:13

## 2023-08-10 RX ADMIN — ACETAMINOPHEN 650 MG: 325 TABLET ORAL at 13:57

## 2023-08-10 RX ADMIN — ASPIRIN 325 MG: 325 TABLET, COATED ORAL at 19:13

## 2023-08-10 RX ADMIN — HYDRALAZINE HYDROCHLORIDE 10 MG: 20 INJECTION INTRAMUSCULAR; INTRAVENOUS at 00:23

## 2023-08-10 RX ADMIN — HYDRALAZINE HYDROCHLORIDE 10 MG: 20 INJECTION INTRAMUSCULAR; INTRAVENOUS at 21:45

## 2023-08-10 ASSESSMENT — ACTIVITIES OF DAILY LIVING (ADL)
ADLS_ACUITY_SCORE: 37
ADLS_ACUITY_SCORE: 37
ADLS_ACUITY_SCORE: 39
ADLS_ACUITY_SCORE: 37
ADLS_ACUITY_SCORE: 33
ADLS_ACUITY_SCORE: 33
ADLS_ACUITY_SCORE: 37
ADLS_ACUITY_SCORE: 37
ADLS_ACUITY_SCORE: 33
ADLS_ACUITY_SCORE: 37

## 2023-08-10 NOTE — PROGRESS NOTES
[facility]    Medicine Progress Note - Hospitalist Service    Date of Admission:  8/9/2023    Assessment & Plan      Hypertensive emergency  Active Problems:    Mild cognitive impairment with memory loss    BPPV (benign paroxysmal positional vertigo)    Essential hypertension    Alcoholic cirrhosis of liver without ascites (H)    Partial symptomatic epilepsy with complex partial seizures, not intractable, without status epilepticus (H)    Accelerated hypertension    Nausea and vomiting, unspecified vomiting type    Eleanor Awan is a 72 year old female with history of essential HTN, alcoholism in remission, alcoholic cirrhosis, chronic Hep C, mild cognitive impairment with memory loss, TBI, BPPV, seizure disorder admitted on 8/9/2023 with hypertensive emergency    Hypertensive emergency  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   Troponin slightly elevated at 23 which trended down to 22  EKG with sinus tachycardia but no history of A-fib or a flutter  Magnesium slightly elevated at 2.4, potassium low normal  TSH acceptable  BNP 2103  BMP unremarkable  A1C 5.4 in April 2023 no known history of diabetes  HDL 33,   CBC shows leukocytosis  Echo 9/29/2022: LVEF 60-65%, no valvular abnormalities.  Head CT shows no acute intracranial processes besides small age-indeterminate infarcts and small vessel ischemic changes with mild to moderate volume loss.  There is a left basal ganglia chronic infarct, left anterior temporal lobe small area of tissue loss likely due to past trauma.  CT chest is negative for PE or dissection.  There is tiny groundglass focus medial right lung base that is likely inflammatory.  CT abdomen and pelvis shows gallbladder wall edema likely related to underlying liver disease and hepatic cirrhosis.  --Goal MAP should be no less than 100 in the next 12  hours  --continue home dose metoprolol and ask pharmacy to review if this has been filled consistent with compliant use  --Start hydralazine IV and IV labetalol prn for MAP greater than 110  --Started lisinopril 10 mg BID for now to allow for dose adjustment  -- Escalate antihypertensives as needed  -- Telemetry  -Stroke protocol/ASA/atorvastatin/telemetry/PT/OT  -Neurology consulted    Troponin elevation, BNP elevation: Troponin trended down and is minimally elevated without findings of ACS on EKG  No overt signs of CHF on imaging medically  -Laboratory    Leukocytosis: Unclear etiology, question stress response from HTN and nausea  Question if minimal groundglass changes within the right lower lobe are related to hepatic hydrothorax?  No symptoms to support pneumonia  UA with bacteriuria, pyuria, negative nitrates and LE  Follow BC    Alcoholic cirrhosis, chronic hep C  History of IVDU/heroin  --LFTs unremarkable  --Lipase mildly elevated but not consistent with pancreatitis  --Incidental finding of gallbladder wall edema likely reflects underlying liver disease on imaging. Patient had nausea on admission, likely related to elevated BP, nausea resolved now.  No abdominal pain.    -Serial abdominal exams, if develop abdominal pain, will obtain HIDA    Mild hypermagnesemia:  -- trend    H/O seizure disorder:  -continue home keppra;  Keppra level 21.3, WNL    H/o TBI, cognitive impairment, mood disorder:  -- continue home lexapro, folic acid and thiamine      Diet: Combination Diet Regular Diet Adult    DVT Prophylaxis: Enoxaparin (Lovenox) SQ  Gtz Catheter: Not present  Lines: None     Cardiac Monitoring: ACTIVE order. Indication: HTN emergency  Code Status: Full Code      Clinically Significant Risk Factors Present on Admission           # Hypercalcemia: Highest Ca = 10.2 mg/dL in last 2 days, will monitor as appropriate        # Hypertension: Noted on problem list               Disposition Plan      Expected  Discharge Date: 08/11/2023                  Zulay Veliz MD  Hospitalist Service  [facility]  Securely message with [employer] (more info)  Text page via [employer] Paging/Directory   ______________________________________________________________________    Interval History   She is new to me.  Chart reviewed.  Patient was seen and examined.  Neurology recommendations reviewed.  Patient is difficult historian.  Unable to provide HPI.  Currently denies headache, chest pain, dyspnea, cough, abdominal pain, nausea.    Physical Exam   Vital Signs: Temp: 99.9  F (37.7  C) Temp src: Oral BP: (!) 144/69 Pulse: 68   Resp: 22 SpO2: 96 % O2 Device: None (Room air)    Weight: 105 lbs 13.13 oz  General Appearance: In no acute distress, somewhat slow to respond  RESPIRATORY: Respirations nonlabored  CARDIOVASCULAR: No le edema bilat.  ABDOMEN: soft and non-tender  NEUROLOGIC: No focal arm or leg  weakness, speech is clear    Medical Decision Making       50 MINUTES SPENT BY ME on the date of service doing chart review, history, exam, documentation & further activities per the note.      Data     I have personally reviewed the following data over the past 24 hrs:    12.3 (H)  \   14.2   / 210     137 102 18.7 /  130 (H)   3.4 24 0.68 \     ALT: 28 AST: 34 AP: 105 (H) TBILI: 0.7   ALB: 3.9 TOT PROTEIN: 6.8 LIPASE: N/A     Trop: 22 (H) BNP: N/A     TSH: N/A T4: N/A A1C: N/A       Imaging results reviewed over the past 24 hrs:   Recent Results (from the past 24 hour(s))   Head CT w/o contrast    Narrative    EXAM: CT HEAD W/O CONTRAST  LOCATION: [facility]  DATE: 8/9/2023    INDICATION: headache, nausea, dry heaves; accelerated hypertension  COMPARISON: MRI brain 09/29/2022  TECHNIQUE: Routine CT Head without IV contrast. Multiplanar reformats. Dose reduction techniques were used.    FINDINGS:  INTRACRANIAL CONTENTS: No intracranial hemorrhage, extraaxial collection, or mass effect.   Right lentiform nucleus small age-indeterminate infarct versus perivascular space new compared to prior MRI brain 09/29/2022. Bilateral thalami small   age-indeterminate infarcts new compared to MRI brain 09/29/2022. Mild presumed chronic small vessel ischemic changes. Mild to moderate generalized volume loss. No hydrocephalus. Left basal ganglia chronic infarct. Left anterior temporal lobe small area   tissue loss and gliosis likely due to chronic trauma.    VISUALIZED ORBITS/SINUSES/MASTOIDS: No intraorbital abnormality. No paranasal sinus mucosal disease. No middle ear or mastoid effusion.    BONES/SOFT TISSUES: No acute abnormality.      Impression    IMPRESSION:  1.  Small age-indeterminate infarcts described above new compared to MRI brain 09/29/2022.  2.  Age-related changes described above.  3.  Otherwise, no acute intracranial process identified.   CT Chest Pulmonary Embolism w Contrast    Narrative    EXAM: CT CHEST PULMONARY EMBOLISM W CONTRAST  LOCATION: [facility]  DATE: 8/9/2023    INDICATION: tachycardia, nausea   vomiting   rule out PE  COMPARISON: 09/29/2022  TECHNIQUE: CT chest pulmonary angiogram during arterial phase injection of IV contrast. Multiplanar reformats and MIP reconstructions were performed. Dose reduction techniques were used.   CONTRAST: isovue 370 90ml    FINDINGS:  ANGIOGRAM CHEST: Pulmonary arteries are normal caliber and negative for pulmonary emboli. Thoracic aorta is negative for dissection. No CT evidence of right heart strain.    LUNGS AND PLEURA: Slight focus of groundglass opacity with internal spiculation, normal-size 9 mm evident within medial right lower lobe (image 219) likely represents minimal inflammatory focus. Calcified granuloma left lower lobe. Lungs otherwise clear.    MEDIASTINUM/AXILLAE: Normal.    CORONARY ARTERY CALCIFICATION: Mild.    UPPER ABDOMEN: Normal.    MUSCULOSKELETAL: Normal.      Impression    IMPRESSION:  1.  No  pulmonary emboli identified. No etiology for symptoms evident.  2.  Tiny groundglass focus medial right lung base likely inflammatory. Suggest follow-up CT in 3 months to confirm resolution.    REFERENCE:  Guidelines for Management of Incidental Pulmonary Nodules Detected on CT Images: From the Fleischner Society 2017.   Guidelines apply to incidental nodules in patients who are 35 years or older.  Guidelines do not apply to lung cancer screening, patients with immunosuppression, or patients with known primary cancer.    SUBSOLID NODULES    Part solid    Nodule size 6 mm or greater  CT at 3-6 months to confirm persistence. If unchanged and solid component remains <6 mm, annual CT for 5 years.   CT Abdomen Pelvis w Contrast    Narrative    EXAM: CT ABDOMEN PELVIS W CONTRAST  LOCATION: [facility]  DATE: 8/9/2023    INDICATION: Abdominal pain, nausea, and vomiting.  COMPARISON: 09/30/2022.  TECHNIQUE: CT scan of the abdomen and pelvis was performed following injection of IV contrast. Multiplanar reformats were obtained. Dose reduction techniques were used.  CONTRAST: 90 mL Isovue 370.    FINDINGS:  Patient was imaged with arm(s) at side, limiting study quality. Study is additionally degraded by motion.    LOWER CHEST: Small amount of fluid within the nondilated distal esophagus, suggestive of reflux. Subtle groundglass changes within the medial right lower lobe, likely infectious/inflammatory, axial image 3.    HEPATOBILIARY: Liver surface nodularity, compatible with cirrhosis.    Prominent gallbladder wall edema, similar to previous examination.    No intrahepatic or intrahepatic biliary ductal dilatation.    PANCREAS: Enhances normally. No peripancreatic inflammatory fat stranding.    SPLEEN: Enhances normally. Normal size.    ADRENAL GLANDS: Normal.    KIDNEYS: Both kidneys enhance symmetrically, without hydronephrosis.    No nephroureterolithiasis.    Urinary bladder is  unremarkable.    PELVIC ORGANS: No suspicious abnormality.    BOWEL: No evidence of acute gastrointestinal inflammation or obstruction. Normal appendix. Colonic diverticulosis.    No intraperitoneal free fluid or free air.    LYMPH NODES: No suspicious abdominal or pelvic lymphadenopathy.    VASCULATURE: No abdominal aortic aneurysm. Moderate to severe atheromatous disease.    MUSCULOSKELETAL: No suspicious abnormality.    OTHER: No additionally significant abnormalities.      Impression    IMPRESSION:   1.  Persisting gallbladder wall edema, likely attributable to patient's underlying liver disease. However, acute cholecystitis cannot be entirely excluded. If this is of clinical suspicion, further evaluation with HIDA scan is recommended, as an   ultrasound will likely be equivocal.  2.  Minimal groundglass changes within the medial right lower lobe, likely infectious/inflammatory.  3.  Hepatic cirrhosis.  4.  Colonic diverticulosis.  5.  Gastroesophageal reflux.  6.  Motion degraded examination.       MR Brain w/o Contrast    Narrative    EXAM: MR BRAIN W/O CONTRAST  LOCATION: [facility]  DATE: 8/10/2023    INDICATION: CT Head with signs of ischemia of indeterminate age. MRI from fall 2022 for comparison.  COMPARISON: 08/09/2023  TECHNIQUE: Routine multiplanar multisequence head MRI without intravenous contrast.    FINDINGS:  INTRACRANIAL CONTENTS: Acute to subacute infarct of the left hippocampal tail. No additional evidence of acute subacute infarction. Multiple remote lacunar infarcts and remote left temporal lobe infarct. No mass, acute hemorrhage, or extra-axial fluid   collections. Patchy nonspecific T2/FLAIR hyperintensities within the cerebral white matter and yon most consistent with mild to moderate chronic microvascular ischemic change. Mild to moderate generalized cerebral atrophy. No hydrocephalus. Normal   position of the cerebellar tonsils.     SELLA: No abnormality  accounting for technique.    OSSEOUS STRUCTURES/SOFT TISSUES: Normal marrow signal. The major intracranial vascular flow voids are maintained.     ORBITS: No abnormality accounting for technique.     SINUSES/MASTOIDS: No paranasal sinus mucosal disease. No middle ear or mastoid effusion.       Impression    IMPRESSION:  1.  Acute to subacute infarct left hippocampal tail.  2.  Probable mild to moderate sequela chronic vessel ischemic disease and remote infarction.  3.  Mild to moderate generalized brain parenchymal volume loss.
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
## 2023-08-10 NOTE — PLAN OF CARE
"Problem: Risk for Delirium  Goal: Improved Attention and Thought Clarity  Outcome: Progressing   Pt alert and oriented to self and place.  at bedside and stating pt seems at her baseline mentality. Pt took off all of here telemetry patches, monitor and blood pressure cuff. When asked patient why she did that she said, \"I don't know, I just did.\" Pt educated on why it is important to keep telemetry on. Pt states understanding.     Having difficulties with managing pt's blood pressure. New order for PRN IV labetalol. Gave for BP of 185/86 and MAP of 96. Monitoring pt and checking frequent blood pressures. BP recheck was 144/63, MAP 96. Dr. Montalvo updated d/t MAP of 96 and goal of >100.      LOUIS WINTER RN  "

## 2023-08-10 NOTE — PLAN OF CARE
Goal Outcome Evaluation:    Problem: Plan of Care - These are the overarching goals to be used throughout the patient stay.    Goal: Optimal Comfort and Wellbeing  Outcome: Progressing  Pt A&O to self and location, seems to be at neurological baseline, able to be reoriented, bed alarm on due to memory loss and not trusting pt to use call light. Denied CP, SOB, HA or neuro changes. BP remains elevated but MAP below 110, prn hydralazine and labetalol available, tele sinus. Pt able to sleep uninterrupted most of shift. Neuro consult today.

## 2023-08-10 NOTE — PHARMACY-ADMISSION MEDICATION HISTORY
Pharmacist Admission Medication History    Admission medication history is complete. The information provided in this note is only as accurate as the sources available at the time of the update.    Medication reconciliation/reorder completed by provider prior to medication history? No    Information Source(s): Patient, Family member, and CareEverywhere/SureScripts via in-person    Pertinent Information:     Changes made to PTA medication list:  Added: None  Deleted: Valium  Changed: None    Allergies reviewed with patient and updates made in EHR: yes    Medication History Completed By: Edith Garrison [facility] 8/9/2023 7:27 PM    Prior to Admission medications    Medication Sig Last Dose Taking? Auth Provider Long Term End Date   escitalopram (LEXAPRO) 20 MG tablet Take 1 tablet (20 mg) by mouth daily 8/9/2023 at am Yes Camille Love MD Yes    folic acid (FOLVITE) 1 MG tablet TAKE ONE TABLET BY MOUTH DAILY 8/9/2023 at am Yes Shawn Mas MD     levETIRAcetam (KEPPRA) 500 MG tablet Take 1 tablet (500 mg) by mouth 2 times daily 8/9/2023 at x1 Yes Rainer Mahajan MD Yes    metoprolol succinate ER (TOPROL XL) 25 MG 24 hr tablet Take 3 tablets (75 mg) by mouth daily 8/9/2023 at am Yes Camille Love MD Yes    Thiamine Mononitrate (B1) 100 MG TABS TAKE ONE TABLET BY MOUTH DAILY 8/9/2023 at am Yes Camille Love MD

## 2023-08-10 NOTE — PLAN OF CARE
Goal Outcome Evaluation:      Plan of Care Reviewed With: patient, spouse, child    Overall Patient Progress: improving    Outcome Evaluation: Blood pressures improving, PRN's available. Neuro consult, MRI done. Pt has a temp of 100.4, Tylenol given, MD notified.

## 2023-08-10 NOTE — PROVIDER NOTIFICATION
"2100: Kasie messaged provider, \"FYI pt's BP recheck 196/82. Gave the one time dose of 10mg IV hydralazine about an hour ago.\"    Order to give daily metoprolol.     2210: [email or web address] messaged provider, \"Rechecked blood pressure, 203/93 (map 134) on left arm and 199/93 (134) on right arm.\"    New order placed for PRN labetalol.     Continuing to monitor.   "

## 2023-08-10 NOTE — PLAN OF CARE
Problem: Plan of Care - These are the overarching goals to be used throughout the patient stay.    Goal: Plan of Care Review  Description: The Plan of Care Review/Shift note should be completed every shift.  The Outcome Evaluation is a brief statement about your assessment that the patient is improving, declining, or no change.  This information will be displayed automatically on your shift note.  Outcome: Progressing  Patient arrived to floor from ER this afternoon. Patient denies pain. She is alert and oriented with intermittent confusion/forgetfulness.   BP is stable, patient has low grade temp.  NIH score was zero. No difficulty swallowing. Speech is slow at baseline.

## 2023-08-10 NOTE — CONSULTS
Care Management Initial Consult    General Information  Assessment completed with: Patient, pt  Type of CM/SW Visit: Initial Assessment    Primary Care Provider verified and updated as needed: Yes   Readmission within the last 30 days: no previous admission in last 30 days      Reason for Consult: discharge planning  Advance Care Planning: Advance Care Planning Reviewed: verified with patient, no concerns identified          Communication Assessment  Patient's communication style: spoken language (English or Bilingual)             Cognitive  Cognitive/Neuro/Behavioral: .WDL except, orientation, speech  Level of Consciousness: alert     Orientation: disoriented to, time, situation     Best Language: 1 - Mild to moderate  Speech: slow    Living Environment:   People in home: spouse     Current living Arrangements: house      Able to return to prior arrangements: yes       Family/Social Support:  Care provided by: self, spouse/significant other  Provides care for: no one  Marital Status:    (Community clinic RN visits)  Asaf       Description of Support System: Supportive, Involved    Support Assessment: Adequate family and caregiver support, Adequate social supports    Current Resources:   Patient receiving home care services: No     Community Resources: None  Equipment currently used at home:    Supplies currently used at home: None    Employment/Financial:  Employment Status:          Financial Concerns: No concerns identified   Referral to Financial Worker: No       Does the patient's insurance plan have a 3 day qualifying hospital stay waiver?  No    Lifestyle & Psychosocial Needs:  Social Determinants of Health     Tobacco Use: Medium Risk (7/26/2023)    Patient History     Smoking Tobacco Use: Former     Smokeless Tobacco Use: Never     Passive Exposure: Never   Alcohol Use: Not At Risk (3/23/2023)    AUDIT-C     Frequency of Alcohol Consumption: Never     Average Number of Drinks: Patient does not  drink     Frequency of Binge Drinking: Never   Financial Resource Strain: Low Risk  (3/23/2023)    Overall Financial Resource Strain (CARDIA)     Difficulty of Paying Living Expenses: Not hard at all   Food Insecurity: No Food Insecurity (3/23/2023)    Hunger Vital Sign     Worried About Running Out of Food in the Last Year: Never true     Ran Out of Food in the Last Year: Never true   Transportation Needs: No Transportation Needs (3/23/2023)    PRAPARE - Transportation     Lack of Transportation (Medical): No     Lack of Transportation (Non-Medical): No   Physical Activity: Not on file   Stress: Stress Concern Present (3/23/2023)    [employer] [place] [employer] - Occupational Stress Questionnaire     Feeling of Stress : Rather much   Social Connections: Socially Isolated (3/23/2023)    Social Connection and Isolation Panel [NHANES]     Frequency of Communication with Friends and Family: Never     Frequency of Social Gatherings with Friends and Family: Never     Attends Denominational Services: Never     Active Member of Clubs or Organizations: No     Attends Club or Organization Meetings: Never     Marital Status:    Intimate Partner Violence: Not on file   Depression: Not at risk (7/7/2023)    PHQ-2     PHQ-2 Score: 0   Recent Concern: Depression - At risk (6/14/2023)    PHQ-2     PHQ-2 Score: 3   Housing Stability: Not on file       Functional Status:  Prior to admission patient needed assistance:   Dependent ADLs:: Independent  Dependent IADLs:: Meal Preparation, Medication Management, Money Management, Transportation, Cleaning, Cooking  Assesssment of Functional Status: Not at baseline with ADL Functioning    Mental Health Status:  Mental Health Status: No Current Concerns       Chemical Dependency Status:                Values/Beliefs:  Spiritual, Cultural Beliefs, Denominational Practices, Values that affect care:                 Additional Information:  Assessed, lives w/spouse, has community RN  visits weekly, no svcs and independent. CM to follow.      Mendy Crowe RN
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
## 2023-08-10 NOTE — UTILIZATION REVIEW
Admission Status; Secondary Review Determination   Under the authority of the Utilization Management Committee, the utilization review process indicated a secondary review on Eleanor Awan. The review outcome is based on review of the medical records, discussions with staff, and applying clinical experience noted on the date of the review.   (x) Inpatient Status Appropriate - This patient's medical care is consistent with medical management for inpatient care and reasonable inpatient medical practice.     RATIONALE FOR DETERMINATION   72-year-old female with history of TBI, MCI, seizure disorder and hypertension presents with hypertensive emergency and MAP greater than 140 associated with nausea and lightheadedness.  Systolic pressures in the 200s and diastolic pressures in the 100s.  Head CT showed small age-indeterminate infarcts and small vessel ischemic changes with mild to moderate volume loss and left basal ganglia chronic infarct.  Neurology consulted and blood pressure control recommended and will check head MRI to evaluate for hypertensive changes.    At the time of admission with the information available to the attending physician more than 2 nights Hospital complex care was anticipated, based on patient risk of adverse outcome if treated as outpatient and complex care required. Inpatient admission is appropriate based on the Medicare guidelines.   The information on this document is developed by the utilization review team in order for the business office to ensure compliance. This only denotes the appropriateness of proper admission status and does not reflect the quality of care rendered.   The definitions of Inpatient Status and Observation Status used in making the determination above are those provided in the CMS Coverage Manual, Chapter 1 and Chapter 6, section 70.4.   Sincerely,   Juwan Singh MD  Utilization Review  Physician Advisor  [facility]

## 2023-08-11 ENCOUNTER — TELEPHONE (OUTPATIENT)
Dept: FAMILY MEDICINE | Facility: CLINIC | Age: 73
End: 2023-08-11
Payer: COMMERCIAL

## 2023-08-11 ENCOUNTER — APPOINTMENT (OUTPATIENT)
Dept: PHYSICAL THERAPY | Facility: HOSPITAL | Age: 73
DRG: 065 | End: 2023-08-11
Attending: INTERNAL MEDICINE
Payer: COMMERCIAL

## 2023-08-11 ENCOUNTER — APPOINTMENT (OUTPATIENT)
Dept: NUCLEAR MEDICINE | Facility: HOSPITAL | Age: 73
DRG: 065 | End: 2023-08-11
Attending: INTERNAL MEDICINE
Payer: COMMERCIAL

## 2023-08-11 PROBLEM — D72.829 LEUKOCYTOSIS: Status: ACTIVE | Noted: 2023-08-11

## 2023-08-11 LAB
ALBUMIN UR-MCNC: 70 MG/DL
ANION GAP SERPL CALCULATED.3IONS-SCNC: 10 MMOL/L (ref 7–15)
APPEARANCE UR: CLEAR
BILIRUB UR QL STRIP: NEGATIVE
BUN SERPL-MCNC: 18.9 MG/DL (ref 8–23)
CALCIUM SERPL-MCNC: 9.9 MG/DL (ref 8.8–10.2)
CHLORIDE SERPL-SCNC: 99 MMOL/L (ref 98–107)
CHOLEST SERPL-MCNC: 165 MG/DL
COLOR UR AUTO: YELLOW
CREAT SERPL-MCNC: 0.65 MG/DL (ref 0.51–0.95)
DEPRECATED HCO3 PLAS-SCNC: 26 MMOL/L (ref 22–29)
ERYTHROCYTE [DISTWIDTH] IN BLOOD BY AUTOMATED COUNT: 11.9 % (ref 10–15)
GFR SERPL CREATININE-BSD FRML MDRD: >90 ML/MIN/1.73M2
GLUCOSE BLDC GLUCOMTR-MCNC: 103 MG/DL (ref 70–99)
GLUCOSE BLDC GLUCOMTR-MCNC: 122 MG/DL (ref 70–99)
GLUCOSE BLDC GLUCOMTR-MCNC: 123 MG/DL (ref 70–99)
GLUCOSE BLDC GLUCOMTR-MCNC: 93 MG/DL (ref 70–99)
GLUCOSE SERPL-MCNC: 122 MG/DL (ref 70–99)
GLUCOSE UR STRIP-MCNC: NEGATIVE MG/DL
HCT VFR BLD AUTO: 43.6 % (ref 35–47)
HDLC SERPL-MCNC: 34 MG/DL
HGB BLD-MCNC: 14.7 G/DL (ref 11.7–15.7)
HGB UR QL STRIP: ABNORMAL
KETONES UR STRIP-MCNC: NEGATIVE MG/DL
LDLC SERPL CALC-MCNC: 108 MG/DL
LEUKOCYTE ESTERASE UR QL STRIP: NEGATIVE
MAGNESIUM SERPL-MCNC: 2.5 MG/DL (ref 1.7–2.3)
MCH RBC QN AUTO: 30.6 PG (ref 26.5–33)
MCHC RBC AUTO-ENTMCNC: 33.7 G/DL (ref 31.5–36.5)
MCV RBC AUTO: 91 FL (ref 78–100)
MUCOUS THREADS #/AREA URNS LPF: PRESENT /LPF
NITRATE UR QL: NEGATIVE
NONHDLC SERPL-MCNC: 131 MG/DL
PH UR STRIP: 6.5 [PH] (ref 5–7)
PLATELET # BLD AUTO: 198 10E3/UL (ref 150–450)
POTASSIUM SERPL-SCNC: 3.6 MMOL/L (ref 3.4–5.3)
RBC # BLD AUTO: 4.8 10E6/UL (ref 3.8–5.2)
RBC URINE: 55 /HPF
SODIUM SERPL-SCNC: 135 MMOL/L (ref 136–145)
SP GR UR STRIP: >1.03 (ref 1–1.03)
SQUAMOUS EPITHELIAL: 5 /HPF
TRIGL SERPL-MCNC: 117 MG/DL
UROBILINOGEN UR STRIP-MCNC: <2 MG/DL
WBC # BLD AUTO: 11.1 10E3/UL (ref 4–11)
WBC URINE: 2 /HPF

## 2023-08-11 PROCEDURE — 78226 HEPATOBILIARY SYSTEM IMAGING: CPT

## 2023-08-11 PROCEDURE — 99232 SBSQ HOSP IP/OBS MODERATE 35: CPT | Performed by: INTERNAL MEDICINE

## 2023-08-11 PROCEDURE — 80061 LIPID PANEL: CPT | Performed by: INTERNAL MEDICINE

## 2023-08-11 PROCEDURE — 343N000001 HC RX 343: Performed by: INTERNAL MEDICINE

## 2023-08-11 PROCEDURE — 81001 URINALYSIS AUTO W/SCOPE: CPT | Performed by: INTERNAL MEDICINE

## 2023-08-11 PROCEDURE — A9537 TC99M MEBROFENIN: HCPCS | Performed by: INTERNAL MEDICINE

## 2023-08-11 PROCEDURE — 83735 ASSAY OF MAGNESIUM: CPT | Performed by: INTERNAL MEDICINE

## 2023-08-11 PROCEDURE — 80048 BASIC METABOLIC PNL TOTAL CA: CPT | Performed by: INTERNAL MEDICINE

## 2023-08-11 PROCEDURE — 250N000011 HC RX IP 250 OP 636: Mod: JZ | Performed by: INTERNAL MEDICINE

## 2023-08-11 PROCEDURE — 85027 COMPLETE CBC AUTOMATED: CPT | Performed by: INTERNAL MEDICINE

## 2023-08-11 PROCEDURE — 97161 PT EVAL LOW COMPLEX 20 MIN: CPT | Mod: GP

## 2023-08-11 PROCEDURE — 250N000013 HC RX MED GY IP 250 OP 250 PS 637: Performed by: INTERNAL MEDICINE

## 2023-08-11 PROCEDURE — 97116 GAIT TRAINING THERAPY: CPT | Mod: GP

## 2023-08-11 PROCEDURE — 36415 COLL VENOUS BLD VENIPUNCTURE: CPT | Performed by: INTERNAL MEDICINE

## 2023-08-11 PROCEDURE — 99232 SBSQ HOSP IP/OBS MODERATE 35: CPT | Performed by: PSYCHIATRY & NEUROLOGY

## 2023-08-11 PROCEDURE — 210N000001 HC R&B IMCU HEART CARE

## 2023-08-11 PROCEDURE — 999N000226 HC STATISTIC SLP IP EVAL DEFER

## 2023-08-11 RX ORDER — KIT FOR THE PREPARATION OF TECHNETIUM TC 99M MEBROFENIN 45 MG/10ML
7-9 INJECTION, POWDER, LYOPHILIZED, FOR SOLUTION INTRAVENOUS ONCE
Status: COMPLETED | OUTPATIENT
Start: 2023-08-11 | End: 2023-08-11

## 2023-08-11 RX ADMIN — ESCITALOPRAM OXALATE 20 MG: 20 TABLET, FILM COATED ORAL at 08:51

## 2023-08-11 RX ADMIN — LISINOPRIL 10 MG: 5 TABLET ORAL at 08:52

## 2023-08-11 RX ADMIN — LISINOPRIL 10 MG: 5 TABLET ORAL at 20:34

## 2023-08-11 RX ADMIN — ASPIRIN 325 MG: 325 TABLET, COATED ORAL at 09:23

## 2023-08-11 RX ADMIN — ENOXAPARIN SODIUM 40 MG: 40 INJECTION SUBCUTANEOUS at 20:34

## 2023-08-11 RX ADMIN — THIAMINE HCL TAB 100 MG 100 MG: 100 TAB at 08:52

## 2023-08-11 RX ADMIN — LEVETIRACETAM 500 MG: 500 TABLET, FILM COATED ORAL at 20:34

## 2023-08-11 RX ADMIN — LEVETIRACETAM 500 MG: 500 TABLET, FILM COATED ORAL at 08:51

## 2023-08-11 RX ADMIN — FOLIC ACID 1000 MCG: 1 TABLET ORAL at 08:51

## 2023-08-11 RX ADMIN — MEBROFENIN 8.1 MILLICURIE: 45 INJECTION, POWDER, LYOPHILIZED, FOR SOLUTION INTRAVENOUS at 11:44

## 2023-08-11 RX ADMIN — METOPROLOL SUCCINATE 75 MG: 25 TABLET, EXTENDED RELEASE ORAL at 08:51

## 2023-08-11 RX ADMIN — HYDRALAZINE HYDROCHLORIDE 10 MG: 20 INJECTION INTRAMUSCULAR; INTRAVENOUS at 03:44

## 2023-08-11 RX ADMIN — ATORVASTATIN CALCIUM 10 MG: 10 TABLET, FILM COATED ORAL at 20:34

## 2023-08-11 ASSESSMENT — ACTIVITIES OF DAILY LIVING (ADL)
ADLS_ACUITY_SCORE: 29
DIFFICULTY_COMMUNICATING: NO
DIFFICULTY_EATING/SWALLOWING: NO
DOING_ERRANDS_INDEPENDENTLY_DIFFICULTY: NO
ADLS_ACUITY_SCORE: 29
WALKING_OR_CLIMBING_STAIRS_DIFFICULTY: NO
ADLS_ACUITY_SCORE: 39
VISION_MANAGEMENT: GLASSES
ADLS_ACUITY_SCORE: 29
ADLS_ACUITY_SCORE: 39
ADLS_ACUITY_SCORE: 39
CONCENTRATING,_REMEMBERING_OR_MAKING_DECISIONS_DIFFICULTY: YES
ADLS_ACUITY_SCORE: 39
TOILETING_ISSUES: NO
ADLS_ACUITY_SCORE: 29
ADLS_ACUITY_SCORE: 29
ADLS_ACUITY_SCORE: 39
WEAR_GLASSES_OR_BLIND: YES
ADLS_ACUITY_SCORE: 39
FALL_HISTORY_WITHIN_LAST_SIX_MONTHS: NO
CHANGE_IN_FUNCTIONAL_STATUS_SINCE_ONSET_OF_CURRENT_ILLNESS/INJURY: YES
DRESSING/BATHING_DIFFICULTY: NO
ADLS_ACUITY_SCORE: 39

## 2023-08-11 NOTE — PLAN OF CARE
Problem: Hypertension Acute  Goal: Blood Pressure Within Desired Range  Outcome: Progressing     Problem: Stroke, Ischemic (Includes Transient Ischemic Attack)  Goal: Optimal Cognitive Function  Outcome: Progressing     Pt A/O x3. NIHS score 1 this shift. Neuros intact. Denied pain and SOB. Pt refused to sit in chair, educated on importance of activity, pt verbalized understanding. Hepatobiliary scan completed this shift. UA sent. MAPs less than 110 this shift. Plan for echo with bubble study to be done per orders.

## 2023-08-11 NOTE — PROGRESS NOTES
[facility]    Medicine Progress Note - Hospitalist Service    Date of Admission:  8/9/2023    Assessment & Plan      Hypertensive emergency  Active Problems:    Mild cognitive impairment with memory loss    BPPV (benign paroxysmal positional vertigo)    Essential hypertension    Alcoholic cirrhosis of liver without ascites (H)    Partial symptomatic epilepsy with complex partial seizures, not intractable, without status epilepticus (H)    Accelerated hypertension    Nausea and vomiting, unspecified vomiting type    Eleanor Awan is a 72 year old female with history of essential HTN, alcoholism in remission, alcoholic cirrhosis, chronic Hep C, mild cognitive impairment with memory loss, TBI, BPPV, seizure disorder admitted on 8/9/2023 with hypertensive emergency    Hypertensive emergency  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   Troponin slightly elevated at 23 which trended down to 22  EKG with sinus tachycardia but no history of A-fib or a flutter  Magnesium slightly elevated at 2.4, potassium low normal  TSH acceptable  BNP 2103  BMP unremarkable  A1C 5.4 in April 2023 no known history of diabetes  HDL 33,   CBC shows leukocytosis  Echo 9/29/2022: LVEF 60-65%, no valvular abnormalities.  Head CT shows no acute intracranial processes besides small age-indeterminate infarcts and small vessel ischemic changes with mild to moderate volume loss.  There is a left basal ganglia chronic infarct, left anterior temporal lobe small area of tissue loss likely due to past trauma.  CT chest is negative for PE or dissection.  There is tiny groundglass focus medial right lung base that is likely inflammatory.  CT abdomen and pelvis shows gallbladder wall edema likely related to underlying liver disease and hepatic cirrhosis.  Permissive hypertension for 48 hours, statin BP control now  PTA  metoprolol?  Consistent compliance  Aspirin 325 mg daily  -- Telemetry  -Stroke protocol/ASA/atorvastatin/telemetry/PT/OT  -Neurology following    Troponin elevation, BNP elevation: Troponin trended down and is minimally elevated without findings of ACS on EKG  No overt signs of CHF on imaging medically  -Echo, telemetry    Leukocytosis: Unclear etiology, question stress response from HTN and nausea  Question if minimal groundglass changes within the right lower lobe are related to hepatic hydrothorax?  No symptoms to support pneumonia  UA with bacteriuria, pyuria, negative nitrates and LE  BC NGTD  HIDA scan 8/11 showed normal reactive activity and liver, bile ducts, gallbladder    Alcoholic cirrhosis, chronic hep C  History of IVDU/heroin  And cannabis use  --LFTs unremarkable  --Lipase mildly elevated but not consistent with pancreatitis  --Incidental finding of gallbladder wall edema likely reflects underlying liver disease on imaging. Patient had nausea on admission, likely related to elevated BP, nausea resolved now.  No abdominal pain.  HIDA scan 8/11showed normal reactive activity and liver, bile ducts, gallbladder    Mild hypermagnesemia:  -- trend    H/O seizure disorder:  -continue home keppra;  Keppra level 21.3, WNL    H/o TBI, cognitive impairment, mood disorder:  -- continue home lexapro, folic acid and thiamine      Diet: Combination Diet Regular Diet Adult    DVT Prophylaxis: Enoxaparin (Lovenox) SQ  Gtz Catheter: Not present  Lines: None     Cardiac Monitoring: ACTIVE order. Indication: Stroke, acute (48 hours)  Code Status: Full Code      Clinically Significant Risk Factors           # Hypercalcemia: Highest Ca = 10.2 mg/dL in last 2 days, will monitor as appropriate          # Hypertension: Noted on problem list               Disposition Plan      Expected Discharge Date: 08/11/2023                Zulay Veliz MD  Hospitalist Service  [facility]  Securely message  with [employer] (more info)  Text page via [facility] Paging/Directory   ______________________________________________________________________    Interval History   Low grade fever 100.5.  Leukocytosis improving.  Patient continues complain of epigastric pain without associated nausea, vomiting, dysuria.  Obtain HIDA scan, which showed normal radionuclide activity in liver, gallbladder, bile ducts and small bowel.    D/W neurology.    Physical Exam   Vital Signs: Temp: 99.1  F (37.3  C) Temp src: Oral BP: (!) 154/75 (map 108) Pulse: 74   Resp: 22 SpO2: 96 % O2 Device: None (Room air)    Weight: 105 lbs 12.8 oz  General Appearance: In no acute distress, somewhat slow to respond  RESPIRATORY: Respirations nonlabored  CARDIOVASCULAR: No le edema bilat.  ABDOMEN: soft and non-tender  NEUROLOGIC: No focal arm or leg  weakness, speech is clear    Medical Decision Making       35 MINUTES SPENT BY ME on the date of service doing chart review, history, exam, documentation & further activities per the note.      Data     I have personally reviewed the following data over the past 24 hrs:    11.1 (H)  \   14.7   / 198     135 (L) 99 18.9 /  123 (H)   3.6 26 0.65 \     ALT: N/A AST: N/A AP: N/A TBILI: N/A   ALB: N/A TOT PROTEIN: N/A LIPASE: N/A     TSH: N/A T4: N/A A1C: N/A     Procal: N/A CRP: N/A Lactic Acid: 1.1       Imaging results reviewed over the past 24 hrs:   Recent Results (from the past 24 hour(s))   MR Brain w/o Contrast    Narrative    EXAM: MR BRAIN W/O CONTRAST  LOCATION: [facility]  DATE: 8/10/2023    INDICATION: CT Head with signs of ischemia of indeterminate age. MRI from fall 2022 for comparison.  COMPARISON: 08/09/2023  TECHNIQUE: Routine multiplanar multisequence head MRI without intravenous contrast.    FINDINGS:  INTRACRANIAL CONTENTS: Acute to subacute infarct of the left hippocampal tail. No additional evidence of acute subacute infarction. Multiple remote lacunar infarcts and remote  left temporal lobe infarct. No mass, acute hemorrhage, or extra-axial fluid   collections. Patchy nonspecific T2/FLAIR hyperintensities within the cerebral white matter and yon most consistent with mild to moderate chronic microvascular ischemic change. Mild to moderate generalized cerebral atrophy. No hydrocephalus. Normal   position of the cerebellar tonsils.     SELLA: No abnormality accounting for technique.    OSSEOUS STRUCTURES/SOFT TISSUES: Normal marrow signal. The major intracranial vascular flow voids are maintained.     ORBITS: No abnormality accounting for technique.     SINUSES/MASTOIDS: No paranasal sinus mucosal disease. No middle ear or mastoid effusion.       Impression    IMPRESSION:  1.  Acute to subacute infarct left hippocampal tail.  2.  Probable mild to moderate sequela chronic vessel ischemic disease and remote infarction.  3.  Mild to moderate generalized brain parenchymal volume loss.

## 2023-08-11 NOTE — CONSULTS
Care Management Follow Up    Length of Stay (days): 2    Expected Discharge Date: 08/11/2023     Concerns to be Addressed: discharge planning      Patient plan of care discussed at interdisciplinary rounds: Yes    Anticipated Discharge Disposition: TBD      Anticipated Discharge Services: TBD   Anticipated Discharge DME: per treatment team     Patient/family educated on Medicare website which has current facility and service quality ratings: N/A   Education Provided on the Discharge Plan: TBD   Patient/Family in Agreement with the Plan: TBD     Referrals Placed by CM/SW: none at this time   Private pay costs discussed: Not applicable    Additional Information:  8:35 AM  CM consulted for discharge planning. PT/OT consults currently pending. CM following for therapy and care team recommendations for appropriate planning for discharge.     FERNANDA Toscano

## 2023-08-11 NOTE — PROGRESS NOTES
08/11/23 1059   Appointment Info   Signing Clinician's Name / Credentials (PT) Citlalli Myrick, PT, DPT   Living Environment   People in Home spouse   Current Living Arrangements apartment   Home Accessibility stairs to enter home;stairs within home   Number of Stairs, Main Entrance 10   Stair Railings, Main Entrance railings safe and in good condition;railings on both sides of stairs   Number of Stairs, Within Home, Primary five   Stair Railings, Within Home, Primary railings safe and in good condition;railings on both sides of stairs   Transportation Anticipated family or friend will provide   Self-Care   Usual Activity Tolerance moderate   Equipment Currently Used at Home none  (Has 4WW at home)   Activity/Exercise/Self-Care Comment Patient reports previous independence with mobility and ADLs.  does cooking, cleaning, and laundry at home.   General Information   Onset of Illness/Injury or Date of Surgery 08/09/23   Referring Physician Zulay Veliz MD   Patient/Family Therapy Goals Statement (PT) To go home   Pertinent History of Current Problem (include personal factors and/or comorbidities that impact the POC) Eleanor Awan is a 72 year old female who presents with episodes of nausea for the last 2 days associated with headache and dizziness. She had a home RN visit today and she was found to be hypertensive.   Currently patient states that her nausea is improved and she denies any other complaints.   Existing Precautions/Restrictions fall   Weight-Bearing Status - LLE full weight-bearing   Weight-Bearing Status - RLE full weight-bearing   Cognition   Affect/Mental Status (Cognition) WFL   Orientation Status (Cognition) disoriented to;situation;time;verbal cues/prompts needed for orientation   Follows Commands (Cognition) WFL   Range of Motion (ROM)   Range of Motion ROM is WFL   Strength (Manual Muscle Testing)   Strength (Manual Muscle Testing) strength is WFL   Bed Mobility   Bed  Mobility supine-sit   Supine-Sit [place] (Bed Mobility) independent   Transfers   Transfers sit-stand transfer   Sit-Stand Transfer   Sit-Stand [place] (Transfers) supervision   Assistive Device (Sit-Stand Transfers) walker, front-wheeled   Gait/Stairs (Locomotion)   [place] Level (Gait) supervision   Assistive Device (Gait) walker, front-wheeled   Distance in Feet 10   Distance in Feet (Gait) 140; 200   Pattern (Gait) step-through   Deviations/Abnormal Patterns (Gait) gait speed decreased   Clinical Impression   Criteria for Skilled Therapeutic Intervention Yes, treatment indicated   PT Diagnosis (PT) Impaired functional mobility   Influenced by the following impairments Weakness   Functional limitations due to impairments transfers, gait, stairs   Clinical Presentation (PT Evaluation Complexity) Evolving/Changing   Clinical Presentation Rationale Patient presents as medically diagnosed   Clinical Decision Making (Complexity) low complexity   Planned Therapy Interventions (PT) gait training;home exercise program;patient/family education;stair training;strengthening;transfer training   Anticipated Equipment Needs at Discharge (PT) cane, straight;walker, rolling   Risk & Benefits of therapy have been explained evaluation/treatment results reviewed;care plan/treatment goals reviewed;participants included;patient;spouse/significant other   PT Total Evaluation Time   PT Eval, Low Complexity Minutes (45360) 10   Physical Therapy Goals   PT Frequency Daily   PT Predicted Duration/Target Date for Goal Attainment 08/18/23   PT Goals Transfers;Gait   PT: Transfers Independent;Sit to/from stand;Bed to/from chair;Assistive device   PT: Gait Supervision/stand-by assist;150 feet   Interventions   Interventions Quick Adds Gait Training   Gait Training   Gait Training Minutes (06710) 10   Symptoms Noted During/After Treatment (Gait Training) none   Treatment Detail/Skilled Intervention Patient ambulated in hallway  with SBA x 1 and FWW. No LOB noted. Trialed gait without AD, multiple LOB noted to R and L directions, min ax 1 required to prevent a fall. Patient frequently reaching out for walls, hand rails while ambulating without AD. Educated patient on benefit and rationale for using AD for stability and safety during gait, patient agreeable. Patient supine in bed with call light within reach and bed alarm armed at end of session.   E[place]ott Level (Gait Training) other (see comments)  (SBA with FWW, CGA without AD)   Physical Assistance Level (Gait Training) 1 person assist;supervision   Weight Bearing (Gait Training) full weight-bearing   Assistive Device (Gait Training) rolling walker;other (see comments)  (and none)   Gait Analysis Deviations decreased drea   Impairments (Gait Analysis/Training) balance impaired  (Without AD)   PT Discharge Planning   PT Plan Gait with FWW vs cane, LE ex, balance, stairs   PT Discharge Recommendation (DC Rec) home;home with assist;home with home care physical therapy   PT Rationale for DC Rec Patient currently requires assist of 1 for safe transfers and gait. She lives in a apartment with her  who is able to provide assistance as needed. Anticipate patient will be safe to discharge home with assist from  once medically cleared to do so. Pending progress with therapy, patient may benefit from home PT   PT Brief overview of current status Assist of 1 for transfers and gait   Total Session Time   Timed Code Treatment Minutes 10   Total Session Time (sum of timed and untimed services) 20     Citlalli Myrick, PT, DPT

## 2023-08-11 NOTE — PLAN OF CARE
Alert and orientedx4. Forgetful at times. Denies of pain. NIH score of 0. SBP of 150 with MAP of >110. PRN hydralazine 10mg given and effective with MAP of 107. Lactic acid normal. Daughter updated on this shift. Will monitor closely.

## 2023-08-11 NOTE — TELEPHONE ENCOUNTER
Please use my same day slot on Tues 8/15 for this patient to come for a hospital follow up appointment. I really need to see her in follow up to be sure her BP is under control.    When you call, talk to her , Asaf/ Rian. Eleanor will not remember the appointment (and, right now, she is in the hospital).    thanks

## 2023-08-11 NOTE — TELEPHONE ENCOUNTER
Spoke to spouse and scheduled.     With Family Practice (Camille Love MD)  08/15/2023 at 12:30 PM 12:10 pm arrival

## 2023-08-11 NOTE — PROGRESS NOTES
Speech-Language Pathology    SLP orders received. Chart reviewed and discussed with care team. Speech-Language Pathology evaluation is not indicated due to no reported or resolved deficits related to speech, language, or cognition. Patient tolerating current diet of Regular and Thin with no s/s aspiration per RN. No acute SLP needs identified. Defer discharge recommendations to treatment team.?Will complete orders.
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
## 2023-08-11 NOTE — PLAN OF CARE
Problem: Hypertension Acute  Goal: Blood Pressure Within Desired Range  Outcome: Progressing  Intervention: Normalize Blood Pressure  Recent Flowsheet Documentation  Taken 8/11/2023 0342 by Yara Lucero RN  Sensory Stimulation Regulation:   quiet environment promoted   care clustered   lighting decreased  Medication Review/Management: medications reviewed  Taken 8/11/2023 0005 by Yara Lucero RN  Sensory Stimulation Regulation:   quiet environment promoted   care clustered   lighting decreased  Medication Review/Management: medications reviewed     Problem: Stroke, Ischemic (Includes Transient Ischemic Attack)  Goal: Optimal Cognitive Function  Outcome: Progressing  Intervention: Optimize Cognitive Function  Recent Flowsheet Documentation  Taken 8/11/2023 0342 by Yara Lucero RN  Sensory Stimulation Regulation:   quiet environment promoted   care clustered   lighting decreased  Reorientation Measures: clock in view  Taken 8/11/2023 0005 by Yara Lucero RN  Sensory Stimulation Regulation:   quiet environment promoted   care clustered   lighting decreased  Reorientation Measures: clock in view  Goal: Improved Communication Skills  Outcome: Progressing  Intervention: Optimize Communication Skills  Recent Flowsheet Documentation  Taken 8/11/2023 0342 by Yara Lucero RN  Communication Enhancement Strategies: call light answered in person  Taken 8/11/2023 0005 by Yara Lucero RN  Communication Enhancement Strategies: call light answered in person  Goal: Optimal Functional Ability  Outcome: Progressing  Intervention: Optimize Functional Ability  Recent Flowsheet Documentation  Taken 8/11/2023 0342 by Yara Lucero RN  Activity Management: activity adjusted per tolerance  Taken 8/11/2023 0005 by Yara Lucero RN  Activity Management: activity adjusted per tolerance     Goal Outcome Evaluation:         Scored 2 and 1 on stroke scale. Can't remember the month and age at first assessment, then knows her  age the second assessment. Standby assist to the bathroom. MAP was above 110. PRN hydralazine given once tonight. /63 with a MAP of 91 after the IV hydralazine.

## 2023-08-12 ENCOUNTER — APPOINTMENT (OUTPATIENT)
Dept: PHYSICAL THERAPY | Facility: HOSPITAL | Age: 73
DRG: 065 | End: 2023-08-12
Payer: COMMERCIAL

## 2023-08-12 ENCOUNTER — APPOINTMENT (OUTPATIENT)
Dept: CARDIOLOGY | Facility: HOSPITAL | Age: 73
DRG: 065 | End: 2023-08-12
Attending: PSYCHIATRY & NEUROLOGY
Payer: COMMERCIAL

## 2023-08-12 VITALS
HEART RATE: 75 BPM | BODY MASS INDEX: 18.84 KG/M2 | SYSTOLIC BLOOD PRESSURE: 136 MMHG | HEIGHT: 63 IN | TEMPERATURE: 99.4 F | RESPIRATION RATE: 22 BRPM | DIASTOLIC BLOOD PRESSURE: 67 MMHG | OXYGEN SATURATION: 96 % | WEIGHT: 106.3 LBS

## 2023-08-12 LAB
LVEF ECHO: NORMAL
MAGNESIUM SERPL-MCNC: 2.5 MG/DL (ref 1.7–2.3)
POTASSIUM SERPL-SCNC: 3.5 MMOL/L (ref 3.4–5.3)

## 2023-08-12 PROCEDURE — 999N000208 ECHOCARDIOGRAM COMPLETE

## 2023-08-12 PROCEDURE — 99233 SBSQ HOSP IP/OBS HIGH 50: CPT | Performed by: PSYCHIATRY & NEUROLOGY

## 2023-08-12 PROCEDURE — 99239 HOSP IP/OBS DSCHRG MGMT >30: CPT | Performed by: INTERNAL MEDICINE

## 2023-08-12 PROCEDURE — 250N000013 HC RX MED GY IP 250 OP 250 PS 637: Performed by: INTERNAL MEDICINE

## 2023-08-12 PROCEDURE — 97112 NEUROMUSCULAR REEDUCATION: CPT | Mod: GP

## 2023-08-12 PROCEDURE — 83735 ASSAY OF MAGNESIUM: CPT | Performed by: INTERNAL MEDICINE

## 2023-08-12 PROCEDURE — 93306 TTE W/DOPPLER COMPLETE: CPT | Mod: 26 | Performed by: INTERNAL MEDICINE

## 2023-08-12 PROCEDURE — 97116 GAIT TRAINING THERAPY: CPT | Mod: GP

## 2023-08-12 PROCEDURE — 84132 ASSAY OF SERUM POTASSIUM: CPT | Performed by: INTERNAL MEDICINE

## 2023-08-12 PROCEDURE — 36415 COLL VENOUS BLD VENIPUNCTURE: CPT | Performed by: INTERNAL MEDICINE

## 2023-08-12 RX ORDER — LISINOPRIL 20 MG/1
20 TABLET ORAL 2 TIMES DAILY
Status: DISCONTINUED | OUTPATIENT
Start: 2023-08-13 | End: 2023-08-12 | Stop reason: HOSPADM

## 2023-08-12 RX ORDER — FOLIC ACID 1 MG/1
1000 TABLET ORAL DAILY
Qty: 90 TABLET | Refills: 0 | Status: SHIPPED | OUTPATIENT
Start: 2023-08-12 | End: 2023-11-10

## 2023-08-12 RX ORDER — ESCITALOPRAM OXALATE 20 MG/1
20 TABLET ORAL DAILY
Qty: 90 TABLET | Refills: 0 | Status: SHIPPED | OUTPATIENT
Start: 2023-08-12 | End: 2023-11-30

## 2023-08-12 RX ORDER — LISINOPRIL 20 MG/1
20 TABLET ORAL 2 TIMES DAILY
Qty: 180 TABLET | Refills: 0 | Status: SHIPPED | OUTPATIENT
Start: 2023-08-13 | End: 2023-11-30

## 2023-08-12 RX ORDER — CALCIUM CARBONATE/VITAMIN D3 600 MG-10
1 TABLET ORAL DAILY
Qty: 100 TABLET | Refills: 0 | Status: SHIPPED | OUTPATIENT
Start: 2023-08-12 | End: 2023-11-10

## 2023-08-12 RX ORDER — LEVETIRACETAM 500 MG/1
500 TABLET ORAL 2 TIMES DAILY
Qty: 180 TABLET | Refills: 0 | Status: SHIPPED | OUTPATIENT
Start: 2023-08-12 | End: 2023-11-30

## 2023-08-12 RX ORDER — POTASSIUM CHLORIDE 1500 MG/1
40 TABLET, EXTENDED RELEASE ORAL ONCE
Status: COMPLETED | OUTPATIENT
Start: 2023-08-12 | End: 2023-08-12

## 2023-08-12 RX ORDER — LISINOPRIL 20 MG/1
20 TABLET ORAL 2 TIMES DAILY
Status: DISCONTINUED | OUTPATIENT
Start: 2023-08-12 | End: 2023-08-12

## 2023-08-12 RX ORDER — METOPROLOL SUCCINATE 25 MG/1
75 TABLET, EXTENDED RELEASE ORAL DAILY
Qty: 270 TABLET | Refills: 0 | Status: SHIPPED | OUTPATIENT
Start: 2023-08-12 | End: 2023-11-30

## 2023-08-12 RX ORDER — ATORVASTATIN CALCIUM 10 MG/1
10 TABLET, FILM COATED ORAL EVERY EVENING
Qty: 90 TABLET | Refills: 0 | Status: SHIPPED | OUTPATIENT
Start: 2023-08-12 | End: 2023-11-30

## 2023-08-12 RX ORDER — ASPIRIN 325 MG
325 TABLET, DELAYED RELEASE (ENTERIC COATED) ORAL DAILY
Qty: 90 TABLET | Refills: 0 | Status: SHIPPED | OUTPATIENT
Start: 2023-08-12 | End: 2023-08-30

## 2023-08-12 RX ORDER — LISINOPRIL 5 MG/1
10 TABLET ORAL ONCE
Status: COMPLETED | OUTPATIENT
Start: 2023-08-12 | End: 2023-08-12

## 2023-08-12 RX ADMIN — LISINOPRIL 10 MG: 5 TABLET ORAL at 09:25

## 2023-08-12 RX ADMIN — ESCITALOPRAM OXALATE 20 MG: 20 TABLET, FILM COATED ORAL at 08:17

## 2023-08-12 RX ADMIN — THIAMINE HCL TAB 100 MG 100 MG: 100 TAB at 08:17

## 2023-08-12 RX ADMIN — FOLIC ACID 1000 MCG: 1 TABLET ORAL at 08:18

## 2023-08-12 RX ADMIN — LEVETIRACETAM 500 MG: 500 TABLET, FILM COATED ORAL at 08:18

## 2023-08-12 RX ADMIN — LISINOPRIL 10 MG: 5 TABLET ORAL at 08:17

## 2023-08-12 RX ADMIN — POTASSIUM CHLORIDE 40 MEQ: 1500 TABLET, EXTENDED RELEASE ORAL at 09:25

## 2023-08-12 RX ADMIN — ASPIRIN 325 MG: 325 TABLET, COATED ORAL at 09:25

## 2023-08-12 RX ADMIN — METOPROLOL SUCCINATE 75 MG: 25 TABLET, EXTENDED RELEASE ORAL at 08:18

## 2023-08-12 ASSESSMENT — ACTIVITIES OF DAILY LIVING (ADL)
ADLS_ACUITY_SCORE: 29
ADLS_ACUITY_SCORE: 29
ADLS_ACUITY_SCORE: 25
ADLS_ACUITY_SCORE: 29

## 2023-08-12 NOTE — DISCHARGE SUMMARY
[facility]  Hospitalist Discharge Summary      Date of Admission:  8/9/2023  Date of Discharge:  8/12/2023  Discharging Provider: Zulay Veliz MD  Discharge Service: Hospitalist Service    Discharge Diagnoses   Acute ischemic stroke in left hypermobile tail  Hypertensive emergency  Accelerated hypertension  Elevated troponin  Probable noncompliance with antihypertensive medications  Mild cognitive impairment with memory loss  BPPV (benign paroxysmal positional vertigo)  Alcoholic cirrhosis of liver without ascites   Partial symptomatic epilepsy with complex partial seizures, not intractable, without status epilepticus (  Daily marijuana use  History of heroin addiction  Chronic hepatitis C  Hypomagnesemia  History of traumatic brain injury    Follow-ups Needed After Discharge   Follow-up Appointments    Follow-up and recommended labs and tests      Follow up with primary care provider, Camille Love, within 7 days to   evaluate medication change, for hospital follow- up, and regarding new   diagnosis.  The following labs/tests are recommended: bmp, mg.       Discharge Disposition   Discharged to home with home health care for RN, PT, OT  Condition at discharge: Stable    Hospital Course   Hypertensive emergency  Active Problems:    Mild cognitive impairment with memory loss    BPPV (benign paroxysmal positional vertigo)    Essential hypertension    Alcoholic cirrhosis of liver without ascites (H)    Partial symptomatic epilepsy with complex partial seizures, not intractable, without status epilepticus (H)    Accelerated hypertension    Nausea and vomiting, unspecified vomiting type    Eleanor Awan is a 72 year old female with history of essential HTN, alcoholism in remission, alcoholic cirrhosis, chronic Hep C, mild cognitive impairment with memory loss, TBI, BPPV, seizure disorder admitted on 8/9/2023 with hypertensive emergency    Hypertensive emergency  Acute to subacute  infarct in the left hippocampal tail  Patient presents with MAP greater than 140 associated with nausea and lightheadedness.   Troponin slightly elevated at 23 which trended down to 22, likely LV strain in the setting of hypertensive crisis.  EKG with sinus tachycardia but no currently or history of A-fib or a flutter  Magnesium slightly elevated at 2.4, potassium low normal  TSH normal 2.98  BNP likely elevated at 2103 with no overt symptoms of CHF.  BMP unremarkable  A1C 5.4 in April 2023 no known history of diabetes  HDL 33,   CBC shows leukocytosis  Echo 9/29/2022: LVEF 60-65%, no valvular abnormalities.  Head CT shows no acute intracranial processes besides small age-indeterminate infarcts and small vessel ischemic changes with mild to moderate volume loss.  There is a left basal ganglia chronic infarct, left anterior temporal lobe small area of tissue loss likely due to past trauma.  CT chest is negative for PE or dissection.  There is tiny groundglass focus medial right lung base that is likely inflammatory.  CT abdomen and pelvis shows gallbladder wall edema likely related to underlying liver disease and hepatic cirrhosis.  Suspect noncompliance with metoprolol.  Patient was seen by neurology, started on full dose aspirin.  Patient was evaluated by PT/OT, recommended home therapy.    Leukocytosis: Unclear etiology, question stress response from HTN and nausea  Question if minimal groundglass changes within the right lower lobe are related to hepatic hydrothorax?  No symptoms to support pneumonia  UA with bacteriuria, pyuria, negative nitrates and LE  BC NGTD  HIDA scan 8/11 showed normal reactive activity and liver, bile ducts, gallbladder    Alcoholic cirrhosis, chronic hep C  History of IVDU/heroin  Daily cannabis use  LFTs unremarkable  Lipase mildly elevated but not consistent with pancreatitis  Incidental finding of gallbladder wall edema likely reflects underlying liver disease on imaging.  Patient had nausea on admission, likely related to elevated BP, nausea resolved now.  No abdominal pain.  HIDA scan 8/11showed normal reactive activity and liver, bile ducts, gallbladder    Mild hypermagnesemia, corrected with replacement.  Not on supplements.    H/O seizure disorder:  -continue home keppra;  Keppra level 21.3, WNL    H/o TBI, cognitive impairment, mood disorder:  -- continue home lexapro, folic acid and thiamine     Consultations This Hospital Stay   NEUROLOGY IP CONSULT  CARE MANAGEMENT / SOCIAL WORK IP CONSULT  SPEECH LANGUAGE PATH ADULT IP CONSULT  PHARMACY IP CONSULT  PHARMACY IP CONSULT  PHARMACY IP CONSULT  PHYSICAL THERAPY ADULT IP CONSULT  OCCUPATIONAL THERAPY ADULT IP CONSULT  REHAB ADMISSIONS LIAISON IP CONSULT  CARE MANAGEMENT / SOCIAL WORK IP CONSULT  SMOKING CESSATION PROGRAM IP CONSULT    Code Status   Full Code    Time Spent on this Encounter   I, Zulay Veliz MD, personally saw the patient today and spent greater than 30 minutes discharging this patient.    Zulay Veliz MD  [facility] HEART [address] [address]  Phone: [phone]  Fax: [phone]  ______________________________________________________________________    Physical Exam   Vital Signs: Temp: 98.9  F (37.2  C) Temp src: Oral BP: (!) 167/74 (map 107) Pulse: 83   Resp: 22 SpO2: 97 % O2 Device: None (Room air)    Weight: 106 lbs 4.8 oz  General Appearance: In no acute distress, somewhat slow to respond  RESPIRATORY: Respirations nonlabored  CARDIOVASCULAR: No le edema bilat.  ABDOMEN: soft and non-tender  NEUROLOGIC: No focal arm or leg  weakness, speech is clear       Primary Care Physician   Camille Love    Discharge Orders      Home Care Referral      Reason for your hospital stay    Acute stroke, uncontrolled hypertension     Follow-up and recommended labs and tests     Follow up with primary care provider, Camille Love, within 7 days to evaluate  medication change, for hospital follow- up, and regarding new diagnosis.  The following labs/tests are recommended: bmp, mg.     Activity    Your activity upon discharge: activity as tolerated     Diet    Follow this diet upon discharge: Orders Placed This Encounter      Combination Diet Regular Diet Adult     Significant Results and Procedures   Results for orders placed or performed during the hospital encounter of 08/09/23   Head CT w/o contrast    Narrative    EXAM: CT HEAD W/O CONTRAST  LOCATION: [facility]  DATE: 8/9/2023    INDICATION: headache, nausea, dry heaves; accelerated hypertension  COMPARISON: MRI brain 09/29/2022  TECHNIQUE: Routine CT Head without IV contrast. Multiplanar reformats. Dose reduction techniques were used.    FINDINGS:  INTRACRANIAL CONTENTS: No intracranial hemorrhage, extraaxial collection, or mass effect.  Right lentiform nucleus small age-indeterminate infarct versus perivascular space new compared to prior MRI brain 09/29/2022. Bilateral thalami small   age-indeterminate infarcts new compared to MRI brain 09/29/2022. Mild presumed chronic small vessel ischemic changes. Mild to moderate generalized volume loss. No hydrocephalus. Left basal ganglia chronic infarct. Left anterior temporal lobe small area   tissue loss and gliosis likely due to chronic trauma.    VISUALIZED ORBITS/SINUSES/MASTOIDS: No intraorbital abnormality. No paranasal sinus mucosal disease. No middle ear or mastoid effusion.    BONES/SOFT TISSUES: No acute abnormality.      Impression    IMPRESSION:  1.  Small age-indeterminate infarcts described above new compared to MRI brain 09/29/2022.  2.  Age-related changes described above.  3.  Otherwise, no acute intracranial process identified.   CT Chest Pulmonary Embolism w Contrast    Narrative    EXAM: CT CHEST PULMONARY EMBOLISM W CONTRAST  LOCATION: [facility]  DATE: 8/9/2023    INDICATION: tachycardia, nausea    vomiting   rule out PE  COMPARISON: 09/29/2022  TECHNIQUE: CT chest pulmonary angiogram during arterial phase injection of IV contrast. Multiplanar reformats and MIP reconstructions were performed. Dose reduction techniques were used.   CONTRAST: isovue 370 90ml    FINDINGS:  ANGIOGRAM CHEST: Pulmonary arteries are normal caliber and negative for pulmonary emboli. Thoracic aorta is negative for dissection. No CT evidence of right heart strain.    LUNGS AND PLEURA: Slight focus of groundglass opacity with internal spiculation, normal-size 9 mm evident within medial right lower lobe (image 219) likely represents minimal inflammatory focus. Calcified granuloma left lower lobe. Lungs otherwise clear.    MEDIASTINUM/AXILLAE: Normal.    CORONARY ARTERY CALCIFICATION: Mild.    UPPER ABDOMEN: Normal.    MUSCULOSKELETAL: Normal.      Impression    IMPRESSION:  1.  No pulmonary emboli identified. No etiology for symptoms evident.  2.  Tiny groundglass focus medial right lung base likely inflammatory. Suggest follow-up CT in 3 months to confirm resolution.    REFERENCE:  Guidelines for Management of Incidental Pulmonary Nodules Detected on CT Images: From the Fleischner Society 2017.   Guidelines apply to incidental nodules in patients who are 35 years or older.  Guidelines do not apply to lung cancer screening, patients with immunosuppression, or patients with known primary cancer.    SUBSOLID NODULES    Part solid    Nodule size 6 mm or greater  CT at 3-6 months to confirm persistence. If unchanged and solid component remains <6 mm, annual CT for 5 years.   CT Abdomen Pelvis w Contrast    Narrative    EXAM: CT ABDOMEN PELVIS W CONTRAST  LOCATION: [facility]  DATE: 8/9/2023    INDICATION: Abdominal pain, nausea, and vomiting.  COMPARISON: 09/30/2022.  TECHNIQUE: CT scan of the abdomen and pelvis was performed following injection of IV contrast. Multiplanar reformats were obtained. Dose reduction  techniques were used.  CONTRAST: 90 mL Isovue 370.    FINDINGS:  Patient was imaged with arm(s) at side, limiting study quality. Study is additionally degraded by motion.    LOWER CHEST: Small amount of fluid within the nondilated distal esophagus, suggestive of reflux. Subtle groundglass changes within the medial right lower lobe, likely infectious/inflammatory, axial image 3.    HEPATOBILIARY: Liver surface nodularity, compatible with cirrhosis.    Prominent gallbladder wall edema, similar to previous examination.    No intrahepatic or intrahepatic biliary ductal dilatation.    PANCREAS: Enhances normally. No peripancreatic inflammatory fat stranding.    SPLEEN: Enhances normally. Normal size.    ADRENAL GLANDS: Normal.    KIDNEYS: Both kidneys enhance symmetrically, without hydronephrosis.    No nephroureterolithiasis.    Urinary bladder is unremarkable.    PELVIC ORGANS: No suspicious abnormality.    BOWEL: No evidence of acute gastrointestinal inflammation or obstruction. Normal appendix. Colonic diverticulosis.    No intraperitoneal free fluid or free air.    LYMPH NODES: No suspicious abdominal or pelvic lymphadenopathy.    VASCULATURE: No abdominal aortic aneurysm. Moderate to severe atheromatous disease.    MUSCULOSKELETAL: No suspicious abnormality.    OTHER: No additionally significant abnormalities.      Impression    IMPRESSION:   1.  Persisting gallbladder wall edema, likely attributable to patient's underlying liver disease. However, acute cholecystitis cannot be entirely excluded. If this is of clinical suspicion, further evaluation with HIDA scan is recommended, as an   ultrasound will likely be equivocal.  2.  Minimal groundglass changes within the medial right lower lobe, likely infectious/inflammatory.  3.  Hepatic cirrhosis.  4.  Colonic diverticulosis.  5.  Gastroesophageal reflux.  6.  Motion degraded examination.     MR Brain w/o Contrast    Narrative    EXAM: MR BRAIN W/O CONTRAST  LOCATION:  [facility]  DATE: 8/10/2023    INDICATION: CT Head with signs of ischemia of indeterminate age. MRI from fall 2022 for comparison.  COMPARISON: 08/09/2023  TECHNIQUE: Routine multiplanar multisequence head MRI without intravenous contrast.  FINDINGS:  INTRACRANIAL CONTENTS: Acute to subacute infarct of the left hippocampal tail. No additional evidence of acute subacute infarction. Multiple remote lacunar infarcts and remote left temporal lobe infarct. No mass, acute hemorrhage, or extra-axial fluid   collections. Patchy nonspecific T2/FLAIR hyperintensities within the cerebral white matter and yon most consistent with mild to moderate chronic microvascular ischemic change. Mild to moderate generalized cerebral atrophy. No hydrocephalus. Normal   position of the cerebellar tonsils.   SELLA: No abnormality accounting for technique.  OSSEOUS STRUCTURES/SOFT TISSUES: Normal marrow signal. The major intracranial vascular flow voids are maintained.   ORBITS: No abnormality accounting for technique.   SINUSES/MASTOIDS: No paranasal sinus mucosal disease. No middle ear or mastoid effusion.     Impression    IMPRESSION:  1.  Acute to subacute infarct left hippocampal tail.  2.  Probable mild to moderate sequela chronic vessel ischemic disease and remote infarction.  3.  Mild to moderate generalized brain parenchymal volume loss.   NM HepatOBiliary Scan    Narrative    EXAM: NM HEPATOBILIARY SCAN  LOCATION: [facility]  DATE: 8/11/2023  INDICATION: Right upper quadrant pain. Evaluate for acute cholecystitis.  COMPARISON: CT abdomen pelvis 08/09/2023.  TECHNIQUE: 8.1 mCi of Tc-99m mebrofenin, IV. Anterior planar imaging of the abdomen.   FINDINGS: Normal radionuclide activity in liver, gallbladder, bile ducts, and small bowel. No evidence of cystic or common duct obstruction or intrinsic liver disease.    Impression    IMPRESSION:   Negative for acute cholecystitis.    Echocardiogram Complete with Bubble Study     Value    LVEF  55-60%    [facility]    [id number]  [id number]  [id number]  [id number]^JAMAL^SOCO^[place] [address]  Name: BUD KEATING  MRN: [id number]  : 1950  Study Date: 2023 08:51 AM  Age: 72 yrs  Gender: Female  Patient Location: [facility]  Reason For Study: CVA  Ordering Physician: SOCO BERRY  Performed By:   BSA: 1.5 m2  Height: 63 in  Weight: 105 lb  HR: 65  ______________________________________________________________________________  Procedure  Complete Portable Echo Adult. Complete Portable Bubble Echo Adult.  ______________________________________________________________________________  Interpretation Summary     The left ventricle is normal in size.  Left ventricular function is normal.The ejection fraction is 55-60%.  Normal right ventricle size and systolic function.  A contrast injection (Bubble Study) was performed that was negative for flow  across the interatrial septum.  ______________________________________________________________________________  Left Ventricle  The left ventricle is normal in size. Left ventricular function is normal.The  ejection fraction is 55-60%. There is normal left ventricular wall thickness.  Left ventricular diastolic function is normal. No regional wall motion  abnormalities noted.  Right Ventricle  Normal right ventricle size and systolic function.  Atria  Normal left atrial size. Right atrial size is normal. There is no color  Doppler evidence of an atrial shunt. A contrast injection (Bubble Study) was  performed that was negative for flow across the interatrial septum.  Mitral Valve  Mitral valve leaflets appear normal. There is no evidence of mitral stenosis  or clinically significant mitral regurgitation. There is trace mitral  regurgitation.  Tricuspid Valve  The tricuspid valve is not well visualized, but is grossly normal.  Right  ventricle systolic pressure estimate normal. There is mild (1+) tricuspid  regurgitation.  Aortic Valve  Aortic valve leaflets appear normal. There is no evidence of aortic stenosis  or clinically significant aortic regurgitation.  Pulmonic Valve  The pulmonic valve is not well visualized. This degree of valvular  regurgitation is within normal limits.  Vessels  The aorta root is normal. Normal size ascending aorta. IVC diameter <2.1 cm  collapsing >50% with sniff suggests a normal RA pressure of 3 mmHg.  Pericardium  There is no pericardial effusion.____________________________________________________________________________  MMode/2D Measurements & Calculations  IVSd: 0.98 cm  LVIDd: 4.1 cm  LVIDs: 2.7 cm  LVPWd: 0.94 cm     FS: 33.3 %  LV mass(C)d: 125.3 grams  LV mass(C)dI: 85.2 grams/m2  Ao root diam: 3.2 cm  LA dimension: 3.3 cm  asc Aorta Diam: 3.3 cm  LA/Ao: 1.0  LVOT diam: 1.9 cm  LVOT area: 2.8 cm2  LA Volume Indexed (AL/bp): 26.9 ml/m2  RV Base: 3.3 cm  RWT: 0.46  TAPSE: 2.1 cm     Doppler Measurements & Calculations  MV E max andrew: 49.3 cm/sec  MV A max andrew: 72.8 cm/sec  MV E/A: 0.68  MV dec slope: 161.0 cm/sec2  MV dec time: 0.31 sec  Ao V2 max: 132.0 cm/sec  Ao max P.0 mmHg  Ao V2 mean: 90.1 cm/sec  Ao mean P.0 mmHg  Ao V2 VTI: 28.5 cm  RAMESH(I,D): 2.1 cm2  RAMESH(V,D): 2.0 cm2  LV V1 max PG: 3.6 mmHg  LV V1 max: 95.0 cm/sec  LV V1 VTI: 21.2 cm  SV(LVOT): 60.1 ml  SI(LVOT): 40.9 ml/m2  PA acc time: 0.11 sec  AV Andrew Ratio (DI): 0.72  RAMESH Index (cm2/m2): 1.4  E/E' avg: 10.6  Lateral E/e': 9.1  Medial E/e': 12.2  RV S Andrew: 14.7 cm/sec     ______________________________________________________________________________  Report approved by: Mane Villegas 2023 11:26 AM             Discharge Medications   Current Discharge Medication List        START taking these medications    Details   aspirin (ASA) 325 MG EC tablet Take 1 tablet (325 mg) by mouth daily for 90 days  Qty: 90 tablet,  Refills: 0    Associated Diagnoses: Acute stroke due to ischemia (H)      atorvastatin (LIPITOR) 10 MG tablet Take 1 tablet (10 mg) by mouth every evening for 90 days  Qty: 90 tablet, Refills: 0    Associated Diagnoses: Acute stroke due to ischemia (H)      lisinopril (ZESTRIL) 20 MG tablet Take 1 tablet (20 mg) by mouth 2 times daily for 90 days  Qty: 180 tablet, Refills: 0    Associated Diagnoses: Accelerated hypertension           CONTINUE these medications which have CHANGED    Details   escitalopram (LEXAPRO) 20 MG tablet Take 1 tablet (20 mg) by mouth daily for 90 days  Qty: 90 tablet, Refills: 0    Associated Diagnoses: Panic attack; Mild major depression (H); Alcohol dependence in remission (H); Encounter for medication refill; Traumatic brain injury, with loss of consciousness of 30 minutes or less, sequela (H)      folic acid (FOLVITE) 1 MG tablet Take 1 tablet (1,000 mcg) by mouth daily for 90 days  Qty: 90 tablet, Refills: 0    Associated Diagnoses: Encounter for medication refill      levETIRAcetam (KEPPRA) 500 MG tablet Take 1 tablet (500 mg) by mouth 2 times daily for 90 days  Qty: 180 tablet, Refills: 0    Associated Diagnoses: Partial symptomatic epilepsy with complex partial seizures, not intractable, without status epilepticus (H)      metoprolol succinate ER (TOPROL XL) 25 MG 24 hr tablet Take 3 tablets (75 mg) by mouth daily for 90 days  Qty: 270 tablet, Refills: 0    Associated Diagnoses: Encounter for medication refill; SIRS (systemic inflammatory response syndrome) (H); Hypertensive emergency; Acute metabolic encephalopathy      Thiamine Mononitrate (B1) 100 MG TABS Take 1 tablet by mouth daily for 90 days  Qty: 100 tablet, Refills: 0    Associated Diagnoses: Thiamine deficiency           Allergies   No Known Allergies
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
## 2023-08-15 ENCOUNTER — OFFICE VISIT (OUTPATIENT)
Dept: FAMILY MEDICINE | Facility: CLINIC | Age: 73
End: 2023-08-15
Payer: COMMERCIAL

## 2023-08-15 ENCOUNTER — PATIENT OUTREACH (OUTPATIENT)
Dept: CARE COORDINATION | Facility: CLINIC | Age: 73
End: 2023-08-15

## 2023-08-15 VITALS
TEMPERATURE: 98.2 F | WEIGHT: 106 LBS | DIASTOLIC BLOOD PRESSURE: 65 MMHG | HEART RATE: 73 BPM | BODY MASS INDEX: 18.78 KG/M2 | SYSTOLIC BLOOD PRESSURE: 110 MMHG | RESPIRATION RATE: 16 BRPM | OXYGEN SATURATION: 97 % | HEIGHT: 63 IN

## 2023-08-15 DIAGNOSIS — R41.9 MEDICATION NONCOMPLIANCE DUE TO COGNITIVE IMPAIRMENT: ICD-10-CM

## 2023-08-15 DIAGNOSIS — M81.8 OTHER OSTEOPOROSIS WITHOUT CURRENT PATHOLOGICAL FRACTURE: ICD-10-CM

## 2023-08-15 DIAGNOSIS — E51.9 THIAMINE DEFICIENCY: ICD-10-CM

## 2023-08-15 DIAGNOSIS — E83.40 DISORDER OF MAGNESIUM METABOLISM: Primary | ICD-10-CM

## 2023-08-15 DIAGNOSIS — I10 ESSENTIAL HYPERTENSION: ICD-10-CM

## 2023-08-15 DIAGNOSIS — F10.21 ALCOHOL DEPENDENCE IN REMISSION (H): ICD-10-CM

## 2023-08-15 DIAGNOSIS — B18.2 HEPATITIS C CARRIER (H): ICD-10-CM

## 2023-08-15 DIAGNOSIS — D72.820 LYMPHOCYTOSIS: ICD-10-CM

## 2023-08-15 DIAGNOSIS — Z91.148 MEDICATION NONCOMPLIANCE DUE TO COGNITIVE IMPAIRMENT: ICD-10-CM

## 2023-08-15 DIAGNOSIS — E83.40 DISORDER OF MAGNESIUM METABOLISM: ICD-10-CM

## 2023-08-15 DIAGNOSIS — R41.89 PERSISTENT COGNITIVE IMPAIRMENT: Primary | ICD-10-CM

## 2023-08-15 DIAGNOSIS — G31.84 MILD COGNITIVE IMPAIRMENT WITH MEMORY LOSS: ICD-10-CM

## 2023-08-15 DIAGNOSIS — R73.09 ELEVATED GLUCOSE: ICD-10-CM

## 2023-08-15 PROBLEM — F22 PARANOIA (H): Status: RESOLVED | Noted: 2020-08-12 | Resolved: 2023-08-15

## 2023-08-15 LAB
ALBUMIN SERPL BCG-MCNC: 4 G/DL (ref 3.5–5.2)
ALP SERPL-CCNC: 165 U/L (ref 35–104)
ALT SERPL W P-5'-P-CCNC: 54 U/L (ref 0–50)
ANION GAP SERPL CALCULATED.3IONS-SCNC: 9 MMOL/L (ref 7–15)
AST SERPL W P-5'-P-CCNC: 42 U/L (ref 0–45)
BASOPHILS # BLD AUTO: 0 10E3/UL (ref 0–0.2)
BASOPHILS NFR BLD AUTO: 0 %
BILIRUB SERPL-MCNC: 0.5 MG/DL
BUN SERPL-MCNC: 24.1 MG/DL (ref 8–23)
CALCIUM SERPL-MCNC: 10.1 MG/DL (ref 8.8–10.2)
CHLORIDE SERPL-SCNC: 100 MMOL/L (ref 98–107)
CREAT SERPL-MCNC: 0.85 MG/DL (ref 0.51–0.95)
DEPRECATED HCO3 PLAS-SCNC: 28 MMOL/L (ref 22–29)
EOSINOPHIL # BLD AUTO: 0 10E3/UL (ref 0–0.7)
EOSINOPHIL NFR BLD AUTO: 1 %
ERYTHROCYTE [DISTWIDTH] IN BLOOD BY AUTOMATED COUNT: 11.4 % (ref 10–15)
GFR SERPL CREATININE-BSD FRML MDRD: 72 ML/MIN/1.73M2
GLUCOSE SERPL-MCNC: 179 MG/DL (ref 70–99)
HBA1C MFR BLD: 5.5 % (ref 0–5.6)
HCT VFR BLD AUTO: 47.1 % (ref 35–47)
HGB BLD-MCNC: 15.2 G/DL (ref 11.7–15.7)
IMM GRANULOCYTES # BLD: 0 10E3/UL
IMM GRANULOCYTES NFR BLD: 0 %
LYMPHOCYTES # BLD AUTO: 1.2 10E3/UL (ref 0.8–5.3)
LYMPHOCYTES NFR BLD AUTO: 19 %
MAGNESIUM SERPL-MCNC: 2.4 MG/DL (ref 1.7–2.3)
MCH RBC QN AUTO: 30.2 PG (ref 26.5–33)
MCHC RBC AUTO-ENTMCNC: 32.3 G/DL (ref 31.5–36.5)
MCV RBC AUTO: 94 FL (ref 78–100)
MONOCYTES # BLD AUTO: 0.4 10E3/UL (ref 0–1.3)
MONOCYTES NFR BLD AUTO: 6 %
NEUTROPHILS # BLD AUTO: 4.5 10E3/UL (ref 1.6–8.3)
NEUTROPHILS NFR BLD AUTO: 74 %
PLATELET # BLD AUTO: 267 10E3/UL (ref 150–450)
POTASSIUM SERPL-SCNC: 4.5 MMOL/L (ref 3.4–5.3)
PROT SERPL-MCNC: 7.3 G/DL (ref 6.4–8.3)
RBC # BLD AUTO: 5.04 10E6/UL (ref 3.8–5.2)
SODIUM SERPL-SCNC: 137 MMOL/L (ref 136–145)
WBC # BLD AUTO: 6.1 10E3/UL (ref 4–11)

## 2023-08-15 PROCEDURE — 99215 OFFICE O/P EST HI 40 MIN: CPT | Performed by: FAMILY MEDICINE

## 2023-08-15 PROCEDURE — 85025 COMPLETE CBC W/AUTO DIFF WBC: CPT | Performed by: FAMILY MEDICINE

## 2023-08-15 PROCEDURE — 36415 COLL VENOUS BLD VENIPUNCTURE: CPT | Performed by: FAMILY MEDICINE

## 2023-08-15 PROCEDURE — 83036 HEMOGLOBIN GLYCOSYLATED A1C: CPT | Performed by: FAMILY MEDICINE

## 2023-08-15 PROCEDURE — 80053 COMPREHEN METABOLIC PANEL: CPT | Performed by: FAMILY MEDICINE

## 2023-08-15 PROCEDURE — 83735 ASSAY OF MAGNESIUM: CPT | Performed by: FAMILY MEDICINE

## 2023-08-15 PROCEDURE — 99417 PROLNG OP E/M EACH 15 MIN: CPT | Performed by: FAMILY MEDICINE

## 2023-08-15 NOTE — PROGRESS NOTES
Assessment & Plan     Persistent cognitive impairment  Eleanor's functioning is going downhill. Her memory is poor, but she can still speak. She definitely needs additional support in her home. This is a bit tricky as she is not one to readily trust others. Since the community paramedics have been very helpful and that has been a positive experience for Eleanor - I'll see if they could see her more often while we seek to get her a PCA to be there daily. (Some time on the day of the visit was spent coordinating continued home care services).    Medication noncompliance due to cognitive impairment  She has missed taking many doses of meds and she has missed many meals on days when she is alone - she tends to just remain in bed.    Essential hypertension  Just last week she had a hypertensive emergency, but now her BP is normal. She is back on her BP meds. We'll keep following it.  - Comprehensive metabolic panel (BMP + Alb, Alk Phos, ALT, AST, Total. Bili, TP)  - Hemoglobin A1c    Hepatitis C carrier (H)  At this point, we are not doing anything about her Hep c  - Comprehensive metabolic panel (BMP + Alb, Alk Phos, ALT, AST, Total. Bili, TP)  - Hemoglobin A1c    Thiamine deficiency  This has been supplemented for a long time. Continue same.  - Comprehensive metabolic panel (BMP + Alb, Alk Phos, ALT, AST, Total. Bili, TP)  - Hemoglobin A1c    Elevated glucose  Even though she is not eating enough, her sugars are sometimes high. She is not prediabetic  - Hemoglobin A1c    Lymphocytosis  This has resolved  - CBC with platelets and differential    Disorder of magnesium metabolism  Her magnesium runs high - keep following  - Magnesium    Osteoporosis  She has osteoporosis, but we are not intervening at this point as it seems she is heading toward a palliative or hospice situation.    Alcohol dependence in remission (H)  I am quite sure she has not been drinking for several years.       Review of external notes as documented  elsewhere in note  Ordering of each unique test  Prescription drug management  100 minutes spent by me on the date of the encounter doing chart review, history and exam, documentation and further activities per the note     MED REC REQUIRED  Post Medication Reconciliation Status:  Discharge medications reconciled and changed, see notes/orders      Camille Love MD  [facility]    Ref home care? She needs to be seen several times per week --or do elder day care? She needs an OT safety check, PT, med help, etc.        Subjective   Eleanor is a 72 year old, presenting for the following health issues:  Hospital F/U      8/15/2023    12:43 PM   Additional Questions   Roomed by paw p   Accompanied by spouse       HPI     When asked how she's doing, Eleanor says she's not sure. When I asked how she was before and during hospitalization, there was a long pause. Once she spoke, she said somethings but it was all incorrect. She did not recall having a stroke. She thought she was fine the day before going to the hospital.  Her , , disagreed and told her how he stayed home with her Tuesday, August 1st because she did not appear well. The Community Paramedic came over Wed and found she had high BP and was sweaty. That is when she went to the hospital. Asaf said she's about the same now - stays in bed unless someone makes her get up. She does not remember meds or meals.        8/15/2023    10:32 AM   Post Discharge Outreach   Admission Date 8/9/2023   Reason for Admission Hypertension and Nausea   Discharge Date 8/12/2023   Discharge Diagnosis Acute stroke, uncontrolled hypertension   How are you doing now that you are home? better now, getting better   How are your symptoms? (Red Flag symptoms escalate to triage hotline per guidelines) Improved   Do you feel your condition is stable enough to be safe at home until your provider visit? Yes   Does the patient have their discharge instructions?   "Yes   Does the patient have questions regarding their discharge instructions?  No   Were you started on any new medications or were there changes to any of your previous medications?  Yes   Does the patient have all of their medications? Yes   Do you have questions regarding any of your medications?  No   Discharge follow-up appointment scheduled within 14 calendar days?  Yes   Discharge Follow Up Appointment Date 8/15/2023   Discharge Follow Up Appointment Scheduled with? Primary Care Provider     Hospital Follow-up Visit:    Hospital/Nursing Home/IP Rehab Facility: [facility]  Date of Admission: 8/2/23  Date of Discharge: 8/5/23  Reason(s) for Admission: stroke    Was your hospitalization related to COVID-19? No   Problems taking medications regularly:  yes, as has been known for months  Medication changes since discharge: yes, reinstated HTN meds she was previously (months ago) on  Problems adhering to non-medication therapy:  yes, she takes nothing unless reminded.    Summary of hospitalization:  [facility] discharge summary reviewed  Diagnostic Tests/Treatments reviewed.  Follow up needed: none  Other Healthcare Providers Involved in Patient s Care:         None  Update since discharge: stable.     She says she was fine  Jr took the day off Tues- she did not look good - he wanted to bring her in. Appt was Wed; she fell while going to feed the cat. Did not LOC. She layed down on the cough. ?is that when she had a stroke?    In the hospital.  Since home - not eating much.     Discharge Sat - ate well  Sunday - slept all day, at most ate a bowl of cereal  Up Monday -    What is  important? Get better. Do more eating, get out of bed.    Plan of care communicated with patient and family             Review of Systems       Objective    /65   Pulse 73   Temp 98.2  F (36.8  C) (Oral)   Resp 16   Ht 1.6 m (5' 3\")   Wt 48.1 kg (106 lb)   LMP  (LMP Unknown)   SpO2 " "97%   BMI 18.78 kg/m    Body mass index is 18.78 kg/m .  Physical Exam   GENERAL: alert, no distress, frail, elderly, and has a \"blank\" stare  RESP: no rales , no rhonchi, and poor aeration  CV: regular rate and rhythm, normal S1 S2  SKIN: xerosis - generalized  PSYCH: concentration poor, confused, disoriented, inattentive, affect flat, fatigued, judgement and insight impaired, and appearance well groomed    Admission on 08/09/2023, Discharged on 08/12/2023   Component Date Value Ref Range Status    Ventricular Rate 08/09/2023 109  BPM Final    Atrial Rate 08/09/2023 109  BPM Final    RI Interval 08/09/2023 136  ms Final    QRS Duration 08/09/2023 78  ms Final    QT 08/09/2023 360  ms Final    QTc 08/09/2023 484  ms Final    P Axis 08/09/2023 79  degrees Final    R AXIS 08/09/2023 31  degrees Final    T [place] 08/09/2023 70  degrees Final    Interpretation ECG 08/09/2023    Final                    Value:Sinus tachycardia  Biatrial enlargement  Cannot rule out Anterior infarct (cited on or before 28-SEP-2022)  Abnormal ECG  When compared with ECG of 28-SEP-2022 23:11,  Questionable change in initial forces of Septal leads  Confirmed by SEE ED PROVIDER NOTE FOR, ECG INTERPRETATION ([id number]),  EBONY MUNOZ ([id number]) on 8/9/2023 11:02:28 PM      Sodium 08/09/2023 136  136 - 145 mmol/L Final    Potassium 08/09/2023 3.4  3.4 - 5.3 mmol/L Final    Chloride 08/09/2023 98  98 - 107 mmol/L Final    Carbon Dioxide (CO2) 08/09/2023 23  22 - 29 mmol/L Final    Anion Gap 08/09/2023 15  7 - 15 mmol/L Final    Urea Nitrogen 08/09/2023 29.7 (H)  8.0 - 23.0 mg/dL Final    Creatinine 08/09/2023 0.77  0.51 - 0.95 mg/dL Final    Calcium 08/09/2023 10.2  8.8 - 10.2 mg/dL Final    Glucose 08/09/2023 145 (H)  70 - 99 mg/dL Final    GFR Estimate 08/09/2023 82  >60 mL/min/1.73m2 Final    Troponin T, High Sensitivity 08/09/2023 23 (H)  <=14 ng/L Final    Either a High Sensitivity Troponin T baseline (0 hours) value = 100 ng/L, or an " increase in High Sensitivity Troponin T = 7 ng/L at 2 hours compared to 0 hours (2-0 hours), suggests myocardial injury, and urgent clinical attention is required.    If the 2-0 hours increase is <7 ng/L, a High Sensitivity Troponin T result above gender-specific reference ranges warrants further evaluation.   Recommendations for further evaluation include correlation with clinical decision-making tool (e.g., HEART), a 3rd High Sensitivity Troponin T test 2 hours after the 2nd (a 20% change from baseline would represent concern), admission for observation, close PCC/cardiology follow-up, or urgent outpatient provocative testing.    N terminal Pro BNP Inpatient 08/09/2023 2,103 (H)  0 - 900 pg/mL Final    Reference range shown and results flagged as abnormal are suggested inpatient cut points for confirming diagnosis if CHF in an acute setting. Establishing a baseline value for each individual patient is useful for follow-up. An inpatient or emergency department NT-proPBNP <300 pg/mL effectively rules out acute CHF, with 99% negative predictive value.    The outpatient non-acute reference range for ruling out CHF is:  0-125 pg/mL (age 18 to less than 75)  0-450 pg/mL (age 75 yrs and older)     Protein Total 08/09/2023 7.8  6.4 - 8.3 g/dL Final    Albumin 08/09/2023 4.5  3.5 - 5.2 g/dL Final    Bilirubin Total 08/09/2023 0.7  <=1.2 mg/dL Final    Alkaline Phosphatase 08/09/2023 128 (H)  35 - 104 U/L Final    AST 08/09/2023 37  0 - 45 U/L Final    Reference intervals for this test were updated on 6/12/2023 to more accurately reflect our healthy population. There may be differences in the flagging of prior results with similar values performed with this method. Interpretation of those prior results can be made in the context of the updated reference intervals.    ALT 08/09/2023 33  0 - 50 U/L Final    Reference intervals for this test were updated on 6/12/2023 to more accurately reflect our healthy population. There may  be differences in the flagging of prior results with similar values performed with this method. Interpretation of those prior results can be made in the context of the updated reference intervals.      Bilirubin Direct 08/09/2023 <0.20  0.00 - 0.30 mg/dL Final    Lipase 08/09/2023 78 (H)  13 - 60 U/L Final    Color Urine 08/09/2023 Light Yellow  Colorless, Straw, Light Yellow, Yellow Final    Appearance Urine 08/09/2023 Clear  Clear Final    Glucose Urine 08/09/2023 Negative  Negative mg/dL Final    Bilirubin Urine 08/09/2023 Negative  Negative Final    Ketones Urine 08/09/2023 Negative  Negative mg/dL Final    Specific Gravity Urine 08/09/2023 1.010  1.003 - 1.035 Final    Blood Urine 08/09/2023 0.06 mg/dL (A)  Negative Final    pH Urine 08/09/2023 8.0 (H)  5.0 - 7.0 Final    Protein Albumin Urine 08/09/2023 50 (A)  Negative mg/dL Final    Urobilinogen Urine 08/09/2023 <2.0  <2.0 mg/dL Final    Nitrite Urine 08/09/2023 Negative  Negative Final    Leukocyte Esterase Urine 08/09/2023 Negative  Negative Final    Bacteria Urine 08/09/2023 Few (A)  None Seen /HPF Final    Mucus Urine 08/09/2023 Present (A)  None Seen /LPF Final    RBC Urine 08/09/2023 22 (H)  <=2 /HPF Final    WBC Urine 08/09/2023 1  <=5 /HPF Final    Squamous Epithelials Urine 08/09/2023 2 (H)  <=1 /HPF Final    INR 08/09/2023 1.02  0.85 - 1.15 Final    aPTT 08/09/2023 24  22 - 38 Seconds Final    WBC Count 08/09/2023 21.1 (H)  4.0 - 11.0 10e3/uL Final    RBC Count 08/09/2023 5.19  3.80 - 5.20 10e6/uL Final    Hemoglobin 08/09/2023 15.6  11.7 - 15.7 g/dL Final    Hematocrit 08/09/2023 45.6  35.0 - 47.0 % Final    MCV 08/09/2023 88  78 - 100 fL Final    MCH 08/09/2023 30.1  26.5 - 33.0 pg Final    MCHC 08/09/2023 34.2  31.5 - 36.5 g/dL Final    RDW 08/09/2023 12.0  10.0 - 15.0 % Final    Platelet Count 08/09/2023 255  150 - 450 10e3/uL Final    % Neutrophils 08/09/2023 87  % Final    % Lymphocytes 08/09/2023 6  % Final    % Monocytes 08/09/2023 5  %  Final    % Eosinophils 08/09/2023 0  % Final    % Basophils 08/09/2023 0  % Final    % Immature Granulocytes 08/09/2023 2  % Final    NRBCs per 100 WBC 08/09/2023 0  <1 /100 Final    Absolute Neutrophils 08/09/2023 18.6 (H)  1.6 - 8.3 10e3/uL Final    Absolute Lymphocytes 08/09/2023 1.2  0.8 - 5.3 10e3/uL Final    Absolute Monocytes 08/09/2023 1.0  0.0 - 1.3 10e3/uL Final    Absolute Eosinophils 08/09/2023 0.0  0.0 - 0.7 10e3/uL Final    Absolute Basophils 08/09/2023 0.0  0.0 - 0.2 10e3/uL Final    Absolute Immature Granulocytes 08/09/2023 0.4  <=0.4 10e3/uL Final    Absolute NRBCs 08/09/2023 0.0  10e3/uL Final    TSH 08/09/2023 2.98  0.30 - 4.20 uIU/mL Final    Magnesium 08/09/2023 2.4 (H)  1.7 - 2.3 mg/dL Final    Troponin T, High Sensitivity 08/09/2023 22 (H)  <=14 ng/L Final    Either a High Sensitivity Troponin T baseline (0 hours) value = 100 ng/L, or an increase in High Sensitivity Troponin T = 7 ng/L at 2 hours compared to 0 hours (2-0 hours), suggests myocardial injury, and urgent clinical attention is required.    If the 2-0 hours increase is <7 ng/L, a High Sensitivity Troponin T result above gender-specific reference ranges warrants further evaluation.   Recommendations for further evaluation include correlation with clinical decision-making tool (e.g., HEART), a 3rd High Sensitivity Troponin T test 2 hours after the 2nd (a 20% change from baseline would represent concern), admission for observation, close PCC/cardiology follow-up, or urgent outpatient provocative testing.    Keppra (Levetiracetam) Level 08/09/2023 21.3  10.0 - 40.0  g/mL Final    Sodium 08/10/2023 137  136 - 145 mmol/L Final    Potassium 08/10/2023 3.4  3.4 - 5.3 mmol/L Final    Chloride 08/10/2023 102  98 - 107 mmol/L Final    Carbon Dioxide (CO2) 08/10/2023 24  22 - 29 mmol/L Final    Anion Gap 08/10/2023 11  7 - 15 mmol/L Final    Urea Nitrogen 08/10/2023 18.7  8.0 - 23.0 mg/dL Final    Creatinine 08/10/2023 0.68  0.51 - 0.95 mg/dL  Final    Calcium 08/10/2023 9.8  8.8 - 10.2 mg/dL Final    Glucose 08/10/2023 130 (H)  70 - 99 mg/dL Final    GFR Estimate 08/10/2023 >90  >60 mL/min/1.73m2 Final    WBC Count 08/10/2023 12.3 (H)  4.0 - 11.0 10e3/uL Final    RBC Count 08/10/2023 4.73  3.80 - 5.20 10e6/uL Final    Hemoglobin 08/10/2023 14.2  11.7 - 15.7 g/dL Final    Hematocrit 08/10/2023 42.7  35.0 - 47.0 % Final    MCV 08/10/2023 90  78 - 100 fL Final    MCH 08/10/2023 30.0  26.5 - 33.0 pg Final    MCHC 08/10/2023 33.3  31.5 - 36.5 g/dL Final    RDW 08/10/2023 11.9  10.0 - 15.0 % Final    Platelet Count 08/10/2023 210  150 - 450 10e3/uL Final    Magnesium 08/10/2023 2.4 (H)  1.7 - 2.3 mg/dL Final    Cholesterol 08/10/2023 167  <200 mg/dL Final    Triglycerides 08/10/2023 118  <150 mg/dL Final    Direct Measure HDL 08/10/2023 33 (L)  >=50 mg/dL Final    LDL Cholesterol Calculated 08/10/2023 110 (H)  <=100 mg/dL Final    Non HDL Cholesterol 08/10/2023 134 (H)  <130 mg/dL Final    Protein Total 08/10/2023 6.8  6.4 - 8.3 g/dL Final    Albumin 08/10/2023 3.9  3.5 - 5.2 g/dL Final    Bilirubin Total 08/10/2023 0.7  <=1.2 mg/dL Final    Alkaline Phosphatase 08/10/2023 105 (H)  35 - 104 U/L Final    AST 08/10/2023 34  0 - 45 U/L Final    Reference intervals for this test were updated on 6/12/2023 to more accurately reflect our healthy population. There may be differences in the flagging of prior results with similar values performed with this method. Interpretation of those prior results can be made in the context of the updated reference intervals.    ALT 08/10/2023 28  0 - 50 U/L Final    Reference intervals for this test were updated on 6/12/2023 to more accurately reflect our healthy population. There may be differences in the flagging of prior results with similar values performed with this method. Interpretation of those prior results can be made in the context of the updated reference intervals.      Bilirubin Direct 08/10/2023 <0.20  0.00 - 0.30  mg/dL Final    Hemoglobin A1C 08/10/2023 5.3  <5.7 % Final    Normal <5.7%   Prediabetes 5.7-6.4%    Diabetes 6.5% or higher     Note: Adopted from ADA consensus guidelines.    GLUCOSE BY METER POCT 08/10/2023 132 (H)  70 - 99 mg/dL Final    Potassium 08/10/2023 3.9  3.4 - 5.3 mmol/L Final    Magnesium 08/10/2023 2.5 (H)  1.7 - 2.3 mg/dL Final    Lactic Acid 08/10/2023 1.1  0.7 - 2.0 mmol/L Final    GLUCOSE BY METER POCT 08/10/2023 100 (H)  70 - 99 mg/dL Final    Cholesterol 08/11/2023 165  <200 mg/dL Final    Triglycerides 08/11/2023 117  <150 mg/dL Final    Direct Measure HDL 08/11/2023 34 (L)  >=50 mg/dL Final    LDL Cholesterol Calculated 08/11/2023 108 (H)  <=100 mg/dL Final    Non HDL Cholesterol 08/11/2023 131 (H)  <130 mg/dL Final    WBC Count 08/11/2023 11.1 (H)  4.0 - 11.0 10e3/uL Final    RBC Count 08/11/2023 4.80  3.80 - 5.20 10e6/uL Final    Hemoglobin 08/11/2023 14.7  11.7 - 15.7 g/dL Final    Hematocrit 08/11/2023 43.6  35.0 - 47.0 % Final    MCV 08/11/2023 91  78 - 100 fL Final    MCH 08/11/2023 30.6  26.5 - 33.0 pg Final    MCHC 08/11/2023 33.7  31.5 - 36.5 g/dL Final    RDW 08/11/2023 11.9  10.0 - 15.0 % Final    Platelet Count 08/11/2023 198  150 - 450 10e3/uL Final    Sodium 08/11/2023 135 (L)  136 - 145 mmol/L Final    Potassium 08/11/2023 3.6  3.4 - 5.3 mmol/L Final    Chloride 08/11/2023 99  98 - 107 mmol/L Final    Carbon Dioxide (CO2) 08/11/2023 26  22 - 29 mmol/L Final    Anion Gap 08/11/2023 10  7 - 15 mmol/L Final    Urea Nitrogen 08/11/2023 18.9  8.0 - 23.0 mg/dL Final    Creatinine 08/11/2023 0.65  0.51 - 0.95 mg/dL Final    Calcium 08/11/2023 9.9  8.8 - 10.2 mg/dL Final    Glucose 08/11/2023 122 (H)  70 - 99 mg/dL Final    GFR Estimate 08/11/2023 >90  >60 mL/min/1.73m2 Final    Magnesium 08/11/2023 2.5 (H)  1.7 - 2.3 mg/dL Final    GLUCOSE BY METER POCT 08/11/2023 123 (H)  70 - 99 mg/dL Final    Color Urine 08/11/2023 Yellow  Colorless, Straw, Light Yellow, Yellow Final    Appearance  Urine 08/11/2023 Clear  Clear Final    Glucose Urine 08/11/2023 Negative  Negative mg/dL Final    Bilirubin Urine 08/11/2023 Negative  Negative Final    Ketones Urine 08/11/2023 Negative  Negative mg/dL Final    Specific Gravity Urine 08/11/2023 >1.030  1.003 - 1.035 Final    Blood Urine 08/11/2023 0.06 mg/dL (A)  Negative Final    pH Urine 08/11/2023 6.5  5.0 - 7.0 Final    Protein Albumin Urine 08/11/2023 70 (A)  Negative mg/dL Final    Urobilinogen Urine 08/11/2023 <2.0  <2.0 mg/dL Final    Nitrite Urine 08/11/2023 Negative  Negative Final    Leukocyte Esterase Urine 08/11/2023 Negative  Negative Final    Mucus Urine 08/11/2023 Present (A)  None Seen /LPF Final    RBC Urine 08/11/2023 55 (H)  <=2 /HPF Final    WBC Urine 08/11/2023 2  <=5 /HPF Final    Squamous Epithelials Urine 08/11/2023 5 (H)  <=1 /HPF Final    LVEF  08/12/2023 55-60%   Final    GLUCOSE BY METER POCT 08/11/2023 122 (H)  70 - 99 mg/dL Final    GLUCOSE BY METER POCT 08/11/2023 93  70 - 99 mg/dL Final    GLUCOSE BY METER POCT 08/11/2023 103 (H)  70 - 99 mg/dL Final    Magnesium 08/12/2023 2.5 (H)  1.7 - 2.3 mg/dL Final    Potassium 08/12/2023 3.5  3.4 - 5.3 mmol/L Final     Results for orders placed or performed in visit on 08/15/23   Comprehensive metabolic panel (BMP + Alb, Alk Phos, ALT, AST, Total. Bili, TP)     Status: Abnormal   Result Value Ref Range    Sodium 137 136 - 145 mmol/L    Potassium 4.5 3.4 - 5.3 mmol/L    Chloride 100 98 - 107 mmol/L    Carbon Dioxide (CO2) 28 22 - 29 mmol/L    Anion Gap 9 7 - 15 mmol/L    Urea Nitrogen 24.1 (H) 8.0 - 23.0 mg/dL    Creatinine 0.85 0.51 - 0.95 mg/dL    Calcium 10.1 8.8 - 10.2 mg/dL    Glucose 179 (H) 70 - 99 mg/dL    Alkaline Phosphatase 165 (H) 35 - 104 U/L    AST 42 0 - 45 U/L    ALT 54 (H) 0 - 50 U/L    Protein Total 7.3 6.4 - 8.3 g/dL    Albumin 4.0 3.5 - 5.2 g/dL    Bilirubin Total 0.5 <=1.2 mg/dL    GFR Estimate 72 >60 mL/min/1.73m2   Hemoglobin A1c     Status: Normal   Result Value Ref  Range    Hemoglobin A1C 5.5 0.0 - 5.6 %   Magnesium     Status: Abnormal   Result Value Ref Range    Magnesium 2.4 (H) 1.7 - 2.3 mg/dL   CBC with platelets and differential     Status: Abnormal   Result Value Ref Range    WBC Count 6.1 4.0 - 11.0 10e3/uL    RBC Count 5.04 3.80 - 5.20 10e6/uL    Hemoglobin 15.2 11.7 - 15.7 g/dL    Hematocrit 47.1 (H) 35.0 - 47.0 %    MCV 94 78 - 100 fL    MCH 30.2 26.5 - 33.0 pg    MCHC 32.3 31.5 - 36.5 g/dL    RDW 11.4 10.0 - 15.0 %    Platelet Count 267 150 - 450 10e3/uL    % Neutrophils 74 %    % Lymphocytes 19 %    % Monocytes 6 %    % Eosinophils 1 %    % Basophils 0 %    % Immature Granulocytes 0 %    Absolute Neutrophils 4.5 1.6 - 8.3 10e3/uL    Absolute Lymphocytes 1.2 0.8 - 5.3 10e3/uL    Absolute Monocytes 0.4 0.0 - 1.3 10e3/uL    Absolute Eosinophils 0.0 0.0 - 0.7 10e3/uL    Absolute Basophils 0.0 0.0 - 0.2 10e3/uL    Absolute Immature Granulocytes 0.0 <=0.4 10e3/uL   CBC with platelets and differential     Status: Abnormal    Narrative    The following orders were created for panel order CBC with platelets and differential.  Procedure                               Abnormality         Status                     ---------                               -----------         ------                     CBC with platelets and d...[[id number]]  Abnormal            Final result                 Please view results for these tests on the individual orders.

## 2023-08-15 NOTE — PROGRESS NOTES
Clinic Care Coordination Contact  [facility]: Post-Discharge Note  SITUATION                                                      Admission:    Admission Date: 08/09/23   Reason for Admission: Hypertension and Nausea  Discharge:   Discharge Date: 08/12/23  Discharge Diagnosis: Acute stroke, uncontrolled hypertension    BACKGROUND                                                      Per hospital discharge summary and inpatient provider notes:  Patient seen in follow for Dr. Meza. This is a 72-year-old female who presented to the hospital with nausea and dry heaves for the last 2 days. She was found to be significantly hyper instead. 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 This morning she reports that she is asymptomatic. 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 She is on Keppra and tolerating that well. Remains on aspirin and Lipitor.     ASSESSMENT           Discharge Assessment  How are you doing now that you are home?: better now, getting better  How are your symptoms? (Red Flag symptoms escalate to triage hotline per guidelines): Improved  Do you feel your condition is stable enough to be safe at home until your provider visit?: Yes  Does the patient have their discharge instructions? : Yes  Does the patient have questions regarding their discharge instructions? : No  Were you started on any new medications or were there changes to any of your previous medications? : Yes  Does the patient have all of their medications?: Yes  Do you have questions regarding any of your medications? : No  Discharge follow-up appointment scheduled within 14 calendar days? : Yes  Discharge Follow Up Appointment Date: 08/15/23  Discharge Follow Up Appointment Scheduled with?: Primary Care Provider         Post-op (Clinicians Only)  Did the patient have surgery or a procedure: No  Fever: No  Chills: No  Eating &  Drinking: eating and drinking without complaints/concerns (just starting to eat normally; took some time to start eating)  Urinary Status: voiding without complaint/concerns        PLAN                                                      Outpatient Plan:  Pt has appointment with PCP 8/15/23 and community paramedic appointment 8/16/23. Pt will continue with care coordination.     Future Appointments   Date Time Provider Department Center   8/15/2023 12:30 PM Camille Love MD [facility]   8/16/2023 12:00 PM  [facility]   8/30/2023 10:00 AM Camille Love MD [facility]   10/20/2023  7:00 AM Camille Love MD [facility]   12/29/2023  7:30 AM Camille Love MD [facility]   2/28/2024  3:30 PM Camille Love MD [facility]         For any urgent concerns, please contact our 24 hour nurse triage line: [phone] ([phone])         CLIFF Sousa

## 2023-08-15 NOTE — TELEPHONE ENCOUNTER
Call patient and spouse Asaf who are still in the car from seeing the provider.  Asaf will return call to clinic for appointments when home. If spouse (Asaf) calls back, please assist with lab only, nurse only and mammogram appt prior to provider appointment scheduled for 8/30/23 below.   Spouse will be able to transport patient to appointment on 8/30/23.    JESENIA Crespo, RN  [facility]      Please call Eleanor around mid-August.     1) if her , , can bring her to her 8/30/23 appointment at 10am, then that's great. He should make arrangements so he can bring her. Also, book her to get a mammogram done when she comes in.     2) if he cannot bring her, so the visit will be by telephone, then please help her to schedule a lab-only appointment to get labs done AND a nurse-only appointment to check BP and a mammogram appointment PRIOR to the 8/30 appointment so we have the results to discuss.     Just ask and make sure she's doing ok in general - eating regular meals, sleeping OK, etc.     Thanks.

## 2023-08-15 NOTE — LETTER
8/15/23        To Whom It May Concern:      Asaf Awan is  to Eleanor Awan. She has recently been hospitalized and is in need of extra medical support. Asaf is providing this support. Therefore please excuse him from work from August 1 - 15, 2023. He will require additional days off for Eleanor's appointments.     Please call if there are questions.      Signed,       Camille Love MD

## 2023-08-16 NOTE — TELEPHONE ENCOUNTER
Asaf called back.  Assist with appointments as recommended from provider request below.  Patient was just seen by pcp 8/15/23.  Asaf reported they just picked up a blood pressure monitor with her BP at 118/80 this morning's check.      Does provider still want patient to come in for nurse only BP check, lab, and or  Mammogram?  Please advise so appts can be made accordingly.    Thank you.     Rehan    Please call Eleanor around mid-August.     1) if her , , can bring her to her 8/30/23 appointment at 10am, then that's great. He should make arrangements so he can bring her. Also, book her to get a mammogram done when she comes in.     2) if he cannot bring her, so the visit will be by telephone, then please help her to schedule a lab-only appointment to get labs done AND a nurse-only appointment to check BP and a mammogram appointment PRIOR to the 8/30 appointment so we have the results to discuss.     Just ask and make sure she's doing ok in general - eating regular meals, sleeping OK, etc.     Thanks.

## 2023-08-16 NOTE — TELEPHONE ENCOUNTER
Not heard back from Asaf, spouse after he said he would call back after they got home from clinic visit.  No answer. Message left on  request a return call for appointments.  Clinic number provided.    JESENIA Crespo, RN  [facility]    If Asaf calls back, please assist with following appointments as recommended by Dr. Love below.      Please call Eleanor around mid-August.     1) if her , , can bring her to her 8/30/23 appointment at 10am, then that's great. He should make arrangements so he can bring her. Also, book her to get a mammogram done when she comes in.     2) if he cannot bring her, so the visit will be by telephone, then please help her to schedule a lab-only appointment to get labs done AND a nurse-only appointment to check BP and a mammogram appointment PRIOR to the 8/30 appointment so we have the results to discuss.     Just ask and make sure she's doing ok in general - eating regular meals, sleeping OK, etc.     Thanks.

## 2023-08-17 ENCOUNTER — PATIENT OUTREACH (OUTPATIENT)
Dept: CARE COORDINATION | Facility: CLINIC | Age: 73
End: 2023-08-17
Payer: COMMERCIAL

## 2023-08-17 NOTE — PROGRESS NOTES
Clinic Care Coordination Contact  Follow Up Progress Note      Assessment: Pt is in need of additional support at home. PCP suggests PCA or home care.     Care Gaps:    Health Maintenance Due   Topic Date Due    DEXA  Never done    DEPRESSION ACTION PLAN  Never done    HEPATITIS A IMMUNIZATION (2 of 2 - Risk 2-dose series) 08/23/2018    Pneumococcal Vaccine: 65+ Years (2 - PPSV23 if available, else PCV20) 07/10/2019    ZOSTER IMMUNIZATION (3 of 3) 07/10/2019    MEDICARE ANNUAL WELLNESS VISIT  02/07/2021    COVID-19 Vaccine (3 - Pfizer series) 05/17/2021    HEPATITIS B IMMUNIZATION (2 of 3 - Risk 3-dose series) 05/29/2021       Postponed to next outreach     Care Plans  Care Plan: Mental Health       Problem: Mental Health Symptoms Need Improvement       Goal: Improve management of mental health symptoms and establish with mental health/psychosocial supports       Start Date: 3/23/2023 Expected End Date: 3/23/2024    Recent Progress: 10%    Priority: High    Note:     Barriers: depression, cognitive functioning  Strengths: willing to try therapy  Patient expressed understanding of goal: yes  Action steps to achieve this goal:  1. I will answer the phone when therapist/agency calls to set up appointment  2. I will attend my scheduled MH appointment  4. I will work with [facility] moving forward with [facility] services, picking up the phone when they call and meeting with the worker in person - referral completed 8/9/23.   3. I will follow up with [facility] monthly as to the progress Im making towards my goal.                                Intervention/Education provided during outreach: [facility] completed PCA assessment request through [facility]. Pt's  Asaf will be contact in the next two business days for the assessment to be scheduled.               Plan:   [facility] completed PCA assessment request through [facility]. Pt's  Asaf will be contact in the next two business days for the assessment to be  scheduled. [facility] reached out to care team to give update on status of PCA assessment.   CHW Care Coordinator will follow up in about one week to check on status of assessment with Pt's .

## 2023-08-20 ENCOUNTER — HEALTH MAINTENANCE LETTER (OUTPATIENT)
Age: 73
End: 2023-08-20

## 2023-08-22 ENCOUNTER — PATIENT OUTREACH (OUTPATIENT)
Dept: CARE COORDINATION | Facility: CLINIC | Age: 73
End: 2023-08-22

## 2023-08-22 NOTE — PROGRESS NOTES
Community Paramedic Program  Community Health Worker Outreach    Called patient to reschedule appointment scheduled for today, August 22nd at 2 pm    Visit rescheduled for: did not reschedule; confirmed CP's next visit on Thursday, August 24th at 2 pm    Additional information:   Spoke with Eleanor. Notified her that the CP is out today and won't be able to visit as previously planned. Pt confirmed and said that she'll see the CP on Thursday, after reviewing next visit details with her during the call. Pt declined having any questions or concerns today.    Routing to the CP for awareness.

## 2023-08-23 NOTE — PROGRESS NOTES
Received a call from patient's daughter, Kia Moon, (work phone [phone]) who states patient was discharged to home 2 weeks ago and was supposed to have home care. Per chart review, appears patient was accepted by [facility] but the discharge orders had not been sent to them. Orders sent. Called [facility] ([phone]) to discuss whether or not they could accept this referral. They had the wrong number for patient listed but will reach out today. Update provided to Kia.     Edna Saeed RN

## 2023-08-24 ENCOUNTER — TELEPHONE (OUTPATIENT)
Dept: FAMILY MEDICINE | Facility: CLINIC | Age: 73
End: 2023-08-24

## 2023-08-24 ENCOUNTER — PATIENT OUTREACH (OUTPATIENT)
Dept: OTHER | Facility: CLINIC | Age: 73
End: 2023-08-24

## 2023-08-24 ENCOUNTER — MEDICAL CORRESPONDENCE (OUTPATIENT)
Dept: HEALTH INFORMATION MANAGEMENT | Facility: CLINIC | Age: 73
End: 2023-08-24

## 2023-08-24 NOTE — TELEPHONE ENCOUNTER
General Call    Contacts         Type Contact Phone/Fax    08/24/2023 08:38 AM CDT Phone (Incoming) [facility] (Home Care) [phone]     [facility]          Reason for Call:  FYI    What are your questions or concerns:  Blood pressure at today's visit was 98/58 prior to am dose of Lisinopril 20 mg - Please advise    Date of last appointment with provider:     Could we send this information to you in [facility] or would you prefer to receive a phone call?:   Patient would prefer a phone call   Okay to leave a detailed message?: Yes at Other phone number:  [phone]

## 2023-08-24 NOTE — TELEPHONE ENCOUNTER
Called placed to [facility] in attempt to find out more regarding home care visit assessment. However, no answer. Message left on  with clinic number provided.  Clinic RN then contacted spouse Asaf SILVA for further information.  Spouse reported patient does not exhibit any signs/symptoms of confusion, nausea/vomiting, fatigue or dizziness.  Spouse is not home at time of call.  An automatic BP monitor available at home.  RN recommended spouse to check patient's BP the next day before and 30 minutes after took BP medications and to call the clinic to report the reading to provider.  Spouse verbally agreed.    Will route encounter to provider for an update.    NOAH CrespoN, RN  [facility]

## 2023-08-24 NOTE — PROGRESS NOTES
Community Paramedic attempted to visit Eleanor at her apartment. Knocked on door several times and called Eleanor on her cell phone x2 with no answer. Left message both times. Was able to hear the TV going in the apartment. Advised Eleanor who I was by talking through the door several times. No answer. Called Asaf (spouse) and he noted that she was not answering her phone for him as well. He denied having any other way to get in contact with her.   Attempted to make contact with Eleanor for 20 minutes outside her apartment door with no success. Will attempt to visit again on Tuesday 8/29 at noon  Asaf noted that he was going to leave work to come home and check on her.

## 2023-08-25 NOTE — TELEPHONE ENCOUNTER
Agree with what RN has already said, but PCP wants to add:    If she feels lightheaded and checks BP BEFORE taking meds (meaning she is not required to check BP all the time before taking meds, just when she feels light-headed), she should skip taking her BP meds if BP is 120/80 or below (or if either SBP or DBP is below those numbers). She previously had low Bps and we do not want her taking high BP meds when her Bps are normal or low.    Please call patient or her  and add this, please.

## 2023-08-25 NOTE — TELEPHONE ENCOUNTER
Called Asaf and relayed Dr. Love's message. He verbalized understanding.      Devan Jain BSN RN  [facility]

## 2023-08-29 ENCOUNTER — TELEPHONE (OUTPATIENT)
Dept: OTHER | Facility: CLINIC | Age: 73
End: 2023-08-29

## 2023-08-29 ENCOUNTER — PATIENT OUTREACH (OUTPATIENT)
Dept: OTHER | Facility: CLINIC | Age: 73
End: 2023-08-29

## 2023-08-29 DIAGNOSIS — R55 PRE-SYNCOPE: Primary | ICD-10-CM

## 2023-08-29 DIAGNOSIS — I10 ESSENTIAL HYPERTENSION: Primary | ICD-10-CM

## 2023-08-29 DIAGNOSIS — G31.84 MILD COGNITIVE IMPAIRMENT WITH MEMORY LOSS: ICD-10-CM

## 2023-08-29 PROCEDURE — 99207 PR COMMUNITY PARAMEDIC - PATIENT NOT BILLABLE: CPT

## 2023-08-29 NOTE — TELEPHONE ENCOUNTER
Community Paramedic received call from Eleanor. She was unaware that CP has stopped by today. Confirmed with Eleanor upcoming visit on 8/31/23 at 2pm.

## 2023-08-29 NOTE — PROGRESS NOTES
Community Paramedic attempted to visit Eleanor today at her apartment at noon. Community Paramedic knocked on Eleanor's door for 10 minutes, called Eleanor's name through the door several times and called he cell phone x2 with no success. Stayed at Eleanor's apartment for 35 minutes to see if she would call back. No call back received. Community Paramedic will attempt one more visit on 8/31 at 2pm (this date and time also left on Eleanor's voicemail). If unable to make contact will graduate patient from program.

## 2023-08-30 ENCOUNTER — PATIENT OUTREACH (OUTPATIENT)
Dept: CARE COORDINATION | Facility: CLINIC | Age: 73
End: 2023-08-30

## 2023-08-30 ENCOUNTER — OFFICE VISIT (OUTPATIENT)
Dept: FAMILY MEDICINE | Facility: CLINIC | Age: 73
End: 2023-08-30
Payer: COMMERCIAL

## 2023-08-30 VITALS
RESPIRATION RATE: 20 BRPM | OXYGEN SATURATION: 96 % | HEART RATE: 85 BPM | SYSTOLIC BLOOD PRESSURE: 77 MMHG | DIASTOLIC BLOOD PRESSURE: 57 MMHG | TEMPERATURE: 98.8 F | HEIGHT: 63 IN | WEIGHT: 108 LBS | BODY MASS INDEX: 19.14 KG/M2

## 2023-08-30 DIAGNOSIS — I63.9 ACUTE STROKE DUE TO ISCHEMIA (H): ICD-10-CM

## 2023-08-30 DIAGNOSIS — M81.8 OTHER OSTEOPOROSIS, UNSPECIFIED PATHOLOGICAL FRACTURE PRESENCE: ICD-10-CM

## 2023-08-30 DIAGNOSIS — F06.1 MAJOR DEPRESSIVE DISORDER, RECURRENT EPISODE WITH CATATONIA (H): Primary | ICD-10-CM

## 2023-08-30 DIAGNOSIS — F33.9 MAJOR DEPRESSIVE DISORDER, RECURRENT EPISODE WITH CATATONIA (H): Primary | ICD-10-CM

## 2023-08-30 PROCEDURE — 99214 OFFICE O/P EST MOD 30 MIN: CPT | Performed by: FAMILY MEDICINE

## 2023-08-30 RX ORDER — ASPIRIN 81 MG/1
325 TABLET ORAL DAILY
Qty: 90 TABLET | Refills: 4 | Status: SHIPPED | OUTPATIENT
Start: 2023-08-30 | End: 2023-10-04

## 2023-08-30 RX ORDER — METHYLPHENIDATE HYDROCHLORIDE 18 MG/1
18 TABLET ORAL EVERY MORNING
Qty: 30 TABLET | Refills: 0 | Status: SHIPPED | OUTPATIENT
Start: 2023-08-30 | End: 2024-02-28

## 2023-08-30 ASSESSMENT — PATIENT HEALTH QUESTIONNAIRE - PHQ9
SUM OF ALL RESPONSES TO PHQ QUESTIONS 1-9: 0
10. IF YOU CHECKED OFF ANY PROBLEMS, HOW DIFFICULT HAVE THESE PROBLEMS MADE IT FOR YOU TO DO YOUR WORK, TAKE CARE OF THINGS AT HOME, OR GET ALONG WITH OTHER PEOPLE: NOT DIFFICULT AT ALL
SUM OF ALL RESPONSES TO PHQ QUESTIONS 1-9: 0

## 2023-08-30 NOTE — PROGRESS NOTES
"  Assessment & Plan     Major depressive disorder, recurrent episode with catatonia (H)  She is catatonic.  agrees to try a med to help \"wake her up\". With the upcoming 3 day weekend, he can see how the med affects her. Once he works, he can give it to Eleanor before he leaves for work.  - methylphenidate HCl ER, OSM, (CONCERTA) 18 MG CR tablet  Dispense: 30 tablet; Refill: 0    Osteoporosis  Need to monitor her falls carefully    Acute stroke due to ischemia (H)  This really hit her hard - and her depression is now severe, too  - aspirin 81 MG EC tablet  Dispense: 90 tablet; Refill: 4      Review of external notes as documented elsewhere in note  Prescription drug management  30 minutes spent by me on the date of the encounter doing chart review, history and exam, documentation and further activities per the note     MED REC REQUIRED  We went through each med. Since BP is down, will stop lisinopril. As mentioned above, will try methylphenidate Elizabeth Love MD  [facility] is a 72 year old, presenting for the following health issues:  Depression      8/30/2023     9:45 AM   Additional Questions   Roomed by Nova MCKNIGHT   Accompanied by Spouse       History of Present Illness       Reason for visit:  F/u Hospital due to Stroke    She eats 2-3 servings of fruits and vegetables daily.She consumes 0 sweetened beverage(s) daily.She exercises with enough effort to increase her heart rate 9 or less minutes per day.  She exercises with enough effort to increase her heart rate 3 or less days per week. She is missing 1 dose(s) of medications per week.  She is not taking prescribed medications regularly due to other.      says she just does not get out of bed.    Today, to come here, she did.      Review of Systems       Objective    BP (!) 77/57   Pulse 85   Temp 98.8  F (37.1  C) (Oral)   Resp 20   Ht 1.6 m (5' 3\")   Wt 49 kg (108 lb)   LMP  (LMP Unknown)  "  SpO2 96%   BMI 19.13 kg/m    Body mass index is 19.13 kg/m .  Physical Exam   GENERAL: no distress, fatigued, and has a blank stare but answers questions  RESP: decreased breath sounds throughout  CV: regular rate and rhythm, normal S1 S2, no S3 or S4, no murmur, click or rub, no peripheral edema and peripheral pulses strong  MS: no gross musculoskeletal defects noted, no edema  SKIN: xerosis - generalized  PSYCH: concentration poor, inattentive, affect flat, fatigued, judgement and insight impaired, and appearance well groomed    Office Visit on 08/15/2023   Component Date Value Ref Range Status    Sodium 08/15/2023 137  136 - 145 mmol/L Final    Potassium 08/15/2023 4.5  3.4 - 5.3 mmol/L Final    Chloride 08/15/2023 100  98 - 107 mmol/L Final    Carbon Dioxide (CO2) 08/15/2023 28  22 - 29 mmol/L Final    Anion Gap 08/15/2023 9  7 - 15 mmol/L Final    Urea Nitrogen 08/15/2023 24.1 (H)  8.0 - 23.0 mg/dL Final    Creatinine 08/15/2023 0.85  0.51 - 0.95 mg/dL Final    Calcium 08/15/2023 10.1  8.8 - 10.2 mg/dL Final    Glucose 08/15/2023 179 (H)  70 - 99 mg/dL Final    Alkaline Phosphatase 08/15/2023 165 (H)  35 - 104 U/L Final    AST 08/15/2023 42  0 - 45 U/L Final    Reference intervals for this test were updated on 6/12/2023 to more accurately reflect our healthy population. There may be differences in the flagging of prior results with similar values performed with this method. Interpretation of those prior results can be made in the context of the updated reference intervals.    ALT 08/15/2023 54 (H)  0 - 50 U/L Final    Reference intervals for this test were updated on 6/12/2023 to more accurately reflect our healthy population. There may be differences in the flagging of prior results with similar values performed with this method. Interpretation of those prior results can be made in the context of the updated reference intervals.      Protein Total 08/15/2023 7.3  6.4 - 8.3 g/dL Final    Albumin 08/15/2023  4.0  3.5 - 5.2 g/dL Final    Bilirubin Total 08/15/2023 0.5  <=1.2 mg/dL Final    GFR Estimate 08/15/2023 72  >60 mL/min/1.73m2 Final    Hemoglobin A1C 08/15/2023 5.5  0.0 - 5.6 % Final    Normal <5.7%   Prediabetes 5.7-6.4%    Diabetes 6.5% or higher     Note: Adopted from ADA consensus guidelines.    Magnesium 08/15/2023 2.4 (H)  1.7 - 2.3 mg/dL Final    WBC Count 08/15/2023 6.1  4.0 - 11.0 10e3/uL Final    RBC Count 08/15/2023 5.04  3.80 - 5.20 10e6/uL Final    Hemoglobin 08/15/2023 15.2  11.7 - 15.7 g/dL Final    Hematocrit 08/15/2023 47.1 (H)  35.0 - 47.0 % Final    MCV 08/15/2023 94  78 - 100 fL Final    MCH 08/15/2023 30.2  26.5 - 33.0 pg Final    MCHC 08/15/2023 32.3  31.5 - 36.5 g/dL Final    RDW 08/15/2023 11.4  10.0 - 15.0 % Final    Platelet Count 08/15/2023 267  150 - 450 10e3/uL Final    % Neutrophils 08/15/2023 74  % Final    % Lymphocytes 08/15/2023 19  % Final    % Monocytes 08/15/2023 6  % Final    % Eosinophils 08/15/2023 1  % Final    % Basophils 08/15/2023 0  % Final    % Immature Granulocytes 08/15/2023 0  % Final    Absolute Neutrophils 08/15/2023 4.5  1.6 - 8.3 10e3/uL Final    Absolute Lymphocytes 08/15/2023 1.2  0.8 - 5.3 10e3/uL Final    Absolute Monocytes 08/15/2023 0.4  0.0 - 1.3 10e3/uL Final    Absolute Eosinophils 08/15/2023 0.0  0.0 - 0.7 10e3/uL Final    Absolute Basophils 08/15/2023 0.0  0.0 - 0.2 10e3/uL Final    Absolute Immature Granulocytes 08/15/2023 0.0  <=0.4 10e3/uL Final

## 2023-08-30 NOTE — PROGRESS NOTES
Clinic Care Coordination Contact  [facility]/Voicemail    Clinical Data: Care Coordinator Outreach  Outreach attempted x 1. CHW attempted to call left message on patient's voicemail with call back information and requested return call.    Plan: Care Coordinator will try to reach patient again in two weeks.

## 2023-08-31 ENCOUNTER — TELEPHONE (OUTPATIENT)
Dept: FAMILY MEDICINE | Facility: CLINIC | Age: 73
End: 2023-08-31

## 2023-08-31 ENCOUNTER — ALLIED HEALTH/NURSE VISIT (OUTPATIENT)
Dept: OTHER | Facility: CLINIC | Age: 73
End: 2023-08-31
Payer: COMMERCIAL

## 2023-08-31 VITALS
RESPIRATION RATE: 14 BRPM | SYSTOLIC BLOOD PRESSURE: 117 MMHG | TEMPERATURE: 98.1 F | HEART RATE: 71 BPM | DIASTOLIC BLOOD PRESSURE: 59 MMHG | OXYGEN SATURATION: 97 %

## 2023-08-31 DIAGNOSIS — G31.84 MILD COGNITIVE IMPAIRMENT WITH MEMORY LOSS: ICD-10-CM

## 2023-08-31 DIAGNOSIS — I10 ESSENTIAL HYPERTENSION: Primary | ICD-10-CM

## 2023-08-31 PROCEDURE — 99207 PR COMMUNITY PARAMEDIC - PATIENT NOT BILLABLE: CPT

## 2023-08-31 NOTE — TELEPHONE ENCOUNTER
Called [facility] OT, gave Dr. Love's verbal approval for home care services.       Julio C Barrios, MSN, RN   [facility]

## 2023-08-31 NOTE — PROGRESS NOTES
Community Paramedics Follow-up Visit  August 31, 2023  TIME: 1400    Eleanor Awan is a 72 year old female being seen at home for a follow-up visit.    Present at appointment:  patient, Community Paramedic, Observing - Dr DODIE Almanzar         Chief Complaint   Patient presents with    Outreach    Recheck Medication       Universal Utilization:      Utilization      Hospital Admissions  2             ED Visits  2             No Show Count (past year)  9                    Current as of: 8/31/2023 11:49 AM                /59   Pulse 71   Temp 98.1  F (36.7  C)   Resp 14   LMP  (LMP Unknown)   SpO2 97%     Clinical Concerns:  Current Medical Concerns:  Failure to thrive, Hyper/Hypotension    Current Behavioral Concerns:     Education Provided to patient:    Medication set up? No  Pill Box issued: No  Scale issued: No  Flu Shot given: No  COVID Vaccine Given: No  Lab draw or specimen collection: No  Food resource given: No  Collaborative visit with PCP: No  Wound Care: No  Health Maintenance Addressed No    Clinical Pathway: None    No  Face to Face in Home / Community    Review of Symptoms/PE    Skin: negative  Eyes: negative  Ears/Nose/Throat: negative  Respiratory: No shortness of breath, dyspnea on exertion, cough, or hemoptysis  Cardiovascular: negative  Gastrointestinal: poor appetite  Genitourinary: negative  Musculoskeletal: negative  Neurologic: negative  Psychiatric: Sleeps approx 12-14 hrs per day    Time spent with patient: 45    The patient meets one or more of the following criteria:  * Has been identified by their primary care provider at risk of nursing home placement    Acute concern/Follow-up recommendations: Patient had not taken her daily medications during visit. Due to blood pressure of 117/59 contacted Dr Love and per her orders held Metoprolol for today only    Next  visit scheduled: 9/6/23    Issues for Provider to follow up on: Community Paramedic visited with patient today  in her home. Today was the first visit since patient had been home from the hospital. Patient states she is doing ok and has good and bad days. Patient noted to have had eaten a partial piece of cake for breakfast/lunch. Patient had gotten out of bed today at approx 1pm. She states she goes to bed at 10-11pm and sleeps till 1pm the next day. She indicates that she sleeps during he whole time.   Went through patients medications, confirmed no Lisinopril in the med box, stopped per Dr Love. Patients blood pressure was 117/59 with a heart rate of 69-71. Contacted Dr Love - hold Metoprolol for today. Asked Eleanor if she had been feeling lightheaded or dizzy. She stated that something she does feel dizzy. Today she denied felling this way. Left a note for Eleanor and Asaf (spouse) to keep track of when Eleanor is feeling this way. Will gather this information at next CP visit.   Talked to Eleanor about having a home health aid come into the home once a week to help her shower. She is skeptical about this. She reported that she has not showered since the CP has been visiting her (3 months) Advised that getting a PCA for long term help would be beneficial for her. Patient agreed.    Provider follow up visit needed: 10/20/23 With Dr Love at 7am

## 2023-08-31 NOTE — TELEPHONE ENCOUNTER
Home Care is calling regarding an established patient with [facility].       Requesting orders from: Camille Love  Provider is following patient: Yes  Is this a 60-day recertification request?  No    Orders Requested  Home health aid to assist with shower 1 x a week for 7 weeks starting next week 9/4/23      Confirmed ok to leave a detailed message with call back.  Contact information confirmed and updated as needed.      [phone] [facility]       Neal Jain, RN

## 2023-09-01 ENCOUNTER — MEDICAL CORRESPONDENCE (OUTPATIENT)
Dept: HEALTH INFORMATION MANAGEMENT | Facility: CLINIC | Age: 73
End: 2023-09-01
Payer: COMMERCIAL

## 2023-09-05 DIAGNOSIS — Z53.9 DIAGNOSIS NOT YET DEFINED: Primary | ICD-10-CM

## 2023-09-05 PROCEDURE — G0180 MD CERTIFICATION HHA PATIENT: HCPCS | Performed by: FAMILY MEDICINE

## 2023-09-06 ENCOUNTER — ALLIED HEALTH/NURSE VISIT (OUTPATIENT)
Dept: OTHER | Facility: CLINIC | Age: 73
End: 2023-09-06
Payer: COMMERCIAL

## 2023-09-06 VITALS
SYSTOLIC BLOOD PRESSURE: 138 MMHG | RESPIRATION RATE: 12 BRPM | DIASTOLIC BLOOD PRESSURE: 78 MMHG | TEMPERATURE: 97.6 F | HEART RATE: 51 BPM | OXYGEN SATURATION: 97 %

## 2023-09-06 DIAGNOSIS — G31.84 MILD COGNITIVE IMPAIRMENT WITH MEMORY LOSS: ICD-10-CM

## 2023-09-06 DIAGNOSIS — I10 ESSENTIAL HYPERTENSION: Primary | ICD-10-CM

## 2023-09-06 PROCEDURE — 99207 PR COMMUNITY PARAMEDIC - PATIENT NOT BILLABLE: CPT

## 2023-09-06 NOTE — PROGRESS NOTES
Community Paramedics Follow-up Visit  September 6, 2023  TIME: 1200    Eleanor Awan is a 72 year old female being seen at home for a follow-up visit.    Present at appointment:  patient, Community Paramedic         Chief Complaint   Patient presents with    Outreach       [place] Utilization:      Utilization      Hospital Admissions  2             ED Visits  2             No Show Count (past year)  9                    Current as of: 9/6/2023 11:58 AM                /78   Pulse 51   Temp 97.6  F (36.4  C)   Resp 12   LMP  (LMP Unknown)   SpO2 97%     Clinical Concerns:  Current Medical Concerns: blood pressure issues    Current Behavioral Concerns: failure to thrive    Education Provided to patient:    Medication set up? No  Pill Box issued: No  Scale issued: No  Flu Shot given: No  COVID Vaccine Given: No  Lab draw or specimen collection: No  Food resource given: No  Collaborative visit with PCP: No  Wound Care: No  Health Maintenance Addressed No    Clinical Pathway: None    No  Face to Face in Home / Community    Review of Symptoms/PE    Skin: negative  Eyes: negative  Ears/Nose/Throat: negative  Respiratory: No shortness of breath, dyspnea on exertion, cough, or hemoptysis  Cardiovascular: negative  Gastrointestinal: poor appetite  Genitourinary: negative  Musculoskeletal: muscular weakness  Neurologic: incoordination and memory problems  Psychiatric: negative    Time spent with patient: 45     The patient meets one or more of the following criteria:  * Has been identified by their primary care provider at risk of nursing home placement    Acute concern/Follow-up recommendations: follow current treatment plan    Next CP visit scheduled: 9/7/23    Issues for Provider to follow up on: Community Paramedic visited with patient at her home. Patient had just woken up for the day upon CP arrival. Patient had not eaten or taken any of her meds for the day. Patient took her AM medications during the  "visit. Patient stated she did not leave the home over the past weekend and her and her  stayed in. She states she is feeling \"pretty good\" and has not complaints. During the visit, ANGIE and Eleanor called [facility] and  to make an appt with intake for an [facility] worker. During the conversation they explained that Eleanor would have to pay out of pocket for the [facility] worker. The intake appointment would cost $188 and each appointment after would cost $26 for a travel fee and $85.00 for each hour. Eleanor states she is unable to pay this cost. CP sent message to  and advised of the out of pocket cost due to Eleanor's insurance not covering this.      Provider follow up visit needed: 10/20/23 with Dr Love      "

## 2023-09-11 ENCOUNTER — NURSE TRIAGE (OUTPATIENT)
Dept: NURSING | Facility: CLINIC | Age: 73
End: 2023-09-11
Payer: COMMERCIAL

## 2023-09-11 NOTE — TELEPHONE ENCOUNTER
"  Nurse Triage SBAR    Is this a 2nd Level Triage? NO    Situation: Lump on left breast.    Background: Received call from patient's  stating that patient developed a lump on her inner left breast area in the last 4 days.     Assessment: Denies any fever or breast discharge. Reports lump on left breast is tender to touch, rated 4/10. Denies any pain when not touched. Describes the lump as nickel size, with redness around it that is quarter size. States it is a \"hard lump\". Denies any itching or increased warmth.     Protocol Recommended Disposition:   See PCP Within 24 Hours    Recommendation: Recommendation is to see PCP within 24 hours. Advised to go to [facility] if unable to get an appt for tomorrow. Gave care advice and call back instructions. Patient was warm-transferred to [facility] to try to get appt for tomorrow. Plans to follow advice.          Does the patient meet one of the following criteria for ADS visit consideration? 16+ years old, with an [facility] PCP     TIP  Providers, please consider if this condition is appropriate for management at one of our Acute and Diagnostic Services sites.     If patient is a good candidate, please use dotphrase <dot>triageresponse and select Refer to ADS to document.          Reason for Disposition   [1] Breast looks infected (spreading redness, feels hot or painful to touch) AND [2] no fever    Additional Information   Negative: [1] SEVERE breast pain AND [2] fever > 103 F (39.4 C)   Negative: Chest pain   Negative: Breastfeeding questions about baby   Negative: Breastfeeding questions about mother (breast symptoms or feeling sick)   Negative: Breastfeeding questions about mother's medicines and diet   Negative: Postpartum breast pain and swelling, not breastfeeding   Negative: Small spot, skin growth or mole   Negative: Patient sounds very sick or weak to the triager   Negative: [1] Breast looks infected (spreading redness, feels hot or painful to touch) AND [2] " fever    Protocols used: Breast Symptoms-A-AH

## 2023-09-12 ENCOUNTER — MEDICAL CORRESPONDENCE (OUTPATIENT)
Dept: HEALTH INFORMATION MANAGEMENT | Facility: CLINIC | Age: 73
End: 2023-09-12

## 2023-09-12 ENCOUNTER — OFFICE VISIT (OUTPATIENT)
Dept: FAMILY MEDICINE | Facility: CLINIC | Age: 73
End: 2023-09-12
Payer: COMMERCIAL

## 2023-09-12 VITALS
RESPIRATION RATE: 16 BRPM | SYSTOLIC BLOOD PRESSURE: 88 MMHG | BODY MASS INDEX: 19.16 KG/M2 | HEART RATE: 73 BPM | TEMPERATURE: 100 F | DIASTOLIC BLOOD PRESSURE: 52 MMHG | OXYGEN SATURATION: 94 % | WEIGHT: 108.12 LBS | HEIGHT: 63 IN

## 2023-09-12 DIAGNOSIS — L72.3 INFLAMED SEBACEOUS CYST: Primary | ICD-10-CM

## 2023-09-12 PROCEDURE — 10060 I&D ABSCESS SIMPLE/SINGLE: CPT | Performed by: FAMILY MEDICINE

## 2023-09-12 RX ORDER — CEPHALEXIN 500 MG/1
500 CAPSULE ORAL 2 TIMES DAILY
Qty: 14 CAPSULE | Refills: 0 | Status: SHIPPED | OUTPATIENT
Start: 2023-09-12 | End: 2023-09-19

## 2023-09-12 RX ORDER — ACETAMINOPHEN 500 MG
1000 TABLET ORAL 3 TIMES DAILY PRN
Qty: 100 TABLET | Refills: 3 | Status: SHIPPED | OUTPATIENT
Start: 2023-09-12 | End: 2023-09-13 | Stop reason: ALTCHOICE

## 2023-09-12 RX ORDER — IBUPROFEN 600 MG/1
600 TABLET, FILM COATED ORAL EVERY 6 HOURS PRN
Qty: 80 TABLET | Refills: 0 | Status: SHIPPED | OUTPATIENT
Start: 2023-09-12

## 2023-09-12 NOTE — PROGRESS NOTES
"  Assessment & Plan     Inflamed sebaceous cyst  [id number] inflammed sebaceous cyst causing significant pain, I&D performed, 2 simple interrupted sutures placed. Patient tolerated well. Will also treat with keflex due to inflammation and underlying hematoma drained. Follow up in 7 days to have sutures removed and reevaluate. The cyst may have multiple lobes, none of which were fluctuant and unable to drain, but no overlying erythema.   - cephALEXin (KEFLEX) 500 MG capsule  Dispense: 14 capsule; Refill: 0  - ibuprofen (ADVIL/MOTRIN) 600 MG tablet  Dispense: 80 tablet; Refill: 0  - Puncture (I&D)Drainage of Lesion/Cyst  [[id number]]    Return in about 1 week (around 9/19/2023) for suture removal.      Loree Nicole MD  [facility] LEILA Webster is a 72 year old, presenting for the following health issues:  Mass (L breast- couple days - hurt when bumping the lump )      Mass       Has been a few days, maybe noticed it last Thursday and was a small bump, then grew and she tried to pop it, some pus came out maybe? Then it continued to get bigger. Has never noticed it before. Denies any trauma, no fevers.       Review of Systems   Constitutional, HEENT, cardiovascular, pulmonary, gi and gu systems are negative, except as otherwise noted.      Objective    BP (!) 88/52   Pulse 73   Temp 100  F (37.8  C) (Oral)   Resp 16   Ht 1.6 m (5' 3\")   Wt 49 kg (108 lb 1.9 oz)   LMP  (LMP Unknown)   SpO2 94%   BMI 19.15 kg/m    Body mass index is 19.15 kg/m .  Physical Exam   GENERAL: healthy, alert and no distress  NECK: no adenopathy, no asymmetry, masses, or scars and thyroid normal to palpation  RESP: lungs clear to auscultation - no rales, rhonchi or wheezes  CV: regular rate and rhythm, normal S1 S2, no S3 or S4, no murmur, click or rub, no peripheral edema and peripheral pulses strong  ABDOMEN: soft, nontender, no hepatosplenomegaly, no masses and bowel sounds normal  MS: no gross musculoskeletal " defects noted, no edema  SKIN: left breast at 9-10 oclock 3cm from nipple: 4cm length x 2cm superficial fluctuant mass with overlying erythema without well defined borders with central umbilication without ulceration. Ecchymosis on lower 1/3. No axillary lympadenopathy.                   Discussed with Eleanor regarding technique, risk of infection, and scarring. Betadine is used for cleansing. Lidocaine with epinephrine is used for anesthesia. Incision and drainage performed on the cyst, 1cm incision made with 3ccs of dark old blood and thick, chunky discharge. 2 sutures of 3-0 sutures are placed. Bandage applied. Eleanor tolerated procedure well. No complications.    ASSESSMENT:   cyst(s) I&D.    PLAN:   Specimens submitted to pathology. Wound care instructions given. Return in 7 days for suture removal.

## 2023-09-13 ENCOUNTER — MEDICAL CORRESPONDENCE (OUTPATIENT)
Dept: HEALTH INFORMATION MANAGEMENT | Facility: CLINIC | Age: 73
End: 2023-09-13
Payer: COMMERCIAL

## 2023-09-14 ENCOUNTER — ALLIED HEALTH/NURSE VISIT (OUTPATIENT)
Dept: OTHER | Facility: CLINIC | Age: 73
End: 2023-09-14
Payer: COMMERCIAL

## 2023-09-14 ENCOUNTER — PATIENT OUTREACH (OUTPATIENT)
Dept: CARE COORDINATION | Facility: CLINIC | Age: 73
End: 2023-09-14

## 2023-09-14 DIAGNOSIS — S06.9X1S TRAUMATIC BRAIN INJURY, WITH LOSS OF CONSCIOUSNESS OF 30 MINUTES OR LESS, SEQUELA (H): ICD-10-CM

## 2023-09-14 DIAGNOSIS — G31.84 MILD COGNITIVE IMPAIRMENT WITH MEMORY LOSS: ICD-10-CM

## 2023-09-14 DIAGNOSIS — I10 ESSENTIAL HYPERTENSION: Primary | ICD-10-CM

## 2023-09-14 PROCEDURE — 99207 PR COMMUNITY PARAMEDIC - PATIENT NOT BILLABLE: CPT

## 2023-09-14 NOTE — PROGRESS NOTES
Clinic Care Coordination Contact  Community Health Worker Follow Up    Care Gaps:   Health Maintenance Due   Topic Date Due    DEXA  Never done    DEPRESSION ACTION PLAN  Never done    HEPATITIS A IMMUNIZATION (2 of 2 - Risk 2-dose series) 08/23/2018    Pneumococcal Vaccine: 65+ Years (2 - PPSV23 or PCV20) 07/10/2019    ZOSTER IMMUNIZATION (3 of 3) 07/10/2019    MEDICARE ANNUAL WELLNESS VISIT  02/07/2021    COVID-19 Vaccine (3 - Pfizer series) 05/17/2021    HEPATITIS B IMMUNIZATION (2 of 3 - Risk 3-dose series) 05/29/2021    DTAP/TDAP/TD IMMUNIZATION (2 - Td or Tdap) 09/06/2023     PCP to discuss other medical care gaps with patient at the next follow up visit.  Care Plan:   Care Plan: Mental Health       Problem: Mental Health Symptoms Need Improvement       Goal: Improve management of mental health symptoms and establish with mental health/psychosocial supports       Start Date: 3/23/2023 Expected End Date: 3/23/2024    This Visit's Progress: 20% Recent Progress: 10%    Priority: High    Note:     Barriers: depression, cognitive functioning  Strengths: willing to try therapy  Patient expressed understanding of goal: yes    Action steps to achieve this goal:  1. I will answer the phone when therapist/agency calls to set up appointment  2. I will attend my scheduled MH appointment  4. I will work with Johana and Paloma moving forward with ARM services, picking up the phone when they call and meeting with the worker in person - referral completed 8/9/23.   3. I will follow up with [facility] monthly as to the progress Im making towards my goal.                            Intervention and Education during outreach:  -CHW was able to reach on today follow up. Per patient she does not need anything.   -CHW followed up with [facility] Services referral which was referred to Johana Dow on 8/17/2023 by [facility] SW. Per Mitch Dow that patient does not qualify for [facility] services due to health insurance plan which  [employer], [employer] Medicare Advantage. Johana & Kristina attempted to contact patient 2x and left a voice message to see if patient wants to get therapy but patient never return call.   -Patient also does not qualify for PCA Services due to health insurance plan. Patient must have a State health insurance plan such as Medical Assistant with any PMAP; [employer], [employer], [employer] or BCBS and NOT [facility]. [facility] also does not have benefits for PCA services.   -CHW routed this outreach encounter to PCP, CCC SW and Community Paramedics to further review and advise.     CHW Next Outreach: In one month.

## 2023-09-18 ENCOUNTER — PATIENT OUTREACH (OUTPATIENT)
Dept: CARE COORDINATION | Facility: CLINIC | Age: 73
End: 2023-09-18
Payer: COMMERCIAL

## 2023-09-18 NOTE — PROGRESS NOTES
Clinic Care Coordination Contact  Care Coordination Clinician Chart Review    Situation: Patient chart reviewed by Care Coordinator.       Background: Care Coordination Program started: 3/8/2023. Initial assessment completed and patient-centered care plan(s) were developed with participation from patient. Lead CC handed patient off to CHW for continued outreaches.       Assessment: Per chart review, patient outreach completed by CC CHW on 9/14/23.  Patient is actively working to accomplish goal(s). Patient's goal(s) appropriate and relevant at this time. Patient is not due for updated Plan of Care.  Assessments will be completed annually or as needed/with change of patient status.      Care Plan: Mental Health       Problem: Mental Health Symptoms Need Improvement       Goal: Improve management of mental health symptoms and establish with mental health/psychosocial supports       Start Date: 3/23/2023 Expected End Date: 3/23/2024    This Visit's Progress: 20% Recent Progress: 10%    Priority: High    Note:     Barriers: depression, cognitive functioning  Strengths: willing to try therapy  Patient expressed understanding of goal: yes    Action steps to achieve this goal:  1. I will answer the phone when therapist/agency calls to set up appointment  2. I will attend my scheduled MH appointment  4. I will work with [facility] moving forward with [facility] services, picking up the phone when they call and meeting with the worker in person - referral completed 8/9/23.   3. I will follow up with CCC monthly as to the progress Im making towards my goal.                                   Plan/Recommendations: The patient will continue working with Care Coordination to achieve goal(s) as above. CHW will continue outreaches at minimum every 30 days and will involve Lead CC as needed or if patient is ready to move to Maintenance. Lead CC will continue to monitor CHW outreaches and patient's progress to goal(s) every 6  weeks.     Plan of Care updated and sent to patient: No    [facility] sent information on [place] ([email or web address] to CP and CHW CC to confirm if this could be another option for Pt in terms on in person support.

## 2023-09-19 ENCOUNTER — OFFICE VISIT (OUTPATIENT)
Dept: FAMILY MEDICINE | Facility: CLINIC | Age: 73
End: 2023-09-19
Payer: COMMERCIAL

## 2023-09-19 VITALS
SYSTOLIC BLOOD PRESSURE: 91 MMHG | OXYGEN SATURATION: 97 % | RESPIRATION RATE: 14 BRPM | TEMPERATURE: 98 F | DIASTOLIC BLOOD PRESSURE: 58 MMHG | HEART RATE: 63 BPM

## 2023-09-19 VITALS
BODY MASS INDEX: 19.38 KG/M2 | HEIGHT: 63 IN | RESPIRATION RATE: 12 BRPM | HEART RATE: 57 BPM | WEIGHT: 109.4 LBS | DIASTOLIC BLOOD PRESSURE: 67 MMHG | TEMPERATURE: 98.2 F | SYSTOLIC BLOOD PRESSURE: 105 MMHG | OXYGEN SATURATION: 99 %

## 2023-09-19 DIAGNOSIS — Z48.02 VISIT FOR SUTURE REMOVAL: ICD-10-CM

## 2023-09-19 DIAGNOSIS — L72.3 INFLAMED SEBACEOUS CYST: Primary | ICD-10-CM

## 2023-09-19 PROCEDURE — 99212 OFFICE O/P EST SF 10 MIN: CPT | Performed by: FAMILY MEDICINE

## 2023-09-19 NOTE — PROGRESS NOTES
"  Assessment & Plan     Inflamed sebaceous cyst  Visit for suture removal  2 simple interrupted sutures removed, placed in clinic. Incision well healed.     Return in about 1 month (around 10/19/2023) for scheduled follow up with PCP.      Loree Nicole MD  [facility] LEILA Webster is a 72 year old, presenting for the following health issues:  stiches removal       History of Present Illness       Reason for visit:  Chyst    She eats 2-3 servings of fruits and vegetables daily.She consumes 1 sweetened beverage(s) daily.She exercises with enough effort to increase her heart rate 10 to 19 minutes per day.  She exercises with enough effort to increase her heart rate 3 or less days per week. She is missing 1 dose(s) of medications per week.     No issues. Pain resolved. Denies any complications, fevers. Has follow up with PCP.   Review of Systems   Constitutional, HEENT, cardiovascular, pulmonary, gi and gu systems are negative, except as otherwise noted.      Objective    /67 (BP Location: Left arm, Patient Position: Sitting, Cuff Size: Adult Small)   Pulse 57   Temp 98.2  F (36.8  C) (Oral)   Resp 12   Ht 1.6 m (5' 3\")   Wt 49.6 kg (109 lb 6.4 oz)   LMP  (LMP Unknown)   SpO2 99%   BMI 19.38 kg/m    Body mass index is 19.38 kg/m .  Physical Exam   GENERAL: healthy, alert and no distress  RESP: no respiratory distress  MS: no gross musculoskeletal defects noted, no edema  SKIN: 1cm healed incision over left breast, 2 simple interrupted sutures (removed). No surrounding erythema    Wt Readings from Last 4 Encounters:   09/19/23 49.6 kg (109 lb 6.4 oz)   09/12/23 49 kg (108 lb 1.9 oz)   08/30/23 49 kg (108 lb)   08/15/23 48.1 kg (106 lb)     BP Readings from Last 3 Encounters:   09/19/23 105/67   09/12/23 (!) 88/52   09/06/23 138/78                         "

## 2023-09-19 NOTE — PROGRESS NOTES
Community Paramedics Follow-up Visit  September 14, 2023  TIME: 1400    Eleanor INGE Awan is a 72 year old female being seen at home for a follow-up visit.    Present at appointment:  patient, Community Paramedic         Chief Complaint   Patient presents with    Outreach       [place] Utilization:      Utilization      Hospital Admissions  2             ED Visits  2             No Show Count (past year)  10                    Current as of: 9/19/2023 10:37 AM                BP 91/58   Pulse 63   Temp 98  F (36.7  C)   Resp 14   LMP  (LMP Unknown)   SpO2 97%     Clinical Concerns:  Current Medical Concerns:  Failure to thrive    Current Behavioral Concerns:     Education Provided to patient:    Medication set up? No  Pill Box issued: No  Scale issued: No  Flu Shot given: No  COVID Vaccine Given: No  Lab draw or specimen collection: No  Food resource given: No  Collaborative visit with PCP: No  Wound Care: No  Health Maintenance Addressed No    Clinical Pathway: None    No  Face to Face in Home / Community    Review of Symptoms/PE    Skin: negative  Eyes: negative  Ears/Nose/Throat: negative  Respiratory: No shortness of breath, dyspnea on exertion, cough, or hemoptysis  Cardiovascular: negative  Gastrointestinal: negative  Genitourinary: negative  Musculoskeletal: negative  Neurologic: negative  Psychiatric: negative    Time spent with patient: 45    The patient meets one or more of the following criteria:  * Has been identified by their primary care provider at risk of nursing home placement    Acute concern/Follow-up recommendations: follow current treatment plan    Next CP visit scheduled: TBD    Issues for Provider to follow up on: Community Paramedic visited with Eleanor at her home. Eleanor indicates she is doing well, she is looking forward to her daughter and grandson coming to visit over the upcoming weekend. She is unsure if they will get out and do something outside her apartment. Eleanor indicted that  she did get out with her  over the past weekend and has been sitting out on the deck. When asked if she thought about finding a hobby such as puzzles or word games she denied. She confirms that she does have therapy coming into the home once a week to help her with exercises and she does try to do them daily. Her daily routine continues to be the same with sleeping until approx 11am, eating breakfast, eating dinner around 5ish once her  comes home and then going to bed around 10-11pm. She does indicate that she has been trying to eat more each day. Due to home health coming in to do therapy, Community Paramedic will graduate patient. All CP goals met or CC to continue. If assistance is needed in the future please submit a new Community Paramedic referral.     Provider follow up visit needed: 10/23/23 with Dr Love

## 2023-10-02 ENCOUNTER — MEDICAL CORRESPONDENCE (OUTPATIENT)
Dept: HEALTH INFORMATION MANAGEMENT | Facility: CLINIC | Age: 73
End: 2023-10-02
Payer: COMMERCIAL

## 2023-10-04 DIAGNOSIS — I63.9 ACUTE STROKE DUE TO ISCHEMIA (H): ICD-10-CM

## 2023-10-04 RX ORDER — ASPIRIN 81 MG/1
325 TABLET ORAL DAILY
Qty: 90 TABLET | Refills: 3 | Status: SHIPPED | OUTPATIENT
Start: 2023-10-04 | End: 2024-02-28

## 2023-10-04 NOTE — TELEPHONE ENCOUNTER
Medication Question or Refill    Contacts         Type Contact Phone/Fax    10/04/2023 11:40 AM CDT Phone (Incoming) Asaf Awan (Emergency Contact) [phone]            What medication are you calling about (include dose and sig)?: aspirin 81 MG EC tablet     Preferred Pharmacy:   [id number] [address] [address]  Phone: [phone] Fax: [phone]      Controlled Substance Agreement on file:   CSA -- Patient Level:    CSA: None found at the patient level.       Who prescribed the medication?: PCP    Do you need a refill? Yes    When did you use the medication last?     Patient offered an appointment? No    Do you have any questions or concerns?  No      Could we send this information to you in [facility] or would you prefer to receive a phone call?:   Patient would prefer a phone call   Okay to leave a detailed message?: Yes at Home number on file [phone] (home)

## 2023-10-16 ENCOUNTER — MEDICAL CORRESPONDENCE (OUTPATIENT)
Dept: HEALTH INFORMATION MANAGEMENT | Facility: CLINIC | Age: 73
End: 2023-10-16
Payer: COMMERCIAL

## 2023-10-17 ENCOUNTER — MEDICAL CORRESPONDENCE (OUTPATIENT)
Dept: HEALTH INFORMATION MANAGEMENT | Facility: CLINIC | Age: 73
End: 2023-10-17
Payer: COMMERCIAL

## 2023-10-20 ENCOUNTER — VIRTUAL VISIT (OUTPATIENT)
Dept: FAMILY MEDICINE | Facility: CLINIC | Age: 73
End: 2023-10-20
Payer: COMMERCIAL

## 2023-10-20 DIAGNOSIS — F33.1 MODERATE EPISODE OF RECURRENT MAJOR DEPRESSIVE DISORDER (H): ICD-10-CM

## 2023-10-20 DIAGNOSIS — G31.84 MILD COGNITIVE IMPAIRMENT WITH MEMORY LOSS: Primary | ICD-10-CM

## 2023-10-20 PROCEDURE — 99213 OFFICE O/P EST LOW 20 MIN: CPT | Mod: VID | Performed by: FAMILY MEDICINE

## 2023-10-20 NOTE — PROGRESS NOTES
"Eleanor is a 72 year old who is being evaluated via a billable video visit.      How would you like to obtain your AVS? Mail a copy  If the video visit is dropped, the invitation should be resent by: Text to cell phone: [phone]  Will anyone else be joining your video visit? No          Assessment & Plan     Mild cognitive impairment with memory loss  Somehow she is doing a bit more, was willing to talk even at 7am --all of which is excellent.    Moderate episode of recurrent major depressive disorder (H)  Mood is flat/ok but not down. This marks improvement.    Follow up in a month for in-person visit    Review of external notes as documented elsewhere in note  Prescription drug management  20 minutes spent by me on the date of the encounter doing chart review, history and exam, documentation and further activities per the note    Camille Love MD  [facility]          Jesus Webster is a 72 year old, presenting for the following health issues:  Depression        10/20/2023     6:52 AM   Additional Questions   Roomed by Lenora TINOCO MA       HPI   I feel better.    Therapists come to the house.  She gets out of the house occasionally.    Getting up about 10am. Goes to bed about 10:30pm. Falls asleep pretty quickly.  Enjoys ice cream.    Review of Systems         Objective    Vitals - Patient Reported  Height (Patient Reported): 162.6 cm (5' 4\")      Vitals:  No vitals were obtained today due to virtual visit.    Physical Exam   GENERAL: alert and no distress  PSYCH: concentration poor, affect flat, judgement and insight intact, and judgement and insight impaired        Video-Visit Details    Type of service:  Video Visit   Video Start Time:  7:00  Video End Time:7:14 AM    Originating Location (pt. Location): Home    Distant Location (provider location):  On-site  Platform used for Video Visit: [employer]      "

## 2023-10-24 DIAGNOSIS — R65.10 SIRS (SYSTEMIC INFLAMMATORY RESPONSE SYNDROME) (H): ICD-10-CM

## 2023-10-24 DIAGNOSIS — Z76.0 ENCOUNTER FOR MEDICATION REFILL: Primary | ICD-10-CM

## 2023-10-24 DIAGNOSIS — I16.1 HYPERTENSIVE EMERGENCY: ICD-10-CM

## 2023-10-24 DIAGNOSIS — G93.41 ACUTE METABOLIC ENCEPHALOPATHY: ICD-10-CM

## 2023-10-24 RX ORDER — METOPROLOL SUCCINATE 25 MG/1
75 TABLET, EXTENDED RELEASE ORAL DAILY
Qty: 270 TABLET | Refills: 3 | OUTPATIENT
Start: 2023-10-24

## 2023-10-25 ENCOUNTER — PATIENT OUTREACH (OUTPATIENT)
Dept: CARE COORDINATION | Facility: CLINIC | Age: 73
End: 2023-10-25
Payer: COMMERCIAL

## 2023-10-25 NOTE — PROGRESS NOTES
Clinic Care Coordination Contact  Patient has completed all goals with Clinic Care Coordination.  Please review the chart and confirm if maintenance  is approved.    -Due to patient's health insurance, patient will not qualify for PCA services and [facility] services at [facility].   -Patient continues to deny any need for resources at this time.   -Chart review routed to [facility] social workers to further review and advise.

## 2023-10-27 ENCOUNTER — PATIENT OUTREACH (OUTPATIENT)
Dept: CARE COORDINATION | Facility: CLINIC | Age: 73
End: 2023-10-27
Payer: COMMERCIAL

## 2023-10-27 NOTE — PROGRESS NOTES
Clinic Care Coordination Contact  Care Coordination Clinician Chart Review    Situation: Patient chart reviewed by Care Coordinator.       Background: Care Coordination Program started: 3/8/2023. Initial assessment completed and patient-centered care plan(s) were developed with participation from patient. Lead CC handed patient off to CHW for continued outreaches.       Assessment: Per chart review, patient outreach completed by CC CHW on 10/25/23.  Patient has actively worked to accomplish goal(s). Patient is due for updated Plan of Care.  Assessments will be completed annually or as needed/with change of patient status.      Care Plan: Mental Health       Problem: Mental Health Symptoms Need Improvement       Goal: Improve management of mental health symptoms and establish with mental health/psychosocial supports       Start Date: 3/23/2023 Expected End Date: 3/23/2024    This Visit's Progress: 20% Recent Progress: 10%    Priority: High    Note:     Barriers: depression, cognitive functioning  Strengths: willing to try therapy  Patient expressed understanding of goal: yes    Action steps to achieve this goal:  1. I will answer the phone when therapist/agency calls to set up appointment  2. I will attend my scheduled MH appointment  4. I will work with [facility] moving forward with [facility] services, picking up the phone when they call and meeting with the worker in person - referral completed 8/9/23.   3. I will follow up with CCC monthly as to the progress Im making towards my goal.                                   Plan/Recommendations: [facility] is consulting with Pt's care team about potential for transition to maintenance status. Pt has accomplished goals however PCP still has safety concerns. The patient will continue working with Care Coordination to achieve goal(s) as above. CHW will continue outreaches at minimum every 30 days and will involve Lead CC as needed or if patient is ready to move to  Maintenance. Lead CC will continue to monitor CHW outreaches and patient's progress to goal(s) every 6 weeks.     Plan of Care updated and sent to patient: Yes, via [employer]

## 2023-10-27 NOTE — LETTER
[facility] CARE COORDINATION  [address] [place] [address]   October 27, 2023        Eleanor Keating  [address]  [facility] [address]          Dear Eleanor,     Attached is an updated Patient Centered Plan of Care for your continued enrollment in Care Coordination. Please let us know if you have additional questions, concerns, or goals that we can assist with.    Sincerely,    Zakia Edmond, [facility] Clinical Care Coordinator  [facility]  Patient Centered Plan of Care  About Me:        Patient Name:  Eleanor Keating    YOB: 1950  Age:         72 year old   Pat MRN:    [id number] Telephone Information:  Home Phone [phone]   Mobile [phone]       Address:  [facility]  [facility] [address] Email address:  [email or web address]      Emergency Contact(s)    Name Relationship Lgl Grd Work Phone Home Phone Mobile Phone   1. CANDACE KEATING Spouse    [phone]   2. MAMI MYRICK Daughter  [phone]             Primary language:  English     needed? No   [place] [facility]:  [phone] op. 1  Other communication barriers:Cognitive impairment; Caregiver    Preferred Method of Communication:     Current living arrangement: I live in a private home with family    Mobility Status/ Medical Equipment: Independent        Health Maintenance  Health Maintenance Reviewed: Due/Overdue   Health Maintenance Due   Topic Date Due    DEXA  Never done    DEPRESSION ACTION PLAN  Never done    RSV VACCINE 60+ (1 - 1-dose 60+ series) Never done    HEPATITIS A IMMUNIZATION (2 of 2 - Risk 2-dose series) 08/23/2018    Pneumococcal Vaccine: 65+ Years (2 - PPSV23 or PCV20) 07/10/2019    ZOSTER IMMUNIZATION (3 of 3) 07/10/2019    MEDICARE ANNUAL WELLNESS VISIT  02/07/2021    HEPATITIS B IMMUNIZATION (2 of 3 - Risk 3-dose series) 05/29/2021    COVID-19 Vaccine (3 - 2023-24 season) 09/01/2023    DTAP/TDAP/TD  IMMUNIZATION (2 - Td or Tdap) 09/06/2023          My Access Plan  Medical Emergency 911   Primary Clinic Line [facility] [phone]   24 Hour Appointment Line [phone] or  [phone] (toll-free)   24 Hour Nurse Line [phone] (toll-free)   Preferred Urgent Care [facility] [phone]     Preferred Hospital [facility]  [phone]     Preferred Pharmacy [facility] DR[facility]ORE #[id number] [facility] [address] AT [facility] [address]     Behavioral Health Crisis Line The National Suicide Prevention Lifeline at 1-195.167.3346 or Text/Call 208           My Care Team Members  Patient Care Team         Relationship Specialty Notifications Start End    Camille Love MD PCP - General Family Medicine  4/4/23     Phone: [phone] Fax: [phone]         [address] [facility] [address] [place] [address]    Danuta Kay, PharmD Pharmacist Pharmacist  10/6/20     Phone: [phone]          [address] [place] [address]    Camille Love MD Assigned PCP   6/16/21     Phone: [phone] Fax: [phone]         [address] [facility] [address] [place] [address]    Rainer Mahajan MD Assigned Neuroscience Provider   4/17/22     Phone: [phone] Fax: [phone]         [address] [facility] [address]    Dontae Bocanegra Community Health Worker Primary Care - CC Admissions 3/9/23     Phone: [phone] Fax: [phone]         [address] [facility] [address] [facility] [address]    Edna Swain, Psy.D, LP Assigned Behavioral Health Provider   5/20/23     Phone: [phone] Fax: [phone]         [address] [facility] [address]    Ella Georges, NRP, CP Community Paramedic  Admissions 5/31/23     Phone: [phone]         Zakia Edmond LICSW Lead Care Coordinator  Admissions 7/12/23                 My Care Plans  Self Management and Treatment Plan    Care Plan  Care Plan:  Mental Health       Problem: Mental Health Symptoms Need Improvement       Goal: Improve management of mental health symptoms and establish with mental health/psychosocial supports       Start Date: 3/23/2023 Expected End Date: 3/23/2024    This Visit's Progress: 20% Recent Progress: 10%    Priority: High    Note:     Barriers: depression, cognitive functioning  Strengths: willing to try therapy  Patient expressed understanding of goal: yes    Action steps to achieve this goal:  1. I will answer the phone when therapist/agency calls to set up appointment  2. I will attend my scheduled MH appointment  4. I will work with [facility] moving forward with Critical access hospital services, picking up the phone when they call and meeting with the worker in person - referral completed 8/9/23.   3. I will follow up with [facility] monthly as to the progress Im making towards my goal.                                           My Medical and Care Information  Problem List   Patient Active Problem List   Diagnosis    Osteoporosis    Mild cognitive impairment with memory loss    BPPV (benign paroxysmal positional vertigo)    Alcohol dependence in remission (H)    Essential hypertension    Thiamine deficiency    Anxiety    Alcoholic cirrhosis of liver without ascites (H)    Moderate episode of recurrent major depressive disorder (H)    Hepatitis C carrier (H)    Hypertensive emergency    Partial symptomatic epilepsy with complex partial seizures, not intractable, without status epilepticus (H)    Accelerated hypertension    Nausea and vomiting, unspecified vomiting type    Acute stroke due to ischemia (H)    Leukocytosis      Current Medications and Allergies:    Current Outpatient Medications   Medication    aspirin 81 MG EC tablet    atorvastatin (LIPITOR) 10 MG tablet    escitalopram (LEXAPRO) 20 MG tablet    folic acid (FOLVITE) 1 MG tablet    ibuprofen (ADVIL/MOTRIN) 600 MG tablet    levETIRAcetam (KEPPRA) 500 MG tablet    lisinopril  (ZESTRIL) 20 MG tablet    methylphenidate HCl ER, OSM, (CONCERTA) 18 MG CR tablet    metoprolol succinate ER (TOPROL XL) 25 MG 24 hr tablet    Thiamine Mononitrate (B1) 100 MG TABS     No current facility-administered medications for this visit.     No Known Allergies     Care Coordination Start Date: 3/8/2023   Frequency of Care Coordination: monthly, more frequently as needed     Form Last Updated: 10/27/2023

## 2023-11-07 ENCOUNTER — MEDICAL CORRESPONDENCE (OUTPATIENT)
Dept: HEALTH INFORMATION MANAGEMENT | Facility: CLINIC | Age: 73
End: 2023-11-07
Payer: COMMERCIAL

## 2023-11-08 ENCOUNTER — PATIENT OUTREACH (OUTPATIENT)
Dept: OTHER | Facility: CLINIC | Age: 73
End: 2023-11-08
Payer: COMMERCIAL

## 2023-11-15 ENCOUNTER — TELEPHONE (OUTPATIENT)
Dept: FAMILY MEDICINE | Facility: CLINIC | Age: 73
End: 2023-11-15

## 2023-11-15 NOTE — TELEPHONE ENCOUNTER
"Writer spoke to pt regarding Dr. Love's message below. Per pt, she forgot about her scheduled appt today. Pt says she is doing \"okay.\" Pt denies any sadness or depression symptoms.    Offered to scheduled Nurse-only visit to complete immunizations, but pt states it is hard to schedule right now as pt relies on her spouse to bring her to her appt. Pt requesting staff to call back on 11/17/2023. Nurse-only visit can be scheduled once she speaks with spouse regarding his availabilities.     Will postpone task to 11/17/2023 to call patient back. Routing pt response back to Dr. Love as HANANE.    Moses Tijerina, NOAHN, RN   [facility]    "

## 2023-11-15 NOTE — TELEPHONE ENCOUNTER
I called Eleanor because she did not show up for her AWV. I asked her to call us and let us know if she is ok. Also, I asked her to make a nurse-only appointment to get some immunizations.    I fear she is just laying in bed doing nothing all day (again), wasting away.       If Eleanor does  not call by 11/16/23, the please try calling her - best to try in the afternoons, to check in and set up a nurse-only appointment for immunizations.     thanks

## 2023-11-16 NOTE — TELEPHONE ENCOUNTER
I called the patient (Eleanor) in the evening and reached her. She sounded OK, mostly coherent but also like she was struggling to make sense of planning another appointment.  She put her , , on, and he said he just did not know about the appointment. He can bring her. He notes that she is back to sleeping daily till around 1pm. She is usually up when he gets home at 5. They are together until bedtime. He says her eyes roll back in her head sometimes and that is new. He is not sure what is happening with that.    I offered to try to get Eleanor in and he said that would be good. I'll try to see where I can fit her in soon.  is now aware of the 12/29 appointment.

## 2023-11-30 ENCOUNTER — TELEPHONE (OUTPATIENT)
Dept: FAMILY MEDICINE | Facility: CLINIC | Age: 73
End: 2023-11-30
Payer: COMMERCIAL

## 2023-11-30 DIAGNOSIS — Z76.0 ENCOUNTER FOR MEDICATION REFILL: ICD-10-CM

## 2023-11-30 DIAGNOSIS — G93.41 ACUTE METABOLIC ENCEPHALOPATHY: ICD-10-CM

## 2023-11-30 DIAGNOSIS — I16.1 HYPERTENSIVE EMERGENCY: ICD-10-CM

## 2023-11-30 DIAGNOSIS — R65.10 SIRS (SYSTEMIC INFLAMMATORY RESPONSE SYNDROME) (H): ICD-10-CM

## 2023-11-30 DIAGNOSIS — F10.21 ALCOHOL DEPENDENCE IN REMISSION (H): ICD-10-CM

## 2023-11-30 DIAGNOSIS — F41.0 PANIC ATTACK: ICD-10-CM

## 2023-11-30 DIAGNOSIS — G40.209 PARTIAL SYMPTOMATIC EPILEPSY WITH COMPLEX PARTIAL SEIZURES, NOT INTRACTABLE, WITHOUT STATUS EPILEPTICUS (H): ICD-10-CM

## 2023-11-30 DIAGNOSIS — S06.9X1S TRAUMATIC BRAIN INJURY, WITH LOSS OF CONSCIOUSNESS OF 30 MINUTES OR LESS, SEQUELA (H): ICD-10-CM

## 2023-11-30 DIAGNOSIS — I10 ACCELERATED HYPERTENSION: ICD-10-CM

## 2023-11-30 DIAGNOSIS — F32.0 MILD MAJOR DEPRESSION (H): ICD-10-CM

## 2023-11-30 RX ORDER — LEVETIRACETAM 500 MG/1
500 TABLET ORAL 2 TIMES DAILY
Qty: 180 TABLET | Refills: 4 | Status: SHIPPED | OUTPATIENT
Start: 2023-11-30

## 2023-11-30 RX ORDER — ESCITALOPRAM OXALATE 20 MG/1
20 TABLET ORAL DAILY
Qty: 90 TABLET | Refills: 4 | Status: SHIPPED | OUTPATIENT
Start: 2023-11-30

## 2023-11-30 RX ORDER — LISINOPRIL 20 MG/1
20 TABLET ORAL 2 TIMES DAILY
Qty: 180 TABLET | Refills: 4 | Status: SHIPPED | OUTPATIENT
Start: 2023-11-30

## 2023-11-30 RX ORDER — METOPROLOL SUCCINATE 25 MG/1
75 TABLET, EXTENDED RELEASE ORAL DAILY
Qty: 270 TABLET | Refills: 4 | Status: SHIPPED | OUTPATIENT
Start: 2023-11-30

## 2023-11-30 NOTE — TELEPHONE ENCOUNTER
General Call    Contacts         Type Contact Phone/Fax    2023 02:51 PM CST Phone (Incoming) Rachel [facility] [phone]          Reason for Call:  Medication question    What are your questions or concerns:      Rachel from [facility] is calling regarding some question regarding two medications. Rachel recently spoke to pt and pt appears to be confused about some medication if she should continue taking or not.    Rachel is asking if pt should be taking Atorvastatin and Lisinopril?    Chart reviewed, it appears these two medications was prescribed by an attending during pt's hospitalization back in August. It looks like the prescription has  (2023 to 2023). Rachel says if pt is supposed to continue taking these medication, pt is out of it, and will need refills for them.    Date of last appointment with provider: 10/20/2023    Could we send this information to you in [employer] or would you prefer to receive a phone call?:   Patient would prefer a phone call   Okay to leave a detailed message?: No at Other phone number:  Rachel [facility] [phone].    Will route message to Dr. Love to review and advise.    NOAH MossN, RN   [facility]

## 2023-12-01 NOTE — TELEPHONE ENCOUNTER
Patient can stop the statin at this time, so I deleted it from her med list. The other meds, she should be taking, so I refilled those with a year's worth of refills.    Please let the people at [facility] know that Eleanor's BP has been fluctuating. Usually she has high blood pressure and needs two meds to control it. But then other times, the meds over-correct her and she lays in bed all day with low blood pressure. She should be taking the meds daily, but if she has dizziness and is found to have low BP then we will hold the meds.

## 2023-12-01 NOTE — TELEPHONE ENCOUNTER
Attempted to call Rachel back but no answer. Will have to try and call again to relay Dr. Love's message below.    NOAH MossN, RN   [facility]

## 2023-12-04 NOTE — TELEPHONE ENCOUNTER
Called patient and relay provider message below.     [facility] [phone]  called with no answer. Message left on vm.  Clinic number provided.  If they call back, please provide provider information below.    JESENIA Crespo, RN  [facility]      Patient can stop the statin at this time, so I deleted it from her med list. The other meds, she should be taking, so I refilled those with a year's worth of refills.     Please let the people at [facility] know that Eleanor's BP has been fluctuating. Usually she has high blood pressure and needs two meds to control it. But then other times, the meds over-correct her and she lays in bed all day with low blood pressure. She should be taking the meds daily, but if she has dizziness and is found to have low BP then we will hold the meds.

## 2023-12-11 ENCOUNTER — PATIENT OUTREACH (OUTPATIENT)
Dept: CARE COORDINATION | Facility: CLINIC | Age: 73
End: 2023-12-11
Payer: COMMERCIAL

## 2023-12-11 NOTE — PROGRESS NOTES
Clinic Care Coordination Contact    Assessment: Care Coordinator contacted patient for 2 month follow up.  Patient has continued to follow the plan of care and assessment is negative for any new needs or concerns.    Enrollment status: Graduated.      Plan: No further outreaches at this time.  Patient will continue to follow the plan of care.  If new needs arise a new Care Coordination referral may be placed.  FYI to PCP    CCSW graduated pt as she and her spouse continually deny need for support. Pt was working with our Community Paramedic program until the program ended in late November. Pt does not qualify for PCA pr ARHMS services due to insurance coverage.     JONO Knowles, [facility]  Social Work Care Coordinator

## 2023-12-11 NOTE — LETTER
[facility] CARE COORDINATION  [facility]  December 11, 2023    Eleanor Awan  [address]  [place] [address]    Dear Eleanor,  Your Care Team congratulates you on your journey to maintain wellness. This document will help guide you on your journey to maintain a healthy lifestyle.  You can use this to help you overcome any barriers you may encounter.  If you should have any questions or concerns, you can contact the members of your Care Team or contact your Primary Care Clinic for assistance.     Health Maintenance  Health Maintenance Reviewed: Due/Overdue   Health Maintenance Due   Topic Date Due    DEPRESSION ACTION PLAN  Never done    RSV VACCINE (Pregnancy & 60+) (1 - 1-dose 60+ series) Never done    IPV IMMUNIZATION (2 of 3 - Adult catch-up series) 03/23/2018    HEPATITIS A IMMUNIZATION (2 of 2 - Risk 2-dose series) 08/23/2018    Pneumococcal Vaccine: 65+ Years (2 - PPSV23 or PCV20) 07/10/2019    ZOSTER IMMUNIZATION (3 of 3) 07/10/2019    MEDICARE ANNUAL WELLNESS VISIT  02/07/2021    HEPATITIS B IMMUNIZATION (2 of 3 - Risk 3-dose series) 05/29/2021    COVID-19 Vaccine (3 - 2023-24 season) 09/01/2023    DTAP/TDAP/TD IMMUNIZATION (2 - Td or Tdap) 09/06/2023    DEPRESSION 6 MO INDEX REPEAT PHQ-9  10/15/2023         My Access Plan  Medical Emergency 911   Primary Clinic Line [facility] - [phone]   24 Hour Appointment Line [phone] or  [phone]) (toll-free)   24 Hour Nurse Line [phone] (toll-free)   Preferred Urgent Care     Preferred Hospital     Preferred Pharmacy [facility] DR[facility]ORE #[id number] [facility] [address] AT [facility] [address]     Behavioral Health Crisis Line The National Suicide Prevention Lifeline at 1-489.297.5535 or 911     My Care Team Members  Patient Care Team         Relationship Specialty Notifications Start End    Camille Love MD PCP - General Family Medicine  4/4/23     Phone: [phone] Fax:  [phone]         [address] [place] [address] [address] [place] [address]    Danuta Kay, PharmD Pharmacist Pharmacist  10/6/20     Phone: [phone]          [facility] [address] [facility] [address] [place] [address]    Camille Love MD Assigned PCP   6/16/21     Phone: [phone] Fax: [phone]         [address] [facility] [address] [place] [address]    Rainer Mahajan MD Assigned Neuroscience Provider   4/17/22     Phone: [phone] Fax: [phone]         [address] [facility] [address]    Dontae Bocanegra CHW Community Health Worker Primary Care - CC Admissions 3/9/23 12/11/23    Phone: [phone] Fax: [phone]         [address] [place] [address] [address] [facility] [address]    Edna Swain Psy.D, LP Assigned Behavioral Health Provider   5/20/23     Phone: [phone] Fax: [phone]         [address] [place] [address] [address] [facility] [address]    Tanvi Hamilton, HIREN Lead Care Coordinator  Admissions 11/1/23 12/11/23                 Goals    None          Advance Care Plans/Directives Type:          It has been your Clinic Care Team's pleasure to work with you on your goals.    Regards,  Your Clinic Care Team       90

## 2023-12-20 ENCOUNTER — TELEPHONE (OUTPATIENT)
Dept: FAMILY MEDICINE | Facility: CLINIC | Age: 73
End: 2023-12-20
Payer: COMMERCIAL

## 2023-12-20 NOTE — TELEPHONE ENCOUNTER
Patient Quality Outreach    Patient is due for the following:   Depression  -  PHQ-9 needed  Physical Annual Wellness Visit      Topic Date Due    Polio Vaccine (2 of 3 - Adult catch-up series) 03/23/2018    Hepatitis A Vaccine (2 of 2 - Risk 2-dose series) 08/23/2018    Pneumococcal Vaccine (2 of 2 - PPSV23 or PCV20) 07/10/2019    Zoster (Shingles) Vaccine (3 of 3) 07/10/2019    Hepatitis B Vaccine (2 of 3 - Risk 3-dose series) 05/29/2021    COVID-19 Vaccine (3 - 2023-24 season) 09/01/2023    Diptheria Tetanus Pertussis (DTAP/TDAP/TD) Vaccine (2 - Td or Tdap) 09/06/2023       Next Steps:   Patient has upcoming appointment, these items will be addressed at that time.    Type of outreach:    Chart review performed, no outreach needed.      Questions for provider review:    None           Rishi Tijerina MA

## 2023-12-29 ENCOUNTER — VIRTUAL VISIT (OUTPATIENT)
Dept: FAMILY MEDICINE | Facility: CLINIC | Age: 73
End: 2023-12-29
Payer: COMMERCIAL

## 2023-12-29 ENCOUNTER — OFFICE VISIT (OUTPATIENT)
Dept: PEDIATRICS | Facility: CLINIC | Age: 73
End: 2023-12-29
Payer: COMMERCIAL

## 2023-12-29 VITALS
SYSTOLIC BLOOD PRESSURE: 115 MMHG | HEART RATE: 64 BPM | OXYGEN SATURATION: 97 % | RESPIRATION RATE: 13 BRPM | DIASTOLIC BLOOD PRESSURE: 65 MMHG | TEMPERATURE: 98.4 F

## 2023-12-29 DIAGNOSIS — R19.7 DIARRHEA, UNSPECIFIED TYPE: Primary | ICD-10-CM

## 2023-12-29 DIAGNOSIS — R41.89 MODERATE COGNITIVE IMPAIRMENT: Primary | ICD-10-CM

## 2023-12-29 DIAGNOSIS — N30.00 ACUTE CYSTITIS WITHOUT HEMATURIA: ICD-10-CM

## 2023-12-29 DIAGNOSIS — I10 ESSENTIAL HYPERTENSION: ICD-10-CM

## 2023-12-29 DIAGNOSIS — F33.1 MODERATE EPISODE OF RECURRENT MAJOR DEPRESSIVE DISORDER (H): ICD-10-CM

## 2023-12-29 DIAGNOSIS — G40.209 PARTIAL SYMPTOMATIC EPILEPSY WITH COMPLEX PARTIAL SEIZURES, NOT INTRACTABLE, WITHOUT STATUS EPILEPTICUS (H): ICD-10-CM

## 2023-12-29 LAB
ALBUMIN UR-MCNC: NEGATIVE MG/DL
ANION GAP SERPL CALCULATED.3IONS-SCNC: 6 MMOL/L (ref 3–14)
APPEARANCE UR: CLEAR
BACTERIA #/AREA URNS HPF: ABNORMAL /HPF
BILIRUB UR QL STRIP: NEGATIVE
BUN SERPL-MCNC: 13 MG/DL (ref 7–30)
CALCIUM SERPL-MCNC: 9.9 MG/DL (ref 8.5–10.1)
CHLORIDE BLD-SCNC: 105 MMOL/L (ref 94–109)
CO2 SERPL-SCNC: 29 MMOL/L (ref 20–32)
COLOR UR AUTO: YELLOW
CREAT SERPL-MCNC: 0.9 MG/DL (ref 0.52–1.04)
EGFRCR SERPLBLD CKD-EPI 2021: 67 ML/MIN/1.73M2
ERYTHROCYTE [DISTWIDTH] IN BLOOD BY AUTOMATED COUNT: 11.8 % (ref 10–15)
GLUCOSE BLD-MCNC: 94 MG/DL (ref 70–99)
GLUCOSE UR STRIP-MCNC: NEGATIVE MG/DL
HCT VFR BLD AUTO: 45 % (ref 35–47)
HGB BLD-MCNC: 15.3 G/DL (ref 11.7–15.7)
HGB UR QL STRIP: ABNORMAL
KETONES UR STRIP-MCNC: NEGATIVE MG/DL
LEUKOCYTE ESTERASE UR QL STRIP: ABNORMAL
MCH RBC QN AUTO: 31.5 PG (ref 26.5–33)
MCHC RBC AUTO-ENTMCNC: 34 G/DL (ref 31.5–36.5)
MCV RBC AUTO: 93 FL (ref 78–100)
MUCOUS THREADS #/AREA URNS LPF: PRESENT /LPF
NITRATE UR QL: NEGATIVE
PH UR STRIP: 5.5 [PH] (ref 5–8)
PLATELET # BLD AUTO: 205 10E3/UL (ref 150–450)
POTASSIUM BLD-SCNC: 3.5 MMOL/L (ref 3.4–5.3)
RBC # BLD AUTO: 4.86 10E6/UL (ref 3.8–5.2)
RBC #/AREA URNS AUTO: ABNORMAL /HPF
SODIUM SERPL-SCNC: 140 MMOL/L (ref 135–145)
SP GR UR STRIP: 1.02 (ref 1–1.03)
SQUAMOUS #/AREA URNS AUTO: ABNORMAL /LPF
UROBILINOGEN UR STRIP-ACNC: 0.2 E.U./DL
WBC # BLD AUTO: 9.7 10E3/UL (ref 4–11)
WBC #/AREA URNS AUTO: ABNORMAL /HPF

## 2023-12-29 PROCEDURE — 80048 BASIC METABOLIC PNL TOTAL CA: CPT | Performed by: EMERGENCY MEDICINE

## 2023-12-29 PROCEDURE — 87086 URINE CULTURE/COLONY COUNT: CPT | Performed by: EMERGENCY MEDICINE

## 2023-12-29 PROCEDURE — 36415 COLL VENOUS BLD VENIPUNCTURE: CPT | Performed by: EMERGENCY MEDICINE

## 2023-12-29 PROCEDURE — 99213 OFFICE O/P EST LOW 20 MIN: CPT | Performed by: EMERGENCY MEDICINE

## 2023-12-29 PROCEDURE — 81001 URINALYSIS AUTO W/SCOPE: CPT | Performed by: EMERGENCY MEDICINE

## 2023-12-29 PROCEDURE — 99213 OFFICE O/P EST LOW 20 MIN: CPT | Mod: VID | Performed by: FAMILY MEDICINE

## 2023-12-29 PROCEDURE — 85027 COMPLETE CBC AUTOMATED: CPT | Performed by: EMERGENCY MEDICINE

## 2023-12-29 RX ORDER — SULFAMETHOXAZOLE/TRIMETHOPRIM 800-160 MG
1 TABLET ORAL 2 TIMES DAILY
Qty: 14 TABLET | Refills: 0 | Status: SHIPPED | OUTPATIENT
Start: 2023-12-29 | End: 2024-01-05

## 2023-12-29 ASSESSMENT — PAIN SCALES - GENERAL: PAINLEVEL: NO PAIN (0)
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
## 2023-12-29 NOTE — PROGRESS NOTES
{PROVIDER CHARTING PREFERENCE:407469}    Subjective   Eleanor is a 73 year old, presenting for the following health issues:  Confusion  {(!) Visit Details have not yet been documented.  Please enter Visit Details and then use this list to pull in documentation. (Optional):161644}    HPI     {ADS SUPERLIST :179992}        Review of Systems   {ROS COMP (Optional):743523}      Objective    /65 (BP Location: Right arm, Patient Position: Sitting, Cuff Size: Adult Small)   Pulse 64   Temp 98.4  F (36.9  C) (Oral)   Resp 13   LMP  (LMP Unknown)   SpO2 97%   There is no height or weight on file to calculate BMI.  Physical Exam   {Exam List (Optional):930561}    {Diagnostic Test Results (Optional):893235}    {AMBULATORY ATTESTATION (Optional):167328}

## 2023-12-29 NOTE — PROGRESS NOTES
"Eleanor is a 73 year old who is being evaluated via a billable video visit.      How would you like to obtain your AVS? Mail a copy  If the video visit is dropped, the invitation should be resent by: Text to cell phone: [phone]  Will anyone else be joining your video visit? Yes: my . How would they like to receive their invitation? Text to cell phone: [phone]          Assessment & Plan     Moderate cognitive impairment  She can hold a conversation by speaking at the right time, but she does not always give information that is the appropriate sequatur / answer to the question.    Essential hypertension  While she typically has high BP, her BP is now low and her  states she has not taken losartan for 4 days    Moderate episode of recurrent major depressive disorder (H)  Mood actually seemed fair today, better than the previous months.    Partial symptomatic epilepsy with complex partial seizures, not intractable, without status epilepticus (H)  Known problem - I wonder if her Keppra level is off?      Review of external notes as documented elsewhere in note  Prescription drug management  20 minutes spent by me on the date of the encounter doing chart review, history and exam, documentation and further activities per the note    Camille Love MD  [facility]                Jesus Webster is a 73 year old, presenting for the following health issues:  No chief complaint on file.      HPI   Ekaterina says she's been feeling \"icky\" lately or \"off a little bit.\" She cannot give more specifics.  She says her eating is \"so so.\" She says she eats twice daily.  She says there is a problem with her dentures.    Her  says her BP was 95/50 last week when they checked it.     Review of Systems         Objective         Vitals:  No vitals were obtained today due to virtual visit.    Physical Exam   GENERAL: alert, no distress, and fatigued  PSYCH: concentration poor, " inattentive, affect flat, and judgement and insight impaired          Video-Visit Details    Type of service:  Video Visit   Video Start Time:  about 7:15  Video End Time: about 7:27    Originating Location (pt. Location): Home    Distant Location (provider location):  On-site  Platform used for Video Visit: Byron

## 2023-12-30 LAB — BACTERIA UR CULT: NORMAL

## 2024-01-24 DIAGNOSIS — Z76.0 ENCOUNTER FOR MEDICATION REFILL: ICD-10-CM

## 2024-01-24 RX ORDER — FOLIC ACID 1 MG/1
1000 TABLET ORAL DAILY
Qty: 90 TABLET | Refills: 3 | Status: SHIPPED | OUTPATIENT
Start: 2024-01-24

## 2024-02-28 ENCOUNTER — OFFICE VISIT (OUTPATIENT)
Dept: FAMILY MEDICINE | Facility: CLINIC | Age: 74
End: 2024-02-28
Payer: COMMERCIAL

## 2024-02-28 VITALS
HEIGHT: 63 IN | WEIGHT: 95 LBS | SYSTOLIC BLOOD PRESSURE: 112 MMHG | BODY MASS INDEX: 16.83 KG/M2 | DIASTOLIC BLOOD PRESSURE: 58 MMHG | OXYGEN SATURATION: 96 % | HEART RATE: 81 BPM | RESPIRATION RATE: 14 BRPM | TEMPERATURE: 97.4 F

## 2024-02-28 DIAGNOSIS — K70.30 ALCOHOLIC CIRRHOSIS OF LIVER WITHOUT ASCITES (H): ICD-10-CM

## 2024-02-28 DIAGNOSIS — I10 ESSENTIAL HYPERTENSION: ICD-10-CM

## 2024-02-28 DIAGNOSIS — R41.89 MODERATE COGNITIVE IMPAIRMENT: ICD-10-CM

## 2024-02-28 DIAGNOSIS — R45.1 AGITATION: Primary | ICD-10-CM

## 2024-02-28 DIAGNOSIS — F10.21 ALCOHOL DEPENDENCE IN REMISSION (H): ICD-10-CM

## 2024-02-28 DIAGNOSIS — I63.9 ACUTE STROKE DUE TO ISCHEMIA (H): ICD-10-CM

## 2024-02-28 DIAGNOSIS — E51.9 THIAMINE DEFICIENCY: ICD-10-CM

## 2024-02-28 DIAGNOSIS — G40.209 PARTIAL SYMPTOMATIC EPILEPSY WITH COMPLEX PARTIAL SEIZURES, NOT INTRACTABLE, WITHOUT STATUS EPILEPTICUS (H): ICD-10-CM

## 2024-02-28 DIAGNOSIS — F33.1 MODERATE EPISODE OF RECURRENT MAJOR DEPRESSIVE DISORDER (H): ICD-10-CM

## 2024-02-28 DIAGNOSIS — B18.2 HEPATITIS C CARRIER (H): ICD-10-CM

## 2024-02-28 DIAGNOSIS — R41.89 MODERATE COGNITIVE IMPAIRMENT: Primary | ICD-10-CM

## 2024-02-28 DIAGNOSIS — S06.9X1S TRAUMATIC BRAIN INJURY, WITH LOSS OF CONSCIOUSNESS OF 30 MINUTES OR LESS, SEQUELA (H): ICD-10-CM

## 2024-02-28 DIAGNOSIS — F02.80 MAJOR NEUROCOGNITIVE DISORDER DUE TO MULTIPLE ETIOLOGIES (H): ICD-10-CM

## 2024-02-28 LAB
BASOPHILS # BLD AUTO: 0 10E3/UL (ref 0–0.2)
BASOPHILS NFR BLD AUTO: 0 %
EOSINOPHIL # BLD AUTO: 0 10E3/UL (ref 0–0.7)
EOSINOPHIL NFR BLD AUTO: 0 %
ERYTHROCYTE [DISTWIDTH] IN BLOOD BY AUTOMATED COUNT: 11.9 % (ref 10–15)
HCT VFR BLD AUTO: 44.2 % (ref 35–47)
HGB BLD-MCNC: 14.9 G/DL (ref 11.7–15.7)
IMM GRANULOCYTES # BLD: 0 10E3/UL
IMM GRANULOCYTES NFR BLD: 0 %
LYMPHOCYTES # BLD AUTO: 1.1 10E3/UL (ref 0.8–5.3)
LYMPHOCYTES NFR BLD AUTO: 17 %
MCH RBC QN AUTO: 31.6 PG (ref 26.5–33)
MCHC RBC AUTO-ENTMCNC: 33.7 G/DL (ref 31.5–36.5)
MCV RBC AUTO: 94 FL (ref 78–100)
MONOCYTES # BLD AUTO: 0.4 10E3/UL (ref 0–1.3)
MONOCYTES NFR BLD AUTO: 6 %
NEUTROPHILS # BLD AUTO: 5 10E3/UL (ref 1.6–8.3)
NEUTROPHILS NFR BLD AUTO: 76 %
PLATELET # BLD AUTO: 241 10E3/UL (ref 150–450)
RBC # BLD AUTO: 4.71 10E6/UL (ref 3.8–5.2)
WBC # BLD AUTO: 6.6 10E3/UL (ref 4–11)

## 2024-02-28 PROCEDURE — 85025 COMPLETE CBC W/AUTO DIFF WBC: CPT | Performed by: FAMILY MEDICINE

## 2024-02-28 PROCEDURE — 84443 ASSAY THYROID STIM HORMONE: CPT | Performed by: FAMILY MEDICINE

## 2024-02-28 PROCEDURE — 80177 DRUG SCRN QUAN LEVETIRACETAM: CPT | Performed by: FAMILY MEDICINE

## 2024-02-28 PROCEDURE — 82607 VITAMIN B-12: CPT | Performed by: FAMILY MEDICINE

## 2024-02-28 PROCEDURE — 36415 COLL VENOUS BLD VENIPUNCTURE: CPT | Performed by: FAMILY MEDICINE

## 2024-02-28 PROCEDURE — 99215 OFFICE O/P EST HI 40 MIN: CPT | Performed by: FAMILY MEDICINE

## 2024-02-28 RX ORDER — RESPIRATORY SYNCYTIAL VIRUS VACCINE 120MCG/0.5
0.5 KIT INTRAMUSCULAR ONCE
Qty: 1 EACH | Refills: 0 | Status: CANCELLED | OUTPATIENT
Start: 2024-02-28 | End: 2024-02-28

## 2024-02-28 ASSESSMENT — PATIENT HEALTH QUESTIONNAIRE - PHQ9
SUM OF ALL RESPONSES TO PHQ QUESTIONS 1-9: 8
SUM OF ALL RESPONSES TO PHQ QUESTIONS 1-9: 8
10. IF YOU CHECKED OFF ANY PROBLEMS, HOW DIFFICULT HAVE THESE PROBLEMS MADE IT FOR YOU TO DO YOUR WORK, TAKE CARE OF THINGS AT HOME, OR GET ALONG WITH OTHER PEOPLE: NOT DIFFICULT AT ALL

## 2024-02-28 NOTE — PROGRESS NOTES
Assessment & Plan     Moderate cognitive impairment  Her impairment is declining. I am not sure what to do about it other than homecare referral for PT and OT.  - Keppra (Levetiracetam) Level  - Vitamin B12  - Vitamin B6  - TSH with free T4 reflex    Partial symptomatic epilepsy with complex partial seizures, not intractable, without status epilepticus (H)  She is on Keppra which is preventing seizures, but I wonder if it is also causing her agition. Will see if neurology can weigh in on this.  - Keppra (Levetiracetam) Level  - TSH with free T4 reflex    Essential hypertension  controlled  - TSH with free T4 reflex  - CBC with platelets and differential    Thiamine deficiency  Not checked today  - TSH with free T4 reflex  - CBC with platelets and differential    Alcoholic cirrhosis of liver without ascites (H)  Historical problem - but could certainly be part of why she is deteriorating  - TSH with free T4 reflex    Moderate episode of recurrent major depressive disorder (H)  Mood currently seems OK, not low. She is more interactive and not down on herself  - TSH with free T4 reflex    Hepatitis C carrier (H)  following  - CBC with platelets and differential    Acute stroke due to ischemia (H)  Past. We reviewed her meds today and she did not have her aspirin. I'll restart it.  - aspirin 81 MG EC tablet  Dispense: 90 tablet; Refill: 3    Agitation  This is a new thing - her  has been aware of it; dirk seems unaware of it.    Major neurocognitive disorder due to multiple etiologies (H)  Yes. She wants to remain at home with her , Rian/Asaf, so that is what we are prioritizing. Hence will see home PT.    Alcohol dependence in remission (H)  noted    Traumatic brain injury, with loss of consciousness of 30 minutes or less, sequela (H24)  His past problem is very likely playing into her current condition      Review of external notes as documented elsewhere in note  Ordering of each unique  test  Prescription drug management  45 minutes spent by me on the date of the encounter doing chart review, history and exam, documentation and further activities per the note                        Subjective   Eleanor is a 73 year old, presenting for the following health issues:  Depression      2/28/2024     3:43 PM   Additional Questions   Roomed by Lenora TINOCO MA   Accompanied by Spouse     History of Present Illness       Reason for visit:  Follow up    She eats 2-3 servings of fruits and vegetables daily.She consumes 0 sweetened beverage(s) daily.She exercises with enough effort to increase her heart rate 9 or less minutes per day.  She exercises with enough effort to increase her heart rate 3 or less days per week. She is missing 1 dose(s) of medications per week.     Appetite is ok but when I eat I get full very quickly.    Pooping - hard to get it started  Pooped this AM - long, not hard bits    Moving lots - not sure why, it does not bother her    Gets dizzy in evenings - has not fallen  Ok with PT at home.      Objective    LMP  (LMP Unknown)   There is no height or weight on file to calculate BMI.  Physical Exam   GENERAL: alert, no distress, frail, appears older than stated age, and moves around a lot  NECK: no adenopathy, no asymmetry, masses, or scars  RESP: lungs clear to auscultation - no rales, rhonchi or wheezes  CV: regular rate and rhythm, normal S1 S2, no S3 or S4, no murmur, click or rub, no peripheral edema  MS: muscle wasting generally and she has some frequent movements - such as poking her right cheek and pursing her lips, or just shifting her seating position  SKIN: no suspicious lesions or rashes  PSYCH: concentration poor, affect flat, judgement and insight intact, judgement and insight impaired, and appearance well groomed    Labs reviewed - CBC normal, TSH normal, B12 normal, B6 pending, keppra normal        Signed Electronically by: Camille Love MD

## 2024-02-29 LAB
LEVETIRACETAM SERPL-MCNC: 25.8 ΜG/ML (ref 10–40)
TSH SERPL DL<=0.005 MIU/L-ACNC: 1.4 UIU/ML (ref 0.3–4.2)
VIT B12 SERPL-MCNC: 645 PG/ML (ref 232–1245)

## 2024-03-02 PROBLEM — S06.9X1S TRAUMATIC BRAIN INJURY, WITH LOSS OF CONSCIOUSNESS OF 30 MINUTES OR LESS, SEQUELA (H): Status: ACTIVE | Noted: 2020-02-08

## 2024-03-02 PROBLEM — F02.80 MAJOR NEUROCOGNITIVE DISORDER DUE TO MULTIPLE ETIOLOGIES (H): Status: ACTIVE | Noted: 2024-03-02

## 2024-03-02 RX ORDER — ASPIRIN 81 MG/1
81 TABLET ORAL DAILY
Qty: 90 TABLET | Refills: 3 | Status: SHIPPED | OUTPATIENT
Start: 2024-03-02

## 2024-04-16 ENCOUNTER — TELEPHONE (OUTPATIENT)
Dept: NEUROLOGY | Facility: CLINIC | Age: 74
End: 2024-04-16

## 2024-04-16 NOTE — TELEPHONE ENCOUNTER
Reached out to patient to schedule New Seizure per referral received 03/2024. No answer, left detailed message for a call back.

## 2024-04-26 ENCOUNTER — OFFICE VISIT (OUTPATIENT)
Dept: FAMILY MEDICINE | Facility: CLINIC | Age: 74
End: 2024-04-26
Payer: COMMERCIAL

## 2024-04-26 VITALS
RESPIRATION RATE: 16 BRPM | HEART RATE: 67 BPM | SYSTOLIC BLOOD PRESSURE: 100 MMHG | TEMPERATURE: 98.1 F | OXYGEN SATURATION: 95 % | WEIGHT: 101.75 LBS | HEIGHT: 63 IN | DIASTOLIC BLOOD PRESSURE: 53 MMHG | BODY MASS INDEX: 18.03 KG/M2

## 2024-04-26 DIAGNOSIS — K70.30 ALCOHOLIC CIRRHOSIS OF LIVER WITHOUT ASCITES (H): ICD-10-CM

## 2024-04-26 DIAGNOSIS — R41.89 MODERATE COGNITIVE IMPAIRMENT: ICD-10-CM

## 2024-04-26 DIAGNOSIS — Z23 NEED FOR VACCINATION: ICD-10-CM

## 2024-04-26 DIAGNOSIS — Z23 NEED FOR TDAP VACCINATION: ICD-10-CM

## 2024-04-26 DIAGNOSIS — Z00.00 PREVENTATIVE HEALTH CARE: ICD-10-CM

## 2024-04-26 DIAGNOSIS — F33.1 MODERATE EPISODE OF RECURRENT MAJOR DEPRESSIVE DISORDER (H): ICD-10-CM

## 2024-04-26 DIAGNOSIS — S06.9X1S TRAUMATIC BRAIN INJURY, WITH LOSS OF CONSCIOUSNESS OF 30 MINUTES OR LESS, SEQUELA (H): Primary | ICD-10-CM

## 2024-04-26 DIAGNOSIS — I10 ESSENTIAL HYPERTENSION: ICD-10-CM

## 2024-04-26 PROCEDURE — 99215 OFFICE O/P EST HI 40 MIN: CPT | Mod: 25 | Performed by: FAMILY MEDICINE

## 2024-04-26 PROCEDURE — 90677 PCV20 VACCINE IM: CPT | Performed by: FAMILY MEDICINE

## 2024-04-26 PROCEDURE — G0009 ADMIN PNEUMOCOCCAL VACCINE: HCPCS | Performed by: FAMILY MEDICINE

## 2024-04-26 PROCEDURE — 90472 IMMUNIZATION ADMIN EACH ADD: CPT | Performed by: FAMILY MEDICINE

## 2024-04-26 PROCEDURE — 90715 TDAP VACCINE 7 YRS/> IM: CPT | Performed by: FAMILY MEDICINE

## 2024-04-26 RX ORDER — RESPIRATORY SYNCYTIAL VIRUS VACCINE 120MCG/0.5
0.5 KIT INTRAMUSCULAR ONCE
Qty: 1 EACH | Refills: 0 | Status: CANCELLED | OUTPATIENT
Start: 2024-04-26 | End: 2024-04-26

## 2024-04-26 ASSESSMENT — PATIENT HEALTH QUESTIONNAIRE - PHQ9: SUM OF ALL RESPONSES TO PHQ QUESTIONS 1-9: 4

## 2024-04-26 NOTE — PROGRESS NOTES
Assessment & Plan     Traumatic brain injury, with loss of consciousness of 30 minutes or less, sequela (H24)  It appears Eleanor is stable, eating enough, getting some activity, content.    Need for Tdap vaccination  Given today  - TDAP 7+ (ADACEL,BOOSTRIX)    Need for vaccination  Given today  - Pneumococcal 20 Valent Conjugate (PCV20)  - Tdap, tetanus-diptheria-acell pertussis, (BOOSTRIX) 5-2.5-18.5 LF-MCG/0.5 DEANDRA injection  Dispense: 0.5 mL; Refill: 0    Preventative health care  reviewed  - REVIEW OF HEALTH MAINTENANCE PROTOCOL ORDERS    Moderate cognitive impairment  Continues same - not worse or better. I do not expect improvement    Essential hypertension  Stable. Might need to decrease her BP med dose.    Alcoholic cirrhosis of liver without ascites (H)  Will check liver next visit    Moderate episode of recurrent major depressive disorder (H)  Mood is good, continue escitalopram 20mg      Review of external notes as documented elsewhere in note  Ordering of each unique test  Prescription drug management  40 minutes spent by me on the date of the encounter doing chart review, history and exam, documentation and further activities per the note                Subjective   Eleanor is a 73 year old, presenting for the following health issues:  Weight Check        4/26/2024     7:12 AM   Additional Questions   Roomed by Maryann ALCAZAR   Accompanied by spouse Asaf     History of Present Illness       Reason for visit:  Check up    She eats 0-1 servings of fruits and vegetables daily.She consumes 0 sweetened beverage(s) daily.She exercises with enough effort to increase her heart rate 9 or less minutes per day.  She exercises with enough effort to increase her heart rate 3 or less days per week. She is missing 1 dose(s) of medications per week.     Left armpit lump -  saw it. They want this checked.    Not getting out much.   says she's up when he is home. She says if he is not home, she sleeps till 1pm and  "even after that dozes or lays in bed.    Eating a bit better. No new problems.        Objective    /53   Pulse 67   Temp 98.1  F (36.7  C) (Oral)   Resp 16   Ht 1.6 m (5' 3\")   Wt 46.2 kg (101 lb 12 oz)   LMP  (LMP Unknown)   SpO2 95%   BMI 18.02 kg/m    Body mass index is 18.02 kg/m .  Physical Exam   GENERAL: alert, no distress, and thin, constantly moving  RESP: lungs clear to auscultation - no rales, rhonchi or wheezes  CV: regular rate and rhythm, normal S1 S2, no S3 or S4, no murmur, click or rub, no peripheral edema  SKIN: no suspicious lesions or rashes  PSYCH: concentration poor, affect flat, judgement and insight impaired, and appearance well groomed    Office Visit on 02/28/2024   Component Date Value Ref Range Status    Keppra (Levetiracetam) Level 02/28/2024 25.8  10.0 - 40.0  g/mL Final    Vitamin B12 02/28/2024 645  232 - 1,245 pg/mL Final    TSH 02/28/2024 1.40  0.30 - 4.20 uIU/mL Final    WBC Count 02/28/2024 6.6  4.0 - 11.0 10e3/uL Final    RBC Count 02/28/2024 4.71  3.80 - 5.20 10e6/uL Final    Hemoglobin 02/28/2024 14.9  11.7 - 15.7 g/dL Final    Hematocrit 02/28/2024 44.2  35.0 - 47.0 % Final    MCV 02/28/2024 94  78 - 100 fL Final    MCH 02/28/2024 31.6  26.5 - 33.0 pg Final    MCHC 02/28/2024 33.7  31.5 - 36.5 g/dL Final    RDW 02/28/2024 11.9  10.0 - 15.0 % Final    Platelet Count 02/28/2024 241  150 - 450 10e3/uL Final    % Neutrophils 02/28/2024 76  % Final    % Lymphocytes 02/28/2024 17  % Final    % Monocytes 02/28/2024 6  % Final    % Eosinophils 02/28/2024 0  % Final    % Basophils 02/28/2024 0  % Final    % Immature Granulocytes 02/28/2024 0  % Final    Absolute Neutrophils 02/28/2024 5.0  1.6 - 8.3 10e3/uL Final    Absolute Lymphocytes 02/28/2024 1.1  0.8 - 5.3 10e3/uL Final    Absolute Monocytes 02/28/2024 0.4  0.0 - 1.3 10e3/uL Final    Absolute Eosinophils 02/28/2024 0.0  0.0 - 0.7 10e3/uL Final    Absolute Basophils 02/28/2024 0.0  0.0 - 0.2 10e3/uL Final    Absolute " Immature Granulocytes 02/28/2024 0.0  <=0.4 10e3/uL Final           Signed Electronically by: Camille Love MD

## 2024-07-16 ENCOUNTER — OFFICE VISIT (OUTPATIENT)
Dept: FAMILY MEDICINE | Facility: CLINIC | Age: 74
End: 2024-07-16
Payer: COMMERCIAL

## 2024-07-16 VITALS
TEMPERATURE: 99.3 F | DIASTOLIC BLOOD PRESSURE: 60 MMHG | OXYGEN SATURATION: 95 % | RESPIRATION RATE: 20 BRPM | HEART RATE: 74 BPM | BODY MASS INDEX: 17.56 KG/M2 | WEIGHT: 99.12 LBS | SYSTOLIC BLOOD PRESSURE: 118 MMHG | HEIGHT: 63 IN

## 2024-07-16 DIAGNOSIS — R35.0 URINE FREQUENCY: ICD-10-CM

## 2024-07-16 DIAGNOSIS — R41.89 MODERATE COGNITIVE IMPAIRMENT: ICD-10-CM

## 2024-07-16 DIAGNOSIS — S06.9X1S TRAUMATIC BRAIN INJURY, WITH LOSS OF CONSCIOUSNESS OF 30 MINUTES OR LESS, SEQUELA (H): ICD-10-CM

## 2024-07-16 DIAGNOSIS — T14.8XXA BRUISING: Primary | ICD-10-CM

## 2024-07-16 LAB
BASOPHILS # BLD AUTO: 0 10E3/UL (ref 0–0.2)
BASOPHILS NFR BLD AUTO: 0 %
EOSINOPHIL # BLD AUTO: 0 10E3/UL (ref 0–0.7)
EOSINOPHIL NFR BLD AUTO: 0 %
ERYTHROCYTE [DISTWIDTH] IN BLOOD BY AUTOMATED COUNT: 11.7 % (ref 10–15)
HCT VFR BLD AUTO: 45.8 % (ref 35–47)
HGB BLD-MCNC: 15.3 G/DL (ref 11.7–15.7)
IMM GRANULOCYTES # BLD: 0 10E3/UL
IMM GRANULOCYTES NFR BLD: 0 %
LYMPHOCYTES # BLD AUTO: 1.5 10E3/UL (ref 0.8–5.3)
LYMPHOCYTES NFR BLD AUTO: 20 %
MCH RBC QN AUTO: 31.1 PG (ref 26.5–33)
MCHC RBC AUTO-ENTMCNC: 33.4 G/DL (ref 31.5–36.5)
MCV RBC AUTO: 93 FL (ref 78–100)
MONOCYTES # BLD AUTO: 0.6 10E3/UL (ref 0–1.3)
MONOCYTES NFR BLD AUTO: 8 %
NEUTROPHILS # BLD AUTO: 5.4 10E3/UL (ref 1.6–8.3)
NEUTROPHILS NFR BLD AUTO: 71 %
PLATELET # BLD AUTO: 213 10E3/UL (ref 150–450)
RBC # BLD AUTO: 4.92 10E6/UL (ref 3.8–5.2)
WBC # BLD AUTO: 7.6 10E3/UL (ref 4–11)

## 2024-07-16 PROCEDURE — 36415 COLL VENOUS BLD VENIPUNCTURE: CPT | Performed by: FAMILY MEDICINE

## 2024-07-16 PROCEDURE — 80048 BASIC METABOLIC PNL TOTAL CA: CPT | Performed by: FAMILY MEDICINE

## 2024-07-16 PROCEDURE — 99214 OFFICE O/P EST MOD 30 MIN: CPT | Performed by: FAMILY MEDICINE

## 2024-07-16 PROCEDURE — 85025 COMPLETE CBC W/AUTO DIFF WBC: CPT | Performed by: FAMILY MEDICINE

## 2024-07-16 ASSESSMENT — PATIENT HEALTH QUESTIONNAIRE - PHQ9
SUM OF ALL RESPONSES TO PHQ QUESTIONS 1-9: 5
10. IF YOU CHECKED OFF ANY PROBLEMS, HOW DIFFICULT HAVE THESE PROBLEMS MADE IT FOR YOU TO DO YOUR WORK, TAKE CARE OF THINGS AT HOME, OR GET ALONG WITH OTHER PEOPLE: NOT DIFFICULT AT ALL
SUM OF ALL RESPONSES TO PHQ QUESTIONS 1-9: 5

## 2024-07-16 NOTE — PROGRESS NOTES
Assessment & Plan     Bruising  Check platlets. I think the problem is her skin is terribly dry and thin due to her poor nutrition and lack of care. She agrees to apply lotion daily, eat 3 meals daily and continue to drink plenty of water to help the skin.  - CBC with platelets and differential  - Basic metabolic panel  (Ca, Cl, CO2, Creat, Gluc, K, Na, BUN)  - CBC with platelets and differential  - Basic metabolic panel  (Ca, Cl, CO2, Creat, Gluc, K, Na, BUN)    Moderate cognitive impairment  Same. Her  supports her and has helped her get out some. While the care is good for her, she   - Vitamin B6    Traumatic brain injury, with loss of consciousness of 30 minutes or less, sequela (H24)  Her current decline is likely because of her TBI history, depression and poor nutrition.    Urine frequency  She thinks this is just from drinking a lot of water. Will check BMP  - CBC with platelets and differential  - Basic metabolic panel  (Ca, Cl, CO2, Creat, Gluc, K, Na, BUN)  - CBC with platelets and differential  - Basic metabolic panel  (Ca, Cl, CO2, Creat, Gluc, K, Na, BUN)    On the day of service, I spent 30 minutes with the patient, preparing for the visit with chart review, and charting.              Jesus Webster is a 73 year old, presenting for the following health issues:  RECHECK (Cognitive impairment and depression)      7/16/2024    12:09 PM   Additional Questions   Roomed by ankit sutton MA   Accompanied by      History of Present Illness       Reason for visit:  Check up    She eats 0-1 servings of fruits and vegetables daily.She consumes 2 sweetened beverage(s) daily.She exercises with enough effort to increase her heart rate 9 or less minutes per day.  She exercises with enough effort to increase her heart rate 3 or less days per week. She is missing 1 dose(s) of medications per week.     Bruising a lot - for a few months    Eating 3 meals per day- because her  helps her get breakfast,  "he makes her a PB&J for lunch, and he makes supper.    Peeing and pooping ok.        Objective    /60   Pulse 74   Temp 99.3  F (37.4  C) (Oral)   Resp 20   Ht 1.6 m (5' 3\")   Wt 45 kg (99 lb 1.9 oz)   LMP  (LMP Unknown)   SpO2 95%   BMI 17.56 kg/m    Body mass index is 17.56 kg/m .  Physical Exam   GENERAL: alert, no distress, frail, elderly, and constantly moving, sometimes poking her finger into her cheek  RESP: lungs clear to auscultation - no rales, rhonchi or wheezes  CV: regular rate and rhythm, normal S1 S2, no S3 or S4, no murmur, click or rub, no peripheral edema  MS: no gross musculoskeletal defects noted, no edema  SKIN: ecchymoses - arms and dry skin all over  PSYCH: concentration poor, affect flat, judgement and insight impaired, and appearance well groomed    Results for orders placed or performed in visit on 07/16/24 (from the past 24 hour(s))   CBC with platelets and differential    Narrative    The following orders were created for panel order CBC with platelets and differential.  Procedure                               Abnormality         Status                     ---------                               -----------         ------                     CBC with platelets and d...[[id number]]                      Final result                 Please view results for these tests on the individual orders.   CBC with platelets and differential   Result Value Ref Range    WBC Count 7.6 4.0 - 11.0 10e3/uL    RBC Count 4.92 3.80 - 5.20 10e6/uL    Hemoglobin 15.3 11.7 - 15.7 g/dL    Hematocrit 45.8 35.0 - 47.0 %    MCV 93 78 - 100 fL    MCH 31.1 26.5 - 33.0 pg    MCHC 33.4 31.5 - 36.5 g/dL    RDW 11.7 10.0 - 15.0 %    Platelet Count 213 150 - 450 10e3/uL    % Neutrophils 71 %    % Lymphocytes 20 %    % Monocytes 8 %    % Eosinophils 0 %    % Basophils 0 %    % Immature Granulocytes 0 %    Absolute Neutrophils 5.4 1.6 - 8.3 10e3/uL    Absolute Lymphocytes 1.5 0.8 - 5.3 10e3/uL    Absolute " Monocytes 0.6 0.0 - 1.3 10e3/uL    Absolute Eosinophils 0.0 0.0 - 0.7 10e3/uL    Absolute Basophils 0.0 0.0 - 0.2 10e3/uL    Absolute Immature Granulocytes 0.0 <=0.4 10e3/uL           Signed Electronically by: Camille Love MD

## 2024-07-17 LAB
ANION GAP SERPL CALCULATED.3IONS-SCNC: 13 MMOL/L (ref 7–15)
BUN SERPL-MCNC: 18.8 MG/DL (ref 8–23)
CALCIUM SERPL-MCNC: 10 MG/DL (ref 8.8–10.4)
CHLORIDE SERPL-SCNC: 101 MMOL/L (ref 98–107)
CREAT SERPL-MCNC: 0.84 MG/DL (ref 0.51–0.95)
EGFRCR SERPLBLD CKD-EPI 2021: 73 ML/MIN/1.73M2
GLUCOSE SERPL-MCNC: 97 MG/DL (ref 70–99)
HCO3 SERPL-SCNC: 29 MMOL/L (ref 22–29)
POTASSIUM SERPL-SCNC: 5 MMOL/L (ref 3.4–5.3)
SODIUM SERPL-SCNC: 143 MMOL/L (ref 135–145)

## 2024-07-19 ENCOUNTER — TELEPHONE (OUTPATIENT)
Dept: FAMILY MEDICINE | Facility: CLINIC | Age: 74
End: 2024-07-19
Payer: COMMERCIAL

## 2024-07-19 NOTE — TELEPHONE ENCOUNTER
----- Message from Camille Love sent at 7/18/2024  8:52 PM CDT -----  Team - please call patient with results. Let her know her electrolytes, glucose and kidney function were all normal from the blood tests done at the clinic. This is good news.

## 2024-10-07 NOTE — TELEPHONE ENCOUNTER
Sounds good. Thanks, Zakia.   Neck: full range of motion  Unrestricted mouth opening  Thyromental distance >3 cm

## 2024-10-13 ENCOUNTER — HEALTH MAINTENANCE LETTER (OUTPATIENT)
Age: 74
End: 2024-10-13

## 2024-10-19 DIAGNOSIS — I10 ESSENTIAL HYPERTENSION: ICD-10-CM

## 2024-10-19 DIAGNOSIS — T14.8XXA BRUISING: Primary | ICD-10-CM

## 2024-10-19 DIAGNOSIS — K70.30 ALCOHOLIC CIRRHOSIS OF LIVER WITHOUT ASCITES (H): ICD-10-CM

## 2024-10-22 ENCOUNTER — TELEPHONE (OUTPATIENT)
Dept: FAMILY MEDICINE | Facility: CLINIC | Age: 74
End: 2024-10-22

## 2024-10-22 ENCOUNTER — OFFICE VISIT (OUTPATIENT)
Dept: FAMILY MEDICINE | Facility: CLINIC | Age: 74
End: 2024-10-22
Payer: COMMERCIAL

## 2024-10-22 VITALS
HEIGHT: 63 IN | SYSTOLIC BLOOD PRESSURE: 104 MMHG | RESPIRATION RATE: 16 BRPM | DIASTOLIC BLOOD PRESSURE: 60 MMHG | TEMPERATURE: 99.1 F | OXYGEN SATURATION: 94 % | BODY MASS INDEX: 17.91 KG/M2 | HEART RATE: 60 BPM | WEIGHT: 101.08 LBS

## 2024-10-22 DIAGNOSIS — I10 ESSENTIAL HYPERTENSION: ICD-10-CM

## 2024-10-22 DIAGNOSIS — R35.0 URINARY FREQUENCY: ICD-10-CM

## 2024-10-22 DIAGNOSIS — K70.30 ALCOHOLIC CIRRHOSIS OF LIVER WITHOUT ASCITES (H): ICD-10-CM

## 2024-10-22 DIAGNOSIS — Z23 NEED FOR VACCINATION: ICD-10-CM

## 2024-10-22 DIAGNOSIS — N30.01 ACUTE CYSTITIS WITH HEMATURIA: ICD-10-CM

## 2024-10-22 DIAGNOSIS — T07.XXXA MULTIPLE EXCORIATIONS: ICD-10-CM

## 2024-10-22 DIAGNOSIS — R41.89 MODERATE COGNITIVE IMPAIRMENT: Primary | ICD-10-CM

## 2024-10-22 DIAGNOSIS — T14.8XXA BRUISING: ICD-10-CM

## 2024-10-22 LAB
ALBUMIN SERPL BCG-MCNC: 4.1 G/DL (ref 3.5–5.2)
ALBUMIN UR-MCNC: 30 MG/DL
ALP SERPL-CCNC: 71 U/L (ref 40–150)
ALT SERPL W P-5'-P-CCNC: 10 U/L (ref 0–50)
ANION GAP SERPL CALCULATED.3IONS-SCNC: 10 MMOL/L (ref 7–15)
APPEARANCE UR: CLEAR
AST SERPL W P-5'-P-CCNC: 21 U/L (ref 0–45)
BACTERIA #/AREA URNS HPF: ABNORMAL /HPF
BILIRUB SERPL-MCNC: 0.5 MG/DL
BILIRUB UR QL STRIP: NEGATIVE
BUN SERPL-MCNC: 15.3 MG/DL (ref 8–23)
CALCIUM SERPL-MCNC: 9.7 MG/DL (ref 8.8–10.4)
CHLORIDE SERPL-SCNC: 103 MMOL/L (ref 98–107)
COLOR UR AUTO: YELLOW
CREAT SERPL-MCNC: 0.84 MG/DL (ref 0.51–0.95)
EGFRCR SERPLBLD CKD-EPI 2021: 73 ML/MIN/1.73M2
ERYTHROCYTE [DISTWIDTH] IN BLOOD BY AUTOMATED COUNT: 11.6 % (ref 10–15)
GGT SERPL-CCNC: 20 U/L (ref 5–36)
GLUCOSE SERPL-MCNC: 89 MG/DL (ref 70–99)
GLUCOSE UR STRIP-MCNC: NEGATIVE MG/DL
HCO3 SERPL-SCNC: 28 MMOL/L (ref 22–29)
HCT VFR BLD AUTO: 44.3 % (ref 35–47)
HGB BLD-MCNC: 15 G/DL (ref 11.7–15.7)
HGB UR QL STRIP: ABNORMAL
KETONES UR STRIP-MCNC: NEGATIVE MG/DL
LEUKOCYTE ESTERASE UR QL STRIP: ABNORMAL
MCH RBC QN AUTO: 31.7 PG (ref 26.5–33)
MCHC RBC AUTO-ENTMCNC: 33.9 G/DL (ref 31.5–36.5)
MCV RBC AUTO: 94 FL (ref 78–100)
MUCOUS THREADS #/AREA URNS LPF: PRESENT /LPF
NITRATE UR QL: NEGATIVE
PH UR STRIP: 6.5 [PH] (ref 5–7)
PLATELET # BLD AUTO: 197 10E3/UL (ref 150–450)
POTASSIUM SERPL-SCNC: 4 MMOL/L (ref 3.4–5.3)
PROT SERPL-MCNC: 7.4 G/DL (ref 6.4–8.3)
RBC # BLD AUTO: 4.73 10E6/UL (ref 3.8–5.2)
RBC #/AREA URNS AUTO: ABNORMAL /HPF
SODIUM SERPL-SCNC: 141 MMOL/L (ref 135–145)
SP GR UR STRIP: 1.02 (ref 1–1.03)
SQUAMOUS #/AREA URNS AUTO: ABNORMAL /LPF
UROBILINOGEN UR STRIP-ACNC: 0.2 E.U./DL
VIT B12 SERPL-MCNC: 614 PG/ML (ref 232–1245)
WBC # BLD AUTO: 8 10E3/UL (ref 4–11)
WBC #/AREA URNS AUTO: ABNORMAL /HPF

## 2024-10-22 PROCEDURE — 90480 ADMN SARSCOV2 VAC 1/ONLY CMP: CPT | Performed by: FAMILY MEDICINE

## 2024-10-22 PROCEDURE — 85027 COMPLETE CBC AUTOMATED: CPT | Performed by: FAMILY MEDICINE

## 2024-10-22 PROCEDURE — 99000 SPECIMEN HANDLING OFFICE-LAB: CPT | Performed by: FAMILY MEDICINE

## 2024-10-22 PROCEDURE — 36415 COLL VENOUS BLD VENIPUNCTURE: CPT | Performed by: FAMILY MEDICINE

## 2024-10-22 PROCEDURE — 82977 ASSAY OF GGT: CPT | Performed by: FAMILY MEDICINE

## 2024-10-22 PROCEDURE — 82607 VITAMIN B-12: CPT | Performed by: FAMILY MEDICINE

## 2024-10-22 PROCEDURE — 99215 OFFICE O/P EST HI 40 MIN: CPT | Mod: 25 | Performed by: FAMILY MEDICINE

## 2024-10-22 PROCEDURE — 81001 URINALYSIS AUTO W/SCOPE: CPT | Performed by: FAMILY MEDICINE

## 2024-10-22 PROCEDURE — 84207 ASSAY OF VITAMIN B-6: CPT | Mod: 90 | Performed by: FAMILY MEDICINE

## 2024-10-22 PROCEDURE — 91320 SARSCV2 VAC 30MCG TRS-SUC IM: CPT | Performed by: FAMILY MEDICINE

## 2024-10-22 PROCEDURE — 80053 COMPREHEN METABOLIC PANEL: CPT | Performed by: FAMILY MEDICINE

## 2024-10-22 ASSESSMENT — PATIENT HEALTH QUESTIONNAIRE - PHQ9
SUM OF ALL RESPONSES TO PHQ QUESTIONS 1-9: 8
SUM OF ALL RESPONSES TO PHQ QUESTIONS 1-9: 8
10. IF YOU CHECKED OFF ANY PROBLEMS, HOW DIFFICULT HAVE THESE PROBLEMS MADE IT FOR YOU TO DO YOUR WORK, TAKE CARE OF THINGS AT HOME, OR GET ALONG WITH OTHER PEOPLE: SOMEWHAT DIFFICULT

## 2024-10-22 NOTE — PROGRESS NOTES
"  Assessment & Plan     Moderate cognitive impairment  Likely mainly due to past closed head injury and now aging. At least she is now awake most of the day. I strongly encouraged Eleanor to go out for walks with Asaf or to take rides. He might have tried to do this (it is not clear) but I strongly encouraged \"getting out\" and doing some movement.  - Vitamin B6    Essential hypertension  Currently controlled, no high BP. Only on metoprolol to control rate.  - Comprehensive metabolic panel (BMP + Alb, Alk Phos, ALT, AST, Total. Bili, TP)  - CBC with platelets    Alcoholic cirrhosis of liver without ascites (H)  Current liver labs are normal  - Comprehensive metabolic panel (BMP + Alb, Alk Phos, ALT, AST, Total. Bili, TP)  - GGT    Bruising  Notable - not sure of etiology - likely dry skin and some picking. I asked Asaf to put lotion on her daily, jannette through the winter. With lotion and improved nutrition, her skin should improve. No areas appeared infected. CBC is normal.  - CBC with platelets    Urinary frequency  UTI Foudn  - UA Macroscopic with reflex to Microscopic and Culture - Lab Collect  - UA Macroscopic with reflex to Microscopic and Culture - Lab Collect  - UA Microscopic with Reflex to Culture    Need for vaccination  given  - COVID-19 12+ (PFIZER)    Acute cystitis with hematuria  Treat and recheck in 10 days for cure.  - amoxicillin-clavulanate (AUGMENTIN) 875-125 MG tablet  Dispense: 10 tablet; Refill: 0  - UA Macroscopic with reflex to Microscopic and Culture - Lab Collect    Multiple excoriations  As noted above, moisturize skin and better nutrition should help her heal.    On the day of service, I spent 50 minutes with the patient, preparing for the visit with chart review, and charting.                            Subjective   Eleanor is a 73 year old, presenting for the following health issues:  RECHECK (Cognitive impairment and others)        10/22/2024     7:59 AM   Additional Questions   Roomed by " "ankit p   Accompanied by partner     History of Present Illness       Reason for visit:  Follow up with pcp      Urinating lots -day and night. No burning with urination.    Sleep is ok - wakes to urinate    Pooping varies - sometimes it is soft    She watches TV most of the day when Asaf is gone. She watches TV most of the weekend, too.     Asaf notes she has lots of sores on her arms and legs. None are wet or weeping or bleeding. He wonders why she has so many?        Objective    /60   Pulse 60   Temp 99.1  F (37.3  C) (Oral)   Resp 16   Ht 1.6 m (5' 3\")   Wt 45.8 kg (101 lb 1.3 oz)   LMP  (LMP Unknown)   SpO2 94%   BMI 17.91 kg/m    Body mass index is 17.91 kg/m .  Physical Exam   GENERAL: alert, no distress, elderly, fatigued, and she is almost constantly moving her hands arms and legs.  RESP: lungs clear to auscultation - no rales, rhonchi or wheezes  CV: tachycardia and no peripheral edema  SKIN: xerosis - generally, erythema - arms and lower legs, ecchymoses - arms, hands, lower legs, and wrists, and excoriation - arms, hands, and lower legs  PSYCH: concentration poor, inattentive, affect flat, fatigued, judgement and insight impaired, and appearance well groomed    Results for orders placed or performed in visit on 10/22/24 (from the past 24 hour(s))   Comprehensive metabolic panel (BMP + Alb, Alk Phos, ALT, AST, Total. Bili, TP)   Result Value Ref Range    Sodium 141 135 - 145 mmol/L    Potassium 4.0 3.4 - 5.3 mmol/L    Carbon Dioxide (CO2) 28 22 - 29 mmol/L    Anion Gap 10 7 - 15 mmol/L    Urea Nitrogen 15.3 8.0 - 23.0 mg/dL    Creatinine 0.84 0.51 - 0.95 mg/dL    GFR Estimate 73 >60 mL/min/1.73m2    Calcium 9.7 8.8 - 10.4 mg/dL    Chloride 103 98 - 107 mmol/L    Glucose 89 70 - 99 mg/dL    Alkaline Phosphatase 71 40 - 150 U/L    AST 21 0 - 45 U/L    ALT 10 0 - 50 U/L    Protein Total 7.4 6.4 - 8.3 g/dL    Albumin 4.1 3.5 - 5.2 g/dL    Bilirubin Total 0.5 <=1.2 mg/dL   GGT   Result Value Ref " Range    GGT 20 5 - 36 U/L   CBC with platelets   Result Value Ref Range    WBC Count 8.0 4.0 - 11.0 10e3/uL    RBC Count 4.73 3.80 - 5.20 10e6/uL    Hemoglobin 15.0 11.7 - 15.7 g/dL    Hematocrit 44.3 35.0 - 47.0 %    MCV 94 78 - 100 fL    MCH 31.7 26.5 - 33.0 pg    MCHC 33.9 31.5 - 36.5 g/dL    RDW 11.6 10.0 - 15.0 %    Platelet Count 197 150 - 450 10e3/uL   UA Macroscopic with reflex to Microscopic and Culture - Lab Collect    Specimen: Urine, Clean Catch   Result Value Ref Range    Color Urine Yellow Colorless, Straw, Light Yellow, Yellow    Appearance Urine Clear Clear    Glucose Urine Negative Negative mg/dL    Bilirubin Urine Negative Negative    Ketones Urine Negative Negative mg/dL    Specific Gravity Urine 1.020 1.005 - 1.030    Blood Urine Moderate (A) Negative    pH Urine 6.5 5.0 - 7.0    Protein Albumin Urine 30 (A) Negative mg/dL    Urobilinogen Urine 0.2 0.2, 1.0 E.U./dL    Nitrite Urine Negative Negative    Leukocyte Esterase Urine Small (A) Negative   UA Microscopic with Reflex to Culture   Result Value Ref Range    Bacteria Urine Moderate (A) None Seen /HPF    RBC Urine 25-50 (A) 0-2 /HPF /HPF    WBC Urine 0-5 0-5 /HPF /HPF    Squamous Epithelials Urine Many (A) None Seen /LPF    Mucus Urine Present (A) None Seen /LPF    Narrative    Urine Culture not indicated           Signed Electronically by: Camille Love MD

## 2024-10-23 NOTE — TELEPHONE ENCOUNTER
Please call Eleanor, or preferably her , Asaf (because Eleanor has cognitive impairment and Asaf is the one who needs to  the meds on his way home from work). Let them know Eleanor has a UTI and needs to take antibiotics. She should start them ASAP. It is augmentin and she takes it twice daily for 5 days.    In 10 days, she should return to [place] and leave a urine sample which we will test to be sure the infection is gone.    Eleanor and Asaf should know that other than her urine test, the rest of the labs were normal. This is good.    thank

## 2024-10-23 NOTE — TELEPHONE ENCOUNTER
Camille Love MD Physician AddendumYesterday        Please call Eleanor, or preferably her , Asaf (because Eleanor has cognitive impairment and Asaf is the one who needs to  the meds on his way home from work). Let them know Eleanor has a UTI and needs to take antibiotics. She should start them ASAP. It is augmentin and she takes it twice daily for 5 days.     In 10 days, she should return to [place] and leave a urine sample which we will test to be sure the infection is gone.     Eleanor and Asaf should know that other than her urine test, the rest of the labs were normal. This is good.     thank          Called Asaf to relay provide test result and treatment recommendation above.  Family verbalized understood.  No further action needed.    Rehan Baron RN  [facility]

## 2024-10-24 LAB — PYRIDOXAL PHOS SERPL-SCNC: 67.5 NMOL/L

## 2024-11-15 ENCOUNTER — LAB (OUTPATIENT)
Dept: LAB | Facility: CLINIC | Age: 74
End: 2024-11-15
Payer: COMMERCIAL

## 2024-11-15 DIAGNOSIS — N30.01 ACUTE CYSTITIS WITH HEMATURIA: ICD-10-CM

## 2024-11-15 LAB
ALBUMIN UR-MCNC: NEGATIVE MG/DL
APPEARANCE UR: ABNORMAL
BACTERIA #/AREA URNS HPF: ABNORMAL /HPF
BILIRUB UR QL STRIP: NEGATIVE
COLOR UR AUTO: ABNORMAL
GLUCOSE UR STRIP-MCNC: NEGATIVE MG/DL
HGB UR QL STRIP: ABNORMAL
KETONES UR STRIP-MCNC: NEGATIVE MG/DL
LEUKOCYTE ESTERASE UR QL STRIP: NEGATIVE
NITRATE UR QL: NEGATIVE
PH UR STRIP: 6.5 [PH] (ref 5–7)
RBC #/AREA URNS AUTO: ABNORMAL /HPF
SP GR UR STRIP: 1.02 (ref 1–1.03)
SQUAMOUS #/AREA URNS AUTO: ABNORMAL /LPF
UROBILINOGEN UR STRIP-ACNC: 0.2 E.U./DL
WBC #/AREA URNS AUTO: ABNORMAL /HPF

## 2024-11-15 PROCEDURE — 81001 URINALYSIS AUTO W/SCOPE: CPT

## 2025-01-14 DIAGNOSIS — Z76.0 ENCOUNTER FOR MEDICATION REFILL: ICD-10-CM

## 2025-01-14 RX ORDER — FOLIC ACID 1 MG/1
1000 TABLET ORAL DAILY
Qty: 90 TABLET | Refills: 3 | Status: SHIPPED | OUTPATIENT
Start: 2025-01-14

## 2025-01-20 DIAGNOSIS — F10.21 ALCOHOL DEPENDENCE IN REMISSION (H): ICD-10-CM

## 2025-01-20 DIAGNOSIS — S06.9X1S TRAUMATIC BRAIN INJURY, WITH LOSS OF CONSCIOUSNESS OF 30 MINUTES OR LESS, SEQUELA: ICD-10-CM

## 2025-01-20 DIAGNOSIS — F32.0 MILD MAJOR DEPRESSION: ICD-10-CM

## 2025-01-20 DIAGNOSIS — F41.0 PANIC ATTACK: ICD-10-CM

## 2025-01-20 DIAGNOSIS — Z76.0 ENCOUNTER FOR MEDICATION REFILL: ICD-10-CM

## 2025-01-21 DIAGNOSIS — G40.209 PARTIAL SYMPTOMATIC EPILEPSY WITH COMPLEX PARTIAL SEIZURES, NOT INTRACTABLE, WITHOUT STATUS EPILEPTICUS (H): ICD-10-CM

## 2025-01-21 RX ORDER — ESCITALOPRAM OXALATE 20 MG/1
20 TABLET ORAL DAILY
Qty: 90 TABLET | Refills: 4 | Status: SHIPPED | OUTPATIENT
Start: 2025-01-21

## 2025-01-21 RX ORDER — LEVETIRACETAM 500 MG/1
500 TABLET ORAL 2 TIMES DAILY
Qty: 180 TABLET | Refills: 4 | Status: SHIPPED | OUTPATIENT
Start: 2025-01-21

## 2025-02-05 ENCOUNTER — TELEPHONE (OUTPATIENT)
Dept: FAMILY MEDICINE | Facility: CLINIC | Age: 75
End: 2025-02-05
Payer: COMMERCIAL

## 2025-02-05 DIAGNOSIS — I16.1 HYPERTENSIVE EMERGENCY: ICD-10-CM

## 2025-02-05 DIAGNOSIS — G93.41 ACUTE METABOLIC ENCEPHALOPATHY: ICD-10-CM

## 2025-02-05 DIAGNOSIS — R65.10 SIRS (SYSTEMIC INFLAMMATORY RESPONSE SYNDROME) (H): ICD-10-CM

## 2025-02-05 DIAGNOSIS — Z76.0 ENCOUNTER FOR MEDICATION REFILL: ICD-10-CM

## 2025-02-05 NOTE — TELEPHONE ENCOUNTER
Patient Quality Outreach    Patient is due for the following:   Depression  -  Depression follow-up visit  Physical Annual Wellness Visit      Topic Date Due    Hepatitis A Vaccine (2 of 2 - Risk 2-dose series) 08/23/2018    Zoster (Shingles) Vaccine (3 of 3) 07/10/2019    Hepatitis B Vaccine (2 of 3 - Risk 3-dose series) 05/29/2021       Action(s) Taken:   Patient has upcoming appointment, these items will be addressed at that time.    Type of outreach:    Chart review performed, no outreach needed.    Questions for provider review:    None           Maryann Rubio MA  Chart routed to N/A.

## 2025-02-11 RX ORDER — METOPROLOL SUCCINATE 25 MG/1
75 TABLET, EXTENDED RELEASE ORAL DAILY
Qty: 270 TABLET | Refills: 4 | Status: SHIPPED | OUTPATIENT
Start: 2025-02-11

## 2025-02-11 NOTE — TELEPHONE ENCOUNTER
Spouse is calling, they are waiting for refill and has not heard anything, can Team review and refill ASAP.

## 2025-03-03 DIAGNOSIS — I63.9 ACUTE STROKE DUE TO ISCHEMIA (H): ICD-10-CM

## 2025-03-03 RX ORDER — ASPIRIN 81 MG/1
81 TABLET, COATED ORAL DAILY
Qty: 90 TABLET | Refills: 2 | Status: SHIPPED | OUTPATIENT
Start: 2025-03-03

## 2025-03-24 DIAGNOSIS — R31.9 HEMATURIA, UNSPECIFIED TYPE: Primary | ICD-10-CM

## 2025-03-25 ENCOUNTER — OFFICE VISIT (OUTPATIENT)
Dept: FAMILY MEDICINE | Facility: CLINIC | Age: 75
End: 2025-03-25
Payer: COMMERCIAL

## 2025-03-25 VITALS
BODY MASS INDEX: 19.4 KG/M2 | OXYGEN SATURATION: 100 % | RESPIRATION RATE: 12 BRPM | TEMPERATURE: 98.8 F | HEART RATE: 71 BPM | WEIGHT: 109.5 LBS | SYSTOLIC BLOOD PRESSURE: 122 MMHG | DIASTOLIC BLOOD PRESSURE: 66 MMHG | HEIGHT: 63 IN

## 2025-03-25 DIAGNOSIS — Z12.31 ENCOUNTER FOR SCREENING MAMMOGRAM FOR BREAST CANCER: ICD-10-CM

## 2025-03-25 DIAGNOSIS — F10.21 ALCOHOL DEPENDENCE IN REMISSION (H): ICD-10-CM

## 2025-03-25 DIAGNOSIS — R56.9 SEIZURE-LIKE ACTIVITY (H): ICD-10-CM

## 2025-03-25 DIAGNOSIS — L30.9 DERMATITIS: ICD-10-CM

## 2025-03-25 DIAGNOSIS — R31.29 MICROSCOPIC HEMATURIA: ICD-10-CM

## 2025-03-25 DIAGNOSIS — R73.09 ELEVATED GLUCOSE: ICD-10-CM

## 2025-03-25 DIAGNOSIS — R20.9 HOT SKIN: Primary | ICD-10-CM

## 2025-03-25 DIAGNOSIS — K70.30 ALCOHOLIC CIRRHOSIS OF LIVER WITHOUT ASCITES (H): ICD-10-CM

## 2025-03-25 DIAGNOSIS — R31.9 HEMATURIA, UNSPECIFIED TYPE: ICD-10-CM

## 2025-03-25 DIAGNOSIS — F02.80 MAJOR NEUROCOGNITIVE DISORDER DUE TO MULTIPLE ETIOLOGIES (H): ICD-10-CM

## 2025-03-25 DIAGNOSIS — F32.0 CURRENT MILD EPISODE OF MAJOR DEPRESSIVE DISORDER, UNSPECIFIED WHETHER RECURRENT: ICD-10-CM

## 2025-03-25 PROBLEM — F33.1 MODERATE EPISODE OF RECURRENT MAJOR DEPRESSIVE DISORDER (H): Status: RESOLVED | Noted: 2021-10-28 | Resolved: 2025-03-25

## 2025-03-25 LAB
ALBUMIN SERPL BCG-MCNC: 4.1 G/DL (ref 3.5–5.2)
ALBUMIN UR-MCNC: NEGATIVE MG/DL
ALP SERPL-CCNC: 67 U/L (ref 40–150)
ALT SERPL W P-5'-P-CCNC: 13 U/L (ref 0–50)
AMORPH CRY #/AREA URNS HPF: ABNORMAL /HPF
ANION GAP SERPL CALCULATED.3IONS-SCNC: 13 MMOL/L (ref 7–15)
APPEARANCE UR: ABNORMAL
AST SERPL W P-5'-P-CCNC: 24 U/L (ref 0–45)
BACTERIA #/AREA URNS HPF: ABNORMAL /HPF
BASOPHILS # BLD AUTO: 0 10E3/UL (ref 0–0.2)
BASOPHILS NFR BLD AUTO: 0 %
BILIRUB SERPL-MCNC: 0.4 MG/DL
BILIRUB UR QL STRIP: NEGATIVE
BUN SERPL-MCNC: 21.9 MG/DL (ref 8–23)
CALCIUM SERPL-MCNC: 9.7 MG/DL (ref 8.8–10.4)
CHLORIDE SERPL-SCNC: 101 MMOL/L (ref 98–107)
COLOR UR AUTO: YELLOW
CREAT SERPL-MCNC: 0.78 MG/DL (ref 0.51–0.95)
CRP SERPL-MCNC: <3 MG/L
EGFRCR SERPLBLD CKD-EPI 2021: 79 ML/MIN/1.73M2
EOSINOPHIL # BLD AUTO: 0.1 10E3/UL (ref 0–0.7)
EOSINOPHIL NFR BLD AUTO: 1 %
ERYTHROCYTE [DISTWIDTH] IN BLOOD BY AUTOMATED COUNT: 11.8 % (ref 10–15)
ERYTHROCYTE [SEDIMENTATION RATE] IN BLOOD BY WESTERGREN METHOD: 19 MM/HR (ref 0–30)
EST. AVERAGE GLUCOSE BLD GHB EST-MCNC: 111 MG/DL
GLUCOSE SERPL-MCNC: 89 MG/DL (ref 70–99)
GLUCOSE UR STRIP-MCNC: NEGATIVE MG/DL
HBA1C MFR BLD: 5.5 % (ref 0–5.6)
HCO3 SERPL-SCNC: 25 MMOL/L (ref 22–29)
HCT VFR BLD AUTO: 43.6 % (ref 35–47)
HGB BLD-MCNC: 14.7 G/DL (ref 11.7–15.7)
HGB UR QL STRIP: ABNORMAL
IMM GRANULOCYTES # BLD: 0 10E3/UL
IMM GRANULOCYTES NFR BLD: 0 %
KETONES UR STRIP-MCNC: NEGATIVE MG/DL
LEUKOCYTE ESTERASE UR QL STRIP: NEGATIVE
LYMPHOCYTES # BLD AUTO: 1.2 10E3/UL (ref 0.8–5.3)
LYMPHOCYTES NFR BLD AUTO: 17 %
MCH RBC QN AUTO: 31.7 PG (ref 26.5–33)
MCHC RBC AUTO-ENTMCNC: 33.7 G/DL (ref 31.5–36.5)
MCV RBC AUTO: 94 FL (ref 78–100)
MONOCYTES # BLD AUTO: 0.5 10E3/UL (ref 0–1.3)
MONOCYTES NFR BLD AUTO: 7 %
NEUTROPHILS # BLD AUTO: 5.4 10E3/UL (ref 1.6–8.3)
NEUTROPHILS NFR BLD AUTO: 75 %
NITRATE UR QL: NEGATIVE
PH UR STRIP: 7 [PH] (ref 5–7)
PLATELET # BLD AUTO: 224 10E3/UL (ref 150–450)
POTASSIUM SERPL-SCNC: 4.2 MMOL/L (ref 3.4–5.3)
PROT SERPL-MCNC: 7.4 G/DL (ref 6.4–8.3)
RBC # BLD AUTO: 4.63 10E6/UL (ref 3.8–5.2)
RBC #/AREA URNS AUTO: ABNORMAL /HPF
SODIUM SERPL-SCNC: 139 MMOL/L (ref 135–145)
SP GR UR STRIP: 1.02 (ref 1–1.03)
SQUAMOUS #/AREA URNS AUTO: ABNORMAL /LPF
TSH SERPL DL<=0.005 MIU/L-ACNC: 1.63 UIU/ML (ref 0.3–4.2)
UROBILINOGEN UR STRIP-ACNC: 0.2 E.U./DL
WBC # BLD AUTO: 7.2 10E3/UL (ref 4–11)
WBC #/AREA URNS AUTO: ABNORMAL /HPF

## 2025-03-25 PROCEDURE — 86140 C-REACTIVE PROTEIN: CPT | Performed by: FAMILY MEDICINE

## 2025-03-25 PROCEDURE — 85652 RBC SED RATE AUTOMATED: CPT | Performed by: FAMILY MEDICINE

## 2025-03-25 PROCEDURE — 99215 OFFICE O/P EST HI 40 MIN: CPT | Performed by: FAMILY MEDICINE

## 2025-03-25 PROCEDURE — 3074F SYST BP LT 130 MM HG: CPT | Performed by: FAMILY MEDICINE

## 2025-03-25 PROCEDURE — 3078F DIAST BP <80 MM HG: CPT | Performed by: FAMILY MEDICINE

## 2025-03-25 PROCEDURE — 80053 COMPREHEN METABOLIC PANEL: CPT | Performed by: FAMILY MEDICINE

## 2025-03-25 PROCEDURE — 81001 URINALYSIS AUTO W/SCOPE: CPT | Performed by: FAMILY MEDICINE

## 2025-03-25 PROCEDURE — 87086 URINE CULTURE/COLONY COUNT: CPT | Performed by: FAMILY MEDICINE

## 2025-03-25 PROCEDURE — 83036 HEMOGLOBIN GLYCOSYLATED A1C: CPT | Performed by: FAMILY MEDICINE

## 2025-03-25 PROCEDURE — 84443 ASSAY THYROID STIM HORMONE: CPT | Performed by: FAMILY MEDICINE

## 2025-03-25 PROCEDURE — 85025 COMPLETE CBC W/AUTO DIFF WBC: CPT | Performed by: FAMILY MEDICINE

## 2025-03-25 PROCEDURE — 36415 COLL VENOUS BLD VENIPUNCTURE: CPT | Performed by: FAMILY MEDICINE

## 2025-03-25 RX ORDER — TRIAMCINOLONE ACETONIDE 1 MG/G
OINTMENT TOPICAL 2 TIMES DAILY
Qty: 80 G | Refills: 4 | Status: SHIPPED | OUTPATIENT
Start: 2025-03-25

## 2025-03-25 SDOH — HEALTH STABILITY: PHYSICAL HEALTH: ON AVERAGE, HOW MANY DAYS PER WEEK DO YOU ENGAGE IN MODERATE TO STRENUOUS EXERCISE (LIKE A BRISK WALK)?: 1 DAY

## 2025-03-25 SDOH — HEALTH STABILITY: PHYSICAL HEALTH: ON AVERAGE, HOW MANY MINUTES DO YOU ENGAGE IN EXERCISE AT THIS LEVEL?: 0 MIN

## 2025-03-25 ASSESSMENT — PATIENT HEALTH QUESTIONNAIRE - PHQ9
10. IF YOU CHECKED OFF ANY PROBLEMS, HOW DIFFICULT HAVE THESE PROBLEMS MADE IT FOR YOU TO DO YOUR WORK, TAKE CARE OF THINGS AT HOME, OR GET ALONG WITH OTHER PEOPLE: NOT DIFFICULT AT ALL
SUM OF ALL RESPONSES TO PHQ QUESTIONS 1-9: 4
SUM OF ALL RESPONSES TO PHQ QUESTIONS 1-9: 4

## 2025-03-25 ASSESSMENT — SOCIAL DETERMINANTS OF HEALTH (SDOH): HOW OFTEN DO YOU GET TOGETHER WITH FRIENDS OR RELATIVES?: ONCE A WEEK
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
## 2025-03-26 LAB — BACTERIA UR CULT: NORMAL

## 2025-03-29 NOTE — LETTER
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 subtests, Wechsler Memory Scale-IV (WMS-IV) select subtests, Brief Visuospatial Memory Test-Revised (BVMT-R) Form 2, [place] Verbal Learning Test-Short Form (CVLT-Short), Trailmaking Test (Trails A & B), Enriqueta Levy Executive Functioning System (DKEFS) select subtests, Controlled Oral Word Association Test (COWAT) and Category Fluency, Cookie Theft test, MORALEZ Sentence Repetition test, Brumley Naming Test-Short Form (BNT-15), Clock Drawing Test, Generalized Anxiety Disorder-7 Assessment (LESIA-7) and Geriatric Depression Scale-Short Form (GDS-15).    MOANS norms were used for BNT, Trail Making Test A & B, COWAT & Category Fluency, Jose-O Copy    DESCRIPTIVE PERFORMANCE KEY:    Labels for tests with Normal Distributions  Score Label Standard Score %ile Rank   Exceptionally high score  > 130 > 98   Above average score 120-129 91-97   High average score 110-119 75-90   Average score  25-74   Low average score 80-89 9-24   Below average score 70-79 2-8   Exceptionally low score < 70 < 2     Labels for tests with Non-Normal Distributions  Score Label %ile Rank   Within normal expectations/ limits score (WNL) > 24   Low average score 9-24   Below average score 2-8   Exceptionally low score < 2     The following test results utilize score labels as adapted from Mikey Nazario, Abelino Cordero, Pushpa Najera, ANDRES Lara, Courtney Byrne, Ernesto Crump & Conference Participatnts (2020): American Academy of Clinical Neuropsychology consensus conference statement on uniform labeling of performance test scores, " [Penis Abnormality] : normal circumcised penis The Clinical Neuropsychologist, DOI: [id number]. All scores contain some measure of error; scores are reported here as they are obtained by the individual (without reference to the range of error). These are meant as labels and not interpretation of performance. While other relevant comments regarding task performance are provided below, please see the Summary, Impressions, and Diagnosis sections of this report for interpretation of the scores and the cognitive profile as a whole, including what does and does not constitute impairment for this particular individual.    BEHAVIORAL OBSERVATIONS:   Ms. Awan arrived on time and accompanied by her  to today's appointment. She was appropriately dressed and groomed. She appeared alert and engaged. She exhibited reduced eye blink with a somewhat surprised look. Response latencies were extremely long at times; other times, she was quick to respond.  No vision or hearing difficulties were observed. Conversational speech was quiet and slow. She did not talk much. No word-finding pauses or paraphasias were noted. Her thought process appeared linear and goal-directed. No hallucinations or delusions were apparent. Judgment and insight appeared limited. Her mood was dysthymic and her affect was flat. Rapport was established.     During the testing session, Ms. Awan was alert throughout. She extremely reserved yet cooperative throughout the evaluation. She continued to have an intense stare with reduced eye blink during testing. She understood test instructions without difficulty. Response latencies continued to vary from extremely prolonged to normal. She tended to pick at her nails and skin during testing. No other frontal signs were observed behaviorally. Ms. Awan appeared adequately motivated and engaged during testing. Her scores on embedded effort indices varied (impaired RDS, intact CVLT Forced Choice). Nevertheless, the following  [Normal Station and Gait] : the gait and station were normal for the patient's age "results are considered a reasonably valid estimation of her current cognitive abilities.    OPTIMAL PREMORBID INTELLECT:  Optimal premorbid intellectual abilities were estimated as falling in the low average range based on Ms. Awan's educational and occupational histories and performance on tasks least likely to be affected by acquired brain dysfunction.    SUMMARY OF TEST RESULTS:  ORIENTATION. Performance on a mental status exam, assessing orientation to personal and current information, resulted in a low average score. She was oriented to most personal information (although she stated that her current age was \"71\" instead of 72). She was oriented to time and date, and was able to correctly name the current and previous presidents. However, she stated that the current city was \"[place]\" (instead of [place]) and did not know the name of our clinic.    ATTENTION/WORKING MEMORY. The patient's score on a measure sensitive to sustained auditory-verbal attention and concentration (WAIS-IV Digit Span) was classified as exceptionally low, as she was able to recite up to 4 digits forward (a below average score), up to 3 digits backward (a low average score), and up to 3 digits in sequence (a below average score).      PROCESSING SPEED. Speeded digit-symbol coding was measured as an average score (WAIS-IV Coding). On a test of complex concentration that requires speeded numeric sequencing (Trails A), the patient's score was average for completion time and with 1 error. On the DKEFS Color-Word Interference Test, speeded color naming was low average, and speeded word reading was low average.     LANGUAGE PROCESSING. Language comprehension appeared intact. Sentence repetition was rated as \"severely defective\" (MORALEZ Sentence Repetition test).  Her verbal responses to the Cookie Theft picture were fairly minimal but within normal limits. The WAIS-IV Verbal Comprehension Index was below average (pro-rated VCI = 66), with a " [Skin Color & Pigmentation] : normal skin color and pigmentation [No Focal Deficits] : no focal deficits below average score on a subtest of word knowledge (Vocabulary), and an exceptionally low score on a measure of verbal abstract reasoning (Similarities). Confrontation naming was measured as an exceptionally low score (+10/15; BNT-15); she did not benefit much from phonemic cues on that task (+1/5). The patient's performance on a test of phonemic fluency resulted in a low average score (COWAT), and her semantic fluency was below average (Category Fluency). Her writing sample was intact and there was no evidence of micrographia.     VISUOSPATIAL/CONSTRUCTIONAL SKILLS. The WAIS-IV Perceptual Reasoning Index was measured as a low average score (pro-rated TAE = 88), with average nonverbal abstract reasoning (Matrix Reasoning), and average visuoconstruction with three-dimensional blocks (Block Design). Copy of six simple geometric figures was within normal limits (BVMT-R Copy). On a Clock Drawing Task, the patient was able to draw a large, well-formed [place] with unequally spaced numbers. Her clock hands converged nicely in the center of the clock face and were differentiated by length.      LEARNING/MEMORY. On the WMS-IV Logical Memory subtest, immediate memory for two paragraph-length stories resulted in a low average score. After a 20-minute delay, the patient's score on the delayed recall trial was low average with a 59% retention rate. On the recognition trial, the patient's score was classified as above average.    On a 9-item verbal list-learning task (CVLT-II Short Form), the patient acquired up to 4 words (44%) of the word list by the 4th and final learning trial (raw scores over trials = 4, 3, 3, 4). Total learning acquisition was ibelow average. Following a 30-second distractor task, the patient recalled 3 items, which was exceptionally low. After a 10-minute delay, the patient recalled 3 items, which was below average. Her recall did not benefit further from semantic cues (2 items; exceptionally low).  "Recognition discriminability was low average, as she recognized 7/9 items (low average) and made only 1 false-positive error (average).    On a visual memory measure (BVMT-R), immediate recall of six simple geometric figures over three learning trials was exceptionally low (raw scores over trials = 2, 2, 0 out of 12). Delayed recall of the figures was exceptionally low, with a 100% retention rate (raw score = 2 out of 12). Recognition discriminability was below average, as she correctly recognized 4/6 figures (below average) and made 1 false-positive error (low average).     EXECUTIVE FUNCTIONS. On a test of inhibition and cognitive flexibility (DKEFS Color-Word Interference Inhibition trial), the patient's score was low average for completion time and made 3 errors, which is average. On a test that requires speeded alpha-numeric sequencing/cognitive set-shifting (Trails B), the patient was unable to complete the task within the maximum time limit, so the test was discontinued (she reached item \"I\" at 6 minutes and made 2 errors at that point). Verbal and nonverbal abstract reasoning were exceptionally low and average, respectively (WAIS-IV Similarities and Matrix Reasoning). Phonemic fluency was low average (COWAT). On the Clock Drawing Test, the patient was unable to accurately represent analog time.     MOOD. On the GDS-15 a self report measure of depressive symptomatology, she obtained a score of 3, placing her in the range of normal. On the LESIA-7, a self-report measure of anxiety, she obtained a score of 7,  placing her in the range of mild anxiety.    ____________________________________________________________________________________    SERVICES PROVIDED & TIME:  On-site Office Visit Details   A clinical interview/neurobehavioral status examination was conducted with the patient and documented. I thoroughly reviewed the medical record, selected the neuropsychological test battery, provided supervision to the " [Oriented To Time, Place, And Person] : oriented to person, place, and time [Affect] : the affect was normal trained examiner/technician, interpreted/integrated patient data and test results, and engaged in clinical decision making, treatment planning and report writing/preparation. A trained examiner/technician administered and scored the neuropsychological tests (2+ tests).  Please see below for a breakdown of time spent and the associated codes billed for these services.  Services   Time Spent  CPT Codes   Neurobehavioral Status Exam:  (e.g., clinical interview and neurobehavioral status exam, interpretation, report)   74 minutes   1 x 96116     Neuropsychological Evaluation Services:   (e.g., integration, interpretation, treatment planning, clinical decision making)   191 minutes   1 x 96132  2 x 96133   Neuropsychological Testing by Trained Examiner/Technician:  (e.g., test administration, scoring, 2+ tests administered)   195 minutes   1 x 96138  5 x 96139   For diagnostic and coding purposes, Ms. Awan has a history of complex partial seizures, hypertension, depression, anxiety, hypersomnia, marijuana use, alcohol use disorder (currently in remission), alcoholic cirrhosis of the liver, history of Wernicke's encephalopathy (8/2020), and remote TBI. She was referred for an evaluation of major neurocognitive disorder.         The patient was seen for a neuropsychological evaluation for the purposes of diagnostic clarification and treatment planning. 105 minutes of face-to-face testing were provided by this writer. An additional 90 minutes were spent scoring and compiling test results. The patient was cooperative with testing. No concerns were brought to my attention. Please see Dr. Swain's report for a detailed description of the charges and interpretation and integration of the findings.      Again, thank you for allowing me to participate in the care of your patient.        Sincerely,        Edna Swain, Vito, LP     [de-identified] : No suprapubic tenderness. Cystoscopy performed

## 2025-04-01 ENCOUNTER — ANCILLARY ORDERS (OUTPATIENT)
Dept: FAMILY MEDICINE | Facility: CLINIC | Age: 75
End: 2025-04-01

## 2025-04-01 ENCOUNTER — ANCILLARY PROCEDURE (OUTPATIENT)
Dept: MAMMOGRAPHY | Facility: HOSPITAL | Age: 75
End: 2025-04-01
Attending: FAMILY MEDICINE
Payer: COMMERCIAL

## 2025-04-01 ENCOUNTER — HOSPITAL ENCOUNTER (OUTPATIENT)
Dept: CT IMAGING | Facility: HOSPITAL | Age: 75
Discharge: HOME | End: 2025-04-01
Attending: FAMILY MEDICINE
Payer: COMMERCIAL

## 2025-04-01 DIAGNOSIS — Z12.31 ENCOUNTER FOR SCREENING MAMMOGRAM FOR BREAST CANCER: ICD-10-CM

## 2025-04-01 DIAGNOSIS — R20.9 HOT SKIN: Primary | ICD-10-CM

## 2025-04-01 DIAGNOSIS — F10.21 ALCOHOL DEPENDENCE IN REMISSION (H): ICD-10-CM

## 2025-04-01 DIAGNOSIS — R31.29 MICROSCOPIC HEMATURIA: ICD-10-CM

## 2025-04-01 DIAGNOSIS — R73.09 ELEVATED GLUCOSE: ICD-10-CM

## 2025-04-01 DIAGNOSIS — R31.9 HEMATURIA, UNSPECIFIED TYPE: ICD-10-CM

## 2025-04-01 DIAGNOSIS — F32.0 CURRENT MILD EPISODE OF MAJOR DEPRESSIVE DISORDER, UNSPECIFIED WHETHER RECURRENT: ICD-10-CM

## 2025-04-01 DIAGNOSIS — F02.80 MAJOR NEUROCOGNITIVE DISORDER DUE TO MULTIPLE ETIOLOGIES (H): ICD-10-CM

## 2025-04-01 DIAGNOSIS — K70.30 ALCOHOLIC CIRRHOSIS OF LIVER WITHOUT ASCITES (H): ICD-10-CM

## 2025-04-01 DIAGNOSIS — R56.9 SEIZURE-LIKE ACTIVITY (H): ICD-10-CM

## 2025-04-01 DIAGNOSIS — L30.9 DERMATITIS: ICD-10-CM

## 2025-04-01 PROCEDURE — 250N000011 HC RX IP 250 OP 636: Performed by: FAMILY MEDICINE

## 2025-04-01 PROCEDURE — 250N000009 HC RX 250: Performed by: FAMILY MEDICINE

## 2025-04-01 PROCEDURE — 77067 SCR MAMMO BI INCL CAD: CPT

## 2025-04-01 PROCEDURE — 77063 BREAST TOMOSYNTHESIS BI: CPT

## 2025-04-01 PROCEDURE — 74178 CT ABD&PLV WO CNTR FLWD CNTR: CPT

## 2025-04-01 RX ORDER — IOPAMIDOL 755 MG/ML
90 INJECTION, SOLUTION INTRAVASCULAR ONCE
Status: COMPLETED | OUTPATIENT
Start: 2025-04-01 | End: 2025-04-01

## 2025-04-01 RX ADMIN — IOPAMIDOL 90 ML: 755 INJECTION, SOLUTION INTRAVENOUS at 14:56

## 2025-04-01 RX ADMIN — SODIUM CHLORIDE 100 ML: 9 INJECTION, SOLUTION INTRAVENOUS at 15:09

## 2025-05-28 ENCOUNTER — TELEPHONE (OUTPATIENT)
Dept: FAMILY MEDICINE | Facility: CLINIC | Age: 75
End: 2025-05-28
Payer: COMMERCIAL

## 2025-05-28 NOTE — TELEPHONE ENCOUNTER
Patient Quality Outreach    Patient is due for the following:   Physical Annual Wellness Visit    Action(s) Taken:   Schedule a Annual Wellness Visit    Type of outreach:    Phone, left message for patient/parent to call back. and patient has an appt on 6/20/2025 with Dr Love. Would she like to add the AWV to that appt     Questions for provider review:    None         Wendie Johnson MA  Chart routed to None.

## 2025-06-19 ENCOUNTER — TELEPHONE (OUTPATIENT)
Dept: FAMILY MEDICINE | Facility: CLINIC | Age: 75
End: 2025-06-19
Payer: COMMERCIAL

## 2025-06-19 NOTE — TELEPHONE ENCOUNTER
Patient Quality Outreach    Patient is due for the following:   Physical Annual Wellness Visit    Action(s) Taken:   If patient calls back, schedule a Annual Wellness Visit    Type of outreach:    Phone, left message for patient/parent to call back.    Questions for provider review:    None         Toni Suazo MA  Chart routed to None.

## 2025-06-20 ENCOUNTER — OFFICE VISIT (OUTPATIENT)
Dept: FAMILY MEDICINE | Facility: CLINIC | Age: 75
End: 2025-06-20
Payer: COMMERCIAL

## 2025-06-20 VITALS
DIASTOLIC BLOOD PRESSURE: 57 MMHG | TEMPERATURE: 99.1 F | HEART RATE: 62 BPM | BODY MASS INDEX: 20.3 KG/M2 | SYSTOLIC BLOOD PRESSURE: 101 MMHG | WEIGHT: 114.56 LBS | HEIGHT: 63 IN | OXYGEN SATURATION: 97 % | RESPIRATION RATE: 14 BRPM

## 2025-06-20 DIAGNOSIS — F33.2 SEVERE EPISODE OF RECURRENT MAJOR DEPRESSIVE DISORDER, WITHOUT PSYCHOTIC FEATURES (H): ICD-10-CM

## 2025-06-20 DIAGNOSIS — S06.9X1S TRAUMATIC BRAIN INJURY, WITH LOSS OF CONSCIOUSNESS OF 30 MINUTES OR LESS, SEQUELA: ICD-10-CM

## 2025-06-20 DIAGNOSIS — Z00.00 PREVENTATIVE HEALTH CARE: ICD-10-CM

## 2025-06-20 DIAGNOSIS — I10 ESSENTIAL HYPERTENSION: ICD-10-CM

## 2025-06-20 DIAGNOSIS — H81.10 BENIGN PAROXYSMAL POSITIONAL VERTIGO, UNSPECIFIED LATERALITY: ICD-10-CM

## 2025-06-20 DIAGNOSIS — Z23 NEED FOR VACCINATION: ICD-10-CM

## 2025-06-20 DIAGNOSIS — R41.89 MODERATE COGNITIVE IMPAIRMENT: ICD-10-CM

## 2025-06-20 DIAGNOSIS — F02.80 MAJOR NEUROCOGNITIVE DISORDER DUE TO MULTIPLE ETIOLOGIES (H): Primary | ICD-10-CM

## 2025-06-20 PROBLEM — R11.2 NAUSEA AND VOMITING, UNSPECIFIED VOMITING TYPE: Status: RESOLVED | Noted: 2023-08-09 | Resolved: 2025-06-20

## 2025-06-20 PROCEDURE — 3074F SYST BP LT 130 MM HG: CPT | Performed by: FAMILY MEDICINE

## 2025-06-20 PROCEDURE — 99213 OFFICE O/P EST LOW 20 MIN: CPT | Performed by: FAMILY MEDICINE

## 2025-06-20 PROCEDURE — 96127 BRIEF EMOTIONAL/BEHAV ASSMT: CPT | Performed by: FAMILY MEDICINE

## 2025-06-20 PROCEDURE — 3078F DIAST BP <80 MM HG: CPT | Performed by: FAMILY MEDICINE

## 2025-06-20 ASSESSMENT — PATIENT HEALTH QUESTIONNAIRE - PHQ9
10. IF YOU CHECKED OFF ANY PROBLEMS, HOW DIFFICULT HAVE THESE PROBLEMS MADE IT FOR YOU TO DO YOUR WORK, TAKE CARE OF THINGS AT HOME, OR GET ALONG WITH OTHER PEOPLE: NOT DIFFICULT AT ALL
SUM OF ALL RESPONSES TO PHQ QUESTIONS 1-9: 7
SUM OF ALL RESPONSES TO PHQ QUESTIONS 1-9: 7

## 2025-06-20 NOTE — PROGRESS NOTES
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 However her  has asked her to walk outside some and she always refuses. I challenged her to Walk outside at least 2 times per week. She said she would try.    Moderate cognitive impairment  Because she is engaged more, she says more and seems a bit more on top of things, less apathetic    Need for vaccination  She asked to defer all vaccinations    Benign paroxysmal positional vertigo, unspecified laterality  She denies any of this now for several months.    Traumatic brain injury, with loss of consciousness of 30 minutes or less, sequela  This is historical but is clearly affecting her a lot now    Essential hypertension  Not only is her BP controlled, it is a bit low  - very different from previously. She is only on metoprolol for rate control, so this is puzzling. Will monitor.    Preventative health care  - REVIEW OF HEALTH MAINTENANCE PROTOCOL ORDERS    On the day of service, I spent 25 minutes with the patient, preparing for the visit with chart review, and charting.                          Jesus Webster is a 74 year old, presenting for the following health issues:  Weight Check and Hypertension      6/20/2025     8:51 AM   Additional Questions   Roomed by Maryann ALCAZAR   Accompanied by spouse Asaf     History of Present Illness       Reason for visit:  Check up  Symptom onset:  1-3 days ago  Symptom intensity:  Mild  Symptom progression:  Staying the same  Had these symptoms before:  Yes  Has tried/received treatment for these symptoms:  No  What makes it worse:  No  What makes it better:  Relaxing She is missing 1 dose(s) of medications per week.      Lots of spots and bruises on the skin -  Does not want a walker  Admits she's stumbling    Never wants to go on a walk.    Appetite - terrible  Has cereal and ice cream,  "  PB&J  supper    Occasionally dizzy -   Drinking lots of water    Mood - ok      Objective    /57   Pulse 62   Temp 99.1  F (37.3  C) (Oral)   Resp 14   Ht 1.605 m (5' 3.19\")   Wt 52 kg (114 lb 9 oz)   LMP  (LMP Unknown)   SpO2 97%   BMI 20.17 kg/m    Body mass index is 20.17 kg/m .  Physical Exam               Signed Electronically by: Camille Love MD    "